# Patient Record
Sex: FEMALE | Race: BLACK OR AFRICAN AMERICAN | Employment: OTHER | URBAN - METROPOLITAN AREA
[De-identification: names, ages, dates, MRNs, and addresses within clinical notes are randomized per-mention and may not be internally consistent; named-entity substitution may affect disease eponyms.]

---

## 2017-04-17 RX ORDER — HYDROCHLOROTHIAZIDE 12.5 MG/1
CAPSULE ORAL
Qty: 90 CAP | Refills: 0 | Status: SHIPPED | OUTPATIENT
Start: 2017-04-17 | End: 2017-07-18 | Stop reason: SDUPTHER

## 2017-05-18 RX ORDER — POLYMYXIN B SULFATE AND TRIMETHOPRIM 1; 10000 MG/ML; [USP'U]/ML
1 SOLUTION OPHTHALMIC EVERY 4 HOURS
Qty: 1 BOTTLE | Refills: 0 | Status: SHIPPED | OUTPATIENT
Start: 2017-05-18 | End: 2017-05-28

## 2017-05-23 ENCOUNTER — OFFICE VISIT (OUTPATIENT)
Dept: INTERNAL MEDICINE CLINIC | Age: 57
End: 2017-05-23

## 2017-05-23 VITALS
HEIGHT: 70 IN | RESPIRATION RATE: 18 BRPM | HEART RATE: 104 BPM | WEIGHT: 205.4 LBS | TEMPERATURE: 98.5 F | DIASTOLIC BLOOD PRESSURE: 88 MMHG | SYSTOLIC BLOOD PRESSURE: 130 MMHG | BODY MASS INDEX: 29.41 KG/M2 | OXYGEN SATURATION: 95 %

## 2017-05-23 DIAGNOSIS — E78.5 DYSLIPIDEMIA: Primary | ICD-10-CM

## 2017-05-23 DIAGNOSIS — R73.03 PREDIABETES: ICD-10-CM

## 2017-05-23 DIAGNOSIS — I10 ESSENTIAL HYPERTENSION: ICD-10-CM

## 2017-05-23 RX ORDER — METFORMIN HYDROCHLORIDE 500 MG/1
500 TABLET, EXTENDED RELEASE ORAL
Qty: 30 TAB | Refills: 11 | Status: SHIPPED | OUTPATIENT
Start: 2017-05-23 | End: 2018-11-07

## 2017-05-23 RX ORDER — OMEPRAZOLE 20 MG/1
CAPSULE, DELAYED RELEASE ORAL
COMMUNITY
Start: 2017-05-11 | End: 2017-07-11 | Stop reason: ALTCHOICE

## 2017-05-23 NOTE — MR AVS SNAPSHOT
Visit Information Date & Time Provider Department Dept. Phone Encounter #  
 5/23/2017 12:00 PM Eli Guerra MD [facility] Sports Medicine and [address] [id number] Follow-up Instructions Return in about 6 months (around 11/23/2017). Follow-up and Disposition History Upcoming Health Maintenance Date Due  
 BREAST CANCER SCRN MAMMOGRAM 10/22/2016 FOBT Q 1 YEAR AGE 50-75 4/4/2017 Pneumococcal 19-64 Highest Risk (2 of 3 - PCV13) 5/18/2017 INFLUENZA AGE 9 TO ADULT 8/1/2017 PAP AKA CERVICAL CYTOLOGY 10/23/2017 DTaP/Tdap/Td series (2 - Td) 5/18/2026 Allergies as of 5/23/2017  Review Complete On: 5/23/2017 By: Eli Guerra MD  
 No Known Allergies Current Immunizations  Never Reviewed No immunizations on file. Not reviewed this visit You Were Diagnosed With   
  
 Codes Comments Dyslipidemia    -  Primary ICD-10-CM: E78.5 ICD-9-CM: 272.4 Essential hypertension     ICD-10-CM: I10 
ICD-9-CM: 401.9 Prediabetes     ICD-10-CM: R73.03 
ICD-9-CM: 790.29 Vitals BP Pulse Temp Resp Height(growth percentile) Weight(growth percentile) 130/88 (BP 1 Location: Left arm, BP Patient Position: Sitting) (!) 104 98.5 °F (36.9 °C) (Oral) 18 5' 10\" (1.778 m) 205 lb 6.4 oz (93.2 kg) SpO2 BMI OB Status Smoking Status 95% 29.47 kg/m2 Postmenopausal Never Smoker BMI and BSA Data Body Mass Index Body Surface Area  
 29.47 kg/m 2 2.15 m 2 Preferred Pharmacy Pharmacy Name Phone [facility], [facility] [address] [phone] Your Updated Medication List  
  
   
This list is accurate as of: 5/23/17  1:50 PM.  Always use your most recent med list.  
  
  
  
  
 albuterol 90 mcg/actuation inhaler Commonly known as:  VENTOLIN HFA Take 2 Puffs by inhalation every four (4) hours as needed for Wheezing. desonide 0.05 % topical lotion Commonly known as:  Milady Pair Apply  to affected area two (2) times a day. hydroCHLOROthiazide 12.5 mg capsule Commonly known as:  Nowata Lukes TAKE ONE CAPSULE BY MOUTH ONCE DAILY  
  
 metFORMIN  mg tablet Commonly known as:  GLUCOPHAGE XR Take 1 Tab by mouth daily (with dinner). mometasone 50 mcg/actuation nasal spray Commonly known as:  NASONEX  
2 Sprays by Both Nostrils route daily. NexIUM 40 mg capsule Generic drug:  esomeprazole  
  
 omeprazole 20 mg capsule Commonly known as:  PRILOSEC  
  
 pantoprazole 40 mg tablet Commonly known as:  PROTONIX Take 1 Tab by mouth daily. sucralfate 1 gram tablet Commonly known as:  CARAFATE  
  
 trimethoprim-polymyxin b ophthalmic solution Commonly known as:  POLYTRIM Administer 1 Drop to both eyes every four (4) hours for 10 days. Prescriptions Sent to Pharmacy Refills  
 metFORMIN ER (GLUCOPHAGE XR) 500 mg tablet 11 Sig: Take 1 Tab by mouth daily (with dinner). Class: Normal  
 Pharmacy: [address] [place] [address] Ph #: [phone] Route: Oral  
  
We Performed the Following CBC WITH AUTOMATED DIFF [06085 CPT(R)] HEMOGLOBIN A1C WITH EAG [99094 CPT(R)] LIPID PANEL [24586 CPT(R)] METABOLIC PANEL, COMPREHENSIVE [92978 CPT(R)] OCCULT BLOOD, IMMUNOASSAY (FIT) [id number] CPT(R)] KS COLLECTION VENOUS BLOOD,VENIPUNCTURE [id number] CPT(R)] TSH 3RD GENERATION [47986 CPT(R)] URINALYSIS W/ RFLX MICROSCOPIC [22844 CPT(R)] Follow-up Instructions Return in about 6 months (around 11/23/2017). Introducing [facility] & HEALTH SERVICES! [facility] introduces [employer] patient portal. Now you can access parts of your medical record, email your doctor's office, and request medication refills online. 1. In your internet browser, go to [email or web address] 2. Click on the First Time User? Click Here link in the Sign In box. You will see the New Member Sign Up page. 3. Enter your [employer] Access Code exactly as it appears below. You will not need to use this code after youve completed the sign-up process. If you do not sign up before the expiration date, you must request a new code. · [employer] Access Code: [email or web address] Expires: 8/21/2017  1:21 PM 
 
4. Enter the last four digits of your Social Security Number (xxxx) and Date of Birth (mm/dd/yyyy) as indicated and click Submit. You will be taken to the next sign-up page. 5. Create a [employer] ID. This will be your [employer] login ID and cannot be changed, so think of one that is secure and easy to remember. 6. Create a [employer] password. You can change your password at any time. 7. Enter your Password Reset Question and Answer. This can be used at a later time if you forget your password. 8. Enter your e-mail address. You will receive e-mail notification when new information is available in [address] 9. Click Sign Up. You can now view and download portions of your medical record. 10. Click the Download Summary menu link to download a portable copy of your medical information. If you have questions, please visit the Frequently Asked Questions section of the [employer] website. Remember, [employer] is NOT to be used for urgent needs. For medical emergencies, dial 911. Now available from your iPhone and Android! Please provide this summary of care documentation to your next provider. Your primary care clinician is listed as Theo Mcneil. If you have any questions after today's visit, please call [phone].

## 2017-05-23 NOTE — PROGRESS NOTES
SPORTS MEDICINE AND PRIMARY CARE  Job Natarajan MD, [address] [address]  Phone:  [phone]  Fax: [phone]      Chief Complaint   Patient presents with    Follow-up     Elevated blood sugar level          SUBECTIVE:    Annalise Healy is a 61 y.o. female Patient returns today ambulatory, alert and appropriate and has the capacity to give an accurate history. She has a known history of primary hypertension, left breast cancer, dyslipidemia, steatosis of the liver, and is seen for evaluation. Since we last saw her she called us about redness to her left eye. We gave her Polytrim. This is the fifth day and she does not see the redness anymore. Patient states that she notes that her glucose is always up and wants to get something to suppress her appetite and to lower her glucose. We recall that a year ago she had a hemoglobin A1C which was 6.4 which placed her in the category of prediabetes. She tells us her blood sugars are greater than 200 consistently that would place her into the category of diabetes which we will confirm with a hemoglobin A1C today. Patient reminds me that she has 9 siblings and all of them except one have diabetes. She wants something done. Patient is seen for evaluation. Current Outpatient Prescriptions   Medication Sig Dispense Refill    omeprazole (PRILOSEC) 20 mg capsule       metFORMIN ER (GLUCOPHAGE XR) 500 mg tablet Take 1 Tab by mouth daily (with dinner). 30 Tab 11    trimethoprim-polymyxin b (POLYTRIM) ophthalmic solution Administer 1 Drop to both eyes every four (4) hours for 10 days. 1 Bottle 0    hydroCHLOROthiazide (MICROZIDE) 12.5 mg capsule TAKE ONE CAPSULE BY MOUTH ONCE DAILY 90 Cap 0    albuterol (VENTOLIN HFA) 90 mcg/actuation inhaler Take 2 Puffs by inhalation every four (4) hours as needed for Wheezing. 1 Inhaler 11    mometasone (NASONEX) 50 mcg/actuation nasal spray 2 Sprays by Both Nostrils route daily.  1 Container 11    sucralfate (CARAFATE) 1 gram tablet       desonide (TRIDESILON) 0.05 % topical lotion Apply  to affected area two (2) times a day. 59 mL 0    pantoprazole (PROTONIX) 40 mg tablet Take 1 Tab by mouth daily. 30 Tab 11    NEXIUM 40 mg capsule        Past Medical History:   Diagnosis Date    Breast cancer, left ([address]     followed by gyn md cassius    Dyslipidemia     Hypertension     Prediabetes 2016    S/P colonoscopy 09    md mina hemorrhoids    S/P MUKNUD-BSO (total abdominal hysterectomy and bilateral salpingo-oophorectomy)     Sinus congestion     Spider bite     Steatosis of liver 16     Past Surgical History:   Procedure Laterality Date    HX GYN       No Known Allergies    REVIEW OF SYSTEMS:   Urgency with small amounts. Social History     Social History    Marital status:      Spouse name: N/A    Number of children: N/A    Years of education: N/A     Social History Main Topics    Smoking status: Never Smoker    Smokeless tobacco: None    Alcohol use 0.5 oz/week     1 Glasses of wine per week    Drug use: None    Sexual activity: Not Asked     Other Topics Concern    None     Social History Narrative    Family History: Mother: , Jozef Dense: , Bee stingSister(s):   yrs, Breast Cancer at 42Brother(s):    , Heart Problems1 brother(s) , 7 sister(s) - healthy. 1 son(s) , 1 daughter(s) - healthy. Social History: Alcohol Use Patient does not use alcohol. Smoking Status Patient is a never smoker. Caffeine: coffee, tea. Marital Status:    . Lives w ith: spouse Savi Feldman.  Occupation/W ork: office w ork.   r  Family History   Problem Relation Age of Onset    Diabetes Mother        OBJECTIVE:  Visit Vitals    /88 (BP 1 Location: Left arm, BP Patient Position: Sitting)    Pulse (!) 104    Temp 98.5 °F (36.9 °C) (Oral)    Resp 18    Ht 5' 10\" (1.778 m)    Wt 205 lb 6.4 oz (93.2 kg)    SpO2 95%    BMI 29.47 kg/m2     ENT: perrla,  eom intact  NECK: supple. Thyroid normal  CHEST: clear to ascultation and percussion   HEART: regular rate and rhythm  ABD: soft, bowel sounds active  EXTREMITIES: no edema, pulse 1+     No visits with results within 3 Month(s) from this visit. Latest known visit with results is:    Abstract on 07/27/2016   Component Date Value Ref Range Status    LDL-C, External 05/23/2016 178   Final    Creatinine, External 05/23/2016 1.10   Final          ASSESSMENT:  1. Dyslipidemia    2. Essential hypertension    3. Prediabetes      We do not disagree with the patient. We note that her BMI is now 20.47 which reflects a seven pound weight gain since she was last seen. I think the Metformin will help in decreasing her appetite and also stabilize her blood sugar. Hemoglobin A1C will be checked today for blood sugar control. We will also check her metabolic status. Blood pressure control is at goal.      We suggest she return to see us eleuterio bout six months, sooner if she has any problems. We will also give her a glucose machine today. PLAN:  .  Orders Placed This Encounter    OCCULT BLOOD, IMMUNOASSAY (FIT)    URINALYSIS W/ RFLX MICROSCOPIC    CBC WITH AUTOMATED DIFF    METABOLIC PANEL, COMPREHENSIVE    LIPID PANEL    TSH 3RD GENERATION    HEMOGLOBIN A1C WITH EAG    omeprazole (PRILOSEC) 20 mg capsule    metFORMIN ER (GLUCOPHAGE XR) 500 mg tablet       Follow-up Disposition:  Return in about 6 months (around 11/23/2017). ATTENTION:   This medical record was transcribed using an electronic medical records system. Although proofread, it may and can contain electronic and spelling errors. Other human spelling and other errors may be present. Corrections may be executed at a later time. Please feel free to contact us for any clarifications as needed.

## 2017-05-23 NOTE — PROGRESS NOTES
1. Have you been to the ER, urgent care clinic since your last visit? Hospitalized since your last visit? Yes [facility]    2. Have you seen or consulted any other health care providers outside of the [address] [place] [address] since your last visit? Include any pap smears or colon screening.  Yes Reason for visit: Abdominal issues

## 2017-05-24 LAB
ALBUMIN SERPL-MCNC: 4.4 G/DL (ref 3.5–5.5)
ALBUMIN/GLOB SERPL: 1.5 {RATIO} (ref 1.2–2.2)
ALP SERPL-CCNC: 115 IU/L (ref 39–117)
ALT SERPL-CCNC: 26 IU/L (ref 0–32)
APPEARANCE UR: CLEAR
AST SERPL-CCNC: 25 IU/L (ref 0–40)
BASOPHILS # BLD AUTO: 0 X10E3/UL (ref 0–0.2)
BASOPHILS NFR BLD AUTO: 0 %
BILIRUB SERPL-MCNC: 0.4 MG/DL (ref 0–1.2)
BILIRUB UR QL STRIP: NEGATIVE
BUN SERPL-MCNC: 15 MG/DL (ref 6–24)
BUN/CREAT SERPL: 17 (ref 9–23)
CALCIUM SERPL-MCNC: 9.5 MG/DL (ref 8.7–10.2)
CHLORIDE SERPL-SCNC: 97 MMOL/L (ref 96–106)
CHOLEST SERPL-MCNC: 262 MG/DL (ref 100–199)
CO2 SERPL-SCNC: 22 MMOL/L (ref 18–29)
COLOR UR: YELLOW
CREAT SERPL-MCNC: 0.88 MG/DL (ref 0.57–1)
EOSINOPHIL # BLD AUTO: 0.1 X10E3/UL (ref 0–0.4)
EOSINOPHIL NFR BLD AUTO: 2 %
ERYTHROCYTE [DISTWIDTH] IN BLOOD BY AUTOMATED COUNT: 17.2 % (ref 12.3–15.4)
EST. AVERAGE GLUCOSE BLD GHB EST-MCNC: 126 MG/DL
GLOBULIN SER CALC-MCNC: 2.9 G/DL (ref 1.5–4.5)
GLUCOSE SERPL-MCNC: 88 MG/DL (ref 65–99)
GLUCOSE UR QL: NEGATIVE
HBA1C MFR BLD: 6 % (ref 4.8–5.6)
HCT VFR BLD AUTO: 41.3 % (ref 34–46.6)
HDLC SERPL-MCNC: 55 MG/DL
HGB BLD-MCNC: 14.4 G/DL (ref 11.1–15.9)
HGB UR QL STRIP: NEGATIVE
IMM GRANULOCYTES # BLD: 0 X10E3/UL (ref 0–0.1)
IMM GRANULOCYTES NFR BLD: 0 %
KETONES UR QL STRIP: NEGATIVE
LDLC SERPL CALC-MCNC: 186 MG/DL (ref 0–99)
LEUKOCYTE ESTERASE UR QL STRIP: NEGATIVE
LYMPHOCYTES # BLD AUTO: 3.2 X10E3/UL (ref 0.7–3.1)
LYMPHOCYTES NFR BLD AUTO: 40 %
MCH RBC QN AUTO: 26.3 PG (ref 26.6–33)
MCHC RBC AUTO-ENTMCNC: 34.9 G/DL (ref 31.5–35.7)
MCV RBC AUTO: 75 FL (ref 79–97)
MICRO URNS: NORMAL
MONOCYTES # BLD AUTO: 0.5 X10E3/UL (ref 0.1–0.9)
MONOCYTES NFR BLD AUTO: 6 %
NEUTROPHILS # BLD AUTO: 4.3 X10E3/UL (ref 1.4–7)
NEUTROPHILS NFR BLD AUTO: 52 %
NITRITE UR QL STRIP: NEGATIVE
PH UR STRIP: 6.5 [PH] (ref 5–7.5)
PLATELET # BLD AUTO: 281 X10E3/UL (ref 150–379)
POTASSIUM SERPL-SCNC: 4 MMOL/L (ref 3.5–5.2)
PROT SERPL-MCNC: 7.3 G/DL (ref 6–8.5)
PROT UR QL STRIP: NEGATIVE
RBC # BLD AUTO: 5.48 X10E6/UL (ref 3.77–5.28)
SODIUM SERPL-SCNC: 139 MMOL/L (ref 134–144)
SP GR UR: 1.02 (ref 1–1.03)
TRIGL SERPL-MCNC: 107 MG/DL (ref 0–149)
TSH SERPL DL<=0.005 MIU/L-ACNC: 3.45 UIU/ML (ref 0.45–4.5)
UROBILINOGEN UR STRIP-MCNC: 0.2 MG/DL (ref 0.2–1)
VLDLC SERPL CALC-MCNC: 21 MG/DL (ref 5–40)
WBC # BLD AUTO: 8.2 X10E3/UL (ref 3.4–10.8)

## 2017-05-24 RX ORDER — ATORVASTATIN CALCIUM 40 MG/1
40 TABLET, FILM COATED ORAL DAILY
Qty: 30 TAB | Refills: 11 | Status: SHIPPED | OUTPATIENT
Start: 2017-05-24 | End: 2018-05-11 | Stop reason: SDUPTHER

## 2017-06-02 PROBLEM — Z12.11 ENCOUNTER FOR HEMOCCULT SCREENING: Status: ACTIVE | Noted: 2017-06-02

## 2017-06-02 LAB — HEMOCCULT STL QL IA: NEGATIVE

## 2017-07-11 ENCOUNTER — OFFICE VISIT (OUTPATIENT)
Dept: INTERNAL MEDICINE CLINIC | Age: 57
End: 2017-07-11

## 2017-07-11 VITALS
HEIGHT: 70 IN | TEMPERATURE: 97.9 F | BODY MASS INDEX: 29.35 KG/M2 | DIASTOLIC BLOOD PRESSURE: 89 MMHG | HEART RATE: 58 BPM | SYSTOLIC BLOOD PRESSURE: 126 MMHG | RESPIRATION RATE: 18 BRPM | WEIGHT: 205 LBS | OXYGEN SATURATION: 96 %

## 2017-07-11 DIAGNOSIS — K76.0 STEATOSIS OF LIVER: ICD-10-CM

## 2017-07-11 DIAGNOSIS — I10 ESSENTIAL HYPERTENSION: ICD-10-CM

## 2017-07-11 DIAGNOSIS — J20.9 ACUTE BRONCHITIS, UNSPECIFIED ORGANISM: ICD-10-CM

## 2017-07-11 DIAGNOSIS — R07.9 CHEST PAIN, UNSPECIFIED TYPE: Primary | ICD-10-CM

## 2017-07-11 DIAGNOSIS — R05.9 COUGH: ICD-10-CM

## 2017-07-11 RX ORDER — ALBUTEROL SULFATE 90 UG/1
2 AEROSOL, METERED RESPIRATORY (INHALATION)
Qty: 1 INHALER | Refills: 11 | Status: SHIPPED | OUTPATIENT
Start: 2017-07-11 | End: 2018-10-02 | Stop reason: SDUPTHER

## 2017-07-11 RX ORDER — AZITHROMYCIN 250 MG/1
250 TABLET, FILM COATED ORAL SEE ADMIN INSTRUCTIONS
Qty: 6 TAB | Refills: 1 | Status: SHIPPED | OUTPATIENT
Start: 2017-07-11 | End: 2017-12-04 | Stop reason: SDUPTHER

## 2017-07-11 RX ORDER — CODEINE PHOSPHATE AND GUAIFENESIN 10; 100 MG/5ML; MG/5ML
5 SOLUTION ORAL
Qty: 180 ML | Refills: 1 | Status: SHIPPED | OUTPATIENT
Start: 2017-07-11 | End: 2017-09-19 | Stop reason: ALTCHOICE

## 2017-07-11 NOTE — MR AVS SNAPSHOT
Visit Information Date & Time Provider Department Dept. Phone Encounter #  
 7/11/2017  4:00 PM Moi Goss MD SPORTS MED AND PRIMARY CARE - [facility] [phone] [id number] Follow-up Instructions Return in about 10 months (around 5/11/2018). Follow-up and Disposition History Your Appointments 11/14/2017  9:15 AM  
Any with Moi Goss MD  
[address] [place] [address] and Primary Care [address] [place] [address]) Appt Note: 6 month f/u htn  
 [address]  
  
   
 [address] [id number] Upcoming Health Maintenance Date Due  
 BREAST CANCER SCRN MAMMOGRAM 10/22/2016 PAP AKA CERVICAL CYTOLOGY 10/23/2017 INFLUENZA AGE 9 TO ADULT 8/1/2017 FOBT Q 1 YEAR AGE 50-75 5/27/2018 Pneumococcal 19-64 Highest Risk (3 of 3 - PCV13) 7/11/2018 DTaP/Tdap/Td series (2 - Td) 5/18/2026 Allergies as of 7/11/2017  Review Complete On: 7/11/2017 By: Moi Goss MD  
 No Known Allergies Current Immunizations  Never Reviewed No immunizations on file. Not reviewed this visit You Were Diagnosed With   
  
 Codes Comments Chest pain, unspecified type    -  Primary ICD-10-CM: R07.9 ICD-9-CM: 786.50 Essential hypertension     ICD-10-CM: I10 
ICD-9-CM: 401.9 Steatosis of liver     ICD-10-CM: K76.0 ICD-9-CM: 571.8 Acute bronchitis, unspecified organism     ICD-10-CM: J20.9 ICD-9-CM: 466.0 Cough     ICD-10-CM: R05 ICD-9-CM: 337. 2 Vitals BP Pulse Temp Resp Height(growth percentile) Weight(growth percentile) 126/89 (BP 1 Location: Right arm, BP Patient Position: Sitting) (!) 58 97.9 °F (36.6 °C) (Oral) 18 5' 10\" (1.778 m) 205 lb (93 kg) SpO2 BMI OB Status Smoking Status 96% 29.41 kg/m2 Postmenopausal Never Smoker Vitals History BMI and BSA Data Body Mass Index Body Surface Area  
 29.41 kg/m 2 2.14 m 2 Preferred Pharmacy Pharmacy Name Phone [facility], [address] Gissel Bae [phone] Your Updated Medication List  
  
   
This list is accurate as of: 7/11/17  5:04 PM.  Always use your most recent med list.  
  
  
  
  
 albuterol 90 mcg/actuation inhaler Commonly known as:  VENTOLIN HFA Take 2 Puffs by inhalation every four (4) hours as needed for Wheezing. atorvastatin 40 mg tablet Commonly known as:  LIPITOR Take 1 Tab by mouth daily. azithromycin 250 mg tablet Commonly known as:  Galdino Gist Take 1 Tab by mouth See Admin Instructions for 5 days. desonide 0.05 % topical lotion Commonly known as:  Dorthula Newcomalhaji Apply  to affected area two (2) times a day. glucose blood VI test strips strip Commonly known as:  CONTOUR NEXT STRIPS  
qd  
  
 guaiFENesin-codeine 100-10 mg/5 mL solution Commonly known as:  ROBITUSSIN AC Take 5 mL by mouth four (4) times daily as needed for Cough. Max Daily Amount: 20 mL.  
  
 hydroCHLOROthiazide 12.5 mg capsule Commonly known as:  Jerilyn Stone TAKE ONE CAPSULE BY MOUTH ONCE DAILY  
  
 metFORMIN  mg tablet Commonly known as:  GLUCOPHAGE XR Take 1 Tab by mouth daily (with dinner). mometasone 50 mcg/actuation nasal spray Commonly known as:  NASONEX  
2 Sprays by Both Nostrils route daily. Prescriptions Printed Refills  
 guaiFENesin-codeine (ROBITUSSIN AC) 100-10 mg/5 mL solution 1 Sig: Take 5 mL by mouth four (4) times daily as needed for Cough. Max Daily Amount: 20 mL. Class: Print Route: Oral  
  
Prescriptions Sent to Pharmacy Refills  
 albuterol (VENTOLIN HFA) 90 mcg/actuation inhaler 11 Sig: Take 2 Puffs by inhalation every four (4) hours as needed for Wheezing. Class: Normal  
 Pharmacy: [address], [address] [place] [address] Ph #: [phone]  Route: Inhalation  
 azithromycin (ZITHROMAX) 250 mg tablet 1  
 Sig: Take 1 Tab by mouth See Admin Instructions for 5 days. Class: Normal  
 Pharmacy: [address] [facility], [address] [place] [address] Ph #: [phone] Route: Oral  
  
We Performed the Following AMB POC EKG ROUTINE W/ 12 LEADS, INTER & REP [23322 CPT(R)] PAIN MGMT PANEL W/REFL, UR [[id number] Custom] Follow-up Instructions Return in about 10 months (around 5/11/2018). To-Do List   
 07/11/2017 ECHO:  ECHO EXERCISE STRESS Introducing [facility] & HEALTH SERVICES! Dear Saleem Medina: Thank you for requesting a [employer] account. Our records indicate that you already have an active [employer] account. You can access your account anytime at [email or web address] Did you know that you can access your hospital and ER discharge instructions at any time in [employer]? You can also review all of your test results from your hospital stay or ER visit. Additional Information If you have questions, please visit the Frequently Asked Questions section of the [employer] website at [email or web address] Remember, [employer] is NOT to be used for urgent needs. For medical emergencies, dial 911. Now available from your iPhone and Android! Please provide this summary of care documentation to your next provider. Your primary care clinician is listed as Yogesh Juarez. If you have any questions after today's visit, please call [phone].

## 2017-07-11 NOTE — PROGRESS NOTES
1. Have you been to the ER, urgent care clinic since your last visit? Hospitalized since your last visit? No    2. Have you seen or consulted any other health care providers outside of the [address] [place] [address] since your last visit? Include any pap smears or colon screening.  No

## 2017-07-11 NOTE — PROGRESS NOTES
SPORTS MEDICINE AND PRIMARY CARE  Jesse Harkins MD, [address] [address]  Phone:  [phone]  Fax: [phone]       Chief Complaint   Patient presents with    Cold    Cough    Chills    Fever    Shoulder Pain   . SUBJECTIVE:    Hussein Michelle is a 61 y.o. female The patient returns today, alert, appropriate, and ambulatory, and has  e capacity to give an accurate history. She has a known history of primary hypertension and prediabetes, and steatosis of the liver. Over the past week or two she has had a cough productive of mucoid purulent sputum. The cough has been persistent and just not feeling well. She also complains of some chest discomfort. She describes it as a pressure type sensation without association of shortness of breath or diaphoresis, not necessarily activity related. That has also been going on for a couple of weeks. Other new complaints denied. Patient seen for evaluation. Current Outpatient Prescriptions   Medication Sig Dispense Refill    albuterol (VENTOLIN HFA) 90 mcg/actuation inhaler Take 2 Puffs by inhalation every four (4) hours as needed for Wheezing. 1 Inhaler 11    azithromycin (ZITHROMAX) 250 mg tablet Take 1 Tab by mouth See Admin Instructions for 5 days. 6 Tab 1    guaiFENesin-codeine (ROBITUSSIN AC) 100-10 mg/5 mL solution Take 5 mL by mouth four (4) times daily as needed for Cough. Max Daily Amount: 20 mL. 180 mL 1    metFORMIN ER (GLUCOPHAGE XR) 500 mg tablet Take 1 Tab by mouth daily (with dinner). 30 Tab 11    glucose blood VI test strips (CONTOUR NEXT STRIPS) strip qd 100 Strip 11    hydroCHLOROthiazide (MICROZIDE) 12.5 mg capsule TAKE ONE CAPSULE BY MOUTH ONCE DAILY 90 Cap 0    mometasone (NASONEX) 50 mcg/actuation nasal spray 2 Sprays by Both Nostrils route daily. 1 Container 11    desonide (TRIDESILON) 0.05 % topical lotion Apply  to affected area two (2) times a day.  59 mL 0    atorvastatin (LIPITOR) 40 mg tablet Take 1 Tab by mouth daily. 27 Tab 11     Past Medical History:   Diagnosis Date    Acute bronchitis     Breast cancer, left ([address]     followed by gyn md cassius    Chest pain 2017    Dyslipidemia     Encounter for Hemoccult screening 2017    neg    Hypertension     Prediabetes 2016    S/P colonoscopy 09    md mina hemorrhoids    S/P MUKUND-BSO (total abdominal hysterectomy and bilateral salpingo-oophorectomy)     Sinus congestion     Spider bite     Steatosis of liver 16     Past Surgical History:   Procedure Laterality Date    HX GYN       No Known Allergies      REVIEW OF SYSTEMS:  General: negative for - chills or fever  ENT: negative for - headaches, nasal congestion or tinnitus  Respiratory: negative for - cough, hemoptysis, shortness of breath or wheezing  Cardiovascular : negative for - chest pain, edema, palpitations or shortness of breath  Gastrointestinal: negative for - abdominal pain, blood in stools, heartburn or nausea/vomiting  Genito-Urinary: no dysuria, trouble voiding, or hematuria  Musculoskeletal: negative for - gait disturbance, joint pain, joint stiffness or joint swelling  Neurological: no TIA or stroke symptoms  Hematologic: no bruises, no bleeding, no swollen glands  Integument: no lumps, mole changes, nail changes or rash  Endocrine: no malaise/lethargy or unexpected weight changes      Social History     Social History    Marital status:      Spouse name: N/A    Number of children: N/A    Years of education: N/A     Social History Main Topics    Smoking status: Never Smoker    Smokeless tobacco: Never Used    Alcohol use 0.5 oz/week     1 Glasses of wine per week    Drug use: None    Sexual activity: Not Asked     Other Topics Concern    None     Social History Narrative    Family History:  Mother: , Claudine Mart: , Bee stingSister(s):   yrs, Breast Cancer at 42Brother(s): , Heart Problems1 brother(s) , 7 sister(s) - healthy. 1 son(s) , 1 daughter(s) - healthy. Social History: Alcohol Use Patient does not use alcohol. Smoking Status Patient is a never smoker. Caffeine: coffee, tea. Marital Status:    . Lives w ith: spouse Kristal rUiarte. Occupation/W ork: office w ork. Medical History: Hypertension, Left Breast papilloma. Gyn History: Last mammogram date 10/28/2014. Last pap date 10/22/2012. Hysterectomy Date . OB History: Total pregnancies 2. Full term delivery (>37 w eeks) 2. Live births 2. C section(s) 2. Pregnancy # 1: , Girl, 11. Pregnancy # 2: Repeat C/S, boy, . Surgical History: hysterectomy, total abdominal w ith BSO , Left w rist surgery , colonoscopy [place]  and , left lumpectomy . Family History   Problem Relation Age of Onset    Diabetes Mother        OBJECTIVE:    Visit Vitals    /89 (BP 1 Location: Right arm, BP Patient Position: Sitting)    Pulse (!) 58    Temp 97.9 °F (36.6 °C) (Oral)    Resp 18    Ht 5' 10\" (1.778 m)    Wt 205 lb (93 kg)    SpO2 96%    BMI 29.41 kg/m2     CONSTITUTIONAL: well , well nourished, appears age appropriate  EYES: perrla, eom intact  ENMT:moist mucous membranes, pharynx clear  NECK: supple. Thyroid normal  RESPIRATORY: Chest: clear bilaterally   CARDIOVASCULAR: Heart: regular rate and rhythm  GASTROINTESTINAL: Abdomen: soft, bowel sounds active  HEMATOLOGIC: no pathological lymph nodes palpated  MUSCULOSKELETAL: Extremities: no edema, pulse 1+   INTEGUMENT: No unusual rashes or suspicious skin lesions noted. Nails appear normal.  NEUROLOGIC: non-focal exam   MENTAL STATUS: alert and oriented, appropriate affect           ASSESSMENT:  1. Chest pain, unspecified type    2. Essential hypertension    3. Steatosis of liver    4. Acute bronchitis, unspecified organism    5. Cough      The patient has acute bronchitis for which she has no bronchospastic component. Will use Robitussin AC for the cough, and give her Z-Niall with a refill to help resolve the infectious component of bronchitis. I am concerned about the chest discomfort. Will ask for an EKG today to be sure there is no change from the previous EKG, and request a stress echocardiogram.      Her BMI remains elevated, but fortunately it has not changed significantly from the last visit. She will return to the office when scheduled. PLAN:  .  Orders Placed This Encounter    PAIN MGMT PANEL W/REFL, UR    AMB POC EKG ROUTINE W/ 12 LEADS, INTER & REP    ECHO EXERCISE STRESS    albuterol (VENTOLIN HFA) 90 mcg/actuation inhaler    azithromycin (ZITHROMAX) 250 mg tablet    guaiFENesin-codeine (ROBITUSSIN AC) 100-10 mg/5 mL solution       Follow-up Disposition:  Return in about 10 months (around 5/11/2018). ATTENTION:   This medical record was transcribed using an electronic medical records system. Although proofread, it may and can contain electronic and spelling errors. Other human spelling and other errors may be present. Corrections may be executed at a later time. Please feel free to contact us for any clarifications as needed.

## 2017-07-13 LAB
AMPHETAMINES UR QL SCN: NEGATIVE NG/ML
BARBITURATES UR QL SCN: NEGATIVE NG/ML
BENZODIAZ UR QL SCN: NEGATIVE NG/ML
BZE UR QL SCN: NEGATIVE NG/ML
CANNABINOIDS UR QL SCN: NEGATIVE NG/ML
CREAT UR-MCNC: 129.8 MG/DL (ref 20–300)
FENTANYL+NORFENTANYL UR QL SCN: NEGATIVE PG/ML
MEPERIDINE UR QL: NEGATIVE NG/ML
METHADONE UR QL SCN: NEGATIVE NG/ML
OPIATES UR QL SCN: NEGATIVE NG/ML
OXYCODONE+OXYMORPHONE UR QL SCN: NEGATIVE NG/ML
PCP UR QL: NEGATIVE NG/ML
PH UR: 5.9 [PH] (ref 4.5–8.9)
PLEASE NOTE:, 733157: NORMAL
PROPOXYPH UR QL SCN: NEGATIVE NG/ML
SP GR UR: 1.01
TRAMADOL UR QL SCN: NEGATIVE NG/ML

## 2017-07-18 RX ORDER — HYDROCHLOROTHIAZIDE 12.5 MG/1
CAPSULE ORAL
Qty: 90 CAP | Refills: 3 | Status: SHIPPED | OUTPATIENT
Start: 2017-07-18 | End: 2018-07-16 | Stop reason: SDUPTHER

## 2017-07-19 ENCOUNTER — HOSPITAL ENCOUNTER (OUTPATIENT)
Dept: NON INVASIVE DIAGNOSTICS | Age: 57
Discharge: HOME | End: 2017-07-19
Attending: INTERNAL MEDICINE
Payer: COMMERCIAL

## 2017-07-19 DIAGNOSIS — R07.9 CHEST PAIN, UNSPECIFIED TYPE: ICD-10-CM

## 2017-07-19 LAB
ATTENDING PHYSICIAN, CST07: NORMAL
DIAGNOSIS, 93000: NORMAL
DUKE TM SCORE RESULT, CST14: NORMAL
DUKE TREADMILL SCORE, CST13: NORMAL
ECG INTERP BEFORE EX, CST11: NORMAL
ECG INTERP DURING EX, CST12: NORMAL
FUNCTIONAL CAPACITY, CST17: NORMAL
KNOWN CARDIAC CONDITION, CST08: NORMAL
MAX. DIASTOLIC BP, CST04: 80 MMHG
MAX. HEART RATE, CST05: 155 BPM
MAX. SYSTOLIC BP, CST03: 170 MMHG
OVERALL BP RESPONSE TO EXERCISE, CST16: NORMAL
OVERALL HR RESPONSE TO EXERCISE, CST15: NORMAL
PEAK EX METS, CST10: 6.3 METS
PROTOCOL NAME, CST01: NORMAL
TEST INDICATION, CST09: NORMAL

## 2017-07-19 PROCEDURE — 93351 STRESS TTE COMPLETE: CPT

## 2017-09-15 RX ORDER — MOMETASONE FUROATE 50 MCG
AEROSOL, SPRAY WITH PUMP (GRAM) NASAL
Qty: 17 CONTAINER | Refills: 11 | Status: SHIPPED | OUTPATIENT
Start: 2017-09-15 | End: 2019-03-07 | Stop reason: SDUPTHER

## 2017-09-19 ENCOUNTER — OFFICE VISIT (OUTPATIENT)
Dept: INTERNAL MEDICINE CLINIC | Age: 57
End: 2017-09-19

## 2017-09-19 VITALS
HEIGHT: 70 IN | SYSTOLIC BLOOD PRESSURE: 135 MMHG | HEART RATE: 76 BPM | RESPIRATION RATE: 18 BRPM | WEIGHT: 209.4 LBS | DIASTOLIC BLOOD PRESSURE: 93 MMHG | TEMPERATURE: 97.7 F | OXYGEN SATURATION: 96 % | BODY MASS INDEX: 29.98 KG/M2

## 2017-09-19 DIAGNOSIS — R10.9 ABDOMINAL WALL PAIN: ICD-10-CM

## 2017-09-19 DIAGNOSIS — K76.0 STEATOSIS OF LIVER: ICD-10-CM

## 2017-09-19 DIAGNOSIS — R73.03 PREDIABETES: ICD-10-CM

## 2017-09-19 DIAGNOSIS — I10 ESSENTIAL HYPERTENSION: Primary | ICD-10-CM

## 2017-09-19 NOTE — MR AVS SNAPSHOT
Visit Information Date & Time Provider Department Dept. Phone Encounter #  
 9/19/2017 10:45 AM Rosa Hwang MD [facility] and [place] [id number] Follow-up Instructions Return in about 3 months (around 12/19/2017). Follow-up and Disposition History Your Appointments 11/14/2017  9:15 AM  
Any with Rosa Hwang MD  
[address] [facility] [address]) Appt Note: 6 month f/u htn  
 [address] [facility]  
  
   
 [id number] 5/1/2018  9:00 AM  
Any with Rosa Hwang MD  
[facility] [address]) Appt Note: 10 month f/u  
 [address]  
[phone] Upcoming Health Maintenance Date Due  
 BREAST CANCER SCRN MAMMOGRAM 10/22/2016 PAP AKA CERVICAL CYTOLOGY 10/23/2017 FOBT Q 1 YEAR AGE 50-75 5/27/2018 Pneumococcal 19-64 Highest Risk (3 of 3 - PCV13) 7/11/2018 DTaP/Tdap/Td series (2 - Td) 5/18/2026 Allergies as of 9/19/2017  Review Complete On: 9/19/2017 By: Rosa Hwang MD  
 No Known Allergies Current Immunizations  Never Reviewed No immunizations on file. Not reviewed this visit You Were Diagnosed With   
  
 Codes Comments Essential hypertension    -  Primary ICD-10-CM: I10 
ICD-9-CM: 401.9 Abdominal wall pain     ICD-10-CM: R10.9 ICD-9-CM: 789.00 Prediabetes     ICD-10-CM: R73.03 
ICD-9-CM: 790.29 Steatosis of liver     ICD-10-CM: K76.0 ICD-9-CM: 571.8 Vitals BP Pulse Temp Resp Height(growth percentile) Weight(growth percentile) (!) 135/93 (BP 1 Location: Right arm, BP Patient Position: Sitting) 76 97.7 °F (36.5 °C) (Oral) 18 5' 10\" (1.778 m) 209 lb 6.4 oz (95 kg) SpO2 BMI OB Status Smoking Status 96% 30.05 kg/m2 Postmenopausal Never Smoker BMI and BSA Data Body Mass Index Body Surface Area 30.05 kg/m 2 2.17 m 2 Preferred Pharmacy Pharmacy Name Phone [facility] [address] [place], [place] - [address] [place] [address] Lisa Chavis [phone] Your Updated Medication List  
  
   
This list is accurate as of: 9/19/17  1:05 PM.  Always use your most recent med list.  
  
  
  
  
 albuterol 90 mcg/actuation inhaler Commonly known as:  VENTOLIN HFA Take 2 Puffs by inhalation every four (4) hours as needed for Wheezing. atorvastatin 40 mg tablet Commonly known as:  LIPITOR Take 1 Tab by mouth daily. desonide 0.05 % topical lotion Commonly known as:  Gerianne Jeff Davis Apply  to affected area two (2) times a day. glucose blood VI test strips strip Commonly known as:  CONTOUR NEXT STRIPS  
qd  
  
 hydroCHLOROthiazide 12.5 mg capsule Commonly known as:  [place] Sinks TAKE ONE CAPSULE BY MOUTH ONCE DAILY  
  
 metFORMIN  mg tablet Commonly known as:  GLUCOPHAGE XR Take 1 Tab by mouth daily (with dinner). NASONEX 50 mcg/actuation nasal spray Generic drug:  mometasone USE TWO SPRAY(S) IN EACH NOSTRIL DAILY Follow-up Instructions Return in about 3 months (around 12/19/2017). Introducing [facility]! Dear Ketty: Thank you for requesting a [employer] account. Our records indicate that you already have an active [employer] account. You can access your account anytime at [email or web address] Did you know that you can access your hospital and ER discharge instructions at any time in [employer]? You can also review all of your test results from your hospital stay or ER visit. Additional Information If you have questions, please visit the Frequently Asked Questions section of the [employer] website at [email or web address] Remember, [employer] is NOT to be used for urgent needs. For medical emergencies, dial 911. Now available from your iPhone and Android! Please provide this summary of care documentation to your next provider. Your primary care clinician is listed as Alexsandra Daley. If you have any questions after today's visit, please call [phone].

## 2017-09-19 NOTE — PROGRESS NOTES
[facility]  Todd Clark MD, [address] [place] [address] [address]  Phone:  [phone]  Fax: [phone]       Chief Complaint   Patient presents with   [facility] Follow Up   . SUBJECTIVE:    Guanaco Polk is a 61 y.o. female Patient returns today alert, appropriate, ambulatory and has the capacity to give an accurate history. Since we last saw her her stress echocardiogram was performed and was negative. She has a known history of hepatic steatosis, primary hypertension, dyslipidemia and left breast cancer, followed by Dr. Ar Gallego. Since we last saw her she had an episode a day after eating some [place] food, loss of balance and staggering gait when she got up twice to urinate. The following morning she had soft stools about three times. She didn't feel good and subsequently went to [facility] and was seen on 09/11/17, where chest xray was negative, CT scan revealed no evidence of an acute intracranial event, a CBC was unremarkable, a UA was unremarkable, and a BMP was unremarkable with a BUN of 12 and creatinine of . She was given a liter of fluid and comes in today feeling better. Patient complains of pain in the right lateral side of her umbilicus for the past 2-3 weeks. She has been doing some gardening work and wonders if that was a contributing factor. We recall that on 06/19/15 she had a CT of her abdomen and pelvis, which revealed the fatty infiltration of the liver and otherwise an unremarkable study. Patient is seen for evaluation. Current Outpatient Prescriptions   Medication Sig Dispense Refill    NASONEX 50 mcg/actuation nasal spray USE TWO SPRAY(S) IN EACH NOSTRIL DAILY 17 Container 11    hydroCHLOROthiazide (MICROZIDE) 12.5 mg capsule TAKE ONE CAPSULE BY MOUTH ONCE DAILY 90 Cap 3    albuterol (VENTOLIN HFA) 90 mcg/actuation inhaler Take 2 Puffs by inhalation every four (4) hours as needed for Wheezing.  1 Inhaler 11    atorvastatin (LIPITOR) 40 mg tablet Take 1 Tab by mouth daily. 30 Tab 11    metFORMIN ER (GLUCOPHAGE XR) 500 mg tablet Take 1 Tab by mouth daily (with dinner). 30 Tab 11    glucose blood VI test strips (CONTOUR NEXT STRIPS) strip qd 100 Strip 11    desonide (TRIDESILON) 0.05 % topical lotion Apply  to affected area two (2) times a day.  59 mL 0     Past Medical History:   Diagnosis Date    Acute bronchitis     Breast cancer, left ([address] 2005    followed by gyn md cassius    Chest pain 07/11/2017    Dyslipidemia     Encounter for Hemoccult screening 06/02/2017    neg    Hypertension     Normal cardiac stress test 07/19/2017    nrmal stres echo    Prediabetes 04/16/2016    S/P colonoscopy 8-11-09    md mina hemorrhoids    S/P MUKUND-BSO (total abdominal hysterectomy and bilateral salpingo-oophorectomy)     Sinus congestion     Spider bite     Steatosis of liver 6/19/16     Past Surgical History:   Procedure Laterality Date    HX GYN       No Known Allergies      REVIEW OF SYSTEMS:  General: negative for - chills or fever  ENT: negative for - headaches, nasal congestion or tinnitus  Respiratory: negative for - cough, hemoptysis, shortness of breath or wheezing  Cardiovascular : negative for - chest pain, edema, palpitations or shortness of breath  Gastrointestinal: negative for - abdominal pain, blood in stools, heartburn or nausea/vomiting  Genito-Urinary: no dysuria, trouble voiding, or hematuria  Musculoskeletal: negative for - gait disturbance, joint pain, joint stiffness or joint swelling  Neurological: no TIA or stroke symptoms  Hematologic: no bruises, no bleeding, no swollen glands  Integument: no lumps, mole changes, nail changes or rash  Endocrine: no malaise/lethargy or unexpected weight changes      Social History     Social History    Marital status:      Spouse name: N/A    Number of children: N/A    Years of education: N/A     Social History Main Topics    Smoking status: Never Smoker    Smokeless tobacco: Never Used    Alcohol use 0.5 oz/week     1 Glasses of wine per week    Drug use: None    Sexual activity: Not Asked     Other Topics Concern    None     Social History Narrative    Family History: Mother: , Rayna Cuenca: , Bee stingSister(s):  2010 yrs, Breast Cancer at 42Brother(s):    , Heart Problems1 brother(s) , 7 sister(s) - healthy. 1 son(s) , 1 daughter(s) - healthy. Social History: Alcohol Use Patient does not use alcohol. Smoking Status Patient is a never smoker. Caffeine: coffee, tea. Marital Status:    . Lives w ith: spouse Yousuf Quinonez. Occupation/W ork: office w ork. Medical History: Hypertension, Left Breast papilloma. Gyn History: Last mammogram date 10/28/2014. Last pap date 10/22/2012. Hysterectomy Date . OB History: Total pregnancies 2. Full term delivery (>37 w eeks) 2. Live births 2. C section(s) 2. Pregnancy # 1: , Girl, 11. Pregnancy # 2: Repeat C/S, boy, . Surgical History: hysterectomy, total abdominal w ith BSO , Left w rist surgery , colonoscopy Estephania  and , left lumpectomy . Family History   Problem Relation Age of Onset    Diabetes Mother        OBJECTIVE:    Visit Vitals    BP (!) 135/93 (BP 1 Location: Right arm, BP Patient Position: Sitting)    Pulse 76    Temp 97.7 °F (36.5 °C) (Oral)    Resp 18    Ht 5' 10\" (1.778 m)    Wt 209 lb 6.4 oz (95 kg)    SpO2 96%    BMI 30.05 kg/m2     CONSTITUTIONAL: well , well nourished, appears age appropriate  EYES: perrla, eom intact  ENMT:moist mucous membranes, pharynx clear  NECK: supple.  Thyroid normal  RESPIRATORY: Chest: clear bilaterally   CARDIOVASCULAR: Heart: regular rate and rhythm  GASTROINTESTINAL: Abdomen: soft, bowel sounds active  HEMATOLOGIC: no pathological lymph nodes palpated  MUSCULOSKELETAL: Extremities: no edema, pulse 1+   INTEGUMENT: No unusual rashes or suspicious skin lesions noted. Nails appear normal.  NEUROLOGIC: non-focal exam   MENTAL STATUS: alert and oriented, appropriate affect           ASSESSMENT:  No diagnosis found. Abdominal pain I think is abdominal wall pain related to the 100 situps that she does a day. Unfortunately we asked her to stop the situps until the pain subsides. The episode of dizziness and the symptoms associated with that I think were related to , which was completed with the IV fluids in the ER, and she is feeling okay now, so I am not going to investigate that any further. Her blood pressure control is adequate. Her BMI, however, reflects a 4 pound weight gain since we last saw her, in spite of her illness. We encouraged her to be physically active, except for situps, as well as a heart healthy, weight reducing diet. She'll return to the office in 3-4 months. If the abdominal pain continues she will let us know. She will come back sooner and we'll have to do a CT scan, which I don't think will be , but I do suspect it is rectus muscle pain. We reminded her that she can walk in at any time without without any appointment. PLAN:  . Follow-up Disposition:  Return in about 3 months (around 12/19/2017). ATTENTION:   This medical record was transcribed using an electronic medical records system. Although proofread, it may and can contain electronic and spelling errors. Other human spelling and other errors may be present. Corrections may be executed at a later time. Please feel free to contact us for any clarifications as needed.

## 2017-09-19 NOTE — PROGRESS NOTES
1. Have you been to the ER, urgent care clinic since your last visit? Hospitalized since your last visit? Yes Reason for visit: dizziness    2. Have you seen or consulted any other health care providers outside of the [address] [place] [address] since your last visit? Include any pap smears or colon screening.  Yes Where: [facility]

## 2017-12-04 RX ORDER — AZITHROMYCIN 250 MG/1
TABLET, FILM COATED ORAL
Qty: 6 TAB | Refills: 1 | Status: SHIPPED | OUTPATIENT
Start: 2017-12-04 | End: 2018-08-15 | Stop reason: ALTCHOICE

## 2018-02-14 ENCOUNTER — APPOINTMENT (OUTPATIENT)
Dept: GENERAL RADIOLOGY | Age: 58
End: 2018-02-14
Attending: EMERGENCY MEDICINE
Payer: COMMERCIAL

## 2018-02-14 ENCOUNTER — APPOINTMENT (OUTPATIENT)
Dept: GENERAL RADIOLOGY | Age: 58
End: 2018-02-14
Attending: STUDENT IN AN ORGANIZED HEALTH CARE EDUCATION/TRAINING PROGRAM
Payer: COMMERCIAL

## 2018-02-14 ENCOUNTER — HOSPITAL ENCOUNTER (EMERGENCY)
Age: 58
Discharge: HOME | End: 2018-02-14
Attending: STUDENT IN AN ORGANIZED HEALTH CARE EDUCATION/TRAINING PROGRAM | Admitting: STUDENT IN AN ORGANIZED HEALTH CARE EDUCATION/TRAINING PROGRAM
Payer: COMMERCIAL

## 2018-02-14 VITALS
SYSTOLIC BLOOD PRESSURE: 127 MMHG | HEART RATE: 91 BPM | RESPIRATION RATE: 16 BRPM | TEMPERATURE: 97.7 F | HEIGHT: 70 IN | BODY MASS INDEX: 30.75 KG/M2 | WEIGHT: 214.8 LBS | DIASTOLIC BLOOD PRESSURE: 89 MMHG | OXYGEN SATURATION: 96 %

## 2018-02-14 DIAGNOSIS — M79.622 LEFT UPPER ARM PAIN: Primary | ICD-10-CM

## 2018-02-14 LAB
ATRIAL RATE: 92 BPM
CALCULATED P AXIS, ECG09: 40 DEGREES
CALCULATED R AXIS, ECG10: 40 DEGREES
DIAGNOSIS, 93000: NORMAL
P-R INTERVAL, ECG05: 146 MS
Q-T INTERVAL, ECG07: 364 MS
QRS DURATION, ECG06: 74 MS
QTC CALCULATION (BEZET), ECG08: 450 MS
TROPONIN I SERPL-MCNC: <0.04 NG/ML
VENTRICULAR RATE, ECG03: 92 BPM

## 2018-02-14 PROCEDURE — 36415 COLL VENOUS BLD VENIPUNCTURE: CPT | Performed by: EMERGENCY MEDICINE

## 2018-02-14 PROCEDURE — 73030 X-RAY EXAM OF SHOULDER: CPT

## 2018-02-14 PROCEDURE — 71045 X-RAY EXAM CHEST 1 VIEW: CPT

## 2018-02-14 PROCEDURE — 84484 ASSAY OF TROPONIN QUANT: CPT | Performed by: EMERGENCY MEDICINE

## 2018-02-14 PROCEDURE — 99283 EMERGENCY DEPT VISIT LOW MDM: CPT

## 2018-02-14 PROCEDURE — 93005 ELECTROCARDIOGRAM TRACING: CPT

## 2018-02-14 NOTE — ED NOTES
Discharge instructions reviewed with and given to pt by ER RN. No obvious distress noted at time of discharge, ambulatory on own accord with spouse present.

## 2018-02-14 NOTE — ED TRIAGE NOTES
Triage note: Pt complains of left arm pain since 0630 that is relieved with rest.  Pt complains of shortness of breath. Pt complains of nausea since last night. Denies CP.

## 2018-02-14 NOTE — ED NOTES
2:01 PM  I have evaluated the patient as the Provider in Triage. I have reviewed Her vital signs and the triage nurse assessment. I have talked with the patient and any available family and advised that I am the provider in triage and have ordered the appropriate study to initiate their work up based on the clinical presentation during my assessment. I have advised that the patient will be accommodated in the Main ED as soon as possible. I have also requested to contact the triage nurse or myself immediately if the patient experiences any changes in their condition during this brief waiting period. Pt presenting with left shoulder pain. Pain radiates down the left arm. No numbness/tingling, no loss of sensation, weakness. No associated chest pain, shortness of breath, difficulty breathing. Pt with nausea last night. No nausea today. No neck pain or back pain. No injury or trauma known to arm.   No meds prior to arrival    Loki Vargas

## 2018-02-14 NOTE — ED PROVIDER NOTES
HPI Comments: 61 y.o. right-handed female with past medical history significant for hypertension, high cholesterol, pre-diabetes, and breast cancer who presents with chief complaint of left arm pain. Patient complains of nausea last night. This morning she woke up with sharp left arm pain. Patient's pain is not worse with ROM and she denies known exacerbating/relieving factors. Patient also reports mild shortness of breath with walking today. Patient states she had a URI a couple weeks ago and has been taking cold medicine. She checked her blood pressure earlier and it was 180's/90's. Patient took her HCTZ today and is not on any medication for high cholesterol. Patient has no cardiac history and has never seen a cardiologist.  Patient denies vomiting, chest pain, neck pain, fall/trauma, heavy lifting, abdominal pain, rash. There are no other acute medical concerns at this time. Social hx: Never smoker  PCP: Claude Yi MD    Note written by Edenilson Francois. Lora Pulliam, as dictated by Ciera Jauregui MD 2:21 PM      The history is provided by the patient.         Past Medical History:   Diagnosis Date    Abdominal wall pain     Acute bronchitis     Breast cancer, left ([address] 2005    followed by gyn md cassius    Chest pain 07/11/2017    Dyslipidemia     Encounter for Hemoccult screening 06/02/2017    neg    Hypertension     Normal cardiac stress test 07/19/2017    nrmal stres echo    Prediabetes 04/16/2016    S/P colonoscopy 8-11-09    md mina hemorrhoids    S/P MUKUND-BSO (total abdominal hysterectomy and bilateral salpingo-oophorectomy)     Sinus congestion     Spider bite     Steatosis of liver 6/19/16       Past Surgical History:   Procedure Laterality Date    HX GYN           Family History:   Problem Relation Age of Onset    Diabetes Mother        Social History     Social History    Marital status:      Spouse name: N/A    Number of children: N/A    Years of education: N/A     Occupational History    Not on file. Social History Main Topics    Smoking status: Never Smoker    Smokeless tobacco: Never Used    Alcohol use 0.5 oz/week     1 Glasses of wine per week    Drug use: Not on file    Sexual activity: Not on file     Other Topics Concern    Not on file     Social History Narrative    Family History: Mother: Abdelrahman Roles: , Bee stingSister(s):  2010 yrs, Breast Cancer at 42Brother(s):    , Heart Problems1 brother(s) , 7 sister(s) - healthy. 1 son(s) , 1 daughter(s) - healthy. Social History: Alcohol Use Patient does not use alcohol. Smoking Status Patient is a never smoker. Caffeine: coffee, tea. Marital Status:    . Lives w ith: spouse Ervin Pratt. Occupation/W ork: office w ork. Medical History: Hypertension, Left Breast papilloma. Gyn History: Last mammogram date 10/28/2014. Last pap date 10/22/2012. Hysterectomy Date . OB History: Total pregnancies 2. Full term delivery (>37 w eeks) 2. Live births 2. C section(s) 2. Pregnancy # 1: , Girl, 11. Pregnancy # 2: Repeat C/S, boy, . Surgical History: hysterectomy, total abdominal w ith BSO , Left w rist surgery , colonoscopy Yuko Martínez and 2009, left lumpectomy . ALLERGIES: Review of patient's allergies indicates no known allergies. Review of Systems   Constitutional: Negative for chills and fever. HENT: Negative for sore throat. Respiratory: Positive for shortness of breath. Negative for cough. Cardiovascular: Negative for chest pain. Gastrointestinal: Negative for abdominal pain and vomiting. Genitourinary: Negative for dysuria. Musculoskeletal: Negative for back pain.        + left arm pain   Skin: Negative for rash. Neurological: Negative for syncope and headaches. Psychiatric/Behavioral: Negative for confusion. All other systems reviewed and are negative.       Vitals:    18 1358 BP: (!) 166/95   Pulse: 99   Resp: 20   Temp: 97.6 °F (36.4 °C)   SpO2: 95%   Weight: 97.4 kg (214 lb 12.8 oz)   Height: 5' 10\" (1.778 m)            Physical Exam   Constitutional: She is oriented to person, place, and time. She appears well-developed. No distress. HENT:   Head: Normocephalic and atraumatic. Eyes: Conjunctivae and EOM are normal. Pupils are equal, round, and reactive to light. Neck: Normal range of motion. Neck supple. Cardiovascular: Normal rate, regular rhythm and normal heart sounds. No murmur heard. Pulmonary/Chest: Effort normal and breath sounds normal. No respiratory distress. Abdominal: Soft. Bowel sounds are normal. She exhibits no distension. There is no tenderness. There is no rebound. Musculoskeletal: Normal range of motion. She exhibits no edema. Neurological: She is alert and oriented to person, place, and time. No cranial nerve deficit. She exhibits normal muscle tone. Coordination normal.   Skin: Skin is warm and dry. No rash noted. Psychiatric: She has a normal mood and affect. Her behavior is normal.   Nursing note and vitals reviewed. Note written by Norbert Monsalve. Shayna Phan, as dictated by Adam Schaffer MD 2:18 PM       [facility]      ED Course       Procedures          ED EKG interpretation 1402:  Rhythm: normal sinus rhythm; and regular . Rate (approx.): 92; Axis: normal; Intervals: normal; No STEMI; Nonspecific T wave abnormality in inferior leads. Note written by Norbert Monsalve. Shayna Phan, as dictated by Adam Schaffer MD 2:23 PM    Low HEART score. AMI ruled out with neg trop at >8 hours of constant symptoms. No abnormalities of X-ray imaging. [address] stabilized today. Outpatient follow up recommended.

## 2018-05-10 ENCOUNTER — OFFICE VISIT (OUTPATIENT)
Dept: INTERNAL MEDICINE CLINIC | Age: 58
End: 2018-05-10

## 2018-05-10 VITALS
RESPIRATION RATE: 20 BRPM | TEMPERATURE: 97.9 F | BODY MASS INDEX: 29.42 KG/M2 | OXYGEN SATURATION: 94 % | HEIGHT: 70 IN | SYSTOLIC BLOOD PRESSURE: 133 MMHG | DIASTOLIC BLOOD PRESSURE: 8 MMHG | HEART RATE: 85 BPM | WEIGHT: 205.5 LBS

## 2018-05-10 DIAGNOSIS — I10 ESSENTIAL HYPERTENSION: Primary | ICD-10-CM

## 2018-05-10 DIAGNOSIS — R73.03 PREDIABETES: ICD-10-CM

## 2018-05-10 DIAGNOSIS — E78.5 DYSLIPIDEMIA: ICD-10-CM

## 2018-05-10 DIAGNOSIS — K76.0 STEATOSIS OF LIVER: ICD-10-CM

## 2018-05-10 DIAGNOSIS — R22.1 NECK NODULE: ICD-10-CM

## 2018-05-10 DIAGNOSIS — Z98.890 S/P COLONOSCOPIC POLYPECTOMY: ICD-10-CM

## 2018-05-10 NOTE — PROGRESS NOTES
SPORTS MEDICINE AND PRIMARY CARE  Syeda Do MD, Pb [address] [address]  Phone:  [phone]  Fax: [phone]       Chief Complaint   Patient presents with    Hypertension     f/u   . SUBJECTIVE:    Maria Dolores Villegas is a 61 y.o. female Patient returns today with known history of dyslipidemia, asthma, left breast cancer, primary hypertension, steatosis of the liver, and is seen for evaluation. Since we last saw her she has developed a lump in her left neck. She had a relax in her scalp that was burning and wonders if there is a relationship. Other new complaints are denied. Patient is using Contrave in an effort to lose weight. She's having a hard joe staying away from sweets and starches, however. She's lost a couple pounds. Current Outpatient Prescriptions   Medication Sig Dispense Refill    albuterol (VENTOLIN HFA) 90 mcg/actuation inhaler Take 2 Puffs by inhalation every four (4) hours as needed for Wheezing. 1 Inhaler 11    atorvastatin (LIPITOR) 40 mg tablet Take 1 Tab by mouth daily. 30 Tab 11    metFORMIN ER (GLUCOPHAGE XR) 500 mg tablet Take 1 Tab by mouth daily (with dinner). 30 Tab 11    glucose blood VI test strips (CONTOUR NEXT STRIPS) strip qd 100 Strip 11    azithromycin (ZITHROMAX) 250 mg tablet USE AS DIRECTED FOR 5 DAYS 6 Tab 1    NASONEX 50 mcg/actuation nasal spray USE TWO SPRAY(S) IN EACH NOSTRIL DAILY 17 Container 11    hydroCHLOROthiazide (MICROZIDE) 12.5 mg capsule TAKE ONE CAPSULE BY MOUTH ONCE DAILY 90 Cap 3    desonide (TRIDESILON) 0.05 % topical lotion Apply  to affected area two (2) times a day.  59 mL 0     Past Medical History:   Diagnosis Date    Abdominal wall pain     Acute bronchitis     Breast cancer, left ([address] 2005    followed by gyn md cassius    Chest pain 07/11/2017    Dyslipidemia     Encounter for Hemoccult screening 06/02/2017    neg    Hypertension     Neck nodule 05/10/2018    Normal cardiac stress test 2017    mansoor piper echo    Prediabetes 2016    S/P colonoscopic polypectomy 2017    tubular adenoma    S/P colonoscopy 09    md mina hemorrhoids    S/P MUKUND-BSO (total abdominal hysterectomy and bilateral salpingo-oophorectomy)     Sinus congestion     Spider bite     Steatosis of liver 16     Past Surgical History:   Procedure Laterality Date    HX GYN       No Known Allergies      REVIEW OF SYSTEMS:  General: negative for - chills or fever  ENT: negative for - headaches, nasal congestion or tinnitus  Respiratory: negative for - cough, hemoptysis, shortness of breath or wheezing  Cardiovascular : negative for - chest pain, edema, palpitations or shortness of breath  Gastrointestinal: negative for - abdominal pain, blood in stools, heartburn or nausea/vomiting  Genito-Urinary: no dysuria, trouble voiding, or hematuria  Musculoskeletal: negative for - gait disturbance, joint pain, joint stiffness or joint swelling  Neurological: no TIA or stroke symptoms  Hematologic: no bruises, no bleeding, no swollen glands  Integument: no lumps, mole changes, nail changes or rash  Endocrine: no malaise/lethargy or unexpected weight changes      Social History     Social History    Marital status:      Spouse name: N/A    Number of children: N/A    Years of education: N/A     Social History Main Topics    Smoking status: Never Smoker    Smokeless tobacco: Never Used    Alcohol use 0.5 oz/week     1 Glasses of wine per week      Comment: ocassional    Drug use: No    Sexual activity: Yes     Partners: Male     Other Topics Concern    None     Social History Narrative    Family History: Mother: , Frosty Minus: , Bee stingSister(s):  2010 yrs, Breast Cancer at 42Brother(s):    , Heart Problems1 brother(s) , 7 sister(s) - healthy. 1 son(s) , 1 daughter(s) - healthy. Social History: Alcohol Use Patient does not use alcohol.  Smoking Status Patient is a never smoker. Caffeine: coffee, tea. Marital Status:    . Lives w ith: spouse Rich Crowder. Occupation/W ork: office w ork. Medical History: Hypertension, Left Breast papilloma. Gyn History: Last mammogram date 10/28/2014. Last pap date 10/22/2012. Hysterectomy Date . OB History: Total pregnancies 2. Full term delivery (>37 w eeks) 2. Live births 2. C section(s) 2. Pregnancy # 1: , Girl, 11. Pregnancy # 2: Repeat C/S, boy, . Surgical History: hysterectomy, total abdominal w ith BSO , Left w rist surgery , colonoscopy Derinda Sean and , left lumpectomy . Family History   Problem Relation Age of Onset    Diabetes Mother        OBJECTIVE:    Visit Vitals    BP (!) 133/8    Pulse 85    Temp 97.9 °F (36.6 °C) (Oral)    Resp 20    Ht 5' 10\" (1.778 m)    Wt 205 lb 8 oz (93.2 kg)    SpO2 94%    BMI 29.49 kg/m2     CONSTITUTIONAL: well , well nourished, appears age appropriate  EYES: perrla, eom intact  ENMT:moist mucous membranes, pharynx clear  NECK: supple. Thyroid normal  RESPIRATORY: Chest: clear bilaterally   CARDIOVASCULAR: Heart: regular rate and rhythm  GASTROINTESTINAL: Abdomen: soft, bowel sounds active  HEMATOLOGIC: no pathological lymph nodes palpated  MUSCULOSKELETAL: Extremities: no edema, pulse 1+   INTEGUMENT: No unusual rashes or suspicious skin lesions noted. Nails appear normal.  NEUROLOGIC: non-focal exam   MENTAL STATUS: alert and oriented, appropriate affect           ASSESSMENT:  1. Essential hypertension    2. S/P colonoscopic polypectomy    3. Steatosis of liver    4. Prediabetes    5. Dyslipidemia    6. Neck nodule      Although the relationship between the irritation in her scalp and nodule is great, because of the firmness of the nodule will ask for a CT of the soft tissues of the neck to rule out other pathology. If it's questionable or suspicious will ask for surgical excision of the nodule.   She has no other lymph nodes on palpation in the accessible areas. BP control is at goal.    We continue to encourage physical activity 30 minutes five days a week and a heart healthy, weight reducing diet. Appropriate lab studies have been requested. She'll return to the office in six weeks, primarily so we can see the progress of the nodule. Discussed the patient's BMI with her. The BMI follow up plan is as follows:     dietary management education, guidance, and counseling  encourage exercise  monitor weight  prescribed dietary intake    An After Visit Summary was printed and given to the patient  PLAN:  .  Orders Placed This Encounter    BRIDGET MAMMO BI SCREENING INCL CAD    CT NECK SOFT TISSUE WO CONT    URINALYSIS W/ RFLX MICROSCOPIC    CBC WITH AUTOMATED DIFF    METABOLIC PANEL, COMPREHENSIVE    LIPID PANEL    TSH 3RD GENERATION    HEMOGLOBIN A1C WITH EAG       Follow-up Disposition:  Return in about 4 weeks (around 6/7/2018). ATTENTION:   This medical record was transcribed using an electronic medical records system. Although proofread, it may and can contain electronic and spelling errors. Other human spelling and other errors may be present. Corrections may be executed at a later time. Please feel free to contact us for any clarifications as needed. Adama Andrade

## 2018-05-10 NOTE — PROGRESS NOTES
1. Have you been to the ER, urgent care clinic since your last visit? Hospitalized since your last visit? No    2. Have you seen or consulted any other health care providers outside of the [address] [place] [address] since your last visit? Include any pap smears or colon screening.  No      Patient states that she is taking contrave for weight loss

## 2018-05-11 DIAGNOSIS — E78.5 DYSLIPIDEMIA: Primary | ICD-10-CM

## 2018-05-11 LAB
ALBUMIN SERPL-MCNC: 4.6 G/DL (ref 3.5–5.5)
ALBUMIN/GLOB SERPL: 1.6 {RATIO} (ref 1.2–2.2)
ALP SERPL-CCNC: 108 IU/L (ref 39–117)
ALT SERPL-CCNC: 32 IU/L (ref 0–32)
APPEARANCE UR: CLEAR
AST SERPL-CCNC: 31 IU/L (ref 0–40)
BACTERIA #/AREA URNS HPF: ABNORMAL /[HPF]
BASOPHILS # BLD AUTO: 0 X10E3/UL (ref 0–0.2)
BASOPHILS NFR BLD AUTO: 1 %
BILIRUB SERPL-MCNC: 0.3 MG/DL (ref 0–1.2)
BILIRUB UR QL STRIP: NEGATIVE
BUN SERPL-MCNC: 13 MG/DL (ref 6–24)
BUN/CREAT SERPL: 13 (ref 9–23)
CALCIUM SERPL-MCNC: 9.6 MG/DL (ref 8.7–10.2)
CASTS URNS QL MICRO: ABNORMAL /LPF
CHLORIDE SERPL-SCNC: 100 MMOL/L (ref 96–106)
CHOLEST SERPL-MCNC: 259 MG/DL (ref 100–199)
CO2 SERPL-SCNC: 22 MMOL/L (ref 18–29)
COLOR UR: YELLOW
CREAT SERPL-MCNC: 0.98 MG/DL (ref 0.57–1)
EOSINOPHIL # BLD AUTO: 0.1 X10E3/UL (ref 0–0.4)
EOSINOPHIL NFR BLD AUTO: 1 %
EPI CELLS #/AREA URNS HPF: ABNORMAL /HPF
ERYTHROCYTE [DISTWIDTH] IN BLOOD BY AUTOMATED COUNT: 16 % (ref 12.3–15.4)
EST. AVERAGE GLUCOSE BLD GHB EST-MCNC: 123 MG/DL
GFR SERPLBLD CREATININE-BSD FMLA CKD-EPI: 64 ML/MIN/1.73
GFR SERPLBLD CREATININE-BSD FMLA CKD-EPI: 74 ML/MIN/1.73
GLOBULIN SER CALC-MCNC: 2.9 G/DL (ref 1.5–4.5)
GLUCOSE SERPL-MCNC: 90 MG/DL (ref 65–99)
GLUCOSE UR QL: NEGATIVE
HBA1C MFR BLD: 5.9 % (ref 4.8–5.6)
HCT VFR BLD AUTO: 40.4 % (ref 34–46.6)
HDLC SERPL-MCNC: 54 MG/DL
HGB BLD-MCNC: 13.7 G/DL (ref 11.1–15.9)
HGB UR QL STRIP: NEGATIVE
IMM GRANULOCYTES # BLD: 0 X10E3/UL (ref 0–0.1)
IMM GRANULOCYTES NFR BLD: 0 %
KETONES UR QL STRIP: NEGATIVE
LDLC SERPL CALC-MCNC: 182 MG/DL (ref 0–99)
LEUKOCYTE ESTERASE UR QL STRIP: ABNORMAL
LYMPHOCYTES # BLD AUTO: 2.4 X10E3/UL (ref 0.7–3.1)
LYMPHOCYTES NFR BLD AUTO: 38 %
MCH RBC QN AUTO: 25.6 PG (ref 26.6–33)
MCHC RBC AUTO-ENTMCNC: 33.9 G/DL (ref 31.5–35.7)
MCV RBC AUTO: 75 FL (ref 79–97)
MICRO URNS: ABNORMAL
MONOCYTES # BLD AUTO: 0.4 X10E3/UL (ref 0.1–0.9)
MONOCYTES NFR BLD AUTO: 7 %
MUCOUS THREADS URNS QL MICRO: PRESENT
NEUTROPHILS # BLD AUTO: 3.4 X10E3/UL (ref 1.4–7)
NEUTROPHILS NFR BLD AUTO: 53 %
NITRITE UR QL STRIP: NEGATIVE
PH UR STRIP: 6.5 [PH] (ref 5–7.5)
PLATELET # BLD AUTO: 353 X10E3/UL (ref 150–379)
POTASSIUM SERPL-SCNC: 4.5 MMOL/L (ref 3.5–5.2)
PROT SERPL-MCNC: 7.5 G/DL (ref 6–8.5)
PROT UR QL STRIP: NEGATIVE
RBC # BLD AUTO: 5.36 X10E6/UL (ref 3.77–5.28)
RBC #/AREA URNS HPF: ABNORMAL /HPF
SODIUM SERPL-SCNC: 140 MMOL/L (ref 134–144)
SP GR UR: 1.02 (ref 1–1.03)
TRIGL SERPL-MCNC: 116 MG/DL (ref 0–149)
TSH SERPL DL<=0.005 MIU/L-ACNC: 4.44 UIU/ML (ref 0.45–4.5)
UROBILINOGEN UR STRIP-MCNC: 0.2 MG/DL (ref 0.2–1)
VLDLC SERPL CALC-MCNC: 23 MG/DL (ref 5–40)
WBC # BLD AUTO: 6.3 X10E3/UL (ref 3.4–10.8)
WBC #/AREA URNS HPF: >30 /HPF

## 2018-05-11 RX ORDER — ATORVASTATIN CALCIUM 80 MG/1
80 TABLET, FILM COATED ORAL DAILY
Qty: 30 TAB | Refills: 11 | Status: SHIPPED | OUTPATIENT
Start: 2018-05-11 | End: 2018-11-07

## 2018-05-19 ENCOUNTER — HOSPITAL ENCOUNTER (OUTPATIENT)
Dept: CT IMAGING | Age: 58
Discharge: HOME | End: 2018-05-19
Attending: INTERNAL MEDICINE
Payer: COMMERCIAL

## 2018-05-19 DIAGNOSIS — R22.1 NECK NODULE: ICD-10-CM

## 2018-05-19 PROCEDURE — 70490 CT SOFT TISSUE NECK W/O DYE: CPT

## 2018-07-16 RX ORDER — HYDROCHLOROTHIAZIDE 12.5 MG/1
CAPSULE ORAL
Qty: 90 CAP | Refills: 3 | Status: SHIPPED | OUTPATIENT
Start: 2018-07-16 | End: 2019-03-07 | Stop reason: ALTCHOICE

## 2018-07-24 ENCOUNTER — OFFICE VISIT (OUTPATIENT)
Dept: BARIATRICS/WEIGHT MGMT | Age: 58
End: 2018-07-24

## 2018-07-24 DIAGNOSIS — E66.3 OVERWEIGHT (BMI 25.0-29.9): Primary | ICD-10-CM

## 2018-07-26 LAB
% ALBUMIN, 58A: 63 % (ref 54–71)
ABSOLUTE IMMATURE GRANULOCYTES, AIG: 0 X10^3/UL (ref 0–0.1)
ADIPONECTIN-DIAZYME: 5 UG/ML
ALB/GLOBRATIO, 58C: 1.73 (ref 1.15–2.5)
ALBUMIN SERPL-MCNC: 4.5 G/DL (ref 3.7–5.1)
ALP SERPL-CCNC: 94 U/L (ref 35–125)
ALT SERPL-CCNC: 25 U/L
ANION GAP SERPL CALC-SCNC: 14 MMOL/L (ref 6–18)
APOLIPOPROTEIN B , 48: 131 MG/DL
AST SERPL W P-5'-P-CCNC: 26 U/L (ref 5–32)
BASOPHILS # BLD: 1 % (ref 0–3)
BASOPHILS NFR BLD: 0.1 X10^3/UL (ref 0–0.2)
BILIRUB SERPL-MCNC: 0.5 MG/DL
BUN SERPL-MCNC: 16 MG/DL (ref 6–20)
BUN/CREATININE RATIO,BUCR: 15 (ref 10–27)
CALCIUM SERPL-MCNC: 9.4 MG/DL (ref 8.8–10.5)
CHLORIDE SERPL-SCNC: 104 MMOL/L (ref 98–110)
CHOLEST SERPL-MCNC: 238 MG/DL
CO2 SERPL-SCNC: 25 MMOL/L (ref 19–31)
CREAT SERPL-MCNC: 1 MG/DL (ref 0.5–0.9)
CRP SERPL HS-MCNC: 1.8 MG/L
EGFRAACREAT: 70 ML/MIN/1.73M^2
EGFRNACREAT: 60 ML/MIN/1.73M^2
EOSINOPHIL # BLD: 2 % (ref 0–7)
EOSINOPHIL NFR BLD: 0.1 X10^3/UL (ref 0–0.4)
ERYTHROCYTE [DISTWIDTH] IN BLOOD BY AUTOMATED COUNT: 18.3 % (ref 11.7–15)
ESTIMATED AVERAGE GLUCOSE, EAG: 134.1 MG/DL
FIBRINOGEN ANTIGEN, 117051: 558 MG/DL (ref 126–437)
GLOBCALC, 58B: 2.6 G/DL (ref 1.9–3.5)
GLUCOSE SERPL-MCNC: 85 MG/DL (ref 70–99)
GRANULOCYTES,GRANS: 50 % (ref 40–74)
HCT VFR BLD AUTO: 43 % (ref 34–44)
HDLC SERPL-MCNC: 50 MG/DL
HGB BLD-MCNC: 14.6 G/DL (ref 11.5–15)
HGBA1C-T, HDL6300: 6.3 %
INSULIN,INS: 22 UU/ML (ref 3–9)
LDLC SERPL CALC-MCNC: 171 MG/DL
LEPTIN, SERUM, 146711: 63 NG/ML
LP(A)-P, HDL4000: 159 NMOL/L
LP-PLA2 ACTIVITY: 86 (NMOL/MIN/ML) (ref 196–225)
LYMPHOCYTES # BLD: 3 X10^3/UL (ref 0.7–4.5)
LYMPHOCYTES NFR BLD: 44 % (ref 14–46)
MAGNESIUM SERPL-MCNC: 2.2 MG/DL (ref 1.6–2.4)
MCH RBC QN AUTO: 27 PG (ref 27–34)
MCHC RBC AUTO-ENTMCNC: 34 G/DL (ref 32–36)
MCV RBC AUTO: 79 FL (ref 80–98)
MONOCYTES # BLD: 0.3 X10^3/UL (ref 0.1–1)
MONOCYTES NFR BLD: 4 % (ref 4–13)
MPOAU: 370 PMOL/L
NEUTROPHILS # BLD AUTO: 3.5 X10^3/UL (ref 1.8–7.8)
NON-HDL CHOLESTEROL, 011976: 188 MG/DL
PLATELET # BLD AUTO: 307 X10^3/UL (ref 140–415)
POTASSIUM SERPL-SCNC: 4.2 MMOL/L (ref 3.5–5.3)
PROT SERPL-MCNC: 7.2 G/DL (ref 6.1–8)
RBC # BLD AUTO: 5.5 X10^6/UL (ref 3.8–5.1)
SMALL DENSE LDL, 47: 47 MG/DL
SODIUM SERPL-SCNC: 142 MMOL/L (ref 133–145)
TRIGL SERPL-MCNC: 157 MG/DL (ref ?–150)
TSH SERPL-ACNC: 3.88 UIU/ML (ref 0.27–4.2)
URATE SERPL-MCNC: 7 MG/DL (ref 2–6.9)
VIT D 25-HYDROXY, VDLT: 51 NG/ML (ref 30–100)
WBC # BLD AUTO: 6.9 X10^3/UL (ref 4–10.5)

## 2018-07-27 LAB
AA2: 14.68 % (ref 10.5–23.3)
ALPHA LINOLEIC ACID N3, HDL1202: 0.14 % (ref 0.1–0.4)
CAMPESTEROL RATIO, HDL1303: 214 (ref 115–240)
CAMPESTEROL, HDL1302: 5.27 UG/ML (ref 2.11–4.43)
CHOLESTANOL RATIO, HDL1305: 156 (ref 117–194)
CHOLESTANOL, HDL1304: 3.73 UG/ML (ref 2.02–3.47)
CIS-MONO-UNSATURATED FATTY ACID TOTAL, HDL1205: 15 (ref 11.5–20.5)
DESMOSTEROL RATIO, HDL1307: 48 (ref 31–64)
DESMOSTEROL, HDL1306: 1.14 UG/ML
DOCOSAHEXAENOIC ACID N3, HDL1208: 4.24 % (ref 0.1–8.4)
DOCOSAPENTAENOIC ACID N3, HDL1206: 2.41 % (ref 0.6–4.1)
DOCOSAPENTAENOIC ACID N6, HDL1207: 0.65 % (ref 0.1–1.3)
EPA2: 0.56 % (ref 0.1–2.5)
HDL HDL-P, HDL5001: 38.9 UMOL/L
HDL LDL-P, HDL5000: 2618 NMOL/L
HDL SLDL-P, HDL5002: 1582 NMOL/L
LINOLEIC ACID C6, HDL1216: 14.42 % (ref 4.6–21.3)
OMEGA-3 FATTY ACID TOTAL, HDL1220: 7.3 (ref 0.1–14.1)
OMEGA-3 INDEX, HDL1219: 4.8 (ref 0.1–10.4)
OMEGA-6 FATTY ACID TOTAL, HDL1222: 34.8 (ref 28.6–44.5)
SATURATED FATTY ACID TOTAL, HDL1226: 42.2 (ref 36.6–42)
SITOSTEROL RATIO, HDL1301: 154 (ref 76–168)
SITOSTEROL, HDL1300: 3.93 UG/ML (ref 1.43–3.17)
TRANS FATTY ACID TOTAL, HDL1232: 0.6 (ref 0.1–1.8)
TRANSLINOLEIC ACID, HDL1229: <0.1 %
TRANSOLEIC ACID, HDL1230: 0.44 % (ref 0.1–1.3)

## 2018-08-01 NOTE — PROGRESS NOTES
Patient attended a Medically Supervised Weight Loss New Patient Orientation today where we discussed:  - New Direction Very Low Calorie Diet details  - Medical Supervision  - Nutrition education  - Cost of Meal Replacements  - Policies and compliance required for program enrollment.      Patients initial consultation with physician is tentatively scheduled for:  Future Appointments  Date Time Provider Kevin Casas   8/15/2018 10:30 AM Pricilla Chester DO [address] [facility]

## 2018-08-15 ENCOUNTER — OFFICE VISIT (OUTPATIENT)
Dept: FAMILY MEDICINE CLINIC | Age: 58
End: 2018-08-15

## 2018-08-15 VITALS
BODY MASS INDEX: 34.82 KG/M2 | WEIGHT: 209 LBS | SYSTOLIC BLOOD PRESSURE: 126 MMHG | OXYGEN SATURATION: 96 % | HEART RATE: 83 BPM | HEIGHT: 65 IN | RESPIRATION RATE: 15 BRPM | TEMPERATURE: 97.3 F | DIASTOLIC BLOOD PRESSURE: 80 MMHG

## 2018-08-15 DIAGNOSIS — Z90.79 S/P TAH-BSO (TOTAL ABDOMINAL HYSTERECTOMY AND BILATERAL SALPINGO-OOPHORECTOMY): ICD-10-CM

## 2018-08-15 DIAGNOSIS — R73.9 HYPERGLYCEMIA: ICD-10-CM

## 2018-08-15 DIAGNOSIS — Z13.31 DEPRESSION SCREENING: ICD-10-CM

## 2018-08-15 DIAGNOSIS — Z78.9 STATIN INTOLERANCE: ICD-10-CM

## 2018-08-15 DIAGNOSIS — E78.5 DYSLIPIDEMIA: Primary | ICD-10-CM

## 2018-08-15 DIAGNOSIS — Z90.710 S/P TAH-BSO (TOTAL ABDOMINAL HYSTERECTOMY AND BILATERAL SALPINGO-OOPHORECTOMY): ICD-10-CM

## 2018-08-15 DIAGNOSIS — E79.0 HYPERURICEMIA W/O SIGNS OF INFLAM ARTHRIT AND TOPHACEOUS DIS: ICD-10-CM

## 2018-08-15 DIAGNOSIS — E16.1 HYPERINSULINEMIA: ICD-10-CM

## 2018-08-15 DIAGNOSIS — J45.20 MILD INTERMITTENT ASTHMA WITHOUT COMPLICATION: ICD-10-CM

## 2018-08-15 DIAGNOSIS — I10 ESSENTIAL HYPERTENSION: ICD-10-CM

## 2018-08-15 DIAGNOSIS — R79.89 HIGH THYROID STIMULATING HORMONE (TSH) LEVEL: ICD-10-CM

## 2018-08-15 DIAGNOSIS — R06.89 GASPING FOR BREATH: ICD-10-CM

## 2018-08-15 DIAGNOSIS — K76.0 STEATOSIS OF LIVER: ICD-10-CM

## 2018-08-15 DIAGNOSIS — R12 HEARTBURN: ICD-10-CM

## 2018-08-15 DIAGNOSIS — E66.9 OBESITY, CLASS I, BMI 30-34.9: ICD-10-CM

## 2018-08-15 DIAGNOSIS — Z98.890 S/P COLONOSCOPIC POLYPECTOMY: ICD-10-CM

## 2018-08-15 DIAGNOSIS — Z90.722 S/P TAH-BSO (TOTAL ABDOMINAL HYSTERECTOMY AND BILATERAL SALPINGO-OOPHORECTOMY): ICD-10-CM

## 2018-08-15 DIAGNOSIS — R41.3 MILD MEMORY DISTURBANCE: ICD-10-CM

## 2018-08-15 DIAGNOSIS — R79.82 ELEVATED C-REACTIVE PROTEIN (CRP): ICD-10-CM

## 2018-08-15 DIAGNOSIS — E63.0 ESSENTIAL FATTY ACID (EFA) DEFICIENCY: ICD-10-CM

## 2018-08-15 DIAGNOSIS — E89.40 POSTSURGICAL MENOPAUSE: ICD-10-CM

## 2018-08-15 DIAGNOSIS — R79.89 ELEVATED SERUM CREATININE: ICD-10-CM

## 2018-08-15 PROBLEM — R07.9 CHEST PAIN: Status: RESOLVED | Noted: 2017-07-11 | Resolved: 2018-08-15

## 2018-08-15 NOTE — MR AVS SNAPSHOT
[address] [place] [address] 
[address] [place] [id number] 
[phone] Patient: Zahira Timmons MRN: [id number] [facility]:[birth year] Visit Information Date & Time Provider Department Dept. Phone Encounter #  
 8/15/2018 10:30 AM DO Harriett ReesevictorianoAlexis [phone] Follow-up Instructions Return in about 1 month (around 9/15/2018) for mswl/vlcd. Upcoming Health Maintenance Date Due  
 BREAST CANCER SCRN MAMMOGRAM 8/31/2018* PAP AKA CERVICAL CYTOLOGY 8/31/2018* Influenza Age 5 to Adult 9/28/2018* Pneumococcal 19-64 Highest Risk (3 of 3 - PCV13) 12/31/2018* COLONOSCOPY 2/16/2022 DTaP/Tdap/Td series (2 - Td) 5/18/2026 *Topic was postponed. The date shown is not the original due date. Allergies as of 8/15/2018  Review Complete On: 8/15/2018 By: Juliane Rahman DO Severity Noted Reaction Type Reactions Contrave [Naltrexone-bupropion]  08/15/2018   Intolerance Other (comments) 1 PILL MORNING, 2 PILLS EVENING 
FELT ZOMBIED, \"OUT OF MY HEAD\" Crestor [Rosuvastatin]  08/15/2018    Other (comments) Felt like it worsened her memory Current Immunizations  Reviewed on 8/15/2018 Name Date Influenza Vaccine 10/1/2017 Reviewed by Juliane Rahman DO on 8/15/2018 at 12:04 PM  
You Were Diagnosed With   
  
 Codes Comments Dyslipidemia    -  Primary ICD-10-CM: E78.5 ICD-9-CM: 272.4 with elevated LDLP and sdLDL Essential hypertension     ICD-10-CM: I10 
ICD-9-CM: 401.9 Hyperglycemia     ICD-10-CM: R73.9 ICD-9-CM: 790.29 worse due to increased sweets Gasping for breath     ICD-10-CM: R06.89 
ICD-9-CM: 786.09 at night during sleep High thyroid stimulating hormone (TSH) level     ICD-10-CM: R94.6 ICD-9-CM: 794.5 Obesity, Class I, BMI 30-34.9     ICD-10-CM: E66.9 ICD-9-CM: 278.00   
 Mild intermittent asthma without complication     JXI-73-XF: J45.20 ICD-9-CM: 493.90 stable Elevated C-reactive protein (CRP)     ICD-10-CM: F32.82 ICD-9-CM: 790.95 Essential fatty acid (EFA) deficiency     ICD-10-CM: E63.0 ICD-9-CM: 269.8 Elevated serum creatinine     ICD-10-CM: R79.89 ICD-9-CM: 790.99 Hyperinsulinemia     ICD-10-CM: E16.1 ICD-9-CM: 251.1 Hyperuricemia w/o signs of inflam arthrit and tophaceous dis     ICD-10-CM: E79.0 ICD-9-CM: 790.6 Steatosis of liver     ICD-10-CM: K76.0 ICD-9-CM: 571.8 stable LFTs Postsurgical menopause     ICD-10-CM: E89.40 ICD-9-CM: 627.4 Depression screening     ICD-10-CM: Z13.89 ICD-9-CM: V79.0 S/P MUKUND-BSO (total abdominal hysterectomy and bilateral salpingo-oophorectomy)     ICD-10-CM: Z90.710, Z90.722, Z90.79 ICD-9-CM: V88.01, V45.77 Statin intolerance     ICD-10-CM: Z78.9 ICD-9-CM: 995.27 S/P colonoscopic polypectomy     ICD-10-CM: R49.280 ICD-9-CM: V45.89 Heartburn     ICD-10-CM: R12 
ICD-9-CM: 787.1 Vitals BP Pulse Temp Resp Height(growth percentile) 126/80 (BP 1 Location: Left arm, BP Patient Position: Sitting) 83 97.3 °F (36.3 °C) (Temporal) 15 5' 4.5\" (1.638 m) Weight(growth percentile) SpO2 BMI OB Status Smoking Status 209 lb (94.8 kg) 96% 35.32 kg/m2 Postmenopausal Never Smoker Vitals History BMI and BSA Data Body Mass Index Body Surface Area  
 35.32 kg/m 2 2.08 m 2 Preferred Pharmacy Pharmacy Name Phone [address] [place] [address] [phone] Your Updated Medication List  
  
   
This list is accurate as of 8/15/18 12:38 PM.  Always use your most recent med list.  
  
  
  
  
 albuterol 90 mcg/actuation inhaler Commonly known as:  VENTOLIN HFA Take 2 Puffs by inhalation every four (4) hours as needed for Wheezing. atorvastatin 80 mg tablet Commonly known as:  LIPITOR Take 1 Tab by mouth daily. desonide 0.05 % topical lotion Commonly known as:  Pia Ott Apply  to affected area two (2) times a day. glucose blood VI test strips strip Commonly known as:  CONTOUR NEXT TEST STRIPS  
qd  
  
 hydroCHLOROthiazide 12.5 mg capsule Commonly known as:  [place] Dense TAKE ONE CAPSULE BY MOUTH ONCE DAILY  
  
 metFORMIN  mg tablet Commonly known as:  GLUCOPHAGE XR Take 1 Tab by mouth daily (with dinner). NASONEX 50 mcg/actuation nasal spray Generic drug:  mometasone USE TWO SPRAY(S) IN EACH NOSTRIL DAILY Follow-up Instructions Return in about 1 month (around 9/15/2018) for mswl/vlcd. Patient Instructions 1. Please refer to [facility] interest folder for a list of all potential side effects of the VLCD/LCD and what to do. For constipation, do not allow more than 3 days to pass you without having a bowel movement. As mentioned at your provider monthly visit, you can try OTC Magnesium 400 mg daily or Milk of Magnesia or Miralax or Smooth Move Tea for constipation. Please make sure you are consuming 2 liter (67 oz of water minimally). 2. Try to brew your own green tea to replace coffee. 3. Use Fish Oil 1000 mg EPA+DHA every day. Introducing [facility]! Dear Elsi Salinas: Thank you for requesting a [employer] account. Our records indicate that you already have an active [employer] account. You can access your account anytime at [email or web address] Did you know that you can access your hospital and ER discharge instructions at any time in [employer]? You can also review all of your test results from your hospital stay or ER visit. Additional Information If you have questions, please visit the Frequently Asked Questions section of the [employer] website at [email or web address] Remember, [employer] is NOT to be used for urgent needs. For medical emergencies, dial 911. Now available from your iPhone and Android! Please provide this summary of care documentation to your next provider. Your primary care clinician is listed as Jose Oreilly. If you have any questions after today's visit, please call [phone].

## 2018-08-15 NOTE — PROGRESS NOTES
Catrachito Marshall  Identified pt with two pt identifiers(name and ). Chief Complaint   Patient presents with    Weight Management    New Patient       1. Have you been to the ER, urgent care clinic since your last visit? Hospitalized since your last visit? No    2. Have you seen or consulted any other health care providers outside of the [address] [place] [address] since your last visit? Include any pap smears or colon screening. Dr. Dheeraj Wong for podiatry    In the event something were to happen to you and you were unable to speak on your behalf, do you have an Advance Directive/ Living Will in place stating your wishes? NO    If yes, do we have a copy on file NO    If no, would you like information:          Body Weight: 209lb  Body Fat: 40.8%  Muscle Mass: 31.1%  Body H2O: 44%  BMR: 1515  BMI: 34.9%    Weight Metrics 8/15/2018 8/15/2018 5/10/2018 2018 2017 2017 2017   Weight - 209 lb 205 lb 8 oz 214 lb 12.8 oz 209 lb 6.4 oz 205 lb 205 lb 6.4 oz   Neck Circ (inches) 14 - - - - - -   Waist Measure Inches 40.75 - - - - - -   Body Fat % 40.8 - - - - - -   BMI - 35.32 kg/m2 29.49 kg/m2 30.82 kg/m2 30.05 kg/m2 29.41 kg/m2 29.47 kg/m2       Medication reconciliation up to date and corrected with patient at this time. Today's provider has been notified of reason for visit, vitals and flowsheets obtained on patients. Reviewed record in preparation for visit, huddled with provider and have obtained necessary documentation.       Health Maintenance Due   Topic    BREAST CANCER SCRN MAMMOGRAM     PAP AKA CERVICAL CYTOLOGY     Pneumococcal 19-64 Highest Risk (3 of 3 - PCV13)    Influenza Age 5 to Adult        Wt Readings from Last 3 Encounters:   08/15/18 209 lb (94.8 kg)   05/10/18 205 lb 8 oz (93.2 kg)   18 214 lb 12.8 oz (97.4 kg)     Temp Readings from Last 3 Encounters:   05/10/18 97.9 °F (36.6 °C) (Oral)   02/14/18 97.7 °F (36.5 °C)   17 97.7 °F (36.5 °C) (Oral)     BP Readings from Last 3 Encounters:   05/10/18 (!) 133/8   02/14/18 127/89   09/19/17 (!) 135/93     Pulse Readings from Last 3 Encounters:   05/10/18 85   02/14/18 91   09/19/17 76     Vitals:    08/15/18 1051   BP: 126/80   Pulse: 83   Resp: 15   Temp: 97.3 °F (36.3 °C)   TempSrc: Temporal   SpO2: 96%   Weight: 209 lb (94.8 kg)   Height: 5' 4.5\" (1.638 m)         Learning Assessment:  :     Learning Assessment 7/11/2017   PRIMARY LEARNER Patient   HIGHEST LEVEL OF EDUCATION - PRIMARY LEARNER  4 YEARS OF COLLEGE   PRIMARY LANGUAGE ENGLISH   LEARNER PREFERENCE PRIMARY LISTENING   ANSWERED BY patient   RELATIONSHIP SELF       Depression Screening:  :     PHQ over the last two weeks 8/15/2018   Little interest or pleasure in doing things Not at all   Feeling down, depressed, irritable, or hopeless Not at all   Total Score PHQ 2 0       Fall Risk Assessment:  :     Fall Risk Assessment, last 12 mths 8/15/2018   Able to walk? Yes   Fall in past 12 months?  No         ADL Screening:  :     ADL Assessment 8/15/2018   Feeding yourself No Help Needed   Getting from bed to chair No Help Needed   Getting dressed No Help Needed   Bathing or showering No Help Needed   Walk across the room (includes cane/walker) No Help Needed   Using the telphone No Help Needed   Taking your medications No Help Needed   Preparing meals No Help Needed   Managing money (expenses/bills) No Help Needed   Moderately strenuous housework (laundry) No Help Needed   Shopping for personal items (toiletries/medicines) No Help Needed   Shopping for groceries No Help Needed   Driving No Help Needed   Climbing a flight of stairs No Help Needed   Getting to places beyond walking distances No Help Needed

## 2018-08-15 NOTE — PROGRESS NOTES
Richie Page Medically Supervised   [facility]   [facility]    INITIAL PHYSICIAN VISIT    HISTORY OF PRESENT ILLNESS  Agree with nurse and registered dietician notes. Rita Antoine is a 61 y.o. female with Obesity Class I, Body mass index is 35.32 kg/(m^2). and associated health concerns presents for evaluation and treatment of weight management. How did you hear about the MSWL program? She heard about it last year when she was on vacation in [place]. This year, a friend from [place] was losing weight and asked her how. She gave her the information for the program in [place]. Two of her sisters are participating in the program with her. Have you ever participated in any other weight loss programs, self directed or commercial? If so, maximum weight loss? Several attempts. Her and her  participated in a ketogenic diet for 3 weeks and she lost weight. She also used HcG shots and drops and lost weight. She did Nutrisystem but felt it was a lot of pasta. She tried weight watchers on her own but did not stick with it. Do you have any current major lifestyle changes? No.    She has ketogenic creamer with butter, coconut oil and salt. Weight History    Current weight 209 lbs. Lowest weight 198 lbs in 12/2016. Maximum weight 214 in 2/2018. Goal weight 170 lbs, and she was last at the goal weight when she was 26 y.o. She started gaining weight in her 45s. Have you ever taken weight loss medications or herbal remedies? She used Contrave BID but it made her feel like \"zombie\". She didn't feel like it was helping with her appetite. Have you or anyone in your family ever had weight loss surgery? Yes, her sister. Eating Habits  How many times a week do you eat fast food or at restaurants? 2 times weekly. Are you skipping meals and if so, why? No.   Breakfast: omelette with peppers, onions, and linder.  She did have a slice of pound cake for breakfast this morning. Lunch: Salad, BLT, chips  Snacks: popcorn with heavy butter and salt. Do you have upcoming travel in the next 6 weeks? Yes       Drinking Habits  How much caffeine do you drink daily? 10 oz coffee. How much alcohol do you drink daily and weekly? Occasional glass of wine. Do you consume any sugar-sweetened beverages (sodas, teas, juices, etc.)? None. How much water do you consume daily? 40 oz. Sleep Habits  Do you sleep between 7-9 hours a night? Yes. Do you snore? Not much, but she wakes up gasping for air. Have you been diagnosed with Sleep Apnea in the past? No, but she has never been tested and her sister has it. Do you regularly use a CPAP machine? No.       Exercise  How many days a week do you currently exercise? 3-4. She tries to do 30 minutes each day. Have you ever been told by a physician not to exercise? No.  Do you know of any reason you shouldn't exercise? No.  Do you own a pedometer or fitness tracker? Yes. Do you have a gym membership? Yes. What's your least favorite physical activity? situps       Other History  Any history of drug abuse or dependence? No.   Are you pregnant or planning on becoming pregnant within 6 months? No.   Any potential unsupportive people in your life? No.   Are you ready to lose weight and become healthier? Yes, she is tired of being overweight and without energy. She also wants to look different. Other Medical Care    Pt with hypertension, dyslipidemia, hyperinsulinemia, high TSH, hyperuricemia, postsurgical menopause, elevated serum cr, statin intolerance, s/p colonoscopic polypectomy, heartburn, elevated CRP, efa deficiency, statin intolerance, hyperglycemia, steatosis of liver, mild intermittent asthma presents to the office with a BP of 126/80. For BP, pt uses Microzide 12.5 mg daily, tolerating well. She saw endocrinologist, Dr. Martha Ramirez in 4/2018 for prediabetes.  She rx'd Metformin  mg TID, which pt used for awhile, but she stopped because she didn't feel it was necessary. Depression Screening    PHQ over the last two weeks 8/15/2018   Little interest or pleasure in doing things Not at all   Feeling down, depressed, irritable, or hopeless Not at all   Total Score PHQ 2 0        Pt denies any contraindications to VLCD including: history of MI in the last 3 months, Type 1 DM, Type 2 DM, Liver or Kidney disease requiring protein restriction, Recent treatment for Cancer, History of Uric-acid Kidney Stone, Gout, Gall stones, Recent onset of Inflammatory Bowel Disease, or severe Food Allergies. Written by karina Chaney, as dictated by Dr. Bassam Monsivais DO.      ROS:    Review of Systems negative except as noted above in HPI. ALLERGIES:    Allergies   Allergen Reactions    Contrave [Naltrexone-Bupropion] Other (comments)     1 PILL MORNING, 2 PILLS EVENING  FELT ZOMBIED, \"OUT OF MY HEAD\"    Crestor [Rosuvastatin] Other (comments)     Felt like it worsened her memory    Lipitor [Atorvastatin] Other (comments)     Felt like it worsened memory. CURRENT MEDICATIONS:    Outpatient Prescriptions Marked as Taking for the 8/15/18 encounter (Office Visit) with Mary Poon DO   Medication Sig Dispense Refill    hydroCHLOROthiazide (MICROZIDE) 12.5 mg capsule TAKE ONE CAPSULE BY MOUTH ONCE DAILY 90 Cap 3    NASONEX 50 mcg/actuation nasal spray USE TWO SPRAY(S) IN EACH NOSTRIL DAILY 17 Container 11    albuterol (VENTOLIN HFA) 90 mcg/actuation inhaler Take 2 Puffs by inhalation every four (4) hours as needed for Wheezing. 1 Inhaler 11    glucose blood VI test strips (CONTOUR NEXT STRIPS) strip qd 100 Strip 11    desonide (TRIDESILON) 0.05 % topical lotion Apply  to affected area two (2) times a day.  59 mL 0       PAST MEDICAL HISTORY:    Past Medical History:   Diagnosis Date    Asthma CHILDHOOD    Breast cancer, left ([address] 2005    followed by gyn md cassius    Chest pain 2017    normal Stress Echo 17.  Dyslipidemia     Heartburn 2017    Hyperglycemia 2016    Dr. Malvin Bailey    Hypertension     Neck nodule 05/10/2018    Obesity (BMI 30-39.9) 2017    elham Henderson.  Postsurgical menopause     Dr. Lizy Mathews Spider bite     ? Leg, recurrent.  Steatosis of liver 2016    Tubular adenoma of colon 2017    Dr. Dillon Torres    Uterine fibroid     Dr. Jose Cool    Vitamin D deficiency 2018    Dr. Telma Moser:    Past Surgical History:   Procedure Laterality Date    HX BREAST LUMPECTOMY Left     Atypical Cells. Dr. Ksenia Traore.  HX COLONOSCOPY  2009, 16    with polypectomy. Dr. Dillon Torres.  HX REFRACTIVE SURGERY Bilateral     HX MUKUND AND BSO      due to uterine fibroids.   Dr. Rogers Escalona HISTORY:    Family History   Problem Relation Age of Onset    Diabetes Mother     Obesity Mother     Other Father 54      FROM BEE ALLERGY    Obesity Sister      X7    Diabetes Brother     No Known Problems Maternal Grandmother     No Known Problems Maternal Grandfather     No Known Problems Paternal Grandmother     No Known Problems Paternal Grandfather     Sleep Apnea Sister     Diabetes Sister     Hypertension Sister     Thyroid Disease Sister      X 2    Diabetes Sister      X3 MORE SISTERS TAKING INSULIN    Diabetes Brother     Breast Cancer Sister 43    Schizophrenia Sister        SOCIAL HISTORY:    Social History     Social History    Marital status:      Spouse name: N/A    Number of children: N/A    Years of education: N/A     Social History Main Topics    Smoking status: Never Smoker    Smokeless tobacco: Never Used    Alcohol use 0.5 oz/week     1 Glasses of wine per week      Comment: ocassional    Drug use: No    Sexual activity: Yes Partners: Male     Birth control/ protection: Surgical      Comment: TL     Other Topics Concern    None     Social History Narrative    Family History: Mother: , Perico Geronimo: , Bee stingSister(s):  2010 yrs, Breast Cancer at 42Brother(s):    , Heart Problems1 brother(s) , 7 sister(s) - healthy. 1 son(s) , 1 daughter(s) - healthy. Social History: Alcohol Use Patient does not use alcohol. Smoking Status Patient is a never smoker. Caffeine: coffee, tea. Marital Status:    . Lives w ith: spouse Christina Dempsey. Occupation/W ork: office w ork. Medical History: Hypertension, Left Breast papilloma. Gyn History: Last mammogram date 10/28/2014. Last pap date 10/22/2012. Hysterectomy Date . OB History: Total pregnancies 2. Full term delivery (>37 w eeks) 2. Live births 2. C section(s) 2. Pregnancy # 1: , Girl, 11. Pregnancy # 2: Repeat C/S, boy, . Surgical History: hysterectomy, total abdominal w ith BSO , Left w rist surgery , colonoscopy Camila Collin and , left lumpectomy . IMMUNIZATIONS:    Immunization History   Administered Date(s) Administered    Influenza Vaccine 10/01/2017         PHYSICAL EXAMINATION    Vital Signs    Visit Vitals    /80 (BP 1 Location: Left arm, BP Patient Position: Sitting)    Pulse 83    Temp 97.3 °F (36.3 °C) (Temporal)    Resp 15    Ht 5' 4.5\" (1.638 m)    Wt 209 lb (94.8 kg)    SpO2 96%    BMI 35.32 kg/m2       Weight Metrics 8/15/2018 8/15/2018 5/10/2018 2018 2017 2017 2017   Weight - 209 lb 205 lb 8 oz 214 lb 12.8 oz 209 lb 6.4 oz 205 lb 205 lb 6.4 oz   Neck Circ (inches) 14 - - - - - -   Waist Measure Inches 40.75 - - - - - -   Body Fat % 40.8 - - - - - -   BMI - 35.32 kg/m2 29.49 kg/m2 30.82 kg/m2 30.05 kg/m2 29.41 kg/m2 29.47 kg/m2         General appearance - Well nourished. Well appearing. Well developed. No acute distress. Obese. Head - Normocephalic. Atraumatic. Eyes - pupils equal and reactive. Extraocular eye movements intact. Sclera anicteric. Mildly injected sclera. Ears - Hearing is grossly normal bilaterally. Nose - normal and patent. No polyps noted. No erythema. No discharge. Mouth - mucous membranes with adequate moisture. Posterior pharynx normal with cobblestone appearance. No erythema, white exudate or obstruction. Neck - supple. Midline trachea. No carotid bruits noted bilaterally. No thyromegaly noted. Chest - clear to auscultation bilaterally anteriorly and posteriorly. No wheezes. No rales or rhonchi. Breath sounds are symmetrical bilaterally. Unlabored respirations. Heart - normal rate. Regular rhythm. Normal S1, S2. No murmur noted. No rubs, clicks or gallops noted. Abdomen - soft and distended. No masses or organomegaly. No rebound, rigidity or guarding. Bowel sounds normal x 4 quadrants. No tenderness noted. Neurological - awake, alert and oriented to person, place, and time and event. Cranial nerves II through XII intact. Clear speech. Muscle strength is +5/5 x 4 extremities. Sensation is intact to light touch bilaterally. Steady gait. Heme/Lymph - peripheral pulses normal x 4 extremities. No peripheral edema is noted. Musculoskeletal - Intact x 4 extremities. Full ROM x 4 extremities. No pain with movement. Back exam - normal range of motion. No pain on palpation of the spinous processes in the cervical, thoracic, lumbar, sacral regions. No CVA tenderness. No buffalo hump noted. Skin - no rashes, erythema, ecchymosis, lacerations, abrasions, suspicious moles noted. No skin tags or moles. No acanthosis nigricans noted in the axilla or neck. Psychological -   normal behavior, dress and thought processes. Good insight. Good eye contact. Normal affect. Appropriate mood. Normal speech. DATA REVIEWED    Labs dated 7/24/2018 from [facility] reviewed. LDL was 171. HDL was 50. Triglycerides were 157. LDL-P was 2618. Small LDL-P was 1582. Hs-CRP was 1.8. Vit D was 51. Uric Acid was 7.0. Hgb A1C was 6.3. Leptin was 63. Adiponectin was 5. Insulin was 22. Creatinine was 1.0. ALT was 25. AST was 26. ALP was 94. TSH was 3.88. Omega 3 was 4.8. SEE SCANNED DOCUMENT FOR COMPLETE PANEL RESULTS. EKG from 2/14/2018: normal EKG, normal sinus rhythm, unchanged from previous tracings. No prolonged QTc noted. Upper limit is 440 for males & 460 for females. ASSESSMENT and PLAN    ICD-10-CM ICD-9-CM    1. Dyslipidemia E78.5 272.4     with elevated LDLP and sdLDL   2. Essential hypertension I10 401.9    3. Hyperglycemia R73.9 790.29     worse due to increased sweets   4. Gasping for breath R06.89 786.09     at night during sleep   5. High thyroid stimulating hormone (TSH) level R94.6 794.5    6. Obesity, Class I, BMI 30-34.9 E66.9 278.00    7. Mild intermittent asthma without complication C85.20 044.81     stable   8. Elevated C-reactive protein (CRP) R79.82 790.95    9. Essential fatty acid (EFA) deficiency E63.0 269.8    10. Elevated serum creatinine R79.89 790.99    11. Hyperinsulinemia E16.1 251.1    12. Hyperuricemia w/o signs of inflam arthrit and tophaceous dis E79.0 790.6    13. Steatosis of liver K76.0 571.8     stable LFTs   14. Postsurgical menopause E89.40 627.4    15. Depression screening Z13.89 V79.0    16. S/P MUKUND-BSO (total abdominal hysterectomy and bilateral salpingo-oophorectomy) Z90.710 V88.01     Z90.722 V45.77     Z90.79     17. Statin intolerance Z78.9 995.27    18. S/P colonoscopic polypectomy Z98.890 V45.89    19. Heartburn R12 787.1    20. Mild memory disturbance R41.3 780.93     due to statin intolerance vs other            Chart reviewed and updated.       Based on pt history, lab results, EKG and exam performed today in our office, Olga Lornez is a good candidate for the [facility] Supervised Weight Loss Program using the [employer] Software food products for an 800 calorie/day VLCD approach. Recommend pt refer to VLCD manual if experiencing any side effects once program is initiated or call our office. Referred patient to Edy Ceballos RD [facility] to receive [employer] Software food products and weekly intervention. Diet prescription: VLCD (very low calorie diet)/800 calories daily using 3-4 meal replacements daily with [employer] food/beverage products recommended. Consume a minimum of 2 L (67 oz) of water daily while utilizing VLCD. Avoid sugar-sweetened beverages. Exercise prescription: Minimal and as tolerated while using VLCD. Sleep prescription: A goal of 7-9 hours of uninterrupted sleep is recommended to turn off the Grehlin hormone to be released from the stomach and triggers appetite while promoting weight gain. Proper rest turns on Leptin hormone to be released from white adipose tissue and promotes weight loss. Reviewed MSWLP Commitment Form, Attendance Policy, and MSWLP Agreement and Consent previously discussed with Edy Ceballos RD and signed by pt. SEE SCANNED DOCUMENTS. Reviewed Coping, Eating, and Exercise Lifestyle Patterns Mini-Quizzes. Discussed appropriate strategies for positive responses. SEE SCANNED DOCUMENTS. Continue current medication and care. Advised pt to start OTC Fish Oil 1000 mg EPA+DHA. Stop HCTZ while participating in VLCD due to diuretic effect of VLCD and risk of electrolyte abnormality with diuretic use. Prescriptions written and sent to pharmacy; medication side effects discussed. Reviewed and discussed [facility] lab results. Copy of [facility] labs given to pt to share with PCP and specialists. Recheck pertinent labs monthly with [facility] lab. Recent office visit notes from Dr. Alice Weaver reviewed. Referrals given; patient urged to keep appointments with specialists.   Counseled patient on health concerns:  VLCD, weight loss goals, sleep hygiene, cholesterol, hyperglycemia, hyperins. Weight loss goal of 5-10% in 6-12 months has shown significant improvement in obesity and its health consequences. Immunizations noted. Offered empathy, support, legitimation, prayers, partnership to patient. Praised patient for progress. Follow-up Disposition:  Return in about 1 month (around 9/15/2018) for mswl/vlcd. Patient was offered a choice/choices in the treatment plan today. Patient expresses understanding of the plan and agrees with recommendations. More than 70 mins spent face to face with patient and more than 50% of this time spent in counseling and coordinating care and/or discussing treatment plans in reference to The primary encounter diagnosis was Dyslipidemia. Diagnoses of Essential hypertension, Hyperglycemia, Gasping for breath, High thyroid stimulating hormone (TSH) level, Obesity, Class I, BMI 30-34.9, Mild intermittent asthma without complication, Elevated C-reactive protein (CRP), Essential fatty acid (EFA) deficiency, Elevated serum creatinine, Hyperinsulinemia, Hyperuricemia w/o signs of inflam arthrit and tophaceous dis, Steatosis of liver, Postsurgical menopause, Depression screening, S/P MUKUND-BSO (total abdominal hysterectomy and bilateral salpingo-oophorectomy), Statin intolerance, S/P colonoscopic polypectomy, Heartburn, and Mild memory disturbance were also pertinent to this visit. Patient declines any additional handouts. Patient is satisfied with previous handouts received from our office    Lindsey Webb has a reminder for a \"due or due soon\" health maintenance. I have asked pt to contact their primary care provider for follow-up on this health maintenance. 238 Jose Raul Mcneil MD FOR ALLOWING ME THE PRIVILEGE TO PARTICIPATE IN THE CARE OF OUR MUTUAL PATIENT, Lindsey Webb WITH RESPECT TO WEIGHT MANAGEMENT. Written by karina Bourne, as dictated by Dr. Ced Salgado DO.     Documentation True and Accepted by Renate Denise. Lien Check. Patient Instructions   1. Please refer to MSWL interest folder for a list of all potential side effects of the VLCD/LCD and what to do. For constipation, do not allow more than 3 days to pass you without having a bowel movement. As mentioned at your provider monthly visit, you can try OTC Magnesium 400 mg daily or Milk of Magnesia or Miralax or Smooth Move Tea for constipation. Please make sure you are consuming 2 liter (67 oz of water minimally). 2. Try to brew your own green tea to replace coffee. 3. Use Fish Oil 1000 mg EPA+DHA every day.

## 2018-08-15 NOTE — PATIENT INSTRUCTIONS
1. Please refer to MSWL interest folder for a list of all potential side effects of the VLCD/LCD and what to do. For constipation, do not allow more than 3 days to pass you without having a bowel movement. As mentioned at your provider monthly visit, you can try OTC Magnesium 400 mg daily or Milk of Magnesia or Miralax or Smooth Move Tea for constipation. Please make sure you are consuming 2 liter (67 oz of water minimally). 2. Try to brew your own green tea to replace coffee. 3. Use Fish Oil 1000 mg EPA+DHA every day.

## 2018-08-23 ENCOUNTER — CLINICAL SUPPORT (OUTPATIENT)
Dept: BARIATRICS/WEIGHT MGMT | Age: 58
End: 2018-08-23

## 2018-08-23 VITALS
SYSTOLIC BLOOD PRESSURE: 144 MMHG | DIASTOLIC BLOOD PRESSURE: 88 MMHG | BODY MASS INDEX: 34.07 KG/M2 | WEIGHT: 204.5 LBS | HEIGHT: 65 IN

## 2018-08-23 DIAGNOSIS — E66.9 OBESITY, CLASS I, BMI 30-34.9: Primary | ICD-10-CM

## 2018-08-23 NOTE — PROGRESS NOTES
Progress Note: Weekly Education Class in the [facility] Weight Loss Program   Is there anything that you or the patient needs to let Dr Etienne Cook know about? no  Over the past week, have you experienced any side-effects? Yes, lightheadedness, headache-they went away when I was out of bed for awhile     Zarateens Petit is a 61 y.o. female who is enrolled in [facility] Loss [facility]    Visit Vitals    /88 (BP 1 Location: Right arm, BP Patient Position: Sitting)    Ht 5' 4.5\" (1.638 m)    Wt 204 lb 8 oz (92.8 kg)    BMI 34.56 kg/m2     Weight Metrics 8/23/2018 8/15/2018 8/15/2018 5/10/2018 2/14/2018 9/19/2017 7/11/2017   Weight 204 lb 8 oz - 209 lb 205 lb 8 oz 214 lb 12.8 oz 209 lb 6.4 oz 205 lb   Neck Circ (inches) - 14 - - - - -   Waist Measure Inches 40 40.75 - - - - -   Body Fat % - 40.8 - - - - -   BMI 34.56 kg/m2 - 35.32 kg/m2 29.49 kg/m2 30.82 kg/m2 30.05 kg/m2 29.41 kg/m2         Have you received any other medical care this week? no  If yes, where and for what? Have you had any change in your medications since your last visit? no  If yes what? Did you have any problems adhering to the program last week? yes  If yes, please explain: hungry, greedy, bored      Eating Habits Over Last Week:  Did you take in 64 oz of non-caloric fluids? yes     Did you consume your 4 meal replacements each day?  yes       Physical Activity Over the Past Week:    Aerobic exercise: 180 min  Resistance exercise: 3 workouts / week

## 2018-08-29 ENCOUNTER — CLINICAL SUPPORT (OUTPATIENT)
Dept: BARIATRICS/WEIGHT MGMT | Age: 58
End: 2018-08-29

## 2018-08-29 VITALS
DIASTOLIC BLOOD PRESSURE: 75 MMHG | HEART RATE: 85 BPM | HEIGHT: 65 IN | BODY MASS INDEX: 33.66 KG/M2 | WEIGHT: 202 LBS | SYSTOLIC BLOOD PRESSURE: 119 MMHG

## 2018-08-29 DIAGNOSIS — E66.9 OBESITY (BMI 30.0-34.9): Primary | ICD-10-CM

## 2018-08-30 LAB
% ALBUMIN, 58A: 61 % (ref 54–71)
ADIPONECTIN-DIAZYME: 6 UG/ML
ALB/GLOBRATIO, 58C: 1.57 (ref 1.15–2.5)
ALBUMIN SERPL-MCNC: 4.4 G/DL (ref 3.7–5.1)
ALP SERPL-CCNC: 95 U/L (ref 35–125)
ALT SERPL-CCNC: 22 U/L
ANION GAP SERPL CALC-SCNC: 13 MMOL/L (ref 6–18)
AST SERPL W P-5'-P-CCNC: 22 U/L (ref 5–32)
BILIRUB SERPL-MCNC: 0.5 MG/DL
BUN SERPL-MCNC: 21 MG/DL (ref 6–20)
BUN/CREATININE RATIO,BUCR: 19 (ref 10–27)
CALCIUM SERPL-MCNC: 9.9 MG/DL (ref 8.8–10.5)
CHLORIDE SERPL-SCNC: 103 MMOL/L (ref 98–110)
CHOLEST SERPL-MCNC: 261 MG/DL
CO2 SERPL-SCNC: 24 MMOL/L (ref 19–31)
CREAT SERPL-MCNC: 1.1 MG/DL (ref 0.5–0.9)
CRP SERPL HS-MCNC: 2 MG/L
EGFRAACREAT: 66 ML/MIN/1.73M^2
EGFRNACREAT: 57 ML/MIN/1.73M^2
ESTIMATED AVERAGE GLUCOSE, EAG: 125.5 MG/DL
GLOBCALC, 58B: 2.8 G/DL (ref 1.9–3.5)
GLUCOSE SERPL-MCNC: 106 MG/DL (ref 70–99)
HDLC SERPL-MCNC: 51 MG/DL
HGBA1C-T, HDL6300: 6 %
INSULIN,INS: 27 UU/ML (ref 3–9)
IRON,IRN: 150 UG/DL (ref 37–145)
LDLC SERPL CALC-MCNC: 196 MG/DL
LEPTIN, SERUM, 146711: 54 NG/ML
MAGNESIUM SERPL-MCNC: 2.3 MG/DL (ref 1.6–2.4)
NON-HDL CHOLESTEROL, 011976: 210 MG/DL
POTASSIUM SERPL-SCNC: 4.2 MMOL/L (ref 3.5–5.3)
PROT SERPL-MCNC: 7.3 G/DL (ref 6.1–8)
SODIUM SERPL-SCNC: 140 MMOL/L (ref 133–145)
T4 FREE SERPL-MCNC: 1.12 NG/DL (ref 0.93–1.7)
TG AB, HDL17001: 10 IU/ML
TRIGL SERPL-MCNC: 117 MG/DL (ref ?–150)
TSH SERPL-ACNC: 4.83 UIU/ML (ref 0.27–4.2)
URATE SERPL-MCNC: 8.3 MG/DL (ref 2–6.9)
VIT B12 II: 680 PG/ML

## 2018-08-31 LAB
AA2: 14.97 % (ref 10.5–23.3)
ALPHA LINOLEIC ACID N3, HDL1202: 0.13 % (ref 0.1–0.4)
CIS-MONO-UNSATURATED FATTY ACID TOTAL, HDL1205: 14.6 (ref 11.5–20.5)
COQ10: 1.59 UG/ML
DOCOSAHEXAENOIC ACID N3, HDL1208: 4.33 % (ref 0.1–8.4)
DOCOSAPENTAENOIC ACID N3, HDL1206: 2.58 % (ref 0.6–4.1)
DOCOSAPENTAENOIC ACID N6, HDL1207: 0.59 % (ref 0.1–1.3)
EPA2: 0.67 % (ref 0.1–2.5)
HDL HDL-P, HDL5001: 34.9 UMOL/L
HDL LDL-P, HDL5000: 2584 NMOL/L
HDL SLDL-P, HDL5002: 1272 NMOL/L
LINOLEIC ACID C6, HDL1216: 12.59 % (ref 4.6–21.3)
OMEGA-3 FATTY ACID TOTAL, HDL1220: 7.7 (ref 0.1–14.1)
OMEGA-3 INDEX, HDL1219: 5 (ref 0.1–10.4)
OMEGA-6 FATTY ACID TOTAL, HDL1222: 33.6 (ref 28.6–44.5)
SATURATED FATTY ACID TOTAL, HDL1226: 43.6 (ref 36.6–42)
TRANS FATTY ACID TOTAL, HDL1232: 0.6 (ref 0.1–1.8)
TRANSLINOLEIC ACID, HDL1229: 0.11 % (ref 0.1–0.5)
TRANSOLEIC ACID, HDL1230: 0.44 % (ref 0.1–1.3)

## 2018-09-01 NOTE — PROGRESS NOTES
Reviewed and agree with Weekly Education Class nurse progress note for [employer] Medically Supervised Weight Loss Program (MSWLP) using [employer] food products. Continue current diet, care and follow up as planned and outlined in BS MSWLP manual.      Documentation true and accepted by Ernestina Yates.  Mauro Chang.

## 2018-09-06 ENCOUNTER — CLINICAL SUPPORT (OUTPATIENT)
Dept: BARIATRICS/WEIGHT MGMT | Age: 58
End: 2018-09-06

## 2018-09-06 VITALS
SYSTOLIC BLOOD PRESSURE: 129 MMHG | HEIGHT: 65 IN | BODY MASS INDEX: 34.24 KG/M2 | WEIGHT: 205.5 LBS | DIASTOLIC BLOOD PRESSURE: 85 MMHG | HEART RATE: 80 BPM

## 2018-09-06 DIAGNOSIS — E66.9 OBESITY, CLASS I, BMI 30-34.9: Primary | ICD-10-CM

## 2018-09-06 NOTE — PROGRESS NOTES
Progress Note: Weekly Education Class in the [facility] Weight Loss Program   Is there anything that you or the patient needs to let Dr Zohra Pantoja know about? no  Over the past week, have you experienced any side-effects? no    Angelica Montalvo is a 61 y.o. female who is enrolled in [facility]    Visit Vitals    /85 (BP 1 Location: Right arm, BP Patient Position: Sitting)    Pulse 80    Ht 5' 4.5\" (1.638 m)    Wt 205 lb 8 oz (93.2 kg)    BMI 34.73 kg/m2     Weight Metrics 9/6/2018 8/31/2018 8/29/2018 8/23/2018 8/15/2018 8/15/2018 5/10/2018   Weight 205 lb 8 oz - 202 lb 204 lb 8 oz - 209 lb 205 lb 8 oz   Neck Circ (inches) - - - - 14 - -   Waist Measure Inches 40 40 - 40 40.75 - -   Body Fat % - 40 - - 40.8 - -   BMI 34.73 kg/m2 - 34.14 kg/m2 34.56 kg/m2 - 35.32 kg/m2 29.49 kg/m2         Have you received any other medical care this week? no  If yes, where and for what? Have you had any change in your medications since your last visit? no  If yes what? Did you have any problems adhering to the program last week? yes  If yes, please explain: weekend celebrations really took a toll on me this past week      Eating Habits Over Last Week:  Did you take in 64 oz of non-caloric fluids?  no     Did you consume your 4 meal replacements each day? no       Physical Activity Over the Past Week:    Aerobic exercise: 193 min  Resistance exercise: 1 workouts / week

## 2018-09-12 ENCOUNTER — OFFICE VISIT (OUTPATIENT)
Dept: FAMILY MEDICINE CLINIC | Age: 58
End: 2018-09-12

## 2018-09-12 VITALS
RESPIRATION RATE: 14 BRPM | SYSTOLIC BLOOD PRESSURE: 117 MMHG | BODY MASS INDEX: 33.24 KG/M2 | HEIGHT: 65 IN | DIASTOLIC BLOOD PRESSURE: 81 MMHG | TEMPERATURE: 97.8 F | OXYGEN SATURATION: 95 % | HEART RATE: 80 BPM | WEIGHT: 199.5 LBS

## 2018-09-12 DIAGNOSIS — K76.0 STEATOSIS OF LIVER: ICD-10-CM

## 2018-09-12 DIAGNOSIS — E16.1 HYPERINSULINEMIA: ICD-10-CM

## 2018-09-12 DIAGNOSIS — R12 HEARTBURN: ICD-10-CM

## 2018-09-12 DIAGNOSIS — Z90.710 S/P TAH-BSO (TOTAL ABDOMINAL HYSTERECTOMY AND BILATERAL SALPINGO-OOPHORECTOMY): ICD-10-CM

## 2018-09-12 DIAGNOSIS — E78.5 DYSLIPIDEMIA: ICD-10-CM

## 2018-09-12 DIAGNOSIS — R79.89 ELEVATED SERUM CREATININE: ICD-10-CM

## 2018-09-12 DIAGNOSIS — R63.4 WEIGHT LOSS: ICD-10-CM

## 2018-09-12 DIAGNOSIS — Z90.722 S/P TAH-BSO (TOTAL ABDOMINAL HYSTERECTOMY AND BILATERAL SALPINGO-OOPHORECTOMY): ICD-10-CM

## 2018-09-12 DIAGNOSIS — I10 ESSENTIAL HYPERTENSION: Primary | ICD-10-CM

## 2018-09-12 DIAGNOSIS — R73.9 HYPERGLYCEMIA: ICD-10-CM

## 2018-09-12 DIAGNOSIS — Z90.79 S/P TAH-BSO (TOTAL ABDOMINAL HYSTERECTOMY AND BILATERAL SALPINGO-OOPHORECTOMY): ICD-10-CM

## 2018-09-12 DIAGNOSIS — E79.0 HYPERURICEMIA W/O SIGNS OF INFLAM ARTHRIT AND TOPHACEOUS DIS: ICD-10-CM

## 2018-09-12 DIAGNOSIS — E63.0 ESSENTIAL FATTY ACID (EFA) DEFICIENCY: ICD-10-CM

## 2018-09-12 DIAGNOSIS — R79.89 ELEVATED TSH: ICD-10-CM

## 2018-09-12 DIAGNOSIS — C50.912 MALIGNANT NEOPLASM OF LEFT FEMALE BREAST, UNSPECIFIED ESTROGEN RECEPTOR STATUS, UNSPECIFIED SITE OF BREAST (HCC): ICD-10-CM

## 2018-09-12 NOTE — MR AVS SNAPSHOT
[address] [facility] 
 
 
 [address] [facility] 
[address] [place] [address] 
[phone] Patient: Yessenia Parikh MRN: [id number] Visit Information Date & Time Provider Department Dept. Phone Encounter #  
 2018 11:30 AM DO Nba Heard [phone] [id number] Your Appointments 2018  3:10 PM  
Nurse Visit with [facility] [employer] [employer] [facility] ([address] [place] [address] Road) Appt Note: 1 week [facility] [employer] ([address] [place] [address]) [phone]  
  
    
 2018  3:10 PM  
Nurse Visit with [facility] [employer] [facility] [employer] ([address] [place] [address] Road) Appt Note: 1 week [facility] ([address] [place] [address]) [phone]  
  
    
 10/4/2018  3:10 PM  
Nurse Visit with [facility] [employer] ([address] [place] [address] Road) Appt Note: 1 week [facility] ([address] [place] [address] Road) [phone]  
  
    
 10/11/2018  3:10 PM  
Nurse Visit with [facility] ([address] [place] [address] Road) Appt Note: 1 week [facility] ([address] [place] [address] Road) [phone] Upcoming Health Maintenance Date Due  
 BREAST CANCER SCRN MAMMOGRAM 10/22/2016 PAP AKA CERVICAL CYTOLOGY 10/23/2017 Influenza Age 5 to Adult 2018* Pneumococcal 19-64 Highest Risk (3 of 3 - PCV13) 2018* COLONOSCOPY 2022 DTaP/Tdap/Td series (2 - Td) 2026 *Topic was postponed. The date shown is not the original due date. Allergies as of 2018  Review Complete On: 2018 By: Peggy English Severity Noted Reaction Type Reactions Contrave [Naltrexone-bupropion]  08/15/2018   Intolerance Other (comments) 1 PILL MORNING, 2 PILLS EVENING 
FELT ZOMBIED, \"OUT OF MY HEAD\" Crestor [Rosuvastatin]  08/15/2018    Other (comments) Felt like it worsened her memory Lipitor [Atorvastatin]  08/15/2018    Other (comments) Felt like it worsened memory. Current Immunizations  Reviewed on 8/15/2018 Name Date Influenza Vaccine 10/1/2017 Not reviewed this visit You Were Diagnosed With   
  
 Codes Comments Essential hypertension    -  Primary ICD-10-CM: I10 
ICD-9-CM: 401.9 stable Dyslipidemia     ICD-10-CM: E78.5 ICD-9-CM: 272.4 with improving LDLP and sd LDL Hyperuricemia w/o signs of inflam arthrit and tophaceous dis     ICD-10-CM: E79.0 ICD-9-CM: 790.6 Essential fatty acid (EFA) deficiency     ICD-10-CM: E63.0 ICD-9-CM: 269.8 Hyperinsulinemia     ICD-10-CM: E16.1 ICD-9-CM: 251.1 Elevated serum creatinine     ICD-10-CM: R79.89 ICD-9-CM: 790.99 Malignant neoplasm of left female breast, unspecified estrogen receptor status, unspecified site of breast [facility]     ICD-10-CM: D77.654 ICD-9-CM: 174.9 Heartburn     ICD-10-CM: R12 
ICD-9-CM: 787.1 stable on VLCD Hyperglycemia     ICD-10-CM: R73.9 ICD-9-CM: 790.29 Steatosis of liver     ICD-10-CM: K76.0 ICD-9-CM: 571.8 Weight loss     ICD-10-CM: R63.4 ICD-9-CM: 783.21 10# since 08/2018 due to VLCD S/P [id number] (total abdominal hysterectomy and bilateral salpingo-oophorectomy)     ICD-10-CM: Z90.710, Z90.722, Z90.79 ICD-9-CM: V88.01, V45.77 Elevated TSH     ICD-10-CM: R94.6 ICD-9-CM: 794.5 Vitals BP Pulse Temp Resp Height(growth percentile) 117/81 (BP 1 Location: Right arm, BP Patient Position: Sitting) 80 97.8 °F (36.6 °C) (Temporal) 14 5' 4.5\" (1.638 m) Weight(growth percentile) SpO2 BMI OB Status Smoking Status 199 lb 8 oz (90.5 kg) 95% 33.72 kg/m2 Postmenopausal Never Smoker Vitals History BMI and BSA Data Body Mass Index Body Surface Area 33.72 kg/m 2 2.03 m 2 Preferred Pharmacy Pharmacy Name Phone [address] [place] [address] [phone] Your Updated Medication List  
  
   
This list is accurate as of 9/12/18  1:17 PM.  Always use your most recent med list.  
  
  
  
  
 albuterol 90 mcg/actuation inhaler Commonly known as:  VENTOLIN HFA Take 2 Puffs by inhalation every four (4) hours as needed for Wheezing. atorvastatin 80 mg tablet Commonly known as:  LIPITOR Take 1 Tab by mouth daily. desonide 0.05 % topical lotion Commonly known as:  Rolly Moan Apply  to affected area two (2) times a day. glucose blood VI test strips strip Commonly known as:  CONTOUR NEXT TEST STRIPS  
qd  
  
 hydroCHLOROthiazide 12.5 mg capsule Commonly known as:  Zen Gregory TAKE ONE CAPSULE BY MOUTH ONCE DAILY  
  
 metFORMIN  mg tablet Commonly known as:  GLUCOPHAGE XR Take 1 Tab by mouth daily (with dinner). NASONEX 50 mcg/actuation nasal spray Generic drug:  mometasone USE TWO SPRAY(S) IN EACH NOSTRIL DAILY To-Do List   
 10/17/2018 11:30 AM  
  Appointment with Layne Beavers DO at [place] [address] ([phone]) [facility]! Dear Ples Hidden: Thank you for requesting a [employer] account. Our records indicate that you already have an active [employer] account. You can access your account anytime at [email or web address] Did you know that you can access your hospital and ER discharge instructions at any time in [employer]? You can also review all of your test results from your hospital stay or ER visit. Additional Information If you have questions, please visit the Frequently Asked Questions section of the [employer] website at [email or web address] Remember, [employer] is NOT to be used for urgent needs. For medical emergencies, dial 911. Now available from your iPhone and Android! Please provide this summary of care documentation to your next provider. Your primary care clinician is listed as Dipak Suarez. If you have any questions after today's visit, please call [phone].

## 2018-09-12 NOTE — PROGRESS NOTES
Progress Note: Weekly Education Class in the [facility]   Is there anything that you or the patient needs to let Dr Deandre Moreno know about? no  Over the past week, have you experienced any side-effects? no    Carola Tellez is a 61 y.o. female who is enrolled in [facility]    Visit Vitals    /81 (BP 1 Location: Right arm, BP Patient Position: Sitting)    Pulse 80    Temp 97.8 °F (36.6 °C) (Temporal)    Resp 14    Ht 5' 4.5\" (1.638 m)    Wt 199 lb 8 oz (90.5 kg)    SpO2 95%    BMI 33.72 kg/m2     Weight Metrics 9/12/2018 9/12/2018 9/6/2018 8/31/2018 8/29/2018 8/23/2018 8/15/2018   Weight - 199 lb 8 oz 205 lb 8 oz - 202 lb 204 lb 8 oz -   Neck Circ (inches) 14 - - - - - 14   Waist Measure Inches 40 - 40 40 - 40 40.75   Body Fat % 39.6 - - 40 - - 40.8   BMI - 33.72 kg/m2 34.73 kg/m2 - 34.14 kg/m2 34.56 kg/m2 -         Have you received any other medical care this week? no  If yes, where and for what? Have you had any change in your medications since your last visit? no  If yes what? Did you have any problems adhering to the program last week? no  If yes, please explain:       Eating Habits Over Last Week:  Did you take in 64 oz of non-caloric fluids? Not available. Did you consume your 4 meal replacements each day?  yes       Physical Activity Over the Past Week:    Aerobic exercise: 104 min  Resistance exercise: 1 workouts / week

## 2018-09-12 NOTE — PROGRESS NOTES
David Gibbs  Identified pt with two pt identifiers(name and ). Chief Complaint   Patient presents with    Weight Management    Results       1. Have you been to the ER, urgent care clinic since your last visit? Hospitalized since your last visit? No    2. Have you seen or consulted any other health care providers outside of the [address] [place] [address] since your last visit? Include any pap smears or colon screening. No    In the event something were to happen to you and you were unable to speak on your behalf, do you have an Advance Directive/ Living Will in place stating your wishes? NO    If yes, do we have a copy on file NO    If no, would you like information:          Would you like to sign up for MyChart today, if you have not already done so? No, patient currently has mychart  If not, would you like information on MyChart, and how to sign up at a later time? No    Body Weight: 199.5lb  Body Fat: 39.6%  Muscle Mass: 30.9%  Body H2O: 44.6%  BMR: 1466  BMI: 33.3%    Weight Metrics 2018 2018 2018 2018 2018 2018 8/15/2018   Weight - 199 lb 8 oz 205 lb 8 oz - 202 lb 204 lb 8 oz -   Neck Circ (inches) 14 - - - - - 14   Waist Measure Inches 40 - 40 40 - 40 40.75   Body Fat % 39.6 - - 40 - - 40.8   BMI - 33.72 kg/m2 34.73 kg/m2 - 34.14 kg/m2 34.56 kg/m2 -       Medication reconciliation up to date and corrected with patient at this time. Today's provider has been notified of reason for visit, vitals and flowsheets obtained on patients. Reviewed record in preparation for visit, huddled with provider and have obtained necessary documentation.       Health Maintenance Due   Topic    BREAST CANCER SCRN MAMMOGRAM     PAP AKA CERVICAL CYTOLOGY        Wt Readings from Last 3 Encounters:   18 199 lb 8 oz (90.5 kg)   18 205 lb 8 oz (93.2 kg)   18 202 lb (91.6 kg)     Temp Readings from Last 3 Encounters:   08/15/18 97.3 °F (36.3 °C) (Temporal)   05/10/18 97.9 °F (36.6 °C) (Oral)   02/14/18 97.7 °F (36.5 °C)     BP Readings from Last 3 Encounters:   09/06/18 129/85   08/29/18 119/75   08/23/18 144/88     Pulse Readings from Last 3 Encounters:   09/06/18 80   08/29/18 85   08/15/18 83     Vitals:    09/12/18 1149   BP: 117/81   Pulse: 80   Resp: 14   Temp: 97.8 °F (36.6 °C)   TempSrc: Temporal   SpO2: 95%   Weight: 199 lb 8 oz (90.5 kg)   Height: 5' 4.5\" (1.638 m)   PainSc:   0 - No pain         Learning Assessment:  :     Learning Assessment 7/11/2017   PRIMARY LEARNER Patient   HIGHEST LEVEL OF EDUCATION - PRIMARY LEARNER  4 YEARS OF COLLEGE   PRIMARY LANGUAGE ENGLISH   LEARNER PREFERENCE PRIMARY LISTENING   ANSWERED BY patient   RELATIONSHIP SELF       Depression Screening:  :     PHQ over the last two weeks 8/15/2018   Little interest or pleasure in doing things Not at all   Feeling down, depressed, irritable, or hopeless Not at all   Total Score PHQ 2 0       Fall Risk Assessment:  :     Fall Risk Assessment, last 12 mths 8/15/2018   Able to walk? Yes   Fall in past 12 months?  No       ADL Screening:  :     ADL Assessment 8/15/2018   Feeding yourself No Help Needed   Getting from bed to chair No Help Needed   Getting dressed No Help Needed   Bathing or showering No Help Needed   Walk across the room (includes cane/walker) No Help Needed   Using the telphone No Help Needed   Taking your medications No Help Needed   Preparing meals No Help Needed   Managing money (expenses/bills) No Help Needed   Moderately strenuous housework (laundry) No Help Needed   Shopping for personal items (toiletries/medicines) No Help Needed   Shopping for groceries No Help Needed   Driving No Help Needed   Climbing a flight of stairs No Help Needed   Getting to places beyond walking distances No Help Needed

## 2018-09-12 NOTE — PROGRESS NOTES
[employer] [facility]   [facility] [facility]   [facility]    FOLLOW UP PHYSICIAN VISIT    HISTORY OF PRESENT ILLNESS  Agree with nurse and registered dietician notes. Lindsey Webb is a 61 y.o. female with Obesity Class I, Body mass index is 33.72 kg/(m^2). and associated health concerns presents for evaluation and treatment of weight management. Pt has been participating in the [employer] [facility] [place] [facility] using the [facility] Very Low Calorie Diet (VLCD, 800 kcal/day) since 8/15/18. Weight History  Start weight 209 on 8/29/18. Current weight 199, She has lost 6 pounds since 9/6/18 Total pounds lost since starting: 10 due to efforts with VLCD. Waist measurement is 40\", unchanged from 9/6/18\". Acceptable Range: M <40\" & F <35\"     Neck circumference is 14\"; unchanged\". Acceptable Range: M <17\" & F <16\"     Body fat percent is 39.6%,  Acceptable Range: M 18-24% & F 25-31%    BMR is 1466, decreased from 1466. Are you satisfied with your progress since the last ov? Yes    Barriers to adherence to this plan of care? No    Eating Habits  3 meals daily. Any skipped meals? No.   Breakfast: She drinks coffee and eats a bar at around 7:00am  Lunch:drinks shake at about 1:00pm  Dinner: She as soup or pudding at 5pm, and eats again before sleeping   Snack: Sometimes eats popcorn, chips, or fruit. She notes this past week she has not had snacks   Appetite well controlled? Yes. Drinking Habits  How much water do you consume daily? She drinks 8-10 glasses of water per day which is 16-80 oz depending on where she is (goal of 2 L or 67 oz daily, minimally)  How much caffeine do you drink daily? Drinks diet pepsi and Stevia drink sometimes  Do you consume any sugar-sweetened beverages (sodas, teas, juices, etc.)? She drinks Pepsi and Stevia drinks sometimes    Sleep Habits  She repots she sleeps 8 hours a week.      Exercise  She notes she exercises 4 times a week at noon. She states she uses the elliptical for 20-35 minutes, and she does situps and pushups. Medication(s):  Are you taking any medications to control the appetite? Side effects noted: Other Medical Care    Pt with hypertension, dyslipidemia, hyperinsulinemia, high TSH, hyperuricemia, postsurgical menopause, elevated serum cr, statin intolerance, s/p colonoscopic polypectomy, heartburn, elevated CRP, efa deficiency, statin intolerance, hyperglycemia, steatosis of liver, mild intermittent asthma presents to the office with a BP of 117/81. Pt reports she has intermittent knee pain. She denies pain when using elliptical.     ROS    Pt denies hunger, cravings, lack of focus, fatigue, feeling weak, headaches, dizziness, light headedness, nausea, vomiting, diarrhea, constipation, indigestion, rapid heart rate, SOB, low blood sugar, feeling cold, hair loss, rash, fluid retention, leg aches, difficulty sleeping, irritability, mood swings, or other associated sxs. Review of Systems negative except as noted above in HPI. ALLERGIES:    Allergies   Allergen Reactions    Contrave [Naltrexone-Bupropion] Other (comments)     1 PILL MORNING, 2 PILLS EVENING  FELT ZOMBIED, \"OUT OF MY HEAD\"    Crestor [Rosuvastatin] Other (comments)     Felt like it worsened her memory    Lipitor [Atorvastatin] Other (comments)     Felt like it worsened memory. CURRENT MEDICATIONS:    Outpatient Prescriptions Marked as Taking for the 9/12/18 encounter (Office Visit) with Wiliam Lamb, DO   Medication Sig Dispense Refill    hydroCHLOROthiazide (MICROZIDE) 12.5 mg capsule TAKE ONE CAPSULE BY MOUTH ONCE DAILY 90 Cap 3    NASONEX 50 mcg/actuation nasal spray USE TWO SPRAY(S) IN EACH NOSTRIL DAILY 17 Container 11    albuterol (VENTOLIN HFA) 90 mcg/actuation inhaler Take 2 Puffs by inhalation every four (4) hours as needed for Wheezing.  1 Inhaler 11    glucose blood VI test strips (CONTOUR NEXT STRIPS) strip qd 100 Strip 11    desonide (TRIDESILON) 0.05 % topical lotion Apply  to affected area two (2) times a day. 59 mL 0       PAST MEDICAL HISTORY:    Past Medical History:   Diagnosis Date    Asthma CHILDHOOD    Breast cancer, left ([address]     followed by gyn md cassius    Chest pain 2017    normal Stress Echo 17.  Dyslipidemia     Heartburn     Hyperglycemia 2016    Dr. Girma Mann    Hypertension     Neck nodule 05/10/2018    Obesity (BMI 30-39.9)     Dr. Girma Mann, endo.  Postsurgical menopause     Dr. Reta Talbert Spider bite     ? Leg, recurrent.  Steatosis of liver 2016    Tubular adenoma of colon 2017    Dr. Lora Courtney    Uterine fibroid     Dr. Ann-Marie Cook    Vitamin D deficiency 2018    Dr. Chantal Kong:    Past Surgical History:   Procedure Laterality Date    HX BREAST LUMPECTOMY Left     Atypical Cells. Dr. Gurjit Osuna.  HX COLONOSCOPY  2009, 17    with polypectomy. Dr. Lora Courtney.  HX REFRACTIVE SURGERY Bilateral     HX MUKUND AND BSO      due to uterine fibroids.   Dr. Rachel Villasenor HISTORY:    Family History   Problem Relation Age of Onset    Diabetes Mother     Obesity Mother     Other Father 54      FROM BEE ALLERGY    Obesity Sister      X7    Diabetes Brother     No Known Problems Maternal Grandmother     No Known Problems Maternal Grandfather     No Known Problems Paternal Grandmother     No Known Problems Paternal Grandfather     Sleep Apnea Sister     Diabetes Sister     Hypertension Sister     Thyroid Disease Sister      X 2    Diabetes Sister      X3 MORE SISTERS TAKING INSULIN    Diabetes Brother     Breast Cancer Sister 43    Schizophrenia Sister        SOCIAL HISTORY:    Social History     Social History    Marital status:      Spouse name: N/A    Number of children: N/A    Years of education: N/A     Social History Main Topics    Smoking status: Never Smoker    Smokeless tobacco: Never Used    Alcohol use 0.5 oz/week     1 Glasses of wine per week      Comment: ocassional    Drug use: No    Sexual activity: Yes     Partners: Male     Birth control/ protection: Surgical      Comment: TL     Other Topics Concern    None     Social History Narrative    Family History: Mother: , Juli Lieberman: , Bee stingSister(s):  2010 yrs, Breast Cancer at 42Brother(s):    , Heart Problems1 brother(s) , 7 sister(s) - healthy. 1 son(s) , 1 daughter(s) - healthy. Social History: Alcohol Use Patient does not use alcohol. Smoking Status Patient is a never smoker. Caffeine: coffee, tea. Marital Status:    . Lives w ith: spouse Eren Sheridan. Occupation/W ork: office w ork. Medical History: Hypertension, Left Breast papilloma. Gyn History: Last mammogram date 10/28/2014. Last pap date 10/22/2012. Hysterectomy Date . OB History: Total pregnancies 2. Full term delivery (>37 w eeks) 2. Live births 2. C section(s) 2. Pregnancy # 1: , Girl, 11. Pregnancy # 2: Repeat C/S, boy, . Surgical History: hysterectomy, total abdominal w ith BSO , Left w rist surgery , colonoscopy Darra Adelia and , left lumpectomy .            IMMUNIZATIONS:    Immunization History   Administered Date(s) Administered    Influenza Vaccine 10/01/2017         PHYSICAL EXAMINATION    Vital Signs    Visit Vitals    /81 (BP 1 Location: Right arm, BP Patient Position: Sitting)    Pulse 80    Temp 97.8 °F (36.6 °C) (Temporal)    Resp 14    Ht 5' 4.5\" (1.638 m)    Wt 199 lb 8 oz (90.5 kg)    SpO2 95%    BMI 33.72 kg/m2       Weight Metrics 2018 2018 2018 2018 2018 2018 8/15/2018   Weight - 199 lb 8 oz 205 lb 8 oz - 202 lb 204 lb 8 oz -   Neck Circ (inches) 14 - - - - - 14   Waist Measure Inches 40 - 40 40 - 40 40.75   Body Fat % 39.6 - - 40 - - 40.8   BMI - 33.72 kg/m2 34.73 kg/m2 - 34.14 kg/m2 34.56 kg/m2 -         General appearance - Well nourished. Well appearing. Well developed. No acute distress. Obese. Head - Normocephalic. Atraumatic. Eyes - pupils equal and reactive. Extraocular eye movements intact. Sclera anicteric. Mildly injected sclera. Ears - Hearing is grossly normal bilaterally. Nose - normal and patent. No polyps noted. No erythema. No discharge. Mouth - mucous membranes with adequate moisture. Posterior pharynx normal with cobblestone appearance. No erythema, white exudate or obstruction. Neck - supple. Midline trachea. No carotid bruits noted bilaterally. No thyromegaly noted. Chest - clear to auscultation bilaterally anteriorly and posteriorly. No wheezes. No rales or rhonchi. Breath sounds are symmetrical bilaterally. Unlabored respirations. Heart - normal rate. Regular rhythm. Normal S1, S2. No murmur noted. No rubs, clicks or gallops noted. Abdomen - soft and distended. No masses or organomegaly. No rebound, rigidity or guarding. Bowel sounds normal x 4 quadrants. No tenderness noted. Neurological - awake, alert and oriented to person, place, and time and event. Cranial nerves II through XII intact. Clear speech. Muscle strength is +5/5 x 4 extremities. Sensation is intact to light touch bilaterally. Steady gait. Heme/Lymph - peripheral pulses normal x 4 extremities. No peripheral edema is noted. Musculoskeletal - Intact x 4 extremities. Full ROM x 4 extremities. No pain with movement. Back exam - normal range of motion. No pain on palpation of the spinous processes in the cervical, thoracic, lumbar, sacral regions. No CVA tenderness. No buffalo hump noted. Skin - no rashes, erythema, ecchymosis, lacerations, abrasions, suspicious moles noted. No skin tags or moles.   No acanthosis nigricans noted in the axilla or neck. Psychological -   normal behavior, dress and thought processes. Good insight. Good eye contact. Normal affect. Appropriate mood. Normal speech. DATA REVIEWED      Lab Results   Component Value Date/Time    WBC 6.9 07/24/2018 11:30 AM    HGB 14.6 07/24/2018 11:30 AM    HCT 43 07/24/2018 11:30 AM    PLATELET 938 82/24/8102 11:30 AM    MCV 79 (L) 07/24/2018 11:30 AM     Lab Results   Component Value Date/Time    Sodium 140 08/29/2018 08:30 AM    Potassium 4.2 08/29/2018 08:30 AM    Chloride 103 08/29/2018 08:30 AM    CO2 24 08/29/2018 08:30 AM    Anion gap 13 08/29/2018 08:30 AM    Glucose 106 (H) 08/29/2018 08:30 AM    BUN 21 (H) 08/29/2018 08:30 AM    Creatinine 1.1 (H) 08/29/2018 08:30 AM    BUN/Creatinine ratio 19 08/29/2018 08:30 AM    GFR est AA 74 05/10/2018 11:09 AM    GFR est non-AA 64 05/10/2018 11:09 AM    Calcium 9.9 08/29/2018 08:30 AM    Bilirubin, total 0.5 08/29/2018 08:30 AM    AST (SGOT) 22 08/29/2018 08:30 AM    Alk. phosphatase 95 08/29/2018 08:30 AM    Protein, total 7.3 08/29/2018 08:30 AM    Albumin 4.4 08/29/2018 08:30 AM    A-G Ratio 1.6 05/10/2018 11:09 AM    ALT (SGPT) 22 08/29/2018 08:30 AM     Lab Results   Component Value Date/Time    Cholesterol, total 261 (H) 08/29/2018 08:30 AM    HDL Cholesterol 51 08/29/2018 08:30 AM    LDL, calculated 196 (H) 08/29/2018 08:30 AM    VLDL, calculated 23 05/10/2018 11:09 AM    Triglyceride 117 08/29/2018 08:30 AM     No results found for: CURRY Quach    Lab Results   Component Value Date/Time    Hemoglobin A1c 5.9 (H) 05/10/2018 11:09 AM     Lab Results   Component Value Date/Time    TSH 4.83 (H) 08/29/2018 08:30 AM    TSH 4.440 05/10/2018 11:09 AM         SEE SCANNED DOCUMENT FOR COMPLETE PANEL RESULTS. ASSESSMENT and PLAN      ICD-10-CM ICD-9-CM    1. Essential hypertension I10 401.9     stable   2. Dyslipidemia E78.5 272.4     with improving LDLP and sd LDL   3. Hyperuricemia w/o signs of inflam arthrit and tophaceous dis E79.0 790.6    4. Essential fatty acid (EFA) deficiency E63.0 269.8    5. Hyperinsulinemia E16.1 251.1    6. Elevated serum creatinine R79.89 790.99    7. Malignant neoplasm of left female breast, unspecified estrogen receptor status, unspecified site of breast (HCC) C50.912 174.9    8. Heartburn R12 787.1     stable on VLCD   9. Hyperglycemia R73.9 790.29    10. Steatosis of liver K76.0 571.8    11. Weight loss R63.4 783.21     10# since 08/2018 due to VLCD   12. S/P MUKUND-BSO (total abdominal hysterectomy and bilateral salpingo-oophorectomy) Z90.710 V88.01     Z90.722 V45.77     Z90.79     13. Elevated TSH R94.6 794.5        Chart reviewed and updated. Recommend pt refer to VLCD manual if experiencing any side effects once program is initiated or call our office. Referred patient to Mohan Wei RD for BS MSWLP to receive [employer] VLCD food products and weekly intervention. Discussed the patient's BMI with her. .  Decrease carbohydrates (white foods, sweet foods, sweet drinks and alcohol), increase green leafy vegetables and protein (lean meats and beans) with each meal.  Avoid fried foods. Do not skip meals. Increase water intake. Avoid sugar-sweetened beverages. Get 7-8 hours uninterrupted sleep nightly. Diet prescription: VLCD (very low calorie diet)/800 calories daily using 3-4 meal replacements daily with [employer] Nicholas food/beverage products recommended. Consume a minimum of 2 L (67 oz) of water daily while utilizing VLCD. Avoid sugar-sweetened beverages. Reviewed nutrition and importance of regular protein intake and hidden carbohydrate sources.      Exercise prescription: Advance as tolerated while using VLCD. A goal of 30 minutes physical activity daily is recommended for health benefit and at least 60 minutes daily to prevent weight regain.   For weight loss, no less than 75% needs to be aerobic (i.e. Walking) and no more than 25% resistance exercising (i.e. Weight lifting). For weight maintenance phase, 50% aerobic and 50% resistance exercises. Emphasized importance of physical activity and reducing sedentary time. Sleep prescription: A goal of 7-8 hours of uninterrupted sleep is recommended to turn off the Grehlin hormone to be released from the stomach and triggers appetite while promoting weight gain. Proper rest turns on Leptin hormone to be released from white adipose tissue and promotes weight loss. Reviewed [employer] VLCD Weekly Education Class Notes. Additional relevant handouts given and discussed with patient today. Continue current medications and care. Prescriptions written and sent to pharmacy; medication side effects discussed. Prescription given to patient during office visit today. Controlled Substance Agreement reviewed and signed. Reviewed and discussed [facility] lab results. Copy of [facility] labs given to pt to share with PCP and specialists. Recheck pertinent labs monthly with [facility] lab. Recent office visit notes from August 2018 reviewed. Referrals given; patient urged to keep appointments with specialists. Counseled patient on health concerns:  VLCD, weight loss goals, sleep hygiene, strategies to overcome habits or challenges to improve or continue adherence. Reminded females of reproductive age that with weight loss, they may become more fertile and recommend the use of condoms if pregnancy is not desired. Weight loss goal of 5-10% in 6-12 months has shown significant improvement in obesity and its health consequences. Immunizations noted. Offered empathy, support, legitimation, prayers, partnership to patient. Praised patient for progress. Follow-up Disposition:  Return in about 1 month (around 10/12/2018) for mswl/vlcd. Patient was offered a choice/choices in the treatment plan today.   Patient expresses understanding of the plan and agrees with recommendations. Time in:12:50 PM   Time out:1:25 PM   More than 60 mins spent face to face with patient and more than 50% of this time spent in counseling and coordinating care and/or discussing treatment plans in reference to There were no encounter diagnoses. Judit Santos has a reminder for a \"due or due soon\" health maintenance. I have asked pt to contact their primary care provider for follow-up on this health maintenance. Written by karina Flores, as dictated by Dr. Yajaira Hussein DO. Documentation True and Accepted by Lashaun Pabon. Leslie Factor. [address] Jose Raul Mcneil MD FOR ALLOWING ME THE PRIVILEGE TO PARTICIPATE IN THE CARE OF OUR MUTUAL PATIENT, Judit Santos WITH RESPECT TO WEIGHT MANAGEMENT. There are no Patient Instructions on file for this visit.

## 2018-09-20 ENCOUNTER — CLINICAL SUPPORT (OUTPATIENT)
Dept: BARIATRICS/WEIGHT MGMT | Age: 58
End: 2018-09-20

## 2018-09-20 VITALS
BODY MASS INDEX: 33.07 KG/M2 | HEART RATE: 91 BPM | SYSTOLIC BLOOD PRESSURE: 112 MMHG | DIASTOLIC BLOOD PRESSURE: 77 MMHG | WEIGHT: 198.5 LBS | HEIGHT: 65 IN

## 2018-09-20 DIAGNOSIS — E66.9 OBESITY, CLASS I, BMI 30-34.9: Primary | ICD-10-CM

## 2018-09-20 NOTE — PROGRESS NOTES
Progress Note: Weekly Education Class in the [facility] Loss Program   Is there anything that you or the patient needs to let Dr Zohra Pantoja know about? no  Over the past week, have you experienced any side-effects? no    Angelica Montalvo is a 61 y.o. female who is enrolled in [facility]    Visit Vitals    /77 (BP 1 Location: Left arm, BP Patient Position: Sitting)    Pulse 91    Ht 5' 4.5\" (1.638 m)    Wt 198 lb 8 oz (90 kg)    BMI 33.55 kg/m2     Weight Metrics 9/20/2018 9/12/2018 9/12/2018 9/6/2018 8/31/2018 8/29/2018 8/23/2018   Weight 198 lb 8 oz - 199 lb 8 oz 205 lb 8 oz - 202 lb 204 lb 8 oz   Neck Circ (inches) - 14 - - - - -   Waist Measure Inches 39.5 40 - 40 40 - 40   Body Fat % - 39.6 - - 40 - -   BMI 33.55 kg/m2 - 33.72 kg/m2 34.73 kg/m2 - 34.14 kg/m2 34.56 kg/m2         Have you received any other medical care this week? no  If yes, where and for what? Have you had any change in your medications since your last visit? no  If yes what? Did you have any problems adhering to the program last week? yes  If yes, please explain: Bored, wanted other foods to eat with my       Eating Habits Over Last Week:  Did you take in 64 oz of non-caloric fluids?  no     Did you consume your 4 meal replacements each day? no       Physical Activity Over the Past Week:    Aerobic exercise: 226 min  Resistance exercise: 5 workouts / week

## 2018-09-27 ENCOUNTER — CLINICAL SUPPORT (OUTPATIENT)
Dept: BARIATRICS/WEIGHT MGMT | Age: 58
End: 2018-09-27

## 2018-09-27 VITALS
HEIGHT: 65 IN | SYSTOLIC BLOOD PRESSURE: 144 MMHG | DIASTOLIC BLOOD PRESSURE: 83 MMHG | WEIGHT: 203 LBS | BODY MASS INDEX: 33.82 KG/M2 | HEART RATE: 80 BPM

## 2018-09-27 DIAGNOSIS — E66.9 OBESITY, CLASS I, BMI 30-34.9: Primary | ICD-10-CM

## 2018-09-27 NOTE — PROGRESS NOTES
Progress Note: Weekly Education Class in the [facility] Weight Loss Program   Is there anything that you or the patient needs to let Dr Deandre Moreno know about? no  Over the past week, have you experienced any side-effects? no    Carola Tellez is a 61 y.o. female who is enrolled in [facility] Weight Loss Program    Visit Vitals    /83 (BP 1 Location: Right arm, BP Patient Position: Sitting)    Pulse 80    Ht 5' 4.5\" (1.638 m)    Wt 203 lb (92.1 kg)    BMI 34.31 kg/m2     Weight Metrics 9/27/2018 9/20/2018 9/12/2018 9/12/2018 9/6/2018 8/31/2018 8/29/2018   Weight 203 lb 198 lb 8 oz - 199 lb 8 oz 205 lb 8 oz - 202 lb   Neck Circ (inches) - - 14 - - - -   Waist Measure Inches 40 39.5 40 - 40 40 -   Body Fat % - - 39.6 - - 40 -   BMI 34.31 kg/m2 33.55 kg/m2 - 33.72 kg/m2 34.73 kg/m2 - 34.14 kg/m2         Have you received any other medical care this week? no  If yes, where and for what? Have you had any change in your medications since your last visit? no  If yes what? Did you have any problems adhering to the program last week? no  If yes, please explain:       Eating Habits Over Last Week:  Did you take in 64 oz of non-caloric fluids?  no     Did you consume your 4 meal replacements each day? no       Physical Activity Over the Past Week:    Aerobic exercise: 224 min  Resistance exercise: 5 workouts / week

## 2018-09-28 NOTE — PROGRESS NOTES
Reviewed and agree with Weekly Education Class nurse progress note for [employer] [facility] [facility] Weight Loss Program (MSWLP) using [employer] food products. Continue current diet, care and follow up as planned and outlined in BS MSWLP manual.      Documentation true and accepted by Anand Tomlin.  Rigo Hernandez.

## 2018-09-28 NOTE — PROGRESS NOTES
Reviewed and agree with Weekly Education Class nurse progress note for [facility] (MSWLP) using [employer] food products. Continue current diet, care and follow up as planned and outlined in BS MSWLP manual.      Documentation true and accepted by Tatiana Durant.  Ivan Apgar.

## 2018-09-28 NOTE — PROGRESS NOTES
Reviewed and agree with Weekly Education Class nurse progress note for [employer] [facility] Weight Loss Program (MSWLP) using [employer] food products. Continue current diet, care and follow up as planned and outlined in BS MSWLP manual.      Documentation true and accepted by Casandra Richards.  Weston Patel.

## 2018-09-28 NOTE — PROGRESS NOTES
Reviewed and agree with Weekly Education Class nurse progress note for [employer] [facility] Weight Loss Program (MSWLP) using [employer] food products. Continue current diet, care and follow up as planned and outlined in BS MSWLP manual.      Documentation true and accepted by Tatiana Durant.  Ivan Apgar.

## 2018-10-01 RX ORDER — ALBUTEROL SULFATE 90 UG/1
2 AEROSOL, METERED RESPIRATORY (INHALATION)
Qty: 1 INHALER | Refills: 11 | Status: CANCELLED | OUTPATIENT
Start: 2018-10-01

## 2018-10-02 RX ORDER — ALBUTEROL SULFATE 90 UG/1
2 AEROSOL, METERED RESPIRATORY (INHALATION)
Qty: 1 INHALER | Refills: 11 | Status: SHIPPED | OUTPATIENT
Start: 2018-10-02 | End: 2022-07-26

## 2018-10-02 NOTE — TELEPHONE ENCOUNTER
From: Josiane Ortega  To: Brooke Hill MD  Sent: 10/1/2018 6:42 PM EDT  Subject: Medication Renewal Request    Original authorizing provider: Brooke Hill MD    Emre Sahu would like a refill of the following medications:  albuterol (VENTOLIN HFA) 90 mcg/actuation inhaler [Tyler Casillas MD]    Preferred pharmacy: [facility] [address], [address] [place] [address]    Comment:

## 2018-10-11 ENCOUNTER — CLINICAL SUPPORT (OUTPATIENT)
Dept: BARIATRICS/WEIGHT MGMT | Age: 58
End: 2018-10-11

## 2018-10-11 VITALS
BODY MASS INDEX: 33.91 KG/M2 | WEIGHT: 203.5 LBS | DIASTOLIC BLOOD PRESSURE: 85 MMHG | SYSTOLIC BLOOD PRESSURE: 147 MMHG | HEIGHT: 65 IN | HEART RATE: 83 BPM

## 2018-10-11 DIAGNOSIS — E66.9 OBESITY, CLASS I, BMI 30-34.9: Primary | ICD-10-CM

## 2018-10-11 NOTE — PROGRESS NOTES
Progress Note: Weekly Education Class in the [facility] Weight Loss Program   Is there anything that you or the patient needs to let Dr Feng Kapadia know about? no  Over the past week, have you experienced any side-effects? no    Juli Dickson is a 60 y.o. female who is enrolled in [facility] Weight Loss Program    Visit Vitals    /85 (BP 1 Location: Left arm, BP Patient Position: Sitting)    Pulse 83    Ht 5' 4.5\" (1.638 m)    Wt 203 lb 8 oz (92.3 kg)    BMI 34.39 kg/m2     Weight Metrics 10/11/2018 9/27/2018 9/20/2018 9/12/2018 9/12/2018 9/6/2018 8/31/2018   Weight 203 lb 8 oz 203 lb 198 lb 8 oz - 199 lb 8 oz 205 lb 8 oz -   Neck Circ (inches) - - - 14 - - -   Waist Measure Inches 39.5 40 39.5 40 - 40 40   Body Fat % - - - 39.6 - - 40   BMI 34.39 kg/m2 34.31 kg/m2 33.55 kg/m2 - 33.72 kg/m2 34.73 kg/m2 -         Have you received any other medical care this week? no  If yes, where and for what? Have you had any change in your medications since your last visit? no  If yes what? Did you have any problems adhering to the program last week? no  If yes, please explain:       Eating Habits Over Last Week:  Did you take in 64 oz of non-caloric fluids? yes     Did you consume your 4 meal replacements each day?  yes       Physical Activity Over the Past Week:    Aerobic exercise: 50 min  Resistance exercise: 1 workouts / week

## 2018-10-18 ENCOUNTER — CLINICAL SUPPORT (OUTPATIENT)
Dept: BARIATRICS/WEIGHT MGMT | Age: 58
End: 2018-10-18

## 2018-10-18 VITALS
HEIGHT: 65 IN | DIASTOLIC BLOOD PRESSURE: 85 MMHG | BODY MASS INDEX: 33.49 KG/M2 | SYSTOLIC BLOOD PRESSURE: 132 MMHG | WEIGHT: 201 LBS | HEART RATE: 84 BPM

## 2018-10-18 DIAGNOSIS — E66.9 OBESITY, CLASS I, BMI 30-34.9: Primary | ICD-10-CM

## 2018-11-01 ENCOUNTER — CLINICAL SUPPORT (OUTPATIENT)
Dept: BARIATRICS/WEIGHT MGMT | Age: 58
End: 2018-11-01

## 2018-11-01 VITALS
SYSTOLIC BLOOD PRESSURE: 115 MMHG | DIASTOLIC BLOOD PRESSURE: 77 MMHG | WEIGHT: 197.5 LBS | HEIGHT: 65 IN | HEART RATE: 88 BPM | BODY MASS INDEX: 32.9 KG/M2

## 2018-11-01 DIAGNOSIS — E66.9 OBESITY, CLASS I, BMI 30-34.9: Primary | ICD-10-CM

## 2018-11-02 LAB
% ALBUMIN, 58A: 61 % (ref 54–71)
ABSOLUTE IMMATURE GRANULOCYTES, AIG: 0 X10^3/UL (ref 0–0.1)
ADIPONECTIN-DIAZYME: 6 UG/ML
ALB/GLOBRATIO, 58C: 1.55 (ref 1.15–2.5)
ALBUMIN SERPL-MCNC: 4.6 G/DL (ref 3.7–5.1)
ALP SERPL-CCNC: 101 U/L (ref 35–125)
ALT SERPL-CCNC: 22 U/L
ANION GAP SERPL CALC-SCNC: 14 MMOL/L (ref 6–18)
AST SERPL W P-5'-P-CCNC: 26 U/L (ref 5–32)
BASOPHILS # BLD: 1 % (ref 0–3)
BASOPHILS NFR BLD: 0.1 X10^3/UL (ref 0–0.2)
BILIRUB SERPL-MCNC: 0.7 MG/DL
BUN SERPL-MCNC: 20 MG/DL (ref 6–20)
BUN/CREATININE RATIO,BUCR: 20 (ref 10–27)
CALCIUM SERPL-MCNC: 9.8 MG/DL (ref 8.8–10.5)
CHLORIDE SERPL-SCNC: 101 MMOL/L (ref 98–110)
CHOLEST SERPL-MCNC: 237 MG/DL
CO2 SERPL-SCNC: 26 MMOL/L (ref 19–31)
CREAT SERPL-MCNC: 1 MG/DL (ref 0.5–0.9)
CRP SERPL HS-MCNC: 1.5 MG/L
EGFRAACREAT: 68 ML/MIN/1.73M^2
EGFRNACREAT: 59 ML/MIN/1.73M^2
EOSINOPHIL # BLD: 2 % (ref 0–7)
EOSINOPHIL NFR BLD: 0.1 X10^3/UL (ref 0–0.4)
ERYTHROCYTE [DISTWIDTH] IN BLOOD BY AUTOMATED COUNT: 14.4 % (ref 11.7–15)
ESTIMATED AVERAGE GLUCOSE, EAG: 125.5 MG/DL
GLOBCALC, 58B: 3 G/DL (ref 1.9–3.5)
GLUCOSE SERPL-MCNC: 84 MG/DL (ref 70–99)
GRANULOCYTES,GRANS: 43 % (ref 40–74)
HCT VFR BLD AUTO: 46 % (ref 34–44)
HDL HDL-P, HDL5001: 39.2 UMOL/L
HDL LDL-P, HDL5000: 2811 NMOL/L
HDL SLDL-P, HDL5002: 1636 NMOL/L
HDLC SERPL-MCNC: 58 MG/DL
HGB BLD-MCNC: 16.5 G/DL (ref 11.5–15)
HGBA1C-T, HDL6300: 6 %
INSULIN,INS: 13 UU/ML (ref 3–9)
IRON,IRN: 186 UG/DL (ref 37–145)
LDLC SERPL CALC-MCNC: 179 MG/DL
LYMPHOCYTES # BLD: 3.1 X10^3/UL (ref 0.7–4.5)
LYMPHOCYTES NFR BLD: 50 % (ref 14–46)
MAGNESIUM SERPL-MCNC: 2.4 MG/DL (ref 1.6–2.4)
MCH RBC QN AUTO: 28 PG (ref 27–34)
MCHC RBC AUTO-ENTMCNC: 36 G/DL (ref 32–36)
MCV RBC AUTO: 78 FL (ref 80–98)
MONOCYTES # BLD: 0.3 X10^3/UL (ref 0.1–1)
MONOCYTES NFR BLD: 5 % (ref 4–13)
NEUTROPHILS # BLD AUTO: 2.6 X10^3/UL (ref 1.8–7.8)
NON-HDL CHOLESTEROL, 011976: 178 MG/DL
NRBC: 0.7 /100 WBC
PLATELET # BLD AUTO: 313 X10^3/UL (ref 140–415)
POTASSIUM SERPL-SCNC: 4.3 MMOL/L (ref 3.5–5.3)
PROT SERPL-MCNC: 7.6 G/DL (ref 6.1–8)
RBC # BLD AUTO: 5.9 X10^6/UL (ref 3.8–5.1)
SODIUM SERPL-SCNC: 141 MMOL/L (ref 133–145)
T4 FREE SERPL-MCNC: 1.04 NG/DL (ref 0.93–1.7)
TRIGL SERPL-MCNC: 115 MG/DL (ref ?–150)
TSH SERPL-ACNC: 6.25 UIU/ML (ref 0.27–4.2)
URATE SERPL-MCNC: 7.1 MG/DL (ref 2–6.9)
VIT B12 II: 722 PG/ML
VIT D 25-HYDROXY, VDLT: 56 NG/ML (ref 30–100)
WBC # BLD AUTO: 6.2 X10^3/UL (ref 4–10.5)

## 2018-11-03 LAB
COQ10: 1.52 UG/ML
LEPTIN, SERUM, 146711: 53 NG/ML

## 2018-11-05 LAB
AA2: 15.01 % (ref 10.5–23.3)
ALPHA LINOLEIC ACID N3, HDL1202: 0.12 % (ref 0.1–0.4)
CIS-MONO-UNSATURATED FATTY ACID TOTAL, HDL1205: 15.1 (ref 11.5–20.5)
DOCOSAHEXAENOIC ACID N3, HDL1208: 4.64 % (ref 0.1–8.4)
DOCOSAPENTAENOIC ACID N3, HDL1206: 2.63 % (ref 0.6–4.1)
DOCOSAPENTAENOIC ACID N6, HDL1207: 0.61 % (ref 0.1–1.3)
EPA2: 0.72 % (ref 0.1–2.5)
LINOLEIC ACID C6, HDL1216: 13.94 % (ref 4.6–21.3)
OMEGA-3 FATTY ACID TOTAL, HDL1220: 8.1 (ref 0.1–14.1)
OMEGA-3 INDEX, HDL1219: 5.4 (ref 0.1–10.4)
OMEGA-6 FATTY ACID TOTAL, HDL1222: 34.6 (ref 28.6–44.5)
SATURATED FATTY ACID TOTAL, HDL1226: 41.6 (ref 36.6–42)
TRANS FATTY ACID TOTAL, HDL1232: 0.6 (ref 0.1–1.8)
TRANSLINOLEIC ACID, HDL1229: 0.1 % (ref 0.1–0.5)
TRANSOLEIC ACID, HDL1230: 0.39 % (ref 0.1–1.3)

## 2018-11-07 ENCOUNTER — OFFICE VISIT (OUTPATIENT)
Dept: FAMILY MEDICINE CLINIC | Age: 58
End: 2018-11-07

## 2018-11-07 VITALS — WEIGHT: 196.5 LBS | BODY MASS INDEX: 32.74 KG/M2 | HEIGHT: 65 IN | RESPIRATION RATE: 18 BRPM

## 2018-11-07 DIAGNOSIS — E16.1 HYPERINSULINEMIA: ICD-10-CM

## 2018-11-07 DIAGNOSIS — R79.82 ELEVATED C-REACTIVE PROTEIN (CRP): ICD-10-CM

## 2018-11-07 DIAGNOSIS — E66.9 OBESITY, CLASS I, BMI 30-34.9: ICD-10-CM

## 2018-11-07 DIAGNOSIS — R63.4 WEIGHT LOSS: ICD-10-CM

## 2018-11-07 DIAGNOSIS — E79.0 HYPERURICEMIA W/O SIGNS OF INFLAM ARTHRIT AND TOPHACEOUS DIS: ICD-10-CM

## 2018-11-07 DIAGNOSIS — E78.5 DYSLIPIDEMIA: ICD-10-CM

## 2018-11-07 DIAGNOSIS — E03.9 HYPOTHYROIDISM, UNSPECIFIED TYPE: Primary | ICD-10-CM

## 2018-11-07 DIAGNOSIS — R73.9 HYPERGLYCEMIA: ICD-10-CM

## 2018-11-07 DIAGNOSIS — D58.2 ELEVATED HEMOGLOBIN (HCC): ICD-10-CM

## 2018-11-07 DIAGNOSIS — E63.0 ESSENTIAL FATTY ACID (EFA) DEFICIENCY: ICD-10-CM

## 2018-11-07 DIAGNOSIS — R79.89 ELEVATED SERUM CREATININE: ICD-10-CM

## 2018-11-07 DIAGNOSIS — I10 ESSENTIAL HYPERTENSION: ICD-10-CM

## 2018-11-07 RX ORDER — LEVOTHYROXINE SODIUM 50 UG/1
50 TABLET ORAL
Qty: 30 TAB | Refills: 5 | Status: SHIPPED | OUTPATIENT
Start: 2018-11-07 | End: 2019-06-06 | Stop reason: DRUGHIGH

## 2018-11-07 NOTE — PROGRESS NOTES
[facility] Medically Supervised Weight Loss Program  
[employer] FOLLOW UP PHYSICIAN VISIT HISTORY OF PRESENT ILLNESS Agree with nurse and registered dietician notes. Naveen Fields is a 60 y.o. female with Obesity Class I, Body mass index is 33.21 kg/m². and associated health concerns presents for evaluation and treatment of weight management. Pt has been participating in the [facility] Medically Supervised [facility] Loss Program using the Ruth New Direction Very Low Calorie Diet (VLCD, 800 kcal/day) since 8/15/18. She will transition to LCD today. Weight History Start weight 209 on 18. Current weight 196 lbs, 3 lbs since 2018. Percent weight loss 6.22% and total pounds lost since startin lbs. Goal weight 170 lbs. Waist measurement is 39\", decreased from 40\". Acceptable Range: M <40\" & F <35\" Neck circumference is 14.75\"; unchanged. Acceptable Range: M <17\" & F <16\" Body fat percent is 39.2%, decreased from 39.6%. Acceptable Range: M 18-24% & F 25-31% BMR is 1454, decreased from 1466. Are you satisfied with your progress since the last ov? She would like to do better. Barriers to adherence to this plan of care? \"I haven't been steady\", eating pecans while watching football Eating Habits 3 meals daily. Any skipped meals? No. 
Breakfast: hot chocolate or bar 
Lunch: shake Dinner: Muffin and cookies from the recipe book, pudding, or soup. She had pizza once. Snacks: 4-5 cups of popcorn a day, pecans Appetite well controlled? Drinking Habits How much water do you consume daily? Average of 16 oz, but as little of 8 oz and up to 80 oz (goal of 2 L or 67 oz daily, minimally) How much caffeine do you drink daily? 9 oz of coffee with stevia How much alcohol do you drink daily and weekly? 1 beer weekly. Do you consume any sugar-sweetened beverages (sodas, teas, juices, etc.)? Diet Jewel Rising Sleep Habits She averages 7-8 hours nightly. Exercise She averages 30-60 minutes of walking or elliptical daily. Medication(s): 
Are you taking any medications to control the appetite? No. 
 
Other Medical Care Pt with dyslipidemia, hypertension, elevated cr, hyperuricemia, efa deficiency, hyperinsulinemia, elevated crp, hypothyroidism, and hyperglycemia presents to the office with a BP of 115/77. For BP, she uses HCTZ 12.5 mg daily, tolerating well. She stopped the medication when she started the diet and her BP was very high. Denies fatigue, skin dryness, or constipation. For hyperglycemia, she previously used Metformin 500 mg but stopped because she didn't think she needed it any longer. Written by karina Talamantes, as dictated by Dr. Blessing Muhammad DO. 
ROS Pt denies hunger, cravings, lack of focus, fatigue, feeling weak, headaches, dizziness, light headedness, nausea, vomiting, diarrhea, constipation, indigestion, rapid heart rate, SOB, low blood sugar, feeling cold, hair loss, rash, fluid retention, leg aches, difficulty sleeping, irritability, mood swings, or other associated sxs. Review of Systems negative except as noted above in HPI. ALLERGIES:   
Allergies Allergen Reactions  Contrave [Naltrexone-Bupropion] Other (comments) 1 PILL MORNING, 2 PILLS EVENING 
FELT ZOMBIED, \"OUT OF MY HEAD\"  Crestor [Rosuvastatin] Other (comments) Felt like it worsened her memory  Lipitor [Atorvastatin] Other (comments) Felt like it worsened memory. CURRENT MEDICATIONS:   
Outpatient Medications Marked as Taking for the 11/7/18 encounter (Office Visit) with Mejia Sterling DO Medication Sig Dispense Refill  albuterol (VENTOLIN HFA) 90 mcg/actuation inhaler Take 2 Puffs by inhalation every four (4) hours as needed for Wheezing.  1 Inhaler 11  
 hydroCHLOROthiazide (MICROZIDE) 12.5 mg capsule TAKE ONE CAPSULE BY MOUTH ONCE DAILY 90 Cap 3  
  NASONEX 50 mcg/actuation nasal spray USE TWO SPRAY(S) IN EACH NOSTRIL DAILY 17 Container 11  
 glucose blood VI test strips (CONTOUR NEXT STRIPS) strip qd 100 Strip 11  
 desonide (TRIDESILON) 0.05 % topical lotion Apply  to affected area two (2) times a day. 59 mL 0  
 
 
PAST MEDICAL HISTORY:   
Past Medical History:  
Diagnosis Date  Asthma CHILDHOOD  Breast cancer, left ([address]   
 followed by gyn md cassius  
 Chest pain 2017  
 normal Stress Echo 17.  Dyslipidemia  Heartburn 2017  Hyperglycemia 2016 Dr. Erwin Jaramillo  Hypertension  Neck nodule 05/10/2018  Obesity (BMI 30-39.9)  Dr. Erwin Jaramillo, endo.  Postsurgical menopause  Dr. Bill Lieberman  Spider bite  ? Leg, recurrent.  Steatosis of liver 2016  Tubular adenoma of colon 2017 Dr. Nyasia [facility]  Uterine fibroid  Dr. Marquis Olea  Vitamin D deficiency 2018 Dr. Erwin Jaramillo PAST SURGICAL HISTORY:   
Past Surgical History:  
Procedure Laterality Date  HX BREAST LUMPECTOMY Left  Atypical Cells. Dr. Aldridge Rule.  HX COLONOSCOPY  2009, 17  
 with polypectomy. Dr. Nyasia [facility].  HX REFRACTIVE SURGERY Bilateral   HX MUKUND AND BSO    
 due to uterine fibroids. Dr. Marquis Olea [facility]. Dr. Marquis Olea FAMILY HISTORY:   
Family History Problem Relation Age of Onset  Diabetes Mother  Obesity Mother  Other Father 54  FROM BEE ALLERGY  Obesity Sister X7  
 Diabetes Brother  No Known Problems Maternal Grandmother  No Known Problems Maternal Grandfather  No Known Problems Paternal Grandmother  No Known Problems Paternal Grandfather  Sleep Apnea Sister  Diabetes Sister  Hypertension Sister  Thyroid Disease Sister X 2  
 Diabetes Sister      X3 MORE SISTERS TAKING INSULIN  
  Diabetes Brother  Breast Cancer Sister 43  Schizophrenia Sister SOCIAL HISTORY:   
Social History Socioeconomic History  Marital status:  Spouse name: Not on file  Number of children: Not on file  Years of education: Not on file  Highest education level: Not on file Social Needs  Financial resource strain: Not on file  Food insecurity - worry: Not on file  Food insecurity - inability: Not on file  Transportation needs - medical: Not on file  Transportation needs - non-medical: Not on file Occupational History  Not on file Tobacco Use  Smoking status: Never Smoker  Smokeless tobacco: Never Used Substance and Sexual Activity  Alcohol use: Yes Alcohol/week: 0.5 oz Types: 1 Glasses of wine per week Comment: ocassional  
 Drug use: No  
 Sexual activity: Yes  
  Partners: Male Birth control/protection: Surgical  
  Comment: TL Other Topics Concern  Not on file Social History Narrative Family History: Mother: , Pauly Record: , Bee stingSister(s):   yrs, Breast Cancer at 42Brother(s):  
 , Heart Problems1 brother(s) , 7 sister(s) - healthy. 1 son(s) , 1 daughter(s) - healthy. Social History: Alcohol Use Patient does not use alcohol. Smoking Status Patient is a never smoker. Caffeine: coffee, tea. Marital Status:  
 . Lives w ith: spouse Jocelin Thakkar. Occupation/W ork: office w ork. Medical History: Hypertension, Left Breast papilloma. Gyn History: Last mammogram date 10/28/2014. Last pap date 10/22/2012. Hysterectomy Date . OB History: Total pregnancies 2. Full term delivery (>37 w eeks) 2. Live births 2. C section(s) 2. Pregnancy # 1: , Girl, 11. Pregnancy # 2: Repeat C/S, boy, . Surgical History: hysterectomy, total abdominal w ith BSO , Left w rist surgery , colonoscopy Dwain Harada and , left lumpectomy . IMMUNIZATIONS:   
Immunization History Administered Date(s) Administered  Influenza Vaccine 10/01/2017 PHYSICAL EXAMINATION Vital Signs Visit Vitals Resp 18 Ht 5' 4.5\" (1.638 m) Wt 196 lb 8 oz (89.1 kg) BMI 33.21 kg/m² Weight Metrics 11/7/2018 11/7/2018 11/1/2018 10/18/2018 10/11/2018 9/27/2018 9/20/2018 Weight - 196 lb 8 oz 197 lb 8 oz 201 lb 203 lb 8 oz 203 lb 198 lb 8 oz Neck Circ (inches) 14.75 - - - - - - Waist Measure Inches 39 - 39.5 39 39.5 40 39.5 Body Fat % 39.2 - 39.4 39.9 - - -  
BMI - 33.21 kg/m2 33.38 kg/m2 33.97 kg/m2 34.39 kg/m2 34.31 kg/m2 33.55 kg/m2 General appearance - Well nourished. Well appearing. Well developed. No acute distress. Obese. Head - Normocephalic. Atraumatic. Eyes - pupils equal and reactive. Extraocular eye movements intact. Sclera anicteric. Mildly injected sclera. Ears - Hearing is grossly normal bilaterally. Nose - normal and patent. No polyps noted. No erythema. No discharge. Mouth - mucous membranes with decreased oral moisture. Posterior pharynx normal with cobblestone appearance. No erythema, white exudate or obstruction. Neck - supple. Midline trachea. No carotid bruits noted bilaterally. No thyromegaly noted. Chest - clear to auscultation bilaterally anteriorly and posteriorly. No wheezes. No rales or rhonchi. Breath sounds are symmetrical bilaterally. Unlabored respirations. Heart - normal rate. Regular rhythm. Normal S1, S2. No murmur noted. No rubs, clicks or gallops noted. Abdomen - soft and distended. No masses or organomegaly. No rebound, rigidity or guarding. Bowel sounds normal x 4 quadrants. No tenderness noted. Neurological - awake, alert and oriented to person, place, and time and event. Cranial nerves II through XII intact. Clear speech. Muscle strength is +5/5 x 4 extremities. Sensation is intact to light touch bilaterally. Steady gait. Heme/Lymph - peripheral pulses normal x 4 extremities. No peripheral edema is noted. Musculoskeletal - Intact x 4 extremities. Full ROM x 4 extremities. No pain with movement. Back exam - normal range of motion. No pain on palpation of the spinous processes in the cervical, thoracic, lumbar, sacral regions. No CVA tenderness. No buffalo hump noted. Skin - no rashes, erythema, ecchymosis, lacerations, abrasions, suspicious moles noted. No skin tags or moles. No acanthosis nigricans noted in the axilla or neck. Psychological -   normal behavior, dress and thought processes. Good insight. Good eye contact. Normal affect. Appropriate mood. Normal speech. DATA REVIEWED Labs dated 11/1/2018 from [facility] compared with labs from 8/29/2018. LDL was 179, down from 196. HDL was 58, up from 51. Triglycerides were 115, down from 117. LDL-P was 2811, up from 2584. Small LDL-P was 1636, up from 1272. Hs-CRP was 1.5, down from 2.0. Vit D was 56, up from 46. Uric Acid was 7.1, down from 8.3. CoQ10 was 1.52. Vit B12 was 722, up from 680. Hgb A1C was 6.0, unchanged. Leptin was 53. Adiponectin was 6. Insulin was 13. Creatinine was 1.0, down from 1.1. ALT was 22. AST was 26. ALP was 101. Iron 186. TSH was 6.25, up from 4.83. FT4 was 1.04. Omega 3 was 5.4, up from 5.0. SEE SCANNED DOCUMENT FOR COMPLETE PANEL RESULTS. ASSESSMENT and PLAN 
 
  ICD-10-CM ICD-9-CM 1. Hypothyroidism, unspecified type E03.9 244.9 levothyroxine (SYNTHROID) 50 mcg tablet  
 newly dx'd 2. Dyslipidemia E78.5 272.4   
 with elevated LDL-P and sLDL-P, worse with modified VLCD and untreated hypothyroid 3. Hyperglycemia R73.9 790.29   
 unchanged 4. Hyperuricemia w/o signs of inflam arthrit and tophaceous dis E79.0 790.6   
 improving 5. Elevated hemoglobin (HCC) D58.2 282.7 6. Weight loss R63.4 783.21   
 3# since 9/2018 and 13# since 8/2018 7. Essential hypertension I10 401.9   
 stable on HCTZ 12.5 mg daily 8. Elevated serum creatinine R79.89 790.99   
 improving 9. Essential fatty acid (EFA) deficiency E63.0 269.8   
 improving with Fish Oil 10. Hyperinsulinemia E16.1 251.1   
 improving with modified VLCD 11. Elevated C-reactive protein (CRP) R79.82 790.95   
 improving 12. Obesity, Class I, BMI 30-34.9 E66.9 278.00 Chart reviewed and updated. Recommend pt refer to LCD manual if experiencing any side effects once program is initiated or call our office. Referred patient to Venkata Reece RD for BS MSWLP to receive Ruth Nicholas LCD food products and weekly intervention. Discussed the patient's BMI with her. The BMI follow up plan is as follows: I have counseled this patient on diet and exercise regimens. Decrease carbohydrates (white foods, sweet foods, sweet drinks and alcohol), increase green leafy vegetables and protein (lean meats and beans) with each meal.  Avoid fried foods. Do not skip meals. Increase water intake. Avoid sugar-sweetened beverages. Get 7-8 hours uninterrupted sleep nightly. Diet prescription: LCD (low calorie diet)/6905-7603 calories daily using 2-3 meal replacements daily with Ruth Nicholas food/beverage products recommended. Consume a minimum of 2 L (67 oz) of water daily while utilizing LCD. Avoid sugar-sweetened beverages. Reviewed nutrition and importance of regular protein intake and hidden carbohydrate sources. Encouraged reduction of salt, sugar, and caffeine in diet. Avoid snacking on pecans. 
  
Exercise prescription: Advance as tolerated while using LCD. A goal of 30 minutes physical activity daily is recommended for health benefit and at least 60 minutes daily to prevent weight regain. For weight loss, no less than 75% needs to be aerobic (i.e. Walking) and no more than 25% resistance exercising (i.e. Weight lifting).   For weight maintenance phase, 50% aerobic and 50% resistance exercises. Emphasized importance of physical activity and reducing sedentary time. Sleep prescription: A goal of 7-8 hours of uninterrupted sleep is recommended to turn off the Grehlin hormone to be released from the stomach and triggers appetite while promoting weight gain. Proper rest turns on Leptin hormone to be released from white adipose tissue and promotes weight loss Continue current medications and care. Start Synthroid 50 mcg qam before breakfast. Do not take within 4 hours of any vitamins. Stop HCTZ while participating in VLCD due to diuretic effect of VLCD and risk of electrolyte abnormality with diuretic use. Okay to stay off Metformin for now while participating in diet. Prescriptions written and sent to pharmacy; medication side effects discussed. Synthroid 50 mcg. Reviewed and discussed [facility]. Copy of [facility] labs given to pt to share with PCP and specialists. Recheck pertinent labs monthly with [facility] lab. Counseled patient on health concerns:  LCD, weight loss goals, sleep hygiene, strategies to overcome habits or challenges to improve or continue adherence, vit d deficiency, elevated hgb, hypothyroidism, cholesterol, elevated cr, efa deficiency, hyperuricemia, and hyperglycemia. Hypothyroid. Reminded females of reproductive age that with weight loss, they may become more fertile and recommend the use of condoms if pregnancy is not desired. Weight loss goal of 5-10% in 6-12 months has shown significant improvement in obesity and its health consequences. Immunizations noted. Offered empathy, support, legitimation, prayers, partnership to patient. Praised patient for progress. Follow-up Disposition: 
Return in about 1 month (around 12/7/2018) for weight, results, mswlp/lcd, thyroid. Patient was offered a choice/choices in the treatment plan today. Patient expresses understanding of the plan and agrees with recommendations. More than 30 mins spent face to face with patient and more than 50% of this time spent in counseling and coordinating care and/or discussing treatment plans in reference to The primary encounter diagnosis was Hypothyroidism, unspecified type. Diagnoses of Dyslipidemia, Hyperglycemia, Hyperuricemia w/o signs of inflam arthrit and tophaceous dis, Elevated hemoglobin (HCC), Weight loss, Essential hypertension, Elevated serum creatinine, Essential fatty acid (EFA) deficiency, Hyperinsulinemia, Elevated C-reactive protein (CRP), and Obesity, Class I, BMI 30-34.9 were also pertinent to this visit. Pili Stark has a reminder for a \"due or due soon\" health maintenance. I have asked pt to contact their primary care provider for follow-up on this health maintenance. Written by karina Talamantes, as dictated by Dr. Blessing Muhammad DO. Documentation True and Accepted by Bassam Torres. Laury Gandara. [id number] [facility] Mcneil MD FOR ALLOWING ME THE PRIVILEGE TO PARTICIPATE IN THE CARE OF OUR MUTUAL PATIENT, Pili Stark WITH RESPECT TO WEIGHT MANAGEMENT. Patient Instructions Start Synthroid 50 mcg every morning before breakfast. Do not take any vitamins within four hours of the medication. Hypothyroidism: Care Instructions Your Care Instructions You have hypothyroidism, which means that your body is not making enough thyroid hormone. This hormone helps your body use energy. If your thyroid level is low, you may feel tired, be constipated, have an increase in your blood pressure, or have dry skin or memory problems. You may also get cold easily, even when it is warm. Women with low thyroid levels may have heavy menstrual periods. A blood test to find your thyroid-stimulating hormone (TSH) level is used to check for hypothyroidism. A high TSH level may mean that you have low thyroid.  When your body is not making enough thyroid hormone, TSH levels rise in an effort to make the body produce more. The treatment for hypothyroidism is to take thyroid hormone pills. You should start to feel better in 1 to 2 weeks. But it can take several months to see changes in the TSH level. You will need regular visits with your doctor to make sure you have the right dose of medicine. Most people need treatment for the rest of their lives. You will need to see your doctor regularly to have blood tests and to make sure you are doing well. Follow-up care is a key part of your treatment and safety. Be sure to make and go to all appointments, and call your doctor if you are having problems. It's also a good idea to know your test results and keep a list of the medicines you take. How can you care for yourself at home? · Take your thyroid hormone medicine exactly as prescribed. Call your doctor if you think you are having a problem with your medicine. Most people do not have side effects if they take the right amount of medicine regularly. ? Take the medicine 30 minutes before breakfast, and do not take it with calcium, vitamins, or iron. ? Do not take extra doses of your thyroid medicine. It will not help you get better any faster, and it may cause side effects. ? If you forget to take a dose, do NOT take a double dose of medicine. Take your usual dose the next day. · Tell your doctor about all prescription, herbal, or over-the-counter products you take. · Take care of yourself. Eat a healthy diet, get enough sleep, and get regular exercise. When should you call for help? Call 911 anytime you think you may need emergency care. For example, call if: 
  · You passed out (lost consciousness).  
  · You have severe trouble breathing.  
  · You have a very slow heartbeat (less than 60 beats a minute).  
  · You have a low body temperature (95°F or below).  
 Call your doctor now or seek immediate medical care if: 
  · You feel tired, sluggish, or weak.   · You have trouble remembering things or concentrating.  
  · You do not begin to feel better 2 weeks after starting your medicine.  
 Watch closely for changes in your health, and be sure to contact your doctor if you have any problems. Where can you learn more? Go to http://corona-andreea.info/. Enter G202 in the search box to learn more about \"Hypothyroidism: Care Instructions. \" Current as of: March 15, 2018 Content Version: 11.8 © 9120-6837 Healthwise, [employer]. Care instructions adapted under license by [employer] (which disclaims liability or warranty for this information). If you have questions about a medical condition or this instruction, always ask your healthcare professional. [address] any warranty or liability for your use of this information.

## 2018-11-07 NOTE — PATIENT INSTRUCTIONS
Start Synthroid 50 mcg every morning before breakfast. Do not take any vitamins within four hours of the medication. Hypothyroidism: Care Instructions Your Care Instructions You have hypothyroidism, which means that your body is not making enough thyroid hormone. This hormone helps your body use energy. If your thyroid level is low, you may feel tired, be constipated, have an increase in your blood pressure, or have dry skin or memory problems. You may also get cold easily, even when it is warm. Women with low thyroid levels may have heavy menstrual periods. A blood test to find your thyroid-stimulating hormone (TSH) level is used to check for hypothyroidism. A high TSH level may mean that you have low thyroid. When your body is not making enough thyroid hormone, TSH levels rise in an effort to make the body produce more. The treatment for hypothyroidism is to take thyroid hormone pills. You should start to feel better in 1 to 2 weeks. But it can take several months to see changes in the TSH level. You will need regular visits with your doctor to make sure you have the right dose of medicine. Most people need treatment for the rest of their lives. You will need to see your doctor regularly to have blood tests and to make sure you are doing well. Follow-up care is a key part of your treatment and safety. Be sure to make and go to all appointments, and call your doctor if you are having problems. It's also a good idea to know your test results and keep a list of the medicines you take. How can you care for yourself at home? · Take your thyroid hormone medicine exactly as prescribed. Call your doctor if you think you are having a problem with your medicine. Most people do not have side effects if they take the right amount of medicine regularly. ? Take the medicine 30 minutes before breakfast, and do not take it with calcium, vitamins, or iron. ? Do not take extra doses of your thyroid medicine. It will not help you get better any faster, and it may cause side effects. ? If you forget to take a dose, do NOT take a double dose of medicine. Take your usual dose the next day. · Tell your doctor about all prescription, herbal, or over-the-counter products you take. · Take care of yourself. Eat a healthy diet, get enough sleep, and get regular exercise. When should you call for help? Call 911 anytime you think you may need emergency care. For example, call if: 
  · You passed out (lost consciousness).  
  · You have severe trouble breathing.  
  · You have a very slow heartbeat (less than 60 beats a minute).  
  · You have a low body temperature (95°F or below).  
 Call your doctor now or seek immediate medical care if: 
  · You feel tired, sluggish, or weak.  
  · You have trouble remembering things or concentrating.  
  · You do not begin to feel better 2 weeks after starting your medicine.  
 Watch closely for changes in your health, and be sure to contact your doctor if you have any problems. Where can you learn more? Go to http://corona-andreea.info/. Enter N644 in the search box to learn more about \"Hypothyroidism: Care Instructions. \" Current as of: March 15, 2018 Content Version: 11.8 © 6637-7692 Healthwise[employer]. Care instructions adapted under license by [employer] (which disclaims liability or warranty for this information). If you have questions about a medical condition or this instruction, always ask your healthcare professional. [address] any warranty or liability for your use of this information.

## 2018-11-07 NOTE — PROGRESS NOTES
Rita Mcelroy  Identified pt with two pt identifiers(name and ). Chief Complaint Patient presents with  Weight Management 1. Have you been to the ER, urgent care clinic since your last visit? Hospitalized since your last visit? NO 
 
2. Have you seen or consulted any other health care providers outside of the [address] [place] [address] since your last visit? Include any pap smears or colon screening. NO Advance Care Planning In the event something were to happen to you and you were unable to speak on your behalf, do you have an Advance Directive/ Living Will in place stating your wishes? NO If yes, do we have a copy on file NO If no, would you like information NO 
 
My Chart My chart gives you direct online access to portions of the electronic medical record (EMR) where your doctor stores your health information (ie, lab results, appointment information, medications, immunizations, and more. It is free. Would you like to set up your my chart? YES 
 
[unfilled] Weight Metrics 2018 2018 2018 10/18/2018 10/11/2018 2018 2018 Weight - 196 lb 8 oz 197 lb 8 oz 201 lb 203 lb 8 oz 203 lb 198 lb 8 oz Neck Circ (inches) 14.75 - - - - - - Waist Measure Inches 39 - 39.5 39 39.5 40 39.5 Body Fat % 39.2 - 39.4 39.9 - - -  
BMI - 33.21 kg/m2 33.38 kg/m2 33.97 kg/m2 34.39 kg/m2 34.31 kg/m2 33.55 kg/m2 MMW: 30.8 
BWW: 44.8 BMR: 4915 Medication reconciliation up to date and corrected with patient at this time. Today's provider has been notified of reason for visit, vitals and flowsheets obtained on patients. Reviewed record in preparation for visit, huddled with provider and have obtained necessary documentation. Health Maintenance Due Topic  Shingrix Vaccine Age 50> (1 of 2)  BREAST CANCER SCRN MAMMOGRAM   
 PAP AKA CERVICAL CYTOLOGY  Influenza Age 5 to Adult Wt Readings from Last 3 Encounters: 11/07/18 196 lb 8 oz (89.1 kg) 11/01/18 197 lb 8 oz (89.6 kg) 10/18/18 201 lb (91.2 kg) Temp Readings from Last 3 Encounters:  
09/12/18 97.8 °F (36.6 °C) (Temporal) 08/15/18 97.3 °F (36.3 °C) (Temporal) 05/10/18 97.9 °F (36.6 °C) (Oral) BP Readings from Last 3 Encounters:  
11/01/18 115/77  
10/18/18 132/85  
10/11/18 147/85 Pulse Readings from Last 3 Encounters:  
11/01/18 88  
10/18/18 84  
10/11/18 83 Vitals:  
 11/07/18 1619 Resp: 18 Weight: 196 lb 8 oz (89.1 kg) Height: 5' 4.5\" (1.638 m) PainSc:   0 - No pain Learning Assessment: 
:  
 
Learning Assessment 7/11/2017 PRIMARY LEARNER Patient HIGHEST LEVEL OF EDUCATION - PRIMARY LEARNER  [address] [place] [address] PRIMARY LANGUAGE ENGLISH  
LEARNER PREFERENCE PRIMARY LISTENING  
ANSWERED BY patient RELATIONSHIP SELF Depression Screening: 
:  
 
PHQ over the last two weeks 8/15/2018 Little interest or pleasure in doing things Not at all Feeling down, depressed, irritable, or hopeless Not at all Total Score PHQ 2 0 Fall Risk Assessment: 
:  
 
Fall Risk Assessment, last 12 mths 8/15/2018 Able to walk? Yes Fall in past 12 months? No  
 
 
Abuse Screening: 
:  
 
Abuse Screening Questionnaire 11/7/2018 Do you ever feel afraid of your partner? Danielle Mandril Are you in a relationship with someone who physically or mentally threatens you? Danielle Mandril Is it safe for you to go home? Y  
 
 
ADL Screening: 
:  
 

## 2018-11-12 ENCOUNTER — OFFICE VISIT (OUTPATIENT)
Dept: INTERNAL MEDICINE CLINIC | Age: 58
End: 2018-11-12

## 2018-11-12 VITALS
TEMPERATURE: 98 F | HEIGHT: 64 IN | DIASTOLIC BLOOD PRESSURE: 82 MMHG | HEART RATE: 79 BPM | WEIGHT: 198.7 LBS | BODY MASS INDEX: 33.92 KG/M2 | SYSTOLIC BLOOD PRESSURE: 116 MMHG | OXYGEN SATURATION: 97 % | RESPIRATION RATE: 18 BRPM

## 2018-11-12 DIAGNOSIS — R10.12 LEFT UPPER QUADRANT PAIN: ICD-10-CM

## 2018-11-12 DIAGNOSIS — I10 ESSENTIAL HYPERTENSION: Primary | ICD-10-CM

## 2018-11-12 DIAGNOSIS — K76.0 STEATOSIS OF LIVER: ICD-10-CM

## 2018-11-12 DIAGNOSIS — E79.0 HYPERURICEMIA W/O SIGNS OF INFLAM ARTHRIT AND TOPHACEOUS DIS: ICD-10-CM

## 2018-11-12 NOTE — PROGRESS NOTES
1. Have you been to the ER, urgent care clinic since your last visit? Hospitalized since your last visit? No 
 
2. Have you seen or consulted any other health care providers outside of the [address] [place] [address] since your last visit? Include any pap smears or colon screening. No  
 
Wants to discuss thyroid

## 2018-11-12 NOTE — PROGRESS NOTES
[facility] Florencia Plasencia MD, [address] [address] Phone:  [phone]  Fax: [phone] Chief Complaint Patient presents with  Thyroid Problem  
  wants to discuss Manuel Diggs SUBJECTIVE: 
  Donna Terrell is a 60 y.o. female Patient returns today with her  with a known history of left breast cancer, dyslipidemia, impaired glucose tolerance, primary hypertension, obesity, and is followed by Dr. Syed Doherty, and is seen for evaluation. She was found to have a slightly elevated TSH at 6.25 and was placed on 50 mcg of thyroid. She is also taking medication that she is hoping to keep her from losing her memory. Patient complains of left lower back discomfort and is seen for evaluation. Current Outpatient Medications Medication Sig Dispense Refill  levothyroxine (SYNTHROID) 50 mcg tablet Take 1 Tab by mouth Daily (before breakfast). 30 Tab 5  MAGNESIUM PO Take 2,000 mg by mouth three (3) times daily. MAGNESIUM L THREONINE For memory  albuterol (VENTOLIN HFA) 90 mcg/actuation inhaler Take 2 Puffs by inhalation every four (4) hours as needed for Wheezing. 1 Inhaler 11  
 hydroCHLOROthiazide (MICROZIDE) 12.5 mg capsule TAKE ONE CAPSULE BY MOUTH ONCE DAILY 90 Cap 3  
 NASONEX 50 mcg/actuation nasal spray USE TWO SPRAY(S) IN EACH NOSTRIL DAILY 17 Container 11  
 glucose blood VI test strips (CONTOUR NEXT STRIPS) strip qd 100 Strip 11  
 desonide (TRIDESILON) 0.05 % topical lotion Apply  to affected area two (2) times a day. 59 mL 0 Past Medical History:  
Diagnosis Date  Asthma CHILDHOOD  Breast cancer, left ([address] 2005  
 followed by gyn md cassius  
 Chest pain 07/11/2017  
 normal Stress Echo 07/19/17.  Dyslipidemia  Heartburn 2017  Hyperglycemia 04/16/2016 Dr. Soco Hitchcock  Hypertension  Neck nodule 05/10/2018  Obesity (BMI 30-39.9) 2017 Dr. Soco Hitchcock, endo.  Postsurgical menopause 2002 Dr. Liza Molina  Spider bite 2016 ? Leg, recurrent.  Steatosis of liver 06/19/2016  Tubular adenoma of colon 02/2017 Dr. Wendy Melvin  Uterine fibroid 2002 Dr. Kathryn Callaway  Vitamin D deficiency 04/18/2018 Dr. Leslee Sutherland Past Surgical History:  
Procedure Laterality Date  HX BREAST LUMPECTOMY Left 2005 Atypical Cells. Dr. Jennifer Veliz.  HX COLONOSCOPY  08/11/2009, 02/07/17  
 with polypectomy. Dr. Wendy Melvin.  HX REFRACTIVE SURGERY Bilateral 2000s  HX MUKUND AND BSO  2002  
 due to uterine fibroids. Dr. Kathryn Callaway [address] Kathryn Callaway Allergies Allergen Reactions  Contrave [Naltrexone-Bupropion] Other (comments) 1 PILL MORNING, 2 PILLS EVENING 
FELT ZOMBIED, \"OUT OF MY HEAD\"  Crestor [Rosuvastatin] Other (comments) Felt like it worsened her memory  Lipitor [Atorvastatin] Other (comments) Felt like it worsened memory. REVIEW OF SYSTEMS: 
General: negative for - chills or fever ENT: negative for - headaches, nasal congestion or tinnitus Respiratory: negative for - cough, hemoptysis, shortness of breath or wheezing Cardiovascular : negative for - chest pain, edema, palpitations or shortness of breath Gastrointestinal: negative for - abdominal pain, blood in stools, heartburn or nausea/vomiting Genito-Urinary: no dysuria, trouble voiding, or hematuria Musculoskeletal: negative for - gait disturbance, joint pain, joint stiffness or joint swelling Neurological: no TIA or stroke symptoms Hematologic: no bruises, no bleeding, no swollen glands Integument: no lumps, mole changes, nail changes or rash Endocrine: no malaise/lethargy or unexpected weight changes Social History Socioeconomic History  Marital status:  Spouse name: Not on file  Number of children: Not on file  Years of education: Not on file  Highest education level: Not on file Social Needs  Financial resource strain: Not on file  Food insecurity - worry: Not on file  Food insecurity - inability: Not on file  Transportation needs - medical: Not on file  Transportation needs - non-medical: Not on file Occupational History  Not on file Tobacco Use  Smoking status: Never Smoker  Smokeless tobacco: Never Used Substance and Sexual Activity  Alcohol use: Yes Alcohol/week: 0.5 oz Types: 1 Glasses of wine per week Comment: ocassional  
 Drug use: No  
 Sexual activity: Yes  
  Partners: Male Birth control/protection: Surgical  
  Comment: TL Other Topics Concern  Not on file Social History Narrative Family History: Mother: , [place]: , Bee stingSister(s):  2010 yrs, Breast Cancer at 42Brother(s):  
 , Heart Problems1 brother(s) , 7 sister(s) - healthy. 1 son(s) , 1 daughter(s) - healthy. Social History: Alcohol Use Patient does not use alcohol. Smoking Status Patient is a never smoker. Caffeine: coffee, tea. Marital Status:  
 . Lives w ith: spouse Melania Ulloa. Occupation/W ork: office w ork. Medical History: Hypertension, Left Breast papilloma. Gyn History: Last mammogram date 10/28/2014. Last pap date 10/22/2012. Hysterectomy Date . OB History: Total pregnancies 2. Full term delivery (>37 w eeks) 2. Live births 2. C section(s) 2. Pregnancy # 1: , Girl, 11. Pregnancy # 2: Repeat C/S, boy, . Surgical History: hysterectomy, total abdominal w ith BSO , Left w rist surgery , colonoscopy Erin Espinal and 2009, left lumpectomy . Family History Problem Relation Age of Onset  Diabetes Mother  Obesity Mother  Other Father 54  FROM BEE ALLERGY  Obesity Sister X7  
 Diabetes Brother  No Known Problems Maternal Grandmother  No Known Problems Maternal Grandfather  No Known Problems Paternal Grandmother  No Known Problems Paternal Grandfather  Sleep Apnea Sister  Diabetes Sister  Hypertension Sister  Thyroid Disease Sister X 2  
 Diabetes Sister X3 MORE SISTERS TAKING INSULIN  
 Diabetes Brother  Breast Cancer Sister 43  Schizophrenia Sister OBJECTIVE: 
 
Visit Vitals /82 Pulse 79 Temp 98 °F (36.7 °C) (Oral) Resp 18 Ht 5' 4\" (1.626 m) Wt 198 lb 11.2 oz (90.1 kg) SpO2 97% BMI 34.11 kg/m² CONSTITUTIONAL: well , well nourished, appears age appropriate EYES: perrla, eom intact ENMT:moist mucous membranes, pharynx clear NECK: supple. Thyroid normal 
RESPIRATORY: Chest: clear bilaterally CARDIOVASCULAR: Heart: regular rate and rhythm GASTROINTESTINAL: Abdomen: soft, bowel sounds active HEMATOLOGIC: no pathological lymph nodes palpated MUSCULOSKELETAL: Extremities: no edema, pulse 1+ INTEGUMENT: No unusual rashes or suspicious skin lesions noted. Nails appear normal. 
NEUROLOGIC: non-focal exam  
MENTAL STATUS: alert and oriented, appropriate affect ASSESSMENT: 
1. Essential hypertension 2. Steatosis of liver 3. Hyperuricemia w/o signs of inflam arthrit and tophaceous dis 4. Left upper quadrant pain We are concerned about the flank discomfort. It is actually at the CVA angle, but no tenderness on percussion. We will ask for a UA as well as CT of the abdomen. If the pain completely subsides she will cancel the CT scan. If the urine shows an abnormality we will cancel the CT scan. She is agreeable with the plan. Her blood pressure control is at goal.  Her weight loss physician suggested she stop her blood pressure pill. Her blood pressure promptly went up greater than 140/80. We therefore suggest that she continue the medication. We encourage physical activity for 30 minutes five days a week.   She will be back to see us in about six months, sooner if she has any problems. She is seeing Dr. Benito Benjamin on a regular basis for her dietary maneuvers. I have discussed the diagnosis with the patient and the intended plan as seen in the 
orders above. The patient understands and agees with the plan. The patient has  
received an after visit summary and questions were answered concerning 
future plans Patient labs and/or xrays were reviewed Past records were reviewed. PLAN: 
. Orders Placed This Encounter  CT ABD PELV W CONT  
 URINALYSIS W/ RFLX MICROSCOPIC Follow-up Disposition: 
Return in about 6 months (around 5/12/2019). ATTENTION:  
This medical record was transcribed using an electronic medical records system. Although proofread, it may and can contain electronic and spelling errors. Other human spelling and other errors may be present. Corrections may be executed at a later time. Please feel free to contact us for any clarifications as needed.

## 2018-11-13 LAB
APPEARANCE UR: CLEAR
BILIRUB UR QL STRIP: NEGATIVE
COLOR UR: YELLOW
GLUCOSE UR QL: NEGATIVE
HGB UR QL STRIP: NEGATIVE
KETONES UR QL STRIP: NEGATIVE
LEUKOCYTE ESTERASE UR QL STRIP: NEGATIVE
MICRO URNS: NORMAL
NITRITE UR QL STRIP: NEGATIVE
PH UR STRIP: 6.5 [PH] (ref 5–7.5)
PROT UR QL STRIP: NEGATIVE
SP GR UR: 1.01 (ref 1–1.03)
UROBILINOGEN UR STRIP-MCNC: 0.2 MG/DL (ref 0.2–1)

## 2018-11-14 ENCOUNTER — CLINICAL SUPPORT (OUTPATIENT)
Dept: BARIATRICS/WEIGHT MGMT | Age: 58
End: 2018-11-14

## 2018-11-14 VITALS
HEIGHT: 64 IN | SYSTOLIC BLOOD PRESSURE: 122 MMHG | HEART RATE: 80 BPM | WEIGHT: 197.5 LBS | BODY MASS INDEX: 33.72 KG/M2 | DIASTOLIC BLOOD PRESSURE: 80 MMHG

## 2018-11-14 DIAGNOSIS — E66.9 CLASS 1 OBESITY WITH BODY MASS INDEX (BMI) OF 33.0 TO 33.9 IN ADULT, UNSPECIFIED OBESITY TYPE, UNSPECIFIED WHETHER SERIOUS COMORBIDITY PRESENT: Primary | ICD-10-CM

## 2018-11-14 NOTE — PROGRESS NOTES
Progress Note: Weekly Education Class in the [facility] Weight Loss Program   Is there anything that you or the patient needs to let Dr Yaneth Gale know about? no  Over the past week, have you experienced any side-effects? no    Abundio Dumont is a 60 y.o. female who is enrolled in [facility]    Visit Vitals  /80 (BP 1 Location: Left arm, BP Patient Position: Sitting)   Pulse 80   Ht 5' 4\" (1.626 m)   Wt 197 lb 8 oz (89.6 kg)   BMI 33.90 kg/m²     Weight Metrics 11/14/2018 11/12/2018 11/7/2018 11/7/2018 11/1/2018 10/18/2018 10/11/2018   Weight 197 lb 8 oz 198 lb 11.2 oz - 196 lb 8 oz 197 lb 8 oz 201 lb 203 lb 8 oz   Neck Circ (inches) - - 14.75 - - - -   Waist Measure Inches 38.5 - 39 - 39.5 39 39.5   Body Fat % 39.8 - 39.2 - 39.4 39.9 -   BMI 33.9 kg/m2 34.11 kg/m2 - 33.21 kg/m2 33.38 kg/m2 33.97 kg/m2 34.39 kg/m2         Have you received any other medical care this week? no  If yes, where and for what? Pt had back pain and abdomen pain and constipation    Have you had any change in your medications since your last visit? no  If yes what? Did you have any problems adhering to the program last week? no  If yes, please explain:       Eating Habits Over Last Week:  Did you take in 64 oz of non-caloric fluids? yes     Did you consume your 4 meal replacements each day?  yes       Physical Activity Over the Past Week:    Aerobic exercise: 32 min  Resistance exercise: 10 workouts / week

## 2018-11-29 ENCOUNTER — CLINICAL SUPPORT (OUTPATIENT)
Dept: BARIATRICS/WEIGHT MGMT | Age: 58
End: 2018-11-29

## 2018-11-29 VITALS
HEIGHT: 64 IN | SYSTOLIC BLOOD PRESSURE: 136 MMHG | WEIGHT: 196.5 LBS | DIASTOLIC BLOOD PRESSURE: 78 MMHG | BODY MASS INDEX: 33.55 KG/M2

## 2018-11-29 DIAGNOSIS — E66.9 CLASS 1 OBESITY WITH BODY MASS INDEX (BMI) OF 33.0 TO 33.9 IN ADULT, UNSPECIFIED OBESITY TYPE, UNSPECIFIED WHETHER SERIOUS COMORBIDITY PRESENT: Primary | ICD-10-CM

## 2018-11-29 NOTE — PROGRESS NOTES
Progress Note: Weekly Education Class in the [facility] Weight Loss Program   Is there anything that you or the patient needs to let Dr Mikael Vargas know about? no  Over the past week, have you experienced any side-effects? no    Hannah Vázquez is a 60 y.o. female who is enrolled in [facility] Weight Loss Program    Visit Vitals  /78 (BP 1 Location: Left arm, BP Patient Position: Sitting)   Ht 5' 4\" (1.626 m)   Wt 196 lb 8 oz (89.1 kg)   BMI 33.73 kg/m²     Weight Metrics 11/29/2018 11/14/2018 11/12/2018 11/7/2018 11/7/2018 11/1/2018 10/18/2018   Weight 196 lb 8 oz 197 lb 8 oz 198 lb 11.2 oz - 196 lb 8 oz 197 lb 8 oz 201 lb   Neck Circ (inches) - - - 14.75 - - -   Waist Measure Inches 38 38.5 - 39 - 39.5 39   Body Fat % 39.6 39.8 - 39.2 - 39.4 39.9   BMI 33.73 kg/m2 33.9 kg/m2 34.11 kg/m2 - 33.21 kg/m2 33.38 kg/m2 33.97 kg/m2         Have you received any other medical care this week? no  If yes, where and for what? Have you had any change in your medications since your last visit? no  If yes what? Did you have any problems adhering to the program last week? no  If yes, please explain:       Eating Habits Over Last Week:  Did you take in 64 oz of non-caloric fluids? yes     Did you consume your 4 meal replacements each day?  yes       Physical Activity Over the Past Week:    Aerobic exercise: 0 min  Resistance exercise: 0 workouts / week

## 2018-12-01 NOTE — PROGRESS NOTES
Reviewed and agree with Weekly Education Class nurse progress note for [facility] Medically Supervised Weight Loss Program (MSWLP) using [employer] food products. Continue current diet, care and follow up as planned and outlined in BS MSWLP manual.      Documentation true and accepted by Tatyana Zambrano.  Jean Leon.

## 2018-12-01 NOTE — PROGRESS NOTES
Reviewed and agree with Weekly Education Class nurse progress note for [facility] Medically Supervised Weight Loss Program (MSWLP) using [employer] food products. Continue current diet, care and follow up as planned and outlined in BS MSWLP manual.      Documentation true and accepted by Shree Butler.  Davis Andrade.

## 2018-12-06 ENCOUNTER — TELEPHONE (OUTPATIENT)
Dept: FAMILY MEDICINE CLINIC | Age: 58
End: 2018-12-06

## 2018-12-06 ENCOUNTER — CLINICAL SUPPORT (OUTPATIENT)
Dept: BARIATRICS/WEIGHT MGMT | Age: 58
End: 2018-12-06

## 2018-12-06 VITALS
HEIGHT: 64 IN | BODY MASS INDEX: 33.12 KG/M2 | DIASTOLIC BLOOD PRESSURE: 77 MMHG | SYSTOLIC BLOOD PRESSURE: 117 MMHG | WEIGHT: 194 LBS

## 2018-12-06 DIAGNOSIS — E66.9 OBESITY (BMI 30.0-34.9): Primary | ICD-10-CM

## 2018-12-06 NOTE — TELEPHONE ENCOUNTER
Patient came into the office for her triage appointment for the MSWL program. Patient stated that she needed a new lab sheet. Patient was provided with a new [facility] Diagnostic lab form to take to complete her labs before her upcoming 1 month appointment with Dr. Robert Claire.

## 2018-12-06 NOTE — PROGRESS NOTES
Progress Note: Weekly Education Class in the [facility] Weight Loss Program   Is there anything that you or the patient needs to let Dr Olivia Nuno know about? no  Over the past week, have you experienced any side-effects? no    Charley Stevens is a 60 y.o. female who is enrolled in [facility] Weight Loss Program    Visit Vitals  /77 (BP 1 Location: Left arm, BP Patient Position: Sitting)   Ht 5' 4\" (1.626 m)   Wt 194 lb (88 kg)   BMI 33.30 kg/m²     Weight Metrics 12/6/2018 12/6/2018 11/29/2018 11/14/2018 11/12/2018 11/7/2018 11/7/2018   Weight - 194 lb 196 lb 8 oz 197 lb 8 oz 198 lb 11.2 oz - 196 lb 8 oz   Neck Circ (inches) - - - - - 14.75 -   Waist Measure Inches 38 - 38 38.5 - 39 -   Body Fat % 39.2 - 39.6 39.8 - 39.2 -   BMI - 33.3 kg/m2 33.73 kg/m2 33.9 kg/m2 34.11 kg/m2 - 33.21 kg/m2         Have you received any other medical care this week? no  If yes, where and for what? Have you had any change in your medications since your last visit? no  If yes what? Did you have any problems adhering to the program last week? no  If yes, please explain:       Eating Habits Over Last Week:  Did you take in 64 oz of non-caloric fluids? yes     Did you consume your 4 meal replacements each day?  yes       Physical Activity Over the Past Week:    Aerobic exercise: 720 min  Resistance exercise: 360 workouts / week

## 2018-12-12 RX ORDER — SCOLOPAMINE TRANSDERMAL SYSTEM 1 MG/1
1 PATCH, EXTENDED RELEASE TRANSDERMAL
Qty: 4 PATCH | Refills: 0 | Status: SHIPPED | OUTPATIENT
Start: 2018-12-12 | End: 2019-05-13 | Stop reason: ALTCHOICE

## 2018-12-13 ENCOUNTER — CLINICAL SUPPORT (OUTPATIENT)
Dept: BARIATRICS/WEIGHT MGMT | Age: 58
End: 2018-12-13

## 2018-12-13 VITALS
HEIGHT: 64 IN | WEIGHT: 190.5 LBS | DIASTOLIC BLOOD PRESSURE: 83 MMHG | SYSTOLIC BLOOD PRESSURE: 144 MMHG | HEART RATE: 82 BPM | BODY MASS INDEX: 32.52 KG/M2

## 2018-12-13 DIAGNOSIS — E66.9 OBESITY (BMI 30.0-34.9): Primary | ICD-10-CM

## 2018-12-13 LAB
% ALBUMIN, 58A: 61 % (ref 54–71)
ADIPONECTIN-DIAZYME: 7 UG/ML
ALB/GLOBRATIO, 58C: 1.6 (ref 1.15–2.5)
ALBUMIN SERPL-MCNC: 4.4 G/DL (ref 3.7–5.1)
ALP SERPL-CCNC: 101 U/L (ref 35–125)
ALT SERPL-CCNC: 23 U/L
ANION GAP SERPL CALC-SCNC: 8 MMOL/L (ref 6–18)
AST SERPL W P-5'-P-CCNC: 26 U/L (ref 5–32)
BILIRUB SERPL-MCNC: 0.4 MG/DL
BUN SERPL-MCNC: 12 MG/DL (ref 6–20)
BUN/CREATININE RATIO,BUCR: 13 (ref 10–27)
CALCIUM SERPL-MCNC: 9.4 MG/DL (ref 8.8–10.5)
CHLORIDE SERPL-SCNC: 108 MMOL/L (ref 98–110)
CHOLEST SERPL-MCNC: 209 MG/DL
CO2 SERPL-SCNC: 28 MMOL/L (ref 19–31)
CREAT SERPL-MCNC: 0.9 MG/DL (ref 0.5–0.9)
CRP SERPL HS-MCNC: 0.6 MG/L
EGFRAACREAT: 80 ML/MIN/1.73M^2
EGFRNACREAT: 69 ML/MIN/1.73M^2
ESTIMATED AVERAGE GLUCOSE, EAG: 116.9 MG/DL
GLOBCALC, 58B: 2.7 G/DL (ref 1.9–3.5)
GLUCOSE SERPL-MCNC: 88 MG/DL (ref 70–99)
HBA1C MFR BLD HPLC: 5.7 %
HDLC SERPL-MCNC: 62 MG/DL
HOMA-IR, HDL2100: 3.4
INSULIN,INS: 16 UU/ML (ref 3–9)
IRON,IRN: 72 UG/DL (ref 37–145)
LDLC SERPL CALC-MCNC: 146 MG/DL
MAGNESIUM SERPL-MCNC: 2.3 MG/DL (ref 1.6–2.4)
NON-HDL CHOLESTEROL, 011976: 147 MG/DL
POTASSIUM SERPL-SCNC: 4.9 MMOL/L (ref 3.5–5.3)
PROT SERPL-MCNC: 7.1 G/DL (ref 6.1–8)
SODIUM SERPL-SCNC: 144 MMOL/L (ref 133–145)
TRIGL SERPL-MCNC: 58 MG/DL (ref ?–150)
URATE SERPL-MCNC: 5.5 MG/DL (ref 2–6.9)
VIT B12 II: 698 PG/ML
VIT D 25-HYDROXY, VDLT: 52 NG/ML (ref 30–100)

## 2018-12-13 NOTE — PROGRESS NOTES
Progress Note: Weekly Education Class in the [facility] Weight Loss Program   Is there anything that you or the patient needs to let Dr Tona Perales know about? no  Over the past week, have you experienced any side-effects? no    Charlcie Ganser is a 60 y.o. female who is enrolled in [facility] Weight Loss Program    Visit Vitals  /83 (BP 1 Location: Left arm, BP Patient Position: Sitting)   Pulse 82   Ht 5' 4\" (1.626 m)   Wt 190 lb 8 oz (86.4 kg)   BMI 32.70 kg/m²     Weight Metrics 12/13/2018 12/6/2018 12/6/2018 11/29/2018 11/14/2018 11/12/2018 11/7/2018   Weight 190 lb 8 oz - 194 lb 196 lb 8 oz 197 lb 8 oz 198 lb 11.2 oz -   Neck Circ (inches) - - - - - - 14.75   Waist Measure Inches 37.5 38 - 38 38.5 - 39   Body Fat % 38.7 39.2 - 39.6 39.8 - 39.2   BMI 32.7 kg/m2 - 33.3 kg/m2 33.73 kg/m2 33.9 kg/m2 34.11 kg/m2 -         Have you received any other medical care this week? no  If yes, where and for what? Have you had any change in your medications since your last visit? no  If yes what? Did you have any problems adhering to the program last week? no  If yes, please explain:       Eating Habits Over Last Week:  Did you take in 64 oz of non-caloric fluids? yes     Did you consume your 4 meal replacements each day?  yes       Physical Activity Over the Past Week:    Aerobic exercise: 720 min  Resistance exercise: 720 workouts / week

## 2018-12-14 LAB
COQ10: 1.5 UG/ML
HDL HDL-P, HDL5001: 42.3 UMOL/L
HDL LDL-P, HDL5000: 1899 NMOL/L
HDL SLDL-P, HDL5002: 701 NMOL/L
LEPTIN, SERUM, 146711: 35 NG/ML

## 2018-12-15 LAB
AA2: 14.84 % (ref 10.5–23.3)
ALPHA LINOLEIC ACID N3, HDL1202: 0.14 % (ref 0.1–0.4)
CIS-MONO-UNSATURATED FATTY ACID TOTAL, HDL1205: 14.7 (ref 11.5–20.5)
DOCOSAHEXAENOIC ACID N3, HDL1208: 5.08 % (ref 0.1–8.4)
DOCOSAPENTAENOIC ACID N3, HDL1206: 2.78 % (ref 0.6–4.1)
DOCOSAPENTAENOIC ACID N6, HDL1207: 0.56 % (ref 0.1–1.3)
EPA2: 0.66 % (ref 0.1–2.5)
LINOLEIC ACID C6, HDL1216: 14.33 % (ref 4.6–21.3)
OMEGA-3 FATTY ACID TOTAL, HDL1220: 8.7 (ref 0.1–14.1)
OMEGA-3 INDEX, HDL1219: 5.7 (ref 0.1–10.4)
OMEGA-6 FATTY ACID TOTAL, HDL1222: 34.6 (ref 28.6–44.5)
SATURATED FATTY ACID TOTAL, HDL1226: 41.4 (ref 36.6–42)
TRANS FATTY ACID TOTAL, HDL1232: 0.6 (ref 0.1–1.8)
TRANSLINOLEIC ACID, HDL1229: 0.19 % (ref 0.1–0.5)
TRANSOLEIC ACID, HDL1230: 0.36 % (ref 0.1–1.3)

## 2018-12-19 ENCOUNTER — OFFICE VISIT (OUTPATIENT)
Dept: FAMILY MEDICINE CLINIC | Age: 58
End: 2018-12-19

## 2018-12-19 VITALS
SYSTOLIC BLOOD PRESSURE: 142 MMHG | OXYGEN SATURATION: 98 % | WEIGHT: 192 LBS | DIASTOLIC BLOOD PRESSURE: 89 MMHG | HEART RATE: 81 BPM | HEIGHT: 64 IN | RESPIRATION RATE: 18 BRPM | TEMPERATURE: 97.9 F | BODY MASS INDEX: 32.78 KG/M2

## 2018-12-19 DIAGNOSIS — E66.9 OBESITY, CLASS I, BMI 30-34.9: ICD-10-CM

## 2018-12-19 DIAGNOSIS — E16.1 HYPERINSULINEMIA: ICD-10-CM

## 2018-12-19 DIAGNOSIS — E63.0 ESSENTIAL FATTY ACID (EFA) DEFICIENCY: ICD-10-CM

## 2018-12-19 DIAGNOSIS — R73.9 HYPERGLYCEMIA: Primary | ICD-10-CM

## 2018-12-19 DIAGNOSIS — E79.0 HYPERURICEMIA: ICD-10-CM

## 2018-12-19 DIAGNOSIS — R63.4 WEIGHT LOSS: ICD-10-CM

## 2018-12-19 DIAGNOSIS — D58.2 ELEVATED HEMOGLOBIN (HCC): ICD-10-CM

## 2018-12-19 DIAGNOSIS — R79.82 ELEVATED C-REACTIVE PROTEIN (CRP): ICD-10-CM

## 2018-12-19 DIAGNOSIS — E78.5 DYSLIPIDEMIA: ICD-10-CM

## 2018-12-19 DIAGNOSIS — R79.89 ELEVATED SERUM CREATININE: ICD-10-CM

## 2018-12-19 DIAGNOSIS — I10 ESSENTIAL HYPERTENSION: ICD-10-CM

## 2018-12-19 NOTE — PROGRESS NOTES
[facility]  Identified pt with two pt identifiers(name and ). Chief Complaint   Patient presents with    Weight Management         1. Have you been to the ER, urgent care clinic since your last visit? Hospitalized since your last visit? NO    2. Have you seen or consulted any other health care providers outside of the [address] [place] [address] since your last visit? Include any pap smears or colon screening. NO      Advance Care Planning    In the event something were to happen to you and you were unable to speak on your behalf, do you have an Advance Directive/ Living Will in place stating your wishes? NO    If yes, do we have a copy on file NO    If no, would you like information NO    My Chart     My chart gives you direct online access to portions of the electronic medical record (EMR) where your doctor stores your health information (ie, lab results, appointment information, medications, immunizations, and more. It is free. Would you like to set up your my chart? YES    [unfilled]    Weight Metrics 2018   Weight 192 lb 190 lb 8 oz - 194 lb 196 lb 8 oz 197 lb 8 oz 198 lb 11.2 oz   Neck Circ (inches) 13.5 - - - - - -   Waist Measure Inches 39 37.5 38 - 38 38.5 -   Body Fat % 39 38.7 39.2 - 39.6 39.8 -   BMI 32.96 kg/m2 32.7 kg/m2 - 33.3 kg/m2 33.73 kg/m2 33.9 kg/m2 34.11 kg/m2       MMW: 30.4  BWW: 44.9  BMR: 1426    Medication reconciliation up to date and corrected with patient at this time. Today's provider has been notified of reason for visit, vitals and flowsheets obtained on patients. Reviewed record in preparation for visit, huddled with provider and have obtained necessary documentation.       Health Maintenance Due   Topic    BREAST CANCER SCRN MAMMOGRAM     PAP AKA CERVICAL CYTOLOGY        Wt Readings from Last 3 Encounters:   18 192 lb (87.1 kg)   18 190 lb 8 oz (86.4 kg)   18 194 lb (88 kg) Temp Readings from Last 3 Encounters:   12/19/18 97.9 °F (36.6 °C) (Temporal)   11/12/18 98 °F (36.7 °C) (Oral)   11/07/18 (P) 97.2 °F (36.2 °C) ((P) Temporal)     BP Readings from Last 3 Encounters:   12/19/18 142/89   12/13/18 144/83   12/06/18 117/77     Pulse Readings from Last 3 Encounters:   12/19/18 81   12/13/18 82   11/14/18 80     Vitals:    12/19/18 1513   BP: 142/89   Pulse: 81   Resp: 18   Temp: 97.9 °F (36.6 °C)   TempSrc: Temporal   SpO2: 98%   Weight: 192 lb (87.1 kg)   Height: 5' 4\" (1.626 m)   PainSc:   0 - No pain         Learning Assessment:  :     Learning Assessment 11/12/2018 7/11/2017   PRIMARY LEARNER Patient Patient   HIGHEST LEVEL OF EDUCATION - PRIMARY LEARNER  4 YEARS OF COLLEGE 4 YEARS OF COLLEGE   PRIMARY LANGUAGE ENGLISH ENGLISH   LEARNER PREFERENCE PRIMARY LISTENING LISTENING   ANSWERED BY patient patient   RELATIONSHIP SELF SELF       Depression Screening:  :     PHQ over the last two weeks 11/12/2018   Little interest or pleasure in doing things Not at all   Feeling down, depressed, irritable, or hopeless Not at all   Total Score PHQ 2 0       Fall Risk Assessment:  :     Fall Risk Assessment, last 12 mths 8/15/2018   Able to walk? Yes   Fall in past 12 months? No       Abuse Screening:  :     Abuse Screening Questionnaire 11/7/2018   Do you ever feel afraid of your partner? N   Are you in a relationship with someone who physically or mentally threatens you? N   Is it safe for you to go home?  Y       ADL Screening:  :     ADL Assessment 8/15/2018   Feeding yourself No Help Needed   Getting from bed to chair No Help Needed   Getting dressed No Help Needed   Bathing or showering No Help Needed   Walk across the room (includes cane/walker) No Help Needed   Using the telphone No Help Needed   Taking your medications No Help Needed   Preparing meals No Help Needed   Managing money (expenses/bills) No Help Needed   Moderately strenuous housework (laundry) No Help Needed   Shopping for personal items (toiletries/medicines) No Help Needed   Shopping for groceries No Help Needed   Driving No Help Needed   Climbing a flight of stairs No Help Needed   Getting to places beyond walking distances No Help Needed

## 2018-12-19 NOTE — PROGRESS NOTES
[facility] Medically Supervised   [facility] Loss Program   [facility]    FOLLOW UP PHYSICIAN VISIT    HISTORY OF PRESENT ILLNESS  Agree with nurse and registered dietician notes. Yocasta Stephenson is a 60 y.o. female with Obesity Class I, Body mass index is 32.96 kg/m². and associated health concerns presents for evaluation and treatment of weight management. Pt has been participating in the [facility] Medically Supervised [facility] Loss Program using the Ruth New Direction Very Low Calorie Diet (VLCD, 800 kcal/day) since 8/15/18.  She transitioned to LCD on 2018. Weight History  Start weight 209 on 2018. Current weight 192 lbs, down 4 lbs since 2018. Percent weight loss 8.13 and total pounds lost since startin lbs. Goal weight 170 lbs. Waist measurement is 39\", unchanged. Acceptable Range: M <40\" & F <35\"     Neck circumference is 13.5\"; unchanged. Acceptable Range: M <17\" & F <16\"     Body fat percent is 39%, decreased from 39.2%. Acceptable Range: M 18-24% & F 25-31%    BMR is 1426, decreased from 1454. Are you satisfied with your progress since the last ov? Yes, she is now wearing a size 14 pants decreased from size 18. Barriers to adherence to this plan of care? None. Eating Habits  3 meals daily. Any skipped meals? No.   Breakfast: hot cocoa mixed with coffee at 6:30 am. She previously used 3 packs of sugar. Lunch: smoothie  Dinner: soup   Snacks: bar at 10 am, jello  Appetite well controlled? Yes. Drinking Habits  How much water do you consume daily? She averages  (goal of 2 L or 67 oz daily, minimally)  How much caffeine do you drink daily? 16 oz of green tea with 1 packet of stevia, decreased from 3 packets. How much alcohol do you drink daily and weekly? None. Do you consume any sugar-sweetened beverages (sodas, teas, juices, etc.)? No. She has not had a soda since her last visit.      Sleep Habits  n/a    Exercise  She works at [employer] during the holidays and she does a lot of walking and lifting. Medication(s):  Are you taking any medications to control the appetite? No.     Other Medical Care    Pt with a BP of 142/89. She no longer uses HCTZ 12.5 mg for BP. She tried to take Synthroid 50 mcg for her thyroid but she started having L flank pain so she stopped taking it. Written by karina Lemon, as dictated by Dr. Elvin García DO. ICD-10-CM ICD-9-CM    1. Weight loss R63.4 783.21     4# since 11/2018 and 17# since 8/2018       ROS    Pt denies hunger, cravings, lack of focus, fatigue, feeling weak, headaches, dizziness, light headedness, nausea, vomiting, diarrhea, constipation, indigestion, rapid heart rate, SOB, low blood sugar, feeling cold, hair loss, rash, fluid retention, leg aches, difficulty sleeping, irritability, mood swings, or other associated sxs. Review of Systems negative except as noted above in HPI. ALLERGIES:    Allergies   Allergen Reactions    Contrave [Naltrexone-Bupropion] Other (comments)     1 PILL MORNING, 2 PILLS EVENING  FELT ZOMBIED, \"OUT OF MY HEAD\"    Crestor [Rosuvastatin] Other (comments)     Felt like it worsened her memory    Lipitor [Atorvastatin] Other (comments)     Felt like it worsened memory. CURRENT MEDICATIONS:        PAST MEDICAL HISTORY:    Past Medical History:   Diagnosis Date    Asthma CHILDHOOD    Breast cancer, left ([facility] [address] 2005    followed by gyn md cassius    Chest pain 07/11/2017    normal Stress Echo 07/19/17.  Dyslipidemia     Heartburn 2017    Hyperglycemia 04/16/2016    Dr. Maryruth Osgood    Hypertension     Neck nodule 05/10/2018    Obesity (BMI 30-39.9) 2017    Dr. Maryruth Osgood, endo.  Postsurgical menopause 2002    Dr. Lester Ascencio Spider bite 2016    ? Leg, recurrent.     Steatosis of liver 06/19/2016    Tubular adenoma of colon 02/2017    Dr. Nick Noland    Uterine fibroid 2002    Dr. Kierra Emmanuel deficiency 2018    Dr. Yuliana Harp HISTORY:    Past Surgical History:   Procedure Laterality Date    HX BREAST LUMPECTOMY Left     Atypical Cells. Dr. Nathan Pina.  HX COLONOSCOPY  2009, 17    with polypectomy. Dr. Ashutosh Dozier.  HX REFRACTIVE SURGERY Bilateral s    HX MUKUND AND BSO      due to uterine fibroids. Dr. Griffin Gómez HISTORY:    Family History   Problem Relation Age of Onset    Diabetes Mother     Obesity Mother     Other Father 54         FROM BEE ALLERGY    Obesity Sister         X7    Diabetes Brother     No Known Problems Maternal Grandmother     No Known Problems Maternal Grandfather     No Known Problems Paternal Grandmother     No Known Problems Paternal Grandfather     Sleep Apnea Sister     Diabetes Sister     Hypertension Sister     Thyroid Disease Sister         X 2    Diabetes Sister         X3 MORE SISTERS TAKING INSULIN    Diabetes Brother     Breast Cancer Sister 43    Schizophrenia Sister        SOCIAL HISTORY:    Social History     Socioeconomic History    Marital status:      Spouse name: Not on file    Number of children: Not on file    Years of education: Not on file    Highest education level: Not on file   Tobacco Use    Smoking status: Never Smoker    Smokeless tobacco: Never Used   Substance and Sexual Activity    Alcohol use: Yes     Alcohol/week: 0.5 oz     Types: 1 Glasses of wine per week     Comment: ocassional    Drug use: No    Sexual activity: Yes     Partners: Male     Birth control/protection: Surgical     Comment: TL   Social History Narrative    Family History: Mother: , Butch Mix: , Bee stingSister(s):  2010 yrs, Breast Cancer at 42Brother(s):    , Heart Problems1 brother(s) , 7 sister(s) - healthy. 1 son(s) , 1 daughter(s) - healthy.     Social History: Alcohol Use Patient does not use alcohol. Smoking Status Patient is a never smoker. Caffeine: coffee, tea. Marital Status:    . Lives w ith: spouse Delores Barros. Occupation/W ork: office w ork. Medical History: Hypertension, Left Breast papilloma. Gyn History: Last mammogram date 10/28/2014. Last pap date 10/22/2012. Hysterectomy Date . OB History: Total pregnancies 2. Full term delivery (>37 w eeks) 2. Live births 2. C section(s) 2. Pregnancy # 1: , Girl, . Pregnancy # 2: Repeat C/S, boy, . Surgical History: hysterectomy, total abdominal w ith BSO , Left w rist surgery , colonoscopy Hessie  and , left lumpectomy . IMMUNIZATIONS:    Immunization History   Administered Date(s) Administered    Influenza Vaccine 10/01/2017, 2018         PHYSICAL EXAMINATION    Vital Signs    Visit Vitals  /89 (BP 1 Location: Left arm, BP Patient Position: Sitting)   Pulse 81   Temp 97.9 °F (36.6 °C) (Temporal)   Resp 18   Ht 5' 4\" (1.626 m)   Wt 192 lb (87.1 kg)   SpO2 98%   BMI 32.96 kg/m²       Weight Metrics 2018   Weight 192 lb 190 lb 8 oz - 194 lb 196 lb 8 oz 197 lb 8 oz 198 lb 11.2 oz   Neck Circ (inches) 13.5 - - - - - -   Waist Measure Inches 39 37.5 38 - 38 38.5 -   Body Fat % 39 38.7 39.2 - 39.6 39.8 -   BMI 32.96 kg/m2 32.7 kg/m2 - 33.3 kg/m2 33.73 kg/m2 33.9 kg/m2 34.11 kg/m2         General appearance - Well nourished. Well appearing. Well developed. No acute distress. Obese. Head - Normocephalic. Atraumatic. Eyes - pupils equal and reactive. Extraocular eye movements intact. Sclera anicteric. Mildly injected sclera. Ears - Hearing is grossly normal bilaterally. Nose - normal and patent. No polyps noted. No erythema. No discharge. Mouth - mucous membranes with adequate moisture. Posterior pharynx normal with cobblestone appearance.   No erythema, white exudate or obstruction. Neck - supple. Midline trachea. No carotid bruits noted bilaterally. No thyromegaly noted. Chest - clear to auscultation bilaterally anteriorly and posteriorly. No wheezes. No rales or rhonchi. Breath sounds are symmetrical bilaterally. Unlabored respirations. Heart - normal rate. Regular rhythm. Normal S1, S2. No murmur noted. No rubs, clicks or gallops noted. Abdomen - soft and distended. No masses or organomegaly. No rebound, rigidity or guarding. Bowel sounds normal x 4 quadrants. No tenderness noted. Neurological - awake, alert and oriented to person, place, and time and event. Cranial nerves II through XII intact. Clear speech. Muscle strength is +5/5 x 4 extremities. Sensation is intact to light touch bilaterally. Steady gait. Heme/Lymph - peripheral pulses normal x 4 extremities. No peripheral edema is noted. Musculoskeletal - Intact x 4 extremities. Full ROM x 4 extremities. No pain with movement. Back exam - normal range of motion. No pain on palpation of the spinous processes in the cervical, thoracic, lumbar, sacral regions. No CVA tenderness. No buffalo hump noted. Skin - no rashes, erythema, ecchymosis, lacerations, abrasions, suspicious moles noted. No skin tags or moles. No acanthosis nigricans noted in the axilla or neck. Psychological -   normal behavior, dress and thought processes. Good insight. Good eye contact. Normal affect. Appropriate mood. Normal speech. DATA REVIEWED    Labs dated 12/12/2018 from [facility] compared with labs from 11/1/2018. LDL was 146. HDL was 62. Triglycerides were 58. LDL-P was 1899, down from 2811. Small LDL-P was 701. Hs-CRP was 0.6. Vit D was 52. Uric Acid was 5.5. CoQ10 was 1.5. Vit B12 was 698. Hgb A1C was 5.7. Leptin was 35. Adiponectin was 7. Insulin was 16. Creatinine was 0.9. ALT was 23. AST was 26. ALP was 101. Iron 72. Hgb 16.5, up from 14.5.     SEE SCANNED DOCUMENT FOR COMPLETE PANEL RESULTS. ASSESSMENT and PLAN      ICD-10-CM ICD-9-CM    1. Hyperglycemia R73.9 790.29     uncontrolled   2. Dyslipidemia E78.5 272.4     with elevated LDLP and sdLDL, improving   3. Essential hypertension I10 401.9     slightly worse off HCTZ 12.5 mg   4. Elevated serum creatinine R79.89 790.99     stable   5. Essential fatty acid (EFA) deficiency E63.0 269.8    6. Hyperinsulinemia E16.1 251.1     slightly worse   7. Elevated C-reactive protein (CRP) R79.82 790.95    8. Weight loss R63.4 783.21     4# since 11/2018 and 17# since 8/2018   9. Obesity, Class I, BMI 30-34.9 E66.9 278.00    10. Hyperuricemia E79.0 790.6     resolved    11. Elevated hemoglobin (HCC) D58.2 282.7        Chart reviewed and updated. Recommend pt refer to LCD manual if experiencing any side effects once program is initiated or call our office. Referred patient to Hussein Rojo RD for BS MSWLP to receive Ruth New Direction LCD food products and weekly intervention. Diet prescription: LCD (low calorie diet)/5914-5463 calories daily using 3-4 meal replacements daily with Ruth New Yu food/beverage products recommended. Consume a minimum of 2 L (67 oz) of water daily while utilizing VLCD. Avoid sugar-sweetened beverages. Reviewed nutrition and importance of regular protein intake and hidden carbohydrate sources.      Exercise prescription: Advance as tolerated while using VLCD. A goal of 30 minutes physical activity daily is recommended for health benefit and at least 60 minutes daily to prevent weight regain. For weight loss, no less than 75% needs to be aerobic (i.e. Walking) and no more than 25% resistance exercising (i.e. Weight lifting). For weight maintenance phase, 50% aerobic and 50% resistance exercises. Emphasized importance of physical activity and reducing sedentary time.      Sleep prescription: A goal of 7-8 hours of uninterrupted sleep is recommended to turn off the [employer] hormone to be released from the stomach and triggers appetite while promoting weight gain. Proper rest turns on Leptin hormone to be released from white adipose tissue and promotes weight loss. Continue current medications and care. Encouraged pt to restart Synthroid 50 mcg 1/2 tab as tolerated. Once tolerated, increase to 1 tab daily. Reviewed and discussed [place] lab results. Copy of [place] labs given to pt to share with PCP and specialists. Recheck pertinent labs monthly with [place] lab. Counseled patient on health concerns:  VLCD, weight loss goals, sleep hygiene, strategies to overcome habits or challenges to improve or continue adherence, hyperglycemia, hyperinsulinemia, hypothyroidism, cholesterol, elevated cr, hyperuricemia, elevated crp, hypertension. Reminded females of reproductive age that with weight loss, they may become more fertile and recommend the use of condoms if pregnancy is not desired. Weight loss goal of 5-10% in 6-12 months has shown significant improvement in obesity and its health consequences. Immunizations noted. Offered empathy, support, legitimation, prayers, partnership to patient. Praised patient for progress. Follow-up Disposition:  Return in about 1 month (around 1/19/2019) for mswlp/lcd. Patient was offered a choice/choices in the treatment plan today. Patient expresses understanding of the plan and agrees with recommendations. More than 30 mins spent face to face with patient and more than 50% of this time spent in counseling and coordinating care and/or discussing treatment plans in reference to There were no encounter diagnoses. Bharat Butler has a reminder for a \"due or due soon\" health maintenance. I have asked pt to contact their primary care provider for follow-up on this health maintenance. Written by karina Garcia, as dictated by Dr. Pat Lucas DO. Documentation True and Accepted by [facility].     Chloeo [address] Rivas MD FOR ALLOWING ME THE PRIVILEGE TO PARTICIPATE IN THE CARE OF OUR MUTUAL PATIENT, Charlcie Ganser WITH RESPECT TO WEIGHT MANAGEMENT. There are no Patient Instructions on file for this visit.

## 2018-12-19 NOTE — PROGRESS NOTES
Progress Note: Weekly Education Class in the [facility] Weight Loss Program   Is there anything that you or the patient needs to let Dr Disha Nettles know about? no  Over the past week, have you experienced any side-effects? no    Krysta Alexander is a 60 y.o. female who is enrolled in [facility] Weight Loss Program    Visit Vitals  /89 (BP 1 Location: Left arm, BP Patient Position: Sitting)   Pulse 81   Temp 97.9 °F (36.6 °C) (Temporal)   Resp 18   Ht 5' 4\" (1.626 m)   Wt 192 lb (87.1 kg)   SpO2 98%   BMI 32.96 kg/m²     Weight Metrics 12/19/2018 12/13/2018 12/6/2018 12/6/2018 11/29/2018 11/14/2018 11/12/2018   Weight 192 lb 190 lb 8 oz - 194 lb 196 lb 8 oz 197 lb 8 oz 198 lb 11.2 oz   Neck Circ (inches) 13.5 - - - - - -   Waist Measure Inches 39 37.5 38 - 38 38.5 -   Body Fat % 39 38.7 39.2 - 39.6 39.8 -   BMI 32.96 kg/m2 32.7 kg/m2 - 33.3 kg/m2 33.73 kg/m2 33.9 kg/m2 34.11 kg/m2         Have you received any other medical care this week? no  If yes, where and for what? Have you had any change in your medications since your last visit? no  If yes what? Did you have any problems adhering to the program last week? no  If yes, please explain:       Eating Habits Over Last Week:  Did you take in 64 oz of non-caloric fluids? yes    Did you consume your 4 meal replacements each day?  yes       Physical Activity Over the Past Week:    Aerobic exercise: 600min  Resistance exercise:140 workouts / week

## 2018-12-21 NOTE — PROGRESS NOTES
Reviewed and agree with Weekly Education Class nurse progress note for [facility] Medically Supervised Weight Loss Program (MSWLP) using New Direction food products. Continue current diet, care and follow up as planned and outlined in BS MSWLP manual.      Documentation true and accepted by Elizabeth Hartley.  Vanessa Valencia.

## 2018-12-21 NOTE — PROGRESS NOTES
Reviewed and agree with Weekly Education Class nurse progress note for [facility] Loss Program (MSWLP) using New Direction food products. Continue current diet, care and follow up as planned and outlined in BS MSWLP manual.      Documentation true and accepted by Bahman Alcantara.  Norman Franklin.

## 2019-01-03 ENCOUNTER — CLINICAL SUPPORT (OUTPATIENT)
Dept: BARIATRICS/WEIGHT MGMT | Age: 59
End: 2019-01-03

## 2019-01-03 VITALS
HEIGHT: 64 IN | DIASTOLIC BLOOD PRESSURE: 83 MMHG | BODY MASS INDEX: 34.49 KG/M2 | SYSTOLIC BLOOD PRESSURE: 144 MMHG | WEIGHT: 202 LBS | HEART RATE: 93 BPM

## 2019-01-03 DIAGNOSIS — E66.9 OBESITY (BMI 30.0-34.9): Primary | ICD-10-CM

## 2019-01-03 NOTE — PROGRESS NOTES
Progress Note: Weekly Education Class in the [facility] Weight Loss Program   Is there anything that you or the patient needs to let Dr Santa Camarena know about? no  Over the past week, have you experienced any side-effects? no    Chikis Gilbert is a 60 y.o. female who is enrolled in [facility] Weight Loss Program    Visit Vitals  /83 (BP 1 Location: Left arm, BP Patient Position: Sitting)   Pulse 93   Ht 5' 4\" (1.626 m)   Wt 202 lb (91.6 kg)   BMI 34.67 kg/m²     Weight Metrics 1/3/2019 1/3/2019 12/19/2018 12/19/2018 12/13/2018 12/6/2018 12/6/2018   Weight - 202 lb - 192 lb 190 lb 8 oz - 194 lb   Neck Circ (inches) - - 13.5 - - - -   Waist Measure Inches 39 - 39 - 37.5 38 -   Body Fat % 40 - 39 - 38.7 39.2 -   BMI - 34.67 kg/m2 - 32.96 kg/m2 32.7 kg/m2 - 33.3 kg/m2         Have you received any other medical care this week? no  If yes, where and for what? Have you had any change in your medications since your last visit? no  If yes what? Did you have any problems adhering to the program last week? yes  If yes, please explain: Holiday       Eating Habits Over Last Week:  Did you take in 64 oz of non-caloric fluids? yes     Did you consume your 4 meal replacements each day?  yes       Physical Activity Over the Past Week:    Aerobic exercise: 0 min  Resistance exercise: 0 workouts / week

## 2019-01-10 ENCOUNTER — CLINICAL SUPPORT (OUTPATIENT)
Dept: BARIATRICS/WEIGHT MGMT | Age: 59
End: 2019-01-10

## 2019-01-10 DIAGNOSIS — E66.9 OBESITY (BMI 30.0-34.9): Primary | ICD-10-CM

## 2019-01-15 VITALS
BODY MASS INDEX: 33.29 KG/M2 | HEART RATE: 90 BPM | DIASTOLIC BLOOD PRESSURE: 78 MMHG | SYSTOLIC BLOOD PRESSURE: 124 MMHG | WEIGHT: 195 LBS | HEIGHT: 64 IN

## 2019-01-15 NOTE — PROGRESS NOTES
Progress Note: Weekly Education Class in the [facility] Weight Loss Program   Is there anything that you or the patient needs to let Dr Vic Yusuf know about? no  Over the past week, have you experienced any side-effects? no    Ac John is a 60 y.o. female who is enrolled in [facility] Weight Loss Program    Visit Vitals  /78 (BP 1 Location: Left arm, BP Patient Position: Sitting)   Pulse 90   Ht 5' 4\" (1.626 m)   Wt 195 lb (88.5 kg)   BMI 33.47 kg/m²     Weight Metrics 1/15/2019 1/10/2019 1/3/2019 1/3/2019 12/19/2018 12/19/2018 12/13/2018   Weight - 195 lb - 202 lb - 192 lb 190 lb 8 oz   Neck Circ (inches) - - - - 13.5 - -   Waist Measure Inches 38 - 39 - 39 - 37.5   Body Fat % - - 40 - 39 - 38.7   BMI - 33.47 kg/m2 - 34.67 kg/m2 - 32.96 kg/m2 32.7 kg/m2         Have you received any other medical care this week? no  If yes, where and for what? Have you had any change in your medications since your last visit? no  If yes what? Did you have any problems adhering to the program last week? no  If yes, please explain:       Eating Habits Over Last Week:  Did you take in 64 oz of non-caloric fluids? yes     Did you consume your 4 meal replacements each day?  yes       Physical Activity Over the Past Week:    Aerobic exercise: 250 min  Resistance exercise: 0 workouts / week

## 2019-01-17 ENCOUNTER — CLINICAL SUPPORT (OUTPATIENT)
Dept: BARIATRICS/WEIGHT MGMT | Age: 59
End: 2019-01-17

## 2019-01-17 VITALS
HEART RATE: 83 BPM | WEIGHT: 195.5 LBS | DIASTOLIC BLOOD PRESSURE: 86 MMHG | BODY MASS INDEX: 33.38 KG/M2 | SYSTOLIC BLOOD PRESSURE: 131 MMHG | HEIGHT: 64 IN

## 2019-01-17 DIAGNOSIS — E66.9 OBESITY, CLASS I, BMI 30-34.9: Primary | ICD-10-CM

## 2019-01-17 NOTE — PROGRESS NOTES
Progress Note: Weekly Education Class in the [facility] Weight Loss Program   Is there anything that you or the patient needs to let Dr Edward Parker know about? no  Over the past week, have you experienced any side-effects? no    Naeem Martinez is a 60 y.o. female who is enrolled in [facility] Weight Loss Program    Visit Vitals  /86 (BP 1 Location: Right arm, BP Patient Position: Sitting)   Pulse 83   Ht 5' 4\" (1.626 m)   Wt 195 lb 8 oz (88.7 kg)   LMP  (Exact Date)   BMI 33.56 kg/m²     Weight Metrics 1/17/2019 1/15/2019 1/10/2019 1/3/2019 1/3/2019 12/19/2018 12/19/2018   Weight 195 lb 8 oz - 195 lb - 202 lb - 192 lb   Neck Circ (inches) - - - - - 13.5 -   Waist Measure Inches 39 38 - 39 - 39 -   Body Fat % 39.5 - - 40 - 39 -   BMI 33.56 kg/m2 - 33.47 kg/m2 - 34.67 kg/m2 - 32.96 kg/m2         Have you received any other medical care this week? no  If yes, where and for what? Have you had any change in your medications since your last visit? no  If yes what? Did you have any problems adhering to the program last week? no  If yes, please explain:       Eating Habits Over Last Week:  Did you take in 64 oz of non-caloric fluids? yes     Did you consume your 4 meal replacements each day?  yes       Physical Activity Over the Past Week:    Aerobic exercise: 200 min  Resistance exercise: 0 workouts / week

## 2019-01-19 LAB
% ALBUMIN, 58A: 61 % (ref 54–71)
ABSOLUTE IMMATURE GRANULOCYTES, AIG: 0 X10^3/UL (ref 0–0.1)
ADIPONECTIN-DIAZYME: 6 UG/ML
ALB/GLOBRATIO, 58C: 1.57 (ref 1.15–2.5)
ALBUMIN SERPL-MCNC: 4.6 G/DL (ref 3.7–5.1)
ALP SERPL-CCNC: 95 U/L (ref 35–125)
ALT SERPL-CCNC: 14 U/L
ANION GAP SERPL CALC-SCNC: 10 MMOL/L (ref 6–18)
AST SERPL W P-5'-P-CCNC: 17 U/L (ref 5–32)
BASOPHILS # BLD: 1 % (ref 0–3)
BASOPHILS NFR BLD: 0 X10^3/UL (ref 0–0.2)
BILIRUB SERPL-MCNC: 0.4 MG/DL
BUN SERPL-MCNC: 19 MG/DL (ref 6–20)
BUN/CREATININE RATIO,BUCR: 21 (ref 10–27)
CALCIUM SERPL-MCNC: 9.5 MG/DL (ref 8.8–10.5)
CHLORIDE SERPL-SCNC: 105 MMOL/L (ref 98–110)
CHOLEST SERPL-MCNC: 236 MG/DL
CO2 SERPL-SCNC: 27 MMOL/L (ref 19–31)
CREAT SERPL-MCNC: 0.9 MG/DL (ref 0.5–0.9)
EGFRAACREAT: 80 ML/MIN/1.73M^2
EGFRNACREAT: 69 ML/MIN/1.73M^2
EOSINOPHIL # BLD: 2 % (ref 0–7)
EOSINOPHIL NFR BLD: 0.1 X10^3/UL (ref 0–0.4)
ERYTHROCYTE [DISTWIDTH] IN BLOOD BY AUTOMATED COUNT: 14.1 % (ref 11.7–15)
ESTIMATED AVERAGE GLUCOSE, EAG: 116.9 MG/DL
GLOBCALC, 58B: 2.9 G/DL (ref 1.9–3.5)
GLUCOSE SERPL-MCNC: 101 MG/DL (ref 70–99)
GRANULOCYTES,GRANS: 50 % (ref 40–74)
HBA1C MFR BLD HPLC: 5.7 %
HCT VFR BLD AUTO: 44 % (ref 34–44)
HDLC SERPL-MCNC: 57 MG/DL
HGB BLD-MCNC: 15.2 G/DL (ref 11.5–15)
HOMA-IR, HDL2100: 4.7
INSULIN,INS: 19 UU/ML (ref 3–9)
IRON,IRN: 105 UG/DL (ref 37–145)
LDLC SERPL CALC-MCNC: 175 MG/DL
LYMPHOCYTES # BLD: 2.6 X10^3/UL (ref 0.7–4.5)
LYMPHOCYTES NFR BLD: 42 % (ref 14–46)
MAGNESIUM SERPL-MCNC: 2.3 MG/DL (ref 1.6–2.4)
MCH RBC QN AUTO: 28 PG (ref 27–34)
MCHC RBC AUTO-ENTMCNC: 35 G/DL (ref 32–36)
MCV RBC AUTO: 80 FL (ref 80–98)
MONOCYTES # BLD: 0.4 X10^3/UL (ref 0.1–1)
MONOCYTES NFR BLD: 6 % (ref 4–13)
NEUTROPHILS # BLD AUTO: 3.1 X10^3/UL (ref 1.8–7.8)
NON-HDL CHOLESTEROL, 011976: 179 MG/DL
NRBC: 2.8 /100 WBC
PLATELET # BLD AUTO: 304 X10^3/UL (ref 140–415)
POTASSIUM SERPL-SCNC: 4.4 MMOL/L (ref 3.5–5.3)
PROT SERPL-MCNC: 7.4 G/DL (ref 6.1–8)
RBC # BLD AUTO: 5.4 X10^6/UL (ref 3.8–5.1)
SODIUM SERPL-SCNC: 142 MMOL/L (ref 133–145)
T4 FREE SERPL-MCNC: 1.01 NG/DL (ref 0.93–1.7)
TRIGL SERPL-MCNC: 74 MG/DL (ref ?–150)
TSH SERPL-ACNC: 4.66 UIU/ML (ref 0.27–4.2)
URATE SERPL-MCNC: 5.5 MG/DL (ref 2–6.9)
WBC # BLD AUTO: 6.2 X10^3/UL (ref 4–10.5)

## 2019-01-20 LAB
HDL HDL-P, HDL5001: 41.9 UMOL/L
HDL LDL-P, HDL5000: 2348 NMOL/L
HDL SLDL-P, HDL5002: 1266 NMOL/L

## 2019-01-21 LAB
AA2: 15.09 % (ref 10.5–23.3)
ALPHA LINOLEIC ACID N3, HDL1202: 0.13 % (ref 0.1–0.4)
CIS-MONO-UNSATURATED FATTY ACID TOTAL, HDL1205: 15.2 (ref 11.5–20.5)
COQ10: 1.53 UG/ML
DOCOSAHEXAENOIC ACID N3, HDL1208: 4.31 % (ref 0.1–8.4)
DOCOSAPENTAENOIC ACID N3, HDL1206: 2.76 % (ref 0.6–4.1)
DOCOSAPENTAENOIC ACID N6, HDL1207: 0.56 % (ref 0.1–1.3)
EPA2: 0.69 % (ref 0.1–2.5)
LINOLEIC ACID C6, HDL1216: 13.53 % (ref 4.6–21.3)
OMEGA-3 FATTY ACID TOTAL, HDL1220: 7.9 (ref 0.1–14.1)
OMEGA-3 INDEX, HDL1219: 5 (ref 0.1–10.4)
OMEGA-6 FATTY ACID TOTAL, HDL1222: 34.5 (ref 28.6–44.5)
SATURATED FATTY ACID TOTAL, HDL1226: 41.9 (ref 36.6–42)
TRANS FATTY ACID TOTAL, HDL1232: 0.5 (ref 0.1–1.8)
TRANSLINOLEIC ACID, HDL1229: <0.1 %
TRANSOLEIC ACID, HDL1230: 0.35 % (ref 0.1–1.3)

## 2019-01-22 LAB — LEPTIN, SERUM, 146711: 44 NG/ML

## 2019-01-24 ENCOUNTER — OFFICE VISIT (OUTPATIENT)
Dept: FAMILY MEDICINE CLINIC | Age: 59
End: 2019-01-24

## 2019-01-24 VITALS
WEIGHT: 195.8 LBS | DIASTOLIC BLOOD PRESSURE: 73 MMHG | OXYGEN SATURATION: 98 % | RESPIRATION RATE: 18 BRPM | TEMPERATURE: 97.7 F | SYSTOLIC BLOOD PRESSURE: 110 MMHG | BODY MASS INDEX: 33.43 KG/M2 | HEIGHT: 64 IN | HEART RATE: 76 BPM

## 2019-01-24 DIAGNOSIS — R63.5 WEIGHT GAIN: ICD-10-CM

## 2019-01-24 DIAGNOSIS — I10 ESSENTIAL HYPERTENSION: ICD-10-CM

## 2019-01-24 DIAGNOSIS — E79.0 HYPERURICEMIA: ICD-10-CM

## 2019-01-24 DIAGNOSIS — R79.89 ELEVATED SERUM CREATININE: ICD-10-CM

## 2019-01-24 DIAGNOSIS — E78.5 DYSLIPIDEMIA: ICD-10-CM

## 2019-01-24 DIAGNOSIS — E63.0 ESSENTIAL FATTY ACID (EFA) DEFICIENCY: ICD-10-CM

## 2019-01-24 DIAGNOSIS — R79.82 ELEVATED C-REACTIVE PROTEIN (CRP): ICD-10-CM

## 2019-01-24 DIAGNOSIS — E03.9 HYPOTHYROIDISM, UNSPECIFIED TYPE: ICD-10-CM

## 2019-01-24 DIAGNOSIS — E16.1 HYPERINSULINEMIA: ICD-10-CM

## 2019-01-24 DIAGNOSIS — E66.9 OBESITY, CLASS I, BMI 30-34.9: ICD-10-CM

## 2019-01-24 DIAGNOSIS — R73.9 HYPERGLYCEMIA: Primary | ICD-10-CM

## 2019-01-24 NOTE — PROGRESS NOTES
Juan C Fields  Identified pt with two pt identifiers(name and ). Chief Complaint   Patient presents with    Weight Management     Rm 13         1. Have you been to the ER, urgent care clinic since your last visit? Hospitalized since your last visit? NO    2. Have you seen or consulted any other health care providers outside of the [address] [place] [address] since your last visit? Include any pap smears or colon screening. NO      Advance Care Planning    In the event something were to happen to you and you were unable to speak on your behalf, do you have an Advance Directive/ Living Will in place stating your wishes? NO    If yes, do we have a copy on file NO    If no, would you like information NO    My Chart     My chart gives you direct online access to portions of the electronic medical record (EMR) where your doctor stores your health information (ie, lab results, appointment information, medications, immunizations, and more. It is free. Would you like to set up your my chart? yes    [unfilled]    Weight Metrics 2019 2019 2019 1/15/2019 1/10/2019 1/3/2019 1/3/2019   Weight - 195 lb 12.8 oz 195 lb 8 oz - 195 lb - 202 lb   Neck Circ (inches) 15 - - - - - -   Waist Measure Inches 39.25 - 39 38 - 39 -   Body Fat % 39.5 - 39.5 - - 40 -   BMI - 33.61 kg/m2 33.56 kg/m2 - 33.47 kg/m2 - 34.67 kg/m2       MMW: 30.4  BWW: 44.7  BMR: 1442    Medication reconciliation up to date and corrected with patient at this time. Today's provider has been notified of reason for visit, vitals and flowsheets obtained on patients. Reviewed record in preparation for visit, huddled with provider and have obtained necessary documentation.       Health Maintenance Due   Topic    Pneumococcal 19-64 Medium Risk (1 of 1 - PPSV23)    BREAST CANCER SCRN MAMMOGRAM     PAP AKA CERVICAL CYTOLOGY        Wt Readings from Last 3 Encounters:   19 195 lb 12.8 oz (88.8 kg)   19 195 lb 8 oz (88.7 kg)   01/15/19 195 lb (88.5 kg)     Temp Readings from Last 3 Encounters:   01/24/19 97.7 °F (36.5 °C) (Oral)   12/19/18 97.9 °F (36.6 °C) (Temporal)   11/12/18 98 °F (36.7 °C) (Oral)     BP Readings from Last 3 Encounters:   01/24/19 110/73   01/17/19 131/86   01/15/19 124/78     Pulse Readings from Last 3 Encounters:   01/24/19 76   01/17/19 83   01/15/19 90     Vitals:    01/24/19 1147   BP: 110/73   Pulse: 76   Resp: 18   Temp: 97.7 °F (36.5 °C)   TempSrc: Oral   SpO2: 98%   Weight: 195 lb 12.8 oz (88.8 kg)   Height: 5' 4\" (1.626 m)   PainSc:   0 - No pain         Learning Assessment:  :     Learning Assessment 11/12/2018 7/11/2017   PRIMARY LEARNER Patient Patient   HIGHEST LEVEL OF EDUCATION - PRIMARY LEARNER  4 YEARS OF COLLEGE 4 YEARS OF COLLEGE   PRIMARY LANGUAGE ENGLISH ENGLISH   LEARNER PREFERENCE PRIMARY LISTENING LISTENING   ANSWERED BY patient patient   RELATIONSHIP SELF SELF       Depression Screening:  :     PHQ over the last two weeks 11/12/2018   Little interest or pleasure in doing things Not at all   Feeling down, depressed, irritable, or hopeless Not at all   Total Score PHQ 2 0       Fall Risk Assessment:  :     Fall Risk Assessment, last 12 mths 8/15/2018   Able to walk? Yes   Fall in past 12 months? No       Abuse Screening:  :     Abuse Screening Questionnaire 11/7/2018   Do you ever feel afraid of your partner? N   Are you in a relationship with someone who physically or mentally threatens you? N   Is it safe for you to go home?  Y       ADL Screening:  :     ADL Assessment 8/15/2018   Feeding yourself No Help Needed   Getting from bed to chair No Help Needed   Getting dressed No Help Needed   Bathing or showering No Help Needed   Walk across the room (includes cane/walker) No Help Needed   Using the telphone No Help Needed   Taking your medications No Help Needed   Preparing meals No Help Needed   Managing money (expenses/bills) No Help Needed   Moderately strenuous housework (laundry) No Help Needed Shopping for personal items (toiletries/medicines) No Help Needed   Shopping for groceries No Help Needed   Driving No Help Needed   Climbing a flight of stairs No Help Needed   Getting to places beyond walking distances No Help Needed

## 2019-01-24 NOTE — PROGRESS NOTES
[employer] Medically Supervised   [facility] Loss Program   [facility]    FOLLOW UP PHYSICIAN VISIT    HISTORY OF PRESENT ILLNESS  Agree with nurse and registered dietician notes. Juan C Fields is a 60 y.o. female with Obesity Class I, Body mass index is 33.61 kg/m². and associated health concerns presents for evaluation and treatment of weight management. Pt has been participating in the [employer] Medically Supervised Weight Loss Program using the [facility] Very Low Calorie Diet (VLCD, 800 kcal/day) since 8/15/18.  She transitioned to LCD on 2018.     Weight History  Start weight 209 lbs on 2018. Current weight 195 lbs, up 3 lbs since 2018. Percent weight loss 6.70% and total pounds lost since startin lbs. Goal weight 170 lbs. Waist measurement is 39.25\", increased from 39\". (Acceptable Range: M <40\" & F <35\")     Neck circumference is 15\"; increased from 14.75\". (Acceptable Range: M <17\" & F <16\")     Body fat percent is 39.5%, increased from 39.2%. (Acceptable Range: M 18-24% & F 25-31%)    BMR is 1442, increased from 1426. Are you satisfied with your progress since the last ov? She is happy she didn't gain more. Barriers to adherence to this plan of care? Eating/drinking while watching football parties, holiday parties, traveling to [place] for 3 days and then a 1 week cruise. Eating Habits  3 meals daily. Any skipped meals? No.   Breakfast: hot cocoa   Lunch: shake  Dinner: meat with vegetable or salad  Snacks: bar    Drinking Habits  How much water do you consume daily? 40 oz (goal of 2 L or 67 oz daily, minimally)  How much caffeine do you drink daily? 10 oz of green tea with 1 packet of stevia  How much alcohol do you drink daily and weekly? She had 2 12 oz bottles of Coronas on two occassions while the [employer] were playing. She had 2 mixed drinks at [employer] parties.   Do you consume any sugar-sweetened beverages (sodas, teas, juices, etc.)? Stevia in green tea. Sleep Habits  She averages 5-6.5 hours of sleep, on occasion getting less. Exercise  She works out everyday at 10 am for about 20-30 minutes. Medication(s):  Are you taking any medications to control the appetite? No.     Other Medical Care    Pt with hyperglycemia, dyslipidemia, elevated cr, efa deficiency, hyperinsulinemia, and elevated crp presents to the office with a BP of 110/73. Pt with hypothyroidism, dx'd in 11/2018. She was rx'd Synthroid 50 mcg on 11/7/2018 but she has not started. She does not want to start another medication. She wonders if it is the New Direction products that caused her TSH to rise. She feels well. Written by karina Hogan, as dictated by Dr. Trish Kirkland DO.    ROS    Pt denies hunger, cravings, lack of focus, fatigue, feeling weak, headaches, dizziness, light headedness, nausea, vomiting, diarrhea, constipation, indigestion, rapid heart rate, SOB, low blood sugar, feeling cold, hair loss, rash, fluid retention, leg aches, difficulty sleeping, irritability, mood swings, or other associated sxs. Review of Systems negative except as noted above in HPI. ALLERGIES:    Allergies   Allergen Reactions    Contrave [Naltrexone-Bupropion] Other (comments)     1 PILL MORNING, 2 PILLS EVENING  FELT ZOMBIED, \"OUT OF MY HEAD\"    Crestor [Rosuvastatin] Other (comments)     Felt like it worsened her memory    Lipitor [Atorvastatin] Other (comments)     Felt like it worsened memory. CURRENT MEDICATIONS:    Outpatient Medications Marked as Taking for the 1/24/19 encounter (Office Visit) with Sabine De Los Santos DO   Medication Sig Dispense Refill    scopolamine (TRANSDERM-SCOP) 1 mg over 3 days pt3d 1 Patch by TransDERmal route every seventy-two (72) hours. 4 Patch 0    levothyroxine (SYNTHROID) 50 mcg tablet Take 1 Tab by mouth Daily (before breakfast).  30 Tab 5    albuterol (VENTOLIN HFA) 90 mcg/actuation inhaler Take 2 Puffs by inhalation every four (4) hours as needed for Wheezing. 1 Inhaler 11    hydroCHLOROthiazide (MICROZIDE) 12.5 mg capsule TAKE ONE CAPSULE BY MOUTH ONCE DAILY 90 Cap 3    NASONEX 50 mcg/actuation nasal spray USE TWO SPRAY(S) IN EACH NOSTRIL DAILY 17 Container 11    glucose blood VI test strips (CONTOUR NEXT STRIPS) strip qd 100 Strip 11       PAST MEDICAL HISTORY:    Past Medical History:   Diagnosis Date    Asthma CHILDHOOD    Breast cancer, left ([address]     followed by gyn md cassius    Chest pain 2017    normal Stress Echo 17.  Dyslipidemia     Heartburn 2017    Hyperglycemia 2016    Dr. Job Tinoco    Hypertension     Neck nodule 05/10/2018    Obesity (BMI 30-39.9)     elham Mcfarland.  Postsurgical menopause     Dr. Sangeetha Aguilera Spider bite     ? Leg, recurrent.  Steatosis of liver 2016    Tubular adenoma of colon 2017    Dr. John Estrada    Uterine fibroid     Dr. Carlos Velazquez    Vitamin D deficiency 2018    Dr. Allen Mcmanus:    Past Surgical History:   Procedure Laterality Date    HX BREAST LUMPECTOMY Left     Atypical Cells. Dr. Tyler Bo.  HX COLONOSCOPY  2009, 17    with polypectomy. Dr. John Estrada.  HX REFRACTIVE SURGERY Bilateral     HX MUKUND AND BSO      due to uterine fibroids.   Dr. Judd Velazquez       FAMILY HISTORY:    Family History   Problem Relation Age of Onset    Diabetes Mother     Obesity Mother     Other Father 54         FROM BEE ALLERGY    Obesity Sister         X7    Diabetes Brother     No Known Problems Maternal Grandmother     No Known Problems Maternal Grandfather     No Known Problems Paternal Grandmother     No Known Problems Paternal Grandfather     Sleep Apnea Sister     Diabetes Sister     Hypertension Sister     Thyroid Disease Sister         X 2    Diabetes Sister         X3 MORE SISTERS TAKING INSULIN    Diabetes Brother     Breast Cancer Sister 43    Schizophrenia Sister        SOCIAL HISTORY:    Social History     Socioeconomic History    Marital status:      Spouse name: Not on file    Number of children: Not on file    Years of education: Not on file    Highest education level: Not on file   Tobacco Use    Smoking status: Never Smoker    Smokeless tobacco: Never Used   Substance and Sexual Activity    Alcohol use: Yes     Alcohol/week: 0.5 oz     Types: 1 Glasses of wine per week     Comment: ocassional    Drug use: No    Sexual activity: Yes     Partners: Male     Birth control/protection: Surgical     Comment: TL   Social History Narrative    Family History: Mother: , Alena Savers: , Bee stingSister(s):  2010 yrs, Breast Cancer at 42Brother(s):    , Heart Problems1 brother(s) , 7 sister(s) - healthy. 1 son(s) , 1 daughter(s) - healthy. Social History: Alcohol Use Patient does not use alcohol. Smoking Status Patient is a never smoker. Caffeine: coffee, tea. Marital Status:    . Lives w ith: spouse Darrell [place]. Occupation/W ork: office w ork. Medical History: Hypertension, Left Breast papilloma. Gyn History: Last mammogram date 10/28/2014. Last pap date 10/22/2012. Hysterectomy Date . OB History: Total pregnancies 2. Full term delivery (>37 w eeks) 2. Live births 2. C section(s) 2. Pregnancy # 1: , Girl, 11. Pregnancy # 2: Repeat C/S, boy, . Surgical History: hysterectomy, total abdominal w ith BSO , Left w rist surgery , colonoscopy Sameul Combe and , left lumpectomy .        IMMUNIZATIONS:    Immunization History   Administered Date(s) Administered    Influenza Vaccine 10/01/2017, 2018         PHYSICAL EXAMINATION    Vital Signs    Visit Vitals  /73 (BP 1 Location: Right arm, BP Patient Position: Sitting) Pulse 76   Temp 97.7 °F (36.5 °C) (Oral)   Resp 18   Ht 5' 4\" (1.626 m)   Wt 195 lb 12.8 oz (88.8 kg)   SpO2 98%   BMI 33.61 kg/m²       Weight Metrics 1/24/2019 1/24/2019 1/17/2019 1/15/2019 1/10/2019 1/3/2019 1/3/2019   Weight - 195 lb 12.8 oz 195 lb 8 oz - 195 lb - 202 lb   Neck Circ (inches) 15 - - - - - -   Waist Measure Inches 39.25 - 39 38 - 39 -   Body Fat % 39.5 - 39.5 - - 40 -   BMI - 33.61 kg/m2 33.56 kg/m2 - 33.47 kg/m2 - 34.67 kg/m2         General appearance - Well nourished. Well appearing. Well developed. No acute distress. Obese. Head - Normocephalic. Atraumatic. Eyes - pupils equal and reactive. Extraocular eye movements intact. Sclera anicteric. Mildly injected sclera. Ears - Hearing is grossly normal bilaterally. Nose - normal and patent. No polyps noted. No erythema. No discharge. Mouth - mucous membranes with adequate moisture. Posterior pharynx normal with cobblestone appearance. No erythema, white exudate or obstruction. Neck - supple. Midline trachea. No carotid bruits noted bilaterally. No thyromegaly noted. Chest - clear to auscultation bilaterally anteriorly and posteriorly. No wheezes. No rales or rhonchi. Breath sounds are symmetrical bilaterally. Unlabored respirations. Heart - normal rate. Regular rhythm. Normal S1, S2. No murmur noted. No rubs, clicks or gallops noted. Abdomen - soft and distended. No masses or organomegaly. No rebound, rigidity or guarding. Bowel sounds normal x 4 quadrants. No tenderness noted. Neurological - awake, alert and oriented to person, place, and time and event. Cranial nerves II through XII intact. Clear speech. Muscle strength is +5/5 x 4 extremities. Sensation is intact to light touch bilaterally. Steady gait. Heme/Lymph - peripheral pulses normal x 4 extremities. No peripheral edema is noted. Musculoskeletal - Intact x 4 extremities. Full ROM x 4 extremities. No pain with movement.     Back exam - normal range of motion. No pain on palpation of the spinous processes in the cervical, thoracic, lumbar, sacral regions. No CVA tenderness. No buffalo hump noted. Skin - no rashes, erythema, ecchymosis, lacerations, abrasions, suspicious moles noted. No skin tags or moles. No acanthosis nigricans noted in the axilla or neck. Psychological -   normal behavior, dress and thought processes. Good insight. Good eye contact. Normal affect. Appropriate mood. Normal speech. DATA REVIEWED    Labs dated 12/12/2018 from [facility] compared with labs from 11/1/2018. LDL was 146, down from 179. HDL was 62, up from 58. Triglycerides were 58, down from 115. LDL-P was 1899, down from 2811. Small LDL-P was 701, down from 1636. Hs-CRP was 0.6, down from 1.5. Vit D was 52, down from 56. Uric Acid was 5.5, down from 7.1. CoQ10 was 1.5. Hgb A1C was 5.7. Leptin was 35, down from 53. Adiponectin was 7, up from 6. Insulin was 16, up from 13. Creatinine was 0.9, down from 1.0. ALT was 23. AST was 26. ALP was 101. Iron 72. TSH was 4.66 on 1/18/2019 down from 6.25. Omega 3 was 5.7, up from 5.4. SEE SCANNED DOCUMENT FOR COMPLETE PANEL RESULTS. ASSESSMENT and PLAN      ICD-10-CM ICD-9-CM    1. Hyperglycemia R73.9 790.29     improving with LCD    2. Dyslipidemia E78.5 272.4     with elevated LDL-P and sLDL-P, improving with LCD    3. Hyperinsulinemia E16.1 251.1     slightly worse    4. Hypothyroidism, unspecified type E03.9 244.9     pt not taking Synthroid 50 mcg    5. Elevated serum creatinine R79.89 790.99     resolved    6. Essential fatty acid (EFA) deficiency E63.0 269.8     improving    7. Elevated C-reactive protein (CRP) R79.82 790.95     resolved    8. Obesity, Class I, BMI 30-34.9 E66.9 278.00    9. Essential hypertension I10 401.9     stable off HCTZ   10. Hyperuricemia E79.0 790.6     resolved    11.  Weight gain R63.5 783.1     3# since 12/2018 due to holiday eating vs modified LCD        Chart reviewed and updated. Recommend pt refer to LCD manual if experiencing any side effects once program is initiated or call our office. Referred patient to Darren Bellamy RD for BS MSWLP to receive Ruth [employer] LCD food products and weekly intervention. Discussed the patient's BMI with her. The BMI follow up plan is as follows: I have counseled this patient on diet and exercise regimens. Decrease carbohydrates (white foods, sweet foods, sweet drinks and alcohol), increase green leafy vegetables and protein (lean meats and beans) with each meal.  Avoid fried foods. Do not skip meals. Increase water intake. Avoid sugar-sweetened beverages. Get 7-8 hours uninterrupted sleep nightly. Diet prescription: LCD (low calorie diet)/7580-3590 calories daily using 2-3 meal replacements daily with Ruth [employer] food/beverage products recommended. Consume a minimum of 2 L (67 oz) of water daily while utilizing LCD. Avoid sugar-sweetened beverages. Reviewed nutrition and importance of regular protein intake and hidden carbohydrate sources.      Exercise prescription: Advance as tolerated while using LCD. A goal of 30 minutes physical activity daily is recommended for health benefit and at least 60 minutes daily to prevent weight regain. For weight loss, no less than 75% needs to be aerobic (i.e. Walking) and no more than 25% resistance exercising (i.e. Weight lifting). For weight maintenance phase, 50% aerobic and 50% resistance exercises. Emphasized importance of physical activity and reducing sedentary time. Sleep prescription: A goal of 7-8 hours of uninterrupted sleep is recommended to turn off the Grehlin hormone to be released from the stomach and triggers appetite while promoting weight gain. Proper rest turns on Leptin hormone to be released from white adipose tissue and promotes weight loss. Continue current medications and care.    Encouraged pt to start Synthroid 50 mcg daily to tx hypothyroidism. Discussed that the ND products would not cause thyroid disease. Since pt is very hesitant, I am agreeable with waiting 1 more month to recheck TSH before starting the medication. Reviewed and discussed [facility] lab results. Copy of [facility] labs given to pt to share with PCP and specialists. Recheck pertinent labs monthly with [facility] lab. Counseled patient on health concerns:  LCD, weight loss goals, sleep hygiene, strategies to overcome habits or challenges to improve or continue adherence, hyperglycemia, hyperuricemia, efa deficiency, elevated cr, elevated crp, hyperinsulinemia, hypothyroidism. Reminded females of reproductive age that with weight loss, they may become more fertile and recommend the use of condoms if pregnancy is not desired. Weight loss goal of 5-10% in 6-12 months has shown significant improvement in obesity and its health consequences. Immunizations noted. Offered empathy, support, legitimation, prayers, partnership to patient. Praised patient for progress. Follow-up Disposition:  Return in about 1 month (around 2/24/2019) for MSWL, LCD. Patient was offered a choice/choices in the treatment plan today. Patient expresses understanding of the plan and agrees with recommendations. Patient declines any additional handouts. Patient is satisfied with previous handouts received from our office    Zain Green has a reminder for a \"due or due soon\" health maintenance. I have asked pt to contact their primary care provider for follow-up on this health maintenance. Written by karina Nagy, as dictated by Dr. Amy Cheema DO. Documentation True and Accepted by Rod Herrera. Semaj FergusonWayne. [id number] Shanell Mcneil MD FOR ALLOWING ME THE PRIVILEGE TO PARTICIPATE IN THE CARE OF OUR MUTUAL PATIENT, Zain Green WITH RESPECT TO WEIGHT MANAGEMENT.

## 2019-01-31 RX ORDER — MOMETASONE FUROATE 50 UG/1
SPRAY, METERED NASAL
Qty: 1 CONTAINER | Refills: 11 | Status: SHIPPED | OUTPATIENT
Start: 2019-01-31

## 2019-02-01 NOTE — PROGRESS NOTES
Reviewed and agree with Weekly Education Class nurse progress note for [facility] Medically Supervised Weight Loss Program (MSWLP) using [employer]irection food products. Continue current diet, care and follow up as planned and outlined in BS MSWLP manual.      Documentation true and accepted by Andrew Nina.  Ryan Mcgill.

## 2019-02-01 NOTE — PROGRESS NOTES
Reviewed and agree with Weekly Education Class nurse progress note for [facility] Medically Supervised Weight Loss Program (MSWLP) using [employer]irection food products. Continue current diet, care and follow up as planned and outlined in BS MSWLP manual.      Documentation true and accepted by Zuhair Solano.  Leonarda Guerra.

## 2019-02-01 NOTE — PROGRESS NOTES
Reviewed and agree with Weekly Education Class nurse progress note for [facility] Medically Supervised Weight Loss Program (MSWLP) using [employer] food products. Continue current diet, care and follow up as planned and outlined in BS MSWLP manual.      Documentation true and accepted by Jeremie Matta.  Alcides Negron.

## 2019-02-13 ENCOUNTER — CLINICAL SUPPORT (OUTPATIENT)
Dept: BARIATRICS/WEIGHT MGMT | Age: 59
End: 2019-02-13

## 2019-02-13 VITALS
SYSTOLIC BLOOD PRESSURE: 143 MMHG | HEIGHT: 64 IN | WEIGHT: 203 LBS | DIASTOLIC BLOOD PRESSURE: 84 MMHG | BODY MASS INDEX: 34.66 KG/M2 | HEART RATE: 84 BPM

## 2019-02-13 DIAGNOSIS — E66.9 OBESITY (BMI 30.0-34.9): Primary | ICD-10-CM

## 2019-02-13 NOTE — PROGRESS NOTES
Progress Note: Weekly Education Class in the [facility] Weight Loss Program   Is there anything that you or the patient needs to let Dr Santa Camarena know about? no  Over the past week, have you experienced any side-effects? no    Chikis Gilbert is a 60 y.o. female who is enrolled in [facility] Weight Loss Program    Visit Vitals  /84   Pulse 84   Ht 5' 4\" (1.626 m)   Wt 203 lb (92.1 kg)   BMI 34.84 kg/m²     Weight Metrics 2/13/2019 1/24/2019 1/24/2019 1/17/2019 1/15/2019 1/10/2019 1/3/2019   Weight 203 lb - 195 lb 12.8 oz 195 lb 8 oz - 195 lb -   Neck Circ (inches) - 15 - - - - -   Waist Measure Inches - 39.25 - 39 38 - 39   Body Fat % - 39.5 - 39.5 - - 40   BMI 34.84 kg/m2 - 33.61 kg/m2 33.56 kg/m2 - 33.47 kg/m2 -         Have you received any other medical care this week? no  If yes, where and for what? Have you had any change in your medications since your last visit? no  If yes what? Did you have any problems adhering to the program last week? no  If yes, please explain:       Eating Habits Over Last Week:  Did you take in 64 oz of non-caloric fluids? yes     Did you consume your 4 meal replacements each day?  yes       Physical Activity Over the Past Week:    Aerobic exercise: 90 min  Resistance exercise: 0 workouts / week

## 2019-02-21 ENCOUNTER — CLINICAL SUPPORT (OUTPATIENT)
Dept: BARIATRICS/WEIGHT MGMT | Age: 59
End: 2019-02-21

## 2019-02-21 VITALS
HEIGHT: 64 IN | SYSTOLIC BLOOD PRESSURE: 144 MMHG | BODY MASS INDEX: 34.15 KG/M2 | DIASTOLIC BLOOD PRESSURE: 83 MMHG | HEART RATE: 84 BPM | WEIGHT: 200 LBS

## 2019-02-21 DIAGNOSIS — E66.9 OBESITY (BMI 30.0-34.9): Primary | ICD-10-CM

## 2019-02-21 NOTE — PROGRESS NOTES
Progress Note: Weekly Education Class in the [facility] Weight Loss Program   Is there anything that you or the patient needs to let Dr Aundrea Bower know about? no  Over the past week, have you experienced any side-effects? no    Nick Banerjee is a 60 y.o. female who is enrolled in [facility] Weight Loss Program    Visit Vitals  /83   Pulse 84   Ht 5' 4\" (1.626 m)   Wt 200 lb (90.7 kg)   BMI 34.33 kg/m²     Weight Metrics 2/21/2019 2/13/2019 2/13/2019 1/24/2019 1/24/2019 1/17/2019 1/15/2019   Weight 200 lb - 203 lb - 195 lb 12.8 oz 195 lb 8 oz -   Neck Circ (inches) - - - 15 - - -   Waist Measure Inches - 40 - 39.25 - 39 38   Body Fat % - 39.7 - 39.5 - 39.5 -   BMI 34.33 kg/m2 - 34.84 kg/m2 - 33.61 kg/m2 33.56 kg/m2 -         Have you received any other medical care this week? no  If yes, where and for what? Have you had any change in your medications since your last visit? no  If yes what? Did you have any problems adhering to the program last week?no If yes, please explain:       Eating Habits Over Last Week:  Did you take in 64 oz of non-caloric fluids? yes     Did you consume your 4 meal replacements each day?  yes       Physical Activity Over the Past Week:    Aerobic exercise: 90 min  Resistance exercise: 30 workouts / week

## 2019-02-26 LAB
% ALBUMIN, 58A: 60 % (ref 54–71)
ADIPONECTIN-DIAZYME: 5 UG/ML
ALB/GLOBRATIO, 58C: 1.52 (ref 1.15–2.5)
ALBUMIN SERPL-MCNC: 4.2 G/DL (ref 3.7–5.1)
ALP SERPL-CCNC: 99 U/L (ref 35–125)
ALT SERPL-CCNC: 13 U/L
ANION GAP SERPL CALC-SCNC: 11 MMOL/L (ref 6–18)
AST SERPL W P-5'-P-CCNC: 16 U/L (ref 5–32)
BILIRUB SERPL-MCNC: 0.3 MG/DL
BUN SERPL-MCNC: 12 MG/DL (ref 6–20)
BUN/CREATININE RATIO,BUCR: 13 (ref 10–27)
CALCIUM SERPL-MCNC: 9.3 MG/DL (ref 8.8–10.5)
CHLORIDE SERPL-SCNC: 107 MMOL/L (ref 98–110)
CHOLEST SERPL-MCNC: 214 MG/DL
CO2 SERPL-SCNC: 25 MMOL/L (ref 19–31)
CREAT SERPL-MCNC: 0.9 MG/DL (ref 0.5–0.9)
EGFRAACREAT: 78 ML/MIN/1.73M^2
EGFRNACREAT: 67 ML/MIN/1.73M^2
ESTIMATED AVERAGE GLUCOSE, EAG: 111.2 MG/DL
GLOBCALC, 58B: 2.8 G/DL (ref 1.9–3.5)
GLUCOSE SERPL-MCNC: 85 MG/DL (ref 70–99)
HBA1C MFR BLD HPLC: 5.5 %
HDLC SERPL-MCNC: 55 MG/DL
HOMA-IR, HDL2100: 3.2
INSULIN,INS: 15 UU/ML (ref 3–9)
LDLC SERPL CALC-MCNC: 165 MG/DL
MAGNESIUM SERPL-MCNC: 2 MG/DL (ref 1.6–2.4)
NON-HDL CHOLESTEROL, 011976: 159 MG/DL
POTASSIUM SERPL-SCNC: 4.9 MMOL/L (ref 3.5–5.3)
PROT SERPL-MCNC: 6.9 G/DL (ref 6.1–8)
SODIUM SERPL-SCNC: 143 MMOL/L (ref 133–145)
T4 FREE SERPL-MCNC: 0.9 NG/DL (ref 0.93–1.7)
TRIGL SERPL-MCNC: 84 MG/DL (ref ?–150)
TSH SERPL-ACNC: 5.9 UIU/ML (ref 0.27–4.2)
URATE SERPL-MCNC: 5.6 MG/DL (ref 2–6.9)

## 2019-02-27 ENCOUNTER — CLINICAL SUPPORT (OUTPATIENT)
Dept: BARIATRICS/WEIGHT MGMT | Age: 59
End: 2019-02-27

## 2019-02-27 DIAGNOSIS — E66.9 OBESITY (BMI 30.0-34.9): Primary | ICD-10-CM

## 2019-02-27 LAB
HDL HDL-P, HDL5001: 41.3 UMOL/L
HDL LDL-P, HDL5000: 2091 NMOL/L
HDL SLDL-P, HDL5002: 818 NMOL/L
LEPTIN, SERUM, 146711: 55 NG/ML

## 2019-02-28 VITALS
HEIGHT: 64 IN | DIASTOLIC BLOOD PRESSURE: 77 MMHG | WEIGHT: 197.5 LBS | BODY MASS INDEX: 33.72 KG/M2 | SYSTOLIC BLOOD PRESSURE: 124 MMHG | HEART RATE: 82 BPM

## 2019-02-28 LAB
AA2: 13.76 % (ref 10.5–23.3)
ALPHA LINOLEIC ACID N3, HDL1202: 0.16 % (ref 0.1–0.4)
CIS-MONO-UNSATURATED FATTY ACID TOTAL, HDL1205: 15 (ref 11.5–20.5)
DOCOSAHEXAENOIC ACID N3, HDL1208: 3.96 % (ref 0.1–8.4)
DOCOSAPENTAENOIC ACID N3, HDL1206: 2.82 % (ref 0.6–4.1)
DOCOSAPENTAENOIC ACID N6, HDL1207: 0.59 % (ref 0.1–1.3)
EPA2: 0.77 % (ref 0.1–2.5)
LINOLEIC ACID C6, HDL1216: 13.45 % (ref 4.6–21.3)
OMEGA-3 FATTY ACID TOTAL, HDL1220: 7.7 (ref 0.1–14.1)
OMEGA-3 INDEX, HDL1219: 4.7 (ref 0.1–10.4)
OMEGA-6 FATTY ACID TOTAL, HDL1222: 33.1 (ref 28.6–44.5)
SATURATED FATTY ACID TOTAL, HDL1226: 43.6 (ref 36.6–42)
TRANS FATTY ACID TOTAL, HDL1232: 0.7 (ref 0.1–1.8)
TRANSLINOLEIC ACID, HDL1229: <0.1 %
TRANSOLEIC ACID, HDL1230: 0.48 % (ref 0.1–1.3)

## 2019-02-28 NOTE — PROGRESS NOTES
Progress Note: Weekly Education Class in the [facility] Weight Loss Program   Is there anything that you or the patient needs to let Dr Shayna Gorman know about? no  Over the past week, have you experienced any side-effects? no    Lena Lane is a 60 y.o. female who is enrolled in [facility] Weight Loss Program    Visit Vitals  /77 (BP 1 Location: Right arm, BP Patient Position: Sitting)   Pulse 82   Ht 5' 4\" (1.626 m)   Wt 197 lb 8 oz (89.6 kg)   BMI 33.90 kg/m²     Weight Metrics 2/28/2019 2/27/2019 2/21/2019 2/13/2019 2/13/2019 1/24/2019 1/24/2019   Weight - 197 lb 8 oz 200 lb - 203 lb - 195 lb 12.8 oz   Neck Circ (inches) - - - - - 15 -   Waist Measure Inches 38 - 40.25 40 - 39.25 -   Body Fat % 39 - 39.4 39.7 - 39.5 -   BMI - 33.9 kg/m2 34.33 kg/m2 - 34.84 kg/m2 - 33.61 kg/m2         Have you received any other medical care this week? no  If yes, where and for what? Have you had any change in your medications since your last visit? no  If yes what? Did you have any problems adhering to the program last week? no  If yes, please explain:       Eating Habits Over Last Week:  Did you take in 64 oz of non-caloric fluids?  no     Did you consume your 4 meal replacements each day? no       Physical Activity Over the Past Week:    Aerobic exercise: 521 min  Resistance exercise: 0 workouts / week

## 2019-03-01 NOTE — PROGRESS NOTES
Reviewed and agree with Weekly Education Class nurse progress note for [facility] Medically Supervised Weight Loss Program (MSWLP) using [employer] Direction food products. Continue current diet, care and follow up as planned and outlined in BS MSWLP manual.      Documentation true and accepted by Sonia CompanyLoopvanessa.  Gasper Cochran.

## 2019-03-01 NOTE — PROGRESS NOTES
Reviewed and agree with Weekly Education Class nurse progress note for [employer] Medically Supervised Weight Loss Program (MSWLP) using [employer] Direction food products. Continue current diet, care and follow up as planned and outlined in BS MSWLP manual.      Documentation true and accepted by Chente Ge.  Anabel Pope.

## 2019-03-01 NOTE — PROGRESS NOTES
Reviewed and agree with Weekly Education Class nurse progress note for [employer] Medically Supervised Weight Loss Program (MSWLP) using [employer]irection food products. Continue current diet, care and follow up as planned and outlined in BS MSWLP manual.      Documentation true and accepted by [employer].  Gasper Cochran.

## 2019-03-07 ENCOUNTER — OFFICE VISIT (OUTPATIENT)
Dept: FAMILY MEDICINE CLINIC | Age: 59
End: 2019-03-07

## 2019-03-07 VITALS
WEIGHT: 195 LBS | TEMPERATURE: 97.9 F | HEIGHT: 64 IN | HEART RATE: 101 BPM | SYSTOLIC BLOOD PRESSURE: 125 MMHG | OXYGEN SATURATION: 99 % | RESPIRATION RATE: 18 BRPM | BODY MASS INDEX: 33.29 KG/M2 | DIASTOLIC BLOOD PRESSURE: 82 MMHG

## 2019-03-07 DIAGNOSIS — E78.5 DYSLIPIDEMIA: ICD-10-CM

## 2019-03-07 DIAGNOSIS — I10 ESSENTIAL HYPERTENSION: ICD-10-CM

## 2019-03-07 DIAGNOSIS — R63.4 WEIGHT LOSS: ICD-10-CM

## 2019-03-07 DIAGNOSIS — R68.89 FLU-LIKE SYMPTOMS: ICD-10-CM

## 2019-03-07 DIAGNOSIS — E79.0 HYPERURICEMIA W/O SIGNS OF INFLAM ARTHRIT AND TOPHACEOUS DIS: ICD-10-CM

## 2019-03-07 DIAGNOSIS — Z90.722 S/P TAH-BSO (TOTAL ABDOMINAL HYSTERECTOMY AND BILATERAL SALPINGO-OOPHORECTOMY): ICD-10-CM

## 2019-03-07 DIAGNOSIS — Z90.710 S/P TAH-BSO (TOTAL ABDOMINAL HYSTERECTOMY AND BILATERAL SALPINGO-OOPHORECTOMY): ICD-10-CM

## 2019-03-07 DIAGNOSIS — R73.9 HYPERGLYCEMIA: ICD-10-CM

## 2019-03-07 DIAGNOSIS — G44.89 OTHER HEADACHE SYNDROME: ICD-10-CM

## 2019-03-07 DIAGNOSIS — E03.4 HYPOTHYROIDISM DUE TO ACQUIRED ATROPHY OF THYROID: Primary | ICD-10-CM

## 2019-03-07 DIAGNOSIS — R68.83 CHILLS: ICD-10-CM

## 2019-03-07 DIAGNOSIS — R79.89 ELEVATED SERUM CREATININE: ICD-10-CM

## 2019-03-07 DIAGNOSIS — K76.0 STEATOSIS OF LIVER: ICD-10-CM

## 2019-03-07 DIAGNOSIS — Z90.79 S/P TAH-BSO (TOTAL ABDOMINAL HYSTERECTOMY AND BILATERAL SALPINGO-OOPHORECTOMY): ICD-10-CM

## 2019-03-07 DIAGNOSIS — Z98.890 STATUS POST SURGICAL REMOVAL OF NAIL MATRIX OF TOE: ICD-10-CM

## 2019-03-07 DIAGNOSIS — C50.912 MALIGNANT NEOPLASM OF LEFT FEMALE BREAST, UNSPECIFIED ESTROGEN RECEPTOR STATUS, UNSPECIFIED SITE OF BREAST (HCC): ICD-10-CM

## 2019-03-07 LAB
FLUAV+FLUBV AG NOSE QL IA.RAPID: NEGATIVE POS/NEG
FLUAV+FLUBV AG NOSE QL IA.RAPID: NEGATIVE POS/NEG
VALID INTERNAL CONTROL?: YES

## 2019-03-07 NOTE — PROGRESS NOTES
Progress Note: Weekly Education Class in the [facility] Weight Loss Program   Is there anything that you or the patient needs to let Dr Sid Easley know about? no  Over the past week, have you experienced any side-effects? no    Wayne Lamb is a 60 y.o. female who is enrolled in [facility] Weight Loss Program    Visit Vitals  /82   Pulse (!) 101   Temp 97.9 °F (36.6 °C) (Oral)   Resp 18   Ht 5' 4\" (1.626 m)   Wt 195 lb (88.5 kg)   SpO2 99%   BMI 33.47 kg/m²     Weight Metrics 3/7/2019 3/7/2019 2/28/2019 2/27/2019 2/21/2019 2/13/2019 2/13/2019   Weight - 195 lb - 197 lb 8 oz 200 lb - 203 lb   Neck Circ (inches) 14.5 - - - - - -   Waist Measure Inches 39.5 - 38 - 40.25 40 -   Body Fat % 39.4 - 39 - 39.4 39.7 -   BMI - 33.47 kg/m2 - 33.9 kg/m2 34.33 kg/m2 - 34.84 kg/m2         Have you received any other medical care this week? yes  If yes, where and for what? Keanu achilles foot and ankle    Have you had any change in your medications since your last visit? yes  If yes what? Tylenol 3 for pain, toenails removed    Did you have any problems adhering to the program last week? no  If yes, please explain:       Eating Habits Over Last Week:  Did you take in 64 oz of non-caloric fluids? yes     Did you consume your 4 meal replacements each day?  yes       Physical Activity Over the Past Week:    Aerobic exercise: 30 min  Resistance exercise: 0 workouts / week

## 2019-03-07 NOTE — PATIENT INSTRUCTIONS
Hypothyroidism: Care Instructions  Your Care Instructions    You have hypothyroidism, which means that your body is not making enough thyroid hormone. This hormone helps your body use energy. If your thyroid level is low, you may feel tired, be constipated, have an increase in your blood pressure, or have dry skin or memory problems. You may also get cold easily, even when it is warm. Women with low thyroid levels may have heavy menstrual periods. A blood test to find your thyroid-stimulating hormone (TSH) level is used to check for hypothyroidism. A high TSH level may mean that you have low thyroid. When your body is not making enough thyroid hormone, TSH levels rise in an effort to make the body produce more. The treatment for hypothyroidism is to take thyroid hormone pills. You should start to feel better in 1 to 2 weeks. But it can take several months to see changes in the TSH level. You will need regular visits with your doctor to make sure you have the right dose of medicine. Most people need treatment for the rest of their lives. You will need to see your doctor regularly to have blood tests and to make sure you are doing well. Follow-up care is a key part of your treatment and safety. Be sure to make and go to all appointments, and call your doctor if you are having problems. It's also a good idea to know your test results and keep a list of the medicines you take. How can you care for yourself at home? · Take your thyroid hormone medicine exactly as prescribed. Call your doctor if you think you are having a problem with your medicine. Most people do not have side effects if they take the right amount of medicine regularly. ? Take the medicine 30 minutes before breakfast, and do not take it with calcium, vitamins, or iron. ? Do not take extra doses of your thyroid medicine. It will not help you get better any faster, and it may cause side effects.   ? If you forget to take a dose, do NOT take a double dose of medicine. Take your usual dose the next day. · Tell your doctor about all prescription, herbal, or over-the-counter products you take. · Take care of yourself. Eat a healthy diet, get enough sleep, and get regular exercise. When should you call for help? Call 911 anytime you think you may need emergency care. For example, call if:    · You passed out (lost consciousness).     · You have severe trouble breathing.     · You have a very slow heartbeat (less than 60 beats a minute).     · You have a low body temperature (95°F or below).    Call your doctor now or seek immediate medical care if:    · You feel tired, sluggish, or weak.     · You have trouble remembering things or concentrating.     · You do not begin to feel better 2 weeks after starting your medicine.    Watch closely for changes in your health, and be sure to contact your doctor if you have any problems. Where can you learn more? Go to http://[facility]ree[facility].info/. Enter S464 in the search box to learn more about \"Hypothyroidism: Care Instructions. \"  Current as of: March 14, 2018  Content Version: 11.9  © 7821-9726 [employer]. Care instructions adapted under license by [employer] (which disclaims liability or warranty for this information). If you have questions about a medical condition or this instruction, always ask your healthcare professional. [address] any warranty or liability for your use of this information.

## 2019-03-07 NOTE — PROGRESS NOTES
Verbal Order Read Back Per Elizabeth Wren MD forAMB POC ARIANNA INFLUENZA A/B TEST    .  Nicole Jean verified correct name and  with PCP

## 2019-03-07 NOTE — PROGRESS NOTES
Loi Gorman  Identified pt with two pt identifiers(name and ). Chief Complaint   Patient presents with    Results     MS[facility]         1. Have you been to the ER, urgent care clinic since your last visit? Hospitalized since your last visit? NO    2. Have you seen or consulted any other health care providers outside of the [address] [place] [address] since your last visit? Include any pap smears or colon screening. NO      Advance Care Planning    In the event something were to happen to you and you were unable to speak on your behalf, do you have an Advance Directive/ Living Will in place stating your wishes? NO    If yes, do we have a copy on file NO    If no, would you like informationYES    My Chart     My chart gives you direct online access to portions of the electronic medical record (EMR) where your doctor stores your health information (ie, lab results, appointment information, medications, immunizations, and more. It is free. Would you like to set up your my chart? YES    [unfilled]    Weight Metrics 3/7/2019 3/7/2019 2019 2019 2019 2019 2019   Weight - 195 lb - 197 lb 8 oz 200 lb - 203 lb   Neck Circ (inches) 14.5 - - - - - -   Waist Measure Inches 39.5 - 38 - 40.25 40 -   Body Fat % 39.4 - 39 - 39.4 39.7 -   BMI - 33.47 kg/m2 - 33.9 kg/m2 34.33 kg/m2 - 34.84 kg/m2       MMW: 30.4  BWW: 44.7  BMR: 1438    Medication reconciliation up to date and corrected with patient at this time. Today's provider has been notified of reason for visit, vitals and flowsheets obtained on patients. Reviewed record in preparation for visit, huddled with provider and have obtained necessary documentation.       Health Maintenance Due   Topic    Pneumococcal 19-64 Medium Risk (1 of 1 - PPSV23)    BREAST CANCER SCRN MAMMOGRAM     PAP AKA CERVICAL CYTOLOGY        Wt Readings from Last 3 Encounters:   19 195 lb (88.5 kg)   19 197 lb 8 oz (89.6 kg)   19 200 lb (90.7 kg)     Temp Readings from Last 3 Encounters:   03/07/19 97.9 °F (36.6 °C) (Oral)   01/24/19 97.7 °F (36.5 °C) (Oral)   12/19/18 97.9 °F (36.6 °C) (Temporal)     BP Readings from Last 3 Encounters:   03/07/19 125/82   02/28/19 124/77   02/21/19 144/83     Pulse Readings from Last 3 Encounters:   03/07/19 (!) 101   02/28/19 82   02/21/19 84     Vitals:    03/07/19 1147   BP: 125/82   Pulse: (!) 101   Resp: 18   Temp: 97.9 °F (36.6 °C)   TempSrc: Oral   SpO2: 99%   Weight: 195 lb (88.5 kg)   Height: 5' 4\" (1.626 m)   PainSc:   0 - No pain         Learning Assessment:  :     Learning Assessment 11/12/2018 7/11/2017   PRIMARY LEARNER Patient Patient   HIGHEST LEVEL OF EDUCATION - PRIMARY LEARNER  4 YEARS OF COLLEGE 4 YEARS OF COLLEGE   PRIMARY LANGUAGE ENGLISH ENGLISH   LEARNER PREFERENCE PRIMARY LISTENING LISTENING   ANSWERED BY patient patient   RELATIONSHIP SELF SELF       Depression Screening:  :     3 most recent PHQ Screens 11/12/2018   Little interest or pleasure in doing things Not at all   Feeling down, depressed, irritable, or hopeless Not at all   Total Score PHQ 2 0       Fall Risk Assessment:  :     Fall Risk Assessment, last 12 mths 8/15/2018   Able to walk? Yes   Fall in past 12 months? No       Abuse Screening:  :     Abuse Screening Questionnaire 11/7/2018   Do you ever feel afraid of your partner? N   Are you in a relationship with someone who physically or mentally threatens you? N   Is it safe for you to go home?  Y       ADL Screening:  :     ADL Assessment 8/15/2018   Feeding yourself No Help Needed   Getting from bed to chair No Help Needed   Getting dressed No Help Needed   Bathing or showering No Help Needed   Walk across the room (includes cane/walker) No Help Needed   Using the telphone No Help Needed   Taking your medications No Help Needed   Preparing meals No Help Needed   Managing money (expenses/bills) No Help Needed   Moderately strenuous housework (laundry) No Help Needed   Shopping for personal items (toiletries/medicines) No Help Needed   Shopping for groceries No Help Needed   Driving No Help Needed   Climbing a flight of stairs No Help Needed   Getting to places beyond walking distances No Help Needed

## 2019-03-08 NOTE — PROGRESS NOTES
[facility] Medically Supervised   [facility] Loss Program   [facility]    FOLLOW UP PHYSICIAN VISIT    HISTORY OF PRESENT ILLNESS  Agree with nurse and registered dietician notes. Candido Kincaid is a 60 y.o. female with Obesity Class I, Body mass index is 33.47 kg/m². and associated health concerns presents for evaluation and treatment of weight management. Pt has been participating in the [facility] Medically Supervised Weight Loss Program using the Ruth [employer] Direction Very Low Calorie Diet (VLCD, 800 kcal/day) since 8/15/18.  She transitioned to LCD on 2018.     Weight History  Start weight 209 lbs on 2018. Current weight 195 lbs, unchanged since 2019. Percent weight loss 6.70% and total pounds lost since startin lbs. Goal weight 170 lbs. Waist measurement is 39.5\", increased from 39.25\". (Acceptable Range: M <40\" & F <35\")     Neck circumference is 14.5\"; decreased from 15\". (Acceptable Range: M <17\" & F <16\")     Body fat percent is 39.4%, decreased from 39.5%. (Acceptable Range: M 18-24% & F 25-31%)    BMR is 1438, decreased from 1442. Are you satisfied with your progress since the last ov? Yes. Barriers to adherence to this plan of care? She went on a 1 week [employer] cruise to the [place] [address] [address] [facility] at the end of 2019, toe surgery,  ordering take out (pizza, etc)    Eating Habits  3 meals daily. Any skipped meals? No.   Breakfast: hot cocoa and coffee  Lunch: pudding or shake  Dinner: soup, pizza  Appetite well controlled? Yes. Drinking Habits  How much water do you consume daily? 16 oz (goal of 2 L or 67 oz daily, minimally)  How much caffeine do you drink daily? 4-5 cups of green tea and coffee. How much alcohol do you drink daily and weekly? None. Do you consume any sugar-sweetened beverages (sodas, teas, juices, etc.)? No    Sleep Habits  She averages 6-7 hours of sleep nightly.  She has been sleeping more while recovering from her toe surgery since she is not working. Exercise  She has not been exercising due to her toe nail surgery. Medication(s):  Are you taking any medications to control the appetite? No.     Other Medical Care    Pt with hypothyroidism, hypertension, hyperglycemia, dyslipidemia, hyperuricemia, steatosis of liver, hx of breast ca, elevated cr, and s/p MUKUND-BSO presents to the office with a BP of 125/82. Her BP is stable of HCTZ 12.5 mg. She has not yet started Synthroid 50 mcg daily. Pt complains of headache, body ache, and chills. Denies blurry or double vision. She also has been sneezing. She used Mucinex today. POC Influenza neg. Pt reports she had surgical removal of BL and R second toe nails on 2/27/2019. She is using Tylenol #3 prn for pain. Written by karina Olivas, as dictated by Dr. Laura Hernandez DO.      ROS    Pt denies hunger, cravings, lack of focus, fatigue, feeling weak, dizziness, light headedness, nausea, vomiting, diarrhea, constipation, indigestion, rapid heart rate, SOB, low blood sugar, hair loss, rash, fluid retention, leg aches, difficulty sleeping, irritability, mood swings, or other associated sxs. Review of Systems negative except as noted above in HPI. ALLERGIES:    Allergies   Allergen Reactions    Contrave [Naltrexone-Bupropion] Other (comments)     1 PILL MORNING, 2 PILLS EVENING  FELT ZOMBIED, \"OUT OF MY HEAD\"    Crestor [Rosuvastatin] Other (comments)     Felt like it worsened her memory    Lipitor [Atorvastatin] Other (comments)     Felt like it worsened memory.         CURRENT MEDICATIONS:    Outpatient Medications Marked as Taking for the 3/7/19 encounter (Office Visit) with Ranjana Luong DO   Medication Sig Dispense Refill    mometasone (NASONEX) 50 mcg/actuation nasal spray USE TWO SPRAY(S) IN EACH NOSTRIL DAILY 1 Container 11    scopolamine (TRANSDERM-SCOP) 1 mg over 3 days pt3d 1 Patch by TransDERmal route every seventy-two (72) hours. 4 Patch 0    albuterol (VENTOLIN HFA) 90 mcg/actuation inhaler Take 2 Puffs by inhalation every four (4) hours as needed for Wheezing. 1 Inhaler 11    glucose blood VI test strips (CONTOUR NEXT STRIPS) strip qd 100 Strip 11       PAST MEDICAL HISTORY:    Past Medical History:   Diagnosis Date    Asthma CHILDHOOD    Breast cancer, left ([address]     followed by gyn md cassius    Chest pain 2017    normal Stress Echo 17.  Dyslipidemia     Heartburn     Hyperglycemia 2016    Dr. Leah Baker    Hypertension     Hypothyroid 2018    Neck nodule 05/10/2018    Obesity (BMI 30-39.9)     Dr. Leah Baker, endo.  Postsurgical menopause     Dr. aNte Ngo Spider bite     ? Leg, recurrent.  Steatosis of liver 2016    Tubular adenoma of colon 2017    Dr. Estephania Huang    Uterine fibroid     Dr. Vickey Dumont    Vitamin D deficiency 2018    Dr. Enma Curry:    Past Surgical History:   Procedure Laterality Date    HX BREAST LUMPECTOMY Left     Atypical Cells. Dr. Jasper Park.  HX COLONOSCOPY  2009, 16    with polypectomy. Dr. Estephania Huang.  HX REFRACTIVE SURGERY Bilateral     HX MUKUND AND BSO      due to uterine fibroids.   Dr. Luis Miguel Reis HISTORY:    Family History   Problem Relation Age of Onset    Diabetes Mother     Obesity Mother     Other Father 54         FROM BEE ALLERGY    Obesity Sister         X7    Diabetes Brother     No Known Problems Maternal Grandmother     No Known Problems Maternal Grandfather     No Known Problems Paternal Grandmother     No Known Problems Paternal Grandfather     Sleep Apnea Sister    [address] [facility] Daryl Diabetes Sister     Hypertension Sister     Thyroid Disease Sister         X 2    Diabetes Sister         X3 MORE SISTERS TAKING INSULIN    Diabetes Brother     Breast Cancer Sister 43    Schizophrenia Sister        SOCIAL HISTORY:    Social History     Socioeconomic History    Marital status:      Spouse name: Not on file    Number of children: Not on file    Years of education: Not on file    Highest education level: Not on file   Tobacco Use    Smoking status: Never Smoker    Smokeless tobacco: Never Used   Substance and Sexual Activity    Alcohol use: Yes     Alcohol/week: 0.5 oz     Types: 1 Glasses of wine per week     Comment: ocassional    Drug use: No    Sexual activity: Yes     Partners: Male     Birth control/protection: Surgical     Comment: TL   Social History Narrative    Family History: Mother: , Brie Duel: , Bee stingSister(s):   yrs, Breast Cancer at 42Brother(s):    , Heart Problems1 brother(s) , 7 sister(s) - healthy. 1 son(s) , 1 daughter(s) - healthy. Social History: Alcohol Use Patient does not use alcohol. Smoking Status Patient is a never smoker. Caffeine: coffee, tea. Marital Status:    . Lives w ith: spouse Savage Jameson. Occupation/W ork: office w ork. Medical History: Hypertension, Left Breast papilloma. Gyn History: Last mammogram date 10/28/2014. Last pap date 10/22/2012. Hysterectomy Date . OB History: Total pregnancies 2. Full term delivery (>37 w eeks) 2. Live births 2. C section(s) 2. Pregnancy # 1: , Girl, 11. Pregnancy # 2: Repeat C/S, boy, . Surgical History: hysterectomy, total abdominal w ith BSO , Left w rist surgery , colonoscopy Sarthak Bobby and 2009, left lumpectomy .        IMMUNIZATIONS:    Immunization History   Administered Date(s) Administered    Influenza Vaccine 10/01/2017, 2018         PHYSICAL EXAMINATION    Vital Signs    Visit Vitals  /82   Pulse (!) 101   Temp 97.9 °F (36.6 °C) (Oral)   Resp 18   Ht 5' 4\" (1.626 m)   Wt 195 lb (88.5 kg)   SpO2 99%   BMI 33.47 kg/m²       Weight Metrics 3/7/2019 3/7/2019 2/28/2019 2/27/2019 2/21/2019 2/13/2019 2/13/2019   Weight - 195 lb - 197 lb 8 oz 200 lb - 203 lb   Neck Circ (inches) 14.5 - - - - - -   Waist Measure Inches 39.5 - 38 - 40.25 40 -   Body Fat % 39.4 - 39 - 39.4 39.7 -   BMI - 33.47 kg/m2 - 33.9 kg/m2 34.33 kg/m2 - 34.84 kg/m2         General appearance - Well nourished. Well appearing. Well developed. No acute distress. Obese. Head - Normocephalic. Atraumatic. Eyes - pupils equal and reactive. Extraocular eye movements intact. Sclera anicteric. Mildly injected sclera. Ears - Hearing is grossly normal bilaterally. Nose - normal and patent. No polyps noted. No erythema. No discharge. Mouth - mucous membranes with adequate moisture. Posterior pharynx normal with cobblestone appearance. No erythema, white exudate or obstruction. Neck - supple. Midline trachea. No carotid bruits noted bilaterally. No thyromegaly noted. Chest - clear to auscultation bilaterally anteriorly and posteriorly. No wheezes. No rales or rhonchi. Breath sounds are symmetrical bilaterally. Unlabored respirations. Heart - normal rate. Regular rhythm. Normal S1, S2. No murmur noted. No rubs, clicks or gallops noted. Abdomen - soft and distended. No masses or organomegaly. No rebound, rigidity or guarding. Bowel sounds normal x 4 quadrants. No tenderness noted. Neurological - awake, alert and oriented to person, place, and time and event. Cranial nerves II through XII intact. Clear speech. Muscle strength is +5/5 x 4 extremities. Sensation is intact to light touch bilaterally. Steady gait. Heme/Lymph - peripheral pulses normal x 4 extremities. No peripheral edema is noted. Musculoskeletal - Intact x 4 extremities. Full ROM x 4 extremities. No pain with movement. Back exam - normal range of motion. No pain on palpation of the spinous processes in the cervical, thoracic, lumbar, sacral regions. No CVA tenderness.   No buffalo hump noted. Skin - no rashes, erythema, ecchymosis, lacerations, abrasions, suspicious moles noted. No skin tags or moles. No acanthosis nigricans noted in the axilla or neck. Psychological -   normal behavior, dress and thought processes. Good insight. Good eye contact. Normal affect. Appropriate mood. Normal speech. DATA REVIEWED    Labs dated 2/25/2019 from [facility] compared with labs from 1/18/2019. LDL was 165, up down from 175. HDL was 55, down from 57. Triglycerides were 84, up from 74. LDL-P was 2091, down from 2348. Uric Acid was 5.6, up from 5.5. Hgb A1C was 5.5, down from 5.7. Leptin was 55, up from 44. Insulin was 15, down from 19. Creatinine was 0.9.     TSH was 5.9, up from 4.66. FT4 was 0.90, down from 1.01.     SEE SCANNED DOCUMENT FOR COMPLETE PANEL RESULTS. ASSESSMENT and PLAN      ICD-10-CM ICD-9-CM    1. Hypothyroidism due to acquired atrophy of thyroid E03.4 244.8      246.8     uncontrolled without tx   2. Essential hypertension I10 401.9     stable off HCTZ 12.5 mg.    3. Hyperglycemia R73.9 790.29     resolved with LCD changes and weight loss   4. Flu-like symptoms R68.89 780.99 AMB POC ARIANNA INFLUENZA A/B TEST   5. Dyslipidemia E78.5 272.4     with elevated LDLP and sdLDLP, improving   6. Hyperuricemia w/o signs of inflam arthrit and tophaceous dis E79.0 790.6     resolved with weight loss and LCD   7. Chills R68.83 780.64 AMB POC ARIANNA INFLUENZA A/B TEST   8. Elevated serum creatinine R79.89 790.99     resolved   9. Steatosis of liver K76.0 571.8     with stable LFTs   10. S/P MUKUND-BSO (total abdominal hysterectomy and bilateral salpingo-oophorectomy) Z90.710 V88.01     Z90.722 V45.77     Z90.79     11. Malignant neoplasm of left female breast, unspecified estrogen receptor status, unspecified site of breast ([facility] [address] C50.912 174.9    12. Other headache syndrome G44.89 339.89     due to Viral vs other   13.  Status post surgical removal of nail matrix of toe Z98.890 V45.89     BL great toes, R 2nd toe   14. Weight loss R63.4 783.21     14# since 8/2018 due to LCD and efforts        Chart reviewed and updated. Recommend pt refer to LCD manual if experiencing any side effects once program is initiated or call our office.     Referred patient to David Perez RD for BS MSWLP to receive Ruth Conner [employer] LCD food products and weekly intervention.     Discussed the patient's BMI with her. The BMI follow up plan is as follows: I have counseled this patient on diet and exercise regimens. Decrease carbohydrates (white foods, sweet foods, sweet drinks and alcohol), increase green leafy vegetables and protein (lean meats and beans) with each meal.  Avoid fried foods. Do not skip meals. Increase water intake to 1 bottle with each meal. Avoid sugar-sweetened beverages. Get 7-8 hours uninterrupted sleep nightly.     Diet prescription: LCD (low calorie diet)/1347-4838 calories daily using 2-3 meal replacements daily with Ruth Conner [facility] food/beverage products recommended. Consume a minimum of 2 L (67 oz) of water daily while utilizing LCD. Avoid sugar-sweetened beverages. Reviewed nutrition and importance of regular protein intake and hidden carbohydrate sources.      Exercise prescription: Advance as tolerated while using LCD. A goal of 30 minutes physical activity daily is recommended for health benefit and at least 60 minutes daily to prevent weight regain. For weight loss, no less than 75% needs to be aerobic (i.e. Walking) and no more than 25% resistance exercising (i.e. Weight lifting). For weight maintenance phase, 50% aerobic and 50% resistance exercises. Emphasized importance of physical activity and reducing sedentary time.      Sleep prescription: A goal of 7-8 hours of uninterrupted sleep is recommended to turn off the Grehlin hormone to be released from the stomach and triggers appetite while promoting weight gain.  Proper rest turns on Leptin hormone to be released from white adipose tissue and promotes weight loss. Continue current medications and care. Start Synthroid 50 mcg daily. Reviewed and discussed [facility] lab results. Copy of [facility] labs given to pt to share with PCP and specialists. Recheck pertinent labs monthly with [facility] lab. Counseled patient on health concerns:  LCD, weight loss goals, sleep hygiene, strategies to overcome habits or challenges to improve or continue adherence, hypothyroidism, cholesterol, hyperuricemia, headaches, chills, elevated cr, BP, hyperglycemia, chills, and hx of breast ca. Reminded females of reproductive age that with weight loss, they may become more fertile and recommend the use of condoms if pregnancy is not desired. Weight loss goal of 5-10% in 6-12 months has shown significant improvement in obesity and its health consequences. Immunizations noted. Offered empathy, support, legitimation, prayers, partnership to patient. Praised patient for progress. Follow-up Disposition:  Return in about 1 month (around 4/7/2019) for mswlp LCD, results. Patient was offered a choice/choices in the treatment plan today. Patient expresses understanding of the plan and agrees with recommendations. Nick Lavonne has a reminder for a \"due or due soon\" health maintenance. I have asked pt to contact their primary care provider for follow-up on this health maintenance. Written by karina Hanna, as dictated by Dr. Shoshana White DO. Documentation True and Accepted by Barbi Early. Garret Herrera. [id number] [facility] Providencenatali Mcneil MD FOR ALLOWING ME THE PRIVILEGE TO PARTICIPATE IN THE CARE OF OUR MUTUAL PATIENT, Nick Banerjee WITH RESPECT TO WEIGHT MANAGEMENT. Patient Instructions          Hypothyroidism: Care Instructions  Your Care Instructions    You have hypothyroidism, which means that your body is not making enough thyroid hormone. This hormone helps your body use energy.  If your thyroid level is low, you may feel tired, be constipated, have an increase in your blood pressure, or have dry skin or memory problems. You may also get cold easily, even when it is warm. Women with low thyroid levels may have heavy menstrual periods. A blood test to find your thyroid-stimulating hormone (TSH) level is used to check for hypothyroidism. A high TSH level may mean that you have low thyroid. When your body is not making enough thyroid hormone, TSH levels rise in an effort to make the body produce more. The treatment for hypothyroidism is to take thyroid hormone pills. You should start to feel better in 1 to 2 weeks. But it can take several months to see changes in the TSH level. You will need regular visits with your doctor to make sure you have the right dose of medicine. Most people need treatment for the rest of their lives. You will need to see your doctor regularly to have blood tests and to make sure you are doing well. Follow-up care is a key part of your treatment and safety. Be sure to make and go to all appointments, and call your doctor if you are having problems. It's also a good idea to know your test results and keep a list of the medicines you take. How can you care for yourself at home? · Take your thyroid hormone medicine exactly as prescribed. Call your doctor if you think you are having a problem with your medicine. Most people do not have side effects if they take the right amount of medicine regularly. ? Take the medicine 30 minutes before breakfast, and do not take it with calcium, vitamins, or iron. ? Do not take extra doses of your thyroid medicine. It will not help you get better any faster, and it may cause side effects. ? If you forget to take a dose, do NOT take a double dose of medicine. Take your usual dose the next day. · Tell your doctor about all prescription, herbal, or over-the-counter products you take. · Take care of yourself.  Eat a healthy diet, get enough sleep, and get regular exercise. When should you call for help? Call 911 anytime you think you may need emergency care. For example, call if:    · You passed out (lost consciousness).     · You have severe trouble breathing.     · You have a very slow heartbeat (less than 60 beats a minute).     · You have a low body temperature (95°F or below).    Call your doctor now or seek immediate medical care if:    · You feel tired, sluggish, or weak.     · You have trouble remembering things or concentrating.     · You do not begin to feel better 2 weeks after starting your medicine.    Watch closely for changes in your health, and be sure to contact your doctor if you have any problems. Where can you learn more? Go to http://corona-andreea.info/. Enter V087 in the search box to learn more about \"Hypothyroidism: Care Instructions. \"  Current as of: March 14, 2018  Content Version: 11.9  © 3098-0780 [employer]. Care instructions adapted under license by [employer] (which disclaims liability or warranty for this information). If you have questions about a medical condition or this instruction, always ask your healthcare professional. [address] any warranty or liability for your use of this information.

## 2019-03-14 ENCOUNTER — CLINICAL SUPPORT (OUTPATIENT)
Dept: BARIATRICS/WEIGHT MGMT | Age: 59
End: 2019-03-14

## 2019-03-14 DIAGNOSIS — E66.9 OBESITY, CLASS I, BMI 30-34.9: Primary | ICD-10-CM

## 2019-03-15 VITALS
SYSTOLIC BLOOD PRESSURE: 129 MMHG | WEIGHT: 195.5 LBS | HEART RATE: 85 BPM | BODY MASS INDEX: 33.38 KG/M2 | DIASTOLIC BLOOD PRESSURE: 81 MMHG | HEIGHT: 64 IN

## 2019-03-15 NOTE — PROGRESS NOTES
Progress Note: Weekly Education Class in the [facility] Weight Loss Program   Is there anything that you or the patient needs to let Dr Hiram West know about? no  Over the past week, have you experienced any side-effects? no    Santiago Jean is a 60 y.o. female who is enrolled in [facility] Weight Loss Program    Visit Vitals  /81   Pulse 85   Ht 5' 4\" (1.626 m)   Wt 195 lb 8 oz (88.7 kg)   BMI 33.56 kg/m²     Weight Metrics 3/15/2019 3/14/2019 3/7/2019 3/7/2019 2/28/2019 2/27/2019 2/21/2019   Weight - 195 lb 8 oz - 195 lb - 197 lb 8 oz 200 lb   Neck Circ (inches) - - 14.5 - - - -   Waist Measure Inches 42 - 39.5 - 38 - 40.25   Body Fat % 36.9 - 39.4 - 39 - 39.4   BMI - 33.56 kg/m2 - 33.47 kg/m2 - 33.9 kg/m2 34.33 kg/m2         Have you received any other medical care this week? no  If yes, where and for what? Have you had any change in your medications since your last visit? no  If yes what? Did you have any problems adhering to the program last week? no  If yes, please explain:       Eating Habits Over Last Week:  Did you take in 64 oz of non-caloric fluids? yes     Did you consume your 4 meal replacements each day?  yes       Physical Activity Over the Past Week:    Aerobic exercise:210 min  Resistance exercise: 30 workouts / week

## 2019-04-12 LAB
% ALBUMIN, 58A: 62 % (ref 54–71)
ABSOLUTE IMMATURE GRANULOCYTES, AIG: 0 X10^3/UL (ref 0–0.1)
ADIPONECTIN-DIAZYME: 6 UG/ML
ALB/GLOBRATIO, 58C: 1.65 (ref 1.15–2.5)
ALBUMIN SERPL-MCNC: 4.6 G/DL (ref 3.7–5.1)
ALP SERPL-CCNC: 118 U/L (ref 35–125)
ALT SERPL-CCNC: 14 U/L
ANION GAP SERPL CALC-SCNC: 11 MMOL/L (ref 6–18)
AST SERPL W P-5'-P-CCNC: 18 U/L (ref 5–32)
BASOPHILS # BLD: 1 % (ref 0–3)
BASOPHILS NFR BLD: 0.1 X10^3/UL (ref 0–0.2)
BILIRUB SERPL-MCNC: 0.5 MG/DL
BUN SERPL-MCNC: 18 MG/DL (ref 6–20)
BUN/CREATININE RATIO,BUCR: 18 (ref 10–27)
CALCIUM SERPL-MCNC: 9.7 MG/DL (ref 8.8–10.5)
CHLORIDE SERPL-SCNC: 105 MMOL/L (ref 98–110)
CHOLEST SERPL-MCNC: 235 MG/DL
CO2 SERPL-SCNC: 26 MMOL/L (ref 19–31)
CREAT SERPL-MCNC: 1 MG/DL (ref 0.5–0.9)
CRP SERPL HS-MCNC: 1 MG/L
EGFRAACREAT: 74 ML/MIN/1.73M^2
EGFRNACREAT: 64 ML/MIN/1.73M^2
EOSINOPHIL # BLD: 2 % (ref 0–7)
EOSINOPHIL NFR BLD: 0.1 X10^3/UL (ref 0–0.4)
ERYTHROCYTE [DISTWIDTH] IN BLOOD BY AUTOMATED COUNT: 13.7 % (ref 11.7–15)
ESTIMATED AVERAGE GLUCOSE, EAG: 116.9 MG/DL
GLOBCALC, 58B: 2.8 G/DL (ref 1.9–3.5)
GLUCOSE SERPL-MCNC: 88 MG/DL (ref 70–99)
GRANULOCYTES,GRANS: 49 % (ref 40–74)
HBA1C MFR BLD HPLC: 5.7 %
HCT VFR BLD AUTO: 44 % (ref 34–44)
HDLC SERPL-MCNC: 61 MG/DL
HGB BLD-MCNC: 15.3 G/DL (ref 11.5–15)
HOMA-IR, HDL2100: 5.3
INSULIN,INS: 24 UU/ML (ref 3–9)
LDLC SERPL CALC-MCNC: 178 MG/DL
LYMPHOCYTES # BLD: 3 X10^3/UL (ref 0.7–4.5)
LYMPHOCYTES NFR BLD: 44 % (ref 14–46)
MCH RBC QN AUTO: 28 PG (ref 27–34)
MCHC RBC AUTO-ENTMCNC: 35 G/DL (ref 32–36)
MCV RBC AUTO: 80 FL (ref 80–98)
MONOCYTES # BLD: 0.3 X10^3/UL (ref 0.1–1)
MONOCYTES NFR BLD: 4 % (ref 4–13)
MP REPORT TRIGGER: 1
NEUTROPHILS # BLD AUTO: 3.4 X10^3/UL (ref 1.8–7.8)
NON-HDL CHOLESTEROL, 011976: 174 MG/DL
NRBC: 0.4 /100 WBC
PLATELET # BLD AUTO: 296 X10^3/UL (ref 140–415)
POTASSIUM SERPL-SCNC: 4.3 MMOL/L (ref 3.5–5.3)
PROT SERPL-MCNC: 7.3 G/DL (ref 6.1–8)
RBC # BLD AUTO: 5.5 X10^6/UL (ref 3.8–5.1)
SODIUM SERPL-SCNC: 142 MMOL/L (ref 133–145)
T4 FREE SERPL-MCNC: 0.94 NG/DL (ref 0.93–1.7)
TRIGL SERPL-MCNC: 123 MG/DL (ref ?–150)
TSH SERPL-ACNC: 8.26 UIU/ML (ref 0.27–4.2)
URATE SERPL-MCNC: 6.8 MG/DL (ref 2–6.9)
VIT D 25-HYDROXY, VDLT: 52 NG/ML (ref 30–100)
WBC # BLD AUTO: 6.8 X10^3/UL (ref 4–10.5)

## 2019-04-13 LAB
HDL HDL-P, HDL5001: 44.2 UMOL/L
HDL LDL-P, HDL5000: 2467 NMOL/L
HDL SLDL-P, HDL5002: 1299 NMOL/L
LEPTIN, SERUM, 146711: 55 NG/ML

## 2019-04-14 LAB
AA2: 15.92 % (ref 10.5–23.3)
ALPHA LINOLEIC ACID N3, HDL1202: 0.16 % (ref 0.1–0.4)
CIS-MONO-UNSATURATED FATTY ACID TOTAL, HDL1205: 15.3 (ref 11.5–20.5)
DOCOSAHEXAENOIC ACID N3, HDL1208: 4.81 % (ref 0.1–8.4)
DOCOSAPENTAENOIC ACID N3, HDL1206: 2.87 % (ref 0.6–4.1)
DOCOSAPENTAENOIC ACID N6, HDL1207: 0.62 % (ref 0.1–1.3)
EPA2: 0.8 % (ref 0.1–2.5)
LINOLEIC ACID C6, HDL1216: 14.28 % (ref 4.6–21.3)
OMEGA-3 FATTY ACID TOTAL, HDL1220: 8.6 (ref 0.1–14.1)
OMEGA-3 INDEX, HDL1219: 5.6 (ref 0.1–10.4)
OMEGA-6 FATTY ACID TOTAL, HDL1222: 35.9 (ref 28.6–44.5)
SATURATED FATTY ACID TOTAL, HDL1226: 39.6 (ref 36.6–42)
TRANS FATTY ACID TOTAL, HDL1232: 0.6 (ref 0.1–1.8)
TRANSLINOLEIC ACID, HDL1229: 0.11 % (ref 0.1–0.5)
TRANSOLEIC ACID, HDL1230: 0.43 % (ref 0.1–1.3)

## 2019-04-18 NOTE — PROGRESS NOTES
Reviewed and agree with Weekly Education Class nurse progress note for [address] [place] [address] Medically Supervised Weight Loss Program (MSWLP) using [employer]irection food products. Continue current diet, care and follow up as planned and outlined in BS MSWLP manual.      Documentation true and accepted by Love Upton.  Kyle Lerner.

## 2019-05-01 ENCOUNTER — OFFICE VISIT (OUTPATIENT)
Dept: FAMILY MEDICINE CLINIC | Age: 59
End: 2019-05-01

## 2019-05-01 VITALS
SYSTOLIC BLOOD PRESSURE: 115 MMHG | DIASTOLIC BLOOD PRESSURE: 81 MMHG | OXYGEN SATURATION: 98 % | BODY MASS INDEX: 34.49 KG/M2 | WEIGHT: 202 LBS | RESPIRATION RATE: 18 BRPM | HEIGHT: 64 IN | HEART RATE: 91 BPM | TEMPERATURE: 98 F

## 2019-05-01 DIAGNOSIS — R79.89 ELEVATED SERUM CREATININE: ICD-10-CM

## 2019-05-01 DIAGNOSIS — E16.1 HYPERINSULINEMIA: ICD-10-CM

## 2019-05-01 DIAGNOSIS — E79.0 HYPERURICEMIA: ICD-10-CM

## 2019-05-01 DIAGNOSIS — R63.5 WEIGHT GAIN: ICD-10-CM

## 2019-05-01 DIAGNOSIS — R79.82 ELEVATED C-REACTIVE PROTEIN (CRP): ICD-10-CM

## 2019-05-01 DIAGNOSIS — R73.9 HYPERGLYCEMIA: ICD-10-CM

## 2019-05-01 DIAGNOSIS — E66.9 OBESITY, CLASS I, BMI 30-34.9: ICD-10-CM

## 2019-05-01 DIAGNOSIS — E63.0 ESSENTIAL FATTY ACID (EFA) DEFICIENCY: ICD-10-CM

## 2019-05-01 DIAGNOSIS — I10 ESSENTIAL HYPERTENSION: ICD-10-CM

## 2019-05-01 DIAGNOSIS — D58.2 ELEVATED HEMOGLOBIN (HCC): ICD-10-CM

## 2019-05-01 DIAGNOSIS — E03.4 HYPOTHYROIDISM DUE TO ACQUIRED ATROPHY OF THYROID: Primary | ICD-10-CM

## 2019-05-01 DIAGNOSIS — E78.5 DYSLIPIDEMIA: ICD-10-CM

## 2019-05-01 RX ORDER — ACETAMINOPHEN AND CODEINE PHOSPHATE 300; 30 MG/1; MG/1
TABLET ORAL
COMMUNITY
Start: 2019-02-27 | End: 2019-06-06 | Stop reason: ALTCHOICE

## 2019-05-01 RX ORDER — HYDROCHLOROTHIAZIDE 12.5 MG/1
CAPSULE ORAL
COMMUNITY
Start: 2019-01-31 | End: 2019-06-06 | Stop reason: ALTCHOICE

## 2019-05-01 NOTE — PROGRESS NOTES
Zoey Malagon  Identified pt with two pt identifiers(name and ). Chief Complaint   Patient presents with    Results      Rm 12 MSWL         1. Have you been to the ER, urgent care clinic since your last visit? Hospitalized since your last visit? NO    2. Have you seen or consulted any other health care providers outside of the [address] [place] [address] since your last visit? Include any pap smears or colon screening. NO      Advance Care Planning    In the event something were to happen to you and you were unable to speak on your behalf, do you have an Advance Directive/ Living Will in place stating your wishes? NO    If yes, do we have a copy on file NO    If no, would you like information YES    My Chart     My chart gives you direct online access to portions of the electronic medical record (EMR) where your doctor stores your health information (ie, lab results, appointment information, medications, immunizations, and more. It is free. Would you like to set up your my chart? yes    [unfilled]    Weight Metrics 2019 2019 3/15/2019 3/14/2019 3/7/2019 3/7/2019 2019   Weight - 202 lb - 195 lb 8 oz - 195 lb -   Neck Circ (inches) 14.5 - - - 14.5 - -   Waist Measure Inches 40 - 42 - 39.5 - 38   Body Fat % - - 36.9 - 39.4 - 39   BMI - 34.67 kg/m2 - 33.56 kg/m2 - 33.47 kg/m2 -       MMW: 31.3  BWW: 44.6   BMR: 1486    Medication reconciliation up to date and corrected with patient at this time. Today's provider has been notified of reason for visit, vitals and flowsheets obtained on patients. Reviewed record in preparation for visit, huddled with provider and have obtained necessary documentation.       Health Maintenance Due   Topic    Pneumococcal 0-64 years (1 of 1 - PPSV23)    BREAST CANCER SCRN MAMMOGRAM     PAP AKA CERVICAL CYTOLOGY        Wt Readings from Last 3 Encounters:   19 202 lb (91.6 kg)   03/15/19 195 lb 8 oz (88.7 kg)   19 195 lb (88.5 kg)     Temp Readings from Last 3 Encounters:   05/01/19 98 °F (36.7 °C) (Oral)   03/07/19 97.9 °F (36.6 °C) (Oral)   01/24/19 97.7 °F (36.5 °C) (Oral)     BP Readings from Last 3 Encounters:   05/01/19 115/81   03/15/19 129/81   03/07/19 125/82     Pulse Readings from Last 3 Encounters:   05/01/19 91   03/15/19 85   03/07/19 (!) 101     Vitals:    05/01/19 1524   BP: 115/81   Pulse: 91   Resp: 18   Temp: 98 °F (36.7 °C)   TempSrc: Oral   SpO2: 98%   Weight: 202 lb (91.6 kg)   Height: 5' 4\" (1.626 m)   PainSc:   0 - No pain         Learning Assessment:  :     Learning Assessment 11/12/2018 7/11/2017   PRIMARY LEARNER Patient Patient   HIGHEST LEVEL OF EDUCATION - PRIMARY LEARNER  4 YEARS OF COLLEGE 4 YEARS OF COLLEGE   PRIMARY LANGUAGE ENGLISH ENGLISH   LEARNER PREFERENCE PRIMARY LISTENING LISTENING   ANSWERED BY patient patient   RELATIONSHIP SELF SELF       Depression Screening:  :     3 most recent PHQ Screens 11/12/2018   Little interest or pleasure in doing things Not at all   Feeling down, depressed, irritable, or hopeless Not at all   Total Score PHQ 2 0       Fall Risk Assessment:  :     Fall Risk Assessment, last 12 mths 8/15/2018   Able to walk? Yes   Fall in past 12 months? No       Abuse Screening:  :     Abuse Screening Questionnaire 11/7/2018   Do you ever feel afraid of your partner? N   Are you in a relationship with someone who physically or mentally threatens you? N   Is it safe for you to go home?  Y       ADL Screening:  :     ADL Assessment 8/15/2018   Feeding yourself No Help Needed   Getting from bed to chair No Help Needed   Getting dressed No Help Needed   Bathing or showering No Help Needed   Walk across the room (includes cane/walker) No Help Needed   Using the telphone No Help Needed   Taking your medications No Help Needed   Preparing meals No Help Needed   Managing money (expenses/bills) No Help Needed   Moderately strenuous housework (laundry) No Help Needed   Shopping for personal items (toiletries/medicines) No Help Needed   Shopping for groceries No Help Needed   Driving No Help Needed   Climbing a flight of stairs No Help Needed   Getting to places beyond walking distances No Help Needed

## 2019-05-09 ENCOUNTER — CLINICAL SUPPORT (OUTPATIENT)
Dept: BARIATRICS/WEIGHT MGMT | Age: 59
End: 2019-05-09

## 2019-05-09 DIAGNOSIS — E66.9 OBESITY (BMI 30.0-34.9): Primary | ICD-10-CM

## 2019-05-10 VITALS
BODY MASS INDEX: 33.89 KG/M2 | SYSTOLIC BLOOD PRESSURE: 125 MMHG | HEIGHT: 64 IN | DIASTOLIC BLOOD PRESSURE: 84 MMHG | WEIGHT: 198.5 LBS | HEART RATE: 89 BPM

## 2019-05-10 NOTE — PROGRESS NOTES
Progress Note: Weekly Education Class in the [facility] Weight Loss Program     I am on the  LCD     Did you have any symptoms of physical problems? no What effects     Is there anything that you or the patient needs to let Dr Osmar Romero know about? no      Zahira Timmons is a 60 y.o. female who is enrolled in [facility]    Visit Vitals  /84   Pulse 89   Ht 5' 4\" (1.626 m)   Wt 198 lb 8 oz (90 kg)   BMI 34.07 kg/m²     Weight Metrics 5/10/2019 5/9/2019 5/1/2019 5/1/2019 3/15/2019 3/14/2019 3/7/2019   Weight - 198 lb 8 oz - 202 lb - 195 lb 8 oz -   Neck Circ (inches) - - 14.5 - - - 14.5   Waist Measure Inches 39.5 - 40 - 42 - 39.5   Body Fat % 39.1 - - - 36.9 - 39.4   BMI - 34.07 kg/m2 - 34.67 kg/m2 - 33.56 kg/m2 -         Have you received any other medical care this week? no  If yes, where and for what? Have you had any change in your medications since your last visit? no  If yes what? Did you have any problems adhering to the program last week? no  If yes, please explain:       Eating Habits Over Last Week:  Did you take in 64 oz of non-caloric fluids? yes     Did you consume your 4 meal replacements each day?  yes       Physical Activity Over the Past Week:    Aerobic exercise: 300  min    Resistance exercise: 150 workouts / week  Steps weekly: 0    How much exercise do you plan to do next week? 0      I would like a follow-up call (#________________) to discuss   Transition into adapting    Question about the diet    Other

## 2019-05-13 ENCOUNTER — OFFICE VISIT (OUTPATIENT)
Dept: INTERNAL MEDICINE CLINIC | Age: 59
End: 2019-05-13

## 2019-05-13 VITALS
DIASTOLIC BLOOD PRESSURE: 82 MMHG | BODY MASS INDEX: 34.76 KG/M2 | SYSTOLIC BLOOD PRESSURE: 123 MMHG | OXYGEN SATURATION: 95 % | TEMPERATURE: 97.8 F | HEART RATE: 76 BPM | WEIGHT: 203.6 LBS | RESPIRATION RATE: 18 BRPM | HEIGHT: 64 IN

## 2019-05-13 DIAGNOSIS — E78.5 DYSLIPIDEMIA: ICD-10-CM

## 2019-05-13 DIAGNOSIS — R10.9 ABDOMINAL WALL PAIN: ICD-10-CM

## 2019-05-13 DIAGNOSIS — I10 ESSENTIAL HYPERTENSION: ICD-10-CM

## 2019-05-13 DIAGNOSIS — J45.909 UNCOMPLICATED ASTHMA, UNSPECIFIED ASTHMA SEVERITY, UNSPECIFIED WHETHER PERSISTENT: ICD-10-CM

## 2019-05-13 DIAGNOSIS — K76.0 STEATOSIS OF LIVER: Primary | ICD-10-CM

## 2019-05-13 RX ORDER — ACYCLOVIR 50 MG/G
OINTMENT TOPICAL
Qty: 30 G | Refills: 11 | Status: SHIPPED | OUTPATIENT
Start: 2019-05-13 | End: 2019-07-17 | Stop reason: ALTCHOICE

## 2019-05-13 NOTE — PROGRESS NOTES
1. Have you been to the ER, urgent care clinic since your last visit? Hospitalized since your last visit? No 
 
2. Have you seen or consulted any other health care providers outside of the [address] [place] [address] since your last visit? Include any pap smears or colon screening. No  
 
Requesting a prescription for a medication Wants to discuss some abdominal issues

## 2019-05-13 NOTE — PROGRESS NOTES
SPORTS MEDICINE AND PRIMARY CARE Ijeoma Medina MD, [facility] [address] Phone:  [phone]  Fax: [phone] Chief Complaint Patient presents with  Hypertension SUBJECTIVE: 
  Michael Galan is a 60 y.o. female *Patient returns today with known history of hepatic steatosis, primary hypertension, dyslipidemia, asthma, abdominal wall pain, and is seen for evaluation. Since we last saw her she was seen by family physician at [place] [address] for a weight loss program and was given exercise and diet recommendations. Patient is seen for evaluation. Current Outpatient Medications Medication Sig Dispense Refill  acyclovir (ZOVIRAX) 5 % ointment 6 times daily 30 g 11  
 acetaminophen-codeine (TYLENOL #3) 300-30 mg per tablet  mometasone (NASONEX) 50 mcg/actuation nasal spray USE TWO SPRAY(S) IN EACH NOSTRIL DAILY 1 Container 11  
 levothyroxine (SYNTHROID) 50 mcg tablet Take 1 Tab by mouth Daily (before breakfast). 30 Tab 5  
 albuterol (VENTOLIN HFA) 90 mcg/actuation inhaler Take 2 Puffs by inhalation every four (4) hours as needed for Wheezing. 1 Inhaler 11  
 glucose blood VI test strips (CONTOUR NEXT STRIPS) strip qd 100 Strip 11  
 hydroCHLOROthiazide (MICROZIDE) 12.5 mg capsule Past Medical History:  
Diagnosis Date  Asthma CHILDHOOD  Breast cancer, left ([address] 2005  
 followed by gyn md cassius  
 Chest pain 07/11/2017  
 normal Stress Echo 07/19/17.  Dyslipidemia  Heartburn 2017  Hyperglycemia 04/16/2016 Dr. Anabel Zabala  Hypertension  Hypothyroid 08/2018  Neck nodule 05/10/2018  Obesity (BMI 30-39.9) 2017 Dr. Anabel Zabala, endo.  Postsurgical menopause 2002 Dr. Lorrie Byrd  Spider bite 2016 ? Leg, recurrent.  Steatosis of liver 06/19/2016  Tubular adenoma of colon 02/2017 Dr. Maura Rose  Uterine fibroid 2002 Dr. Beck Malhotra  Vitamin D deficiency 04/18/2018 Dr. Radha Hu Past Surgical History:  
Procedure Laterality Date  HX BREAST LUMPECTOMY Left 2005 Atypical Cells. Dr. Flako Ace.  HX COLONOSCOPY  08/11/2009, 02/07/17  
 with polypectomy. Dr. James Silverman.  HX REFRACTIVE SURGERY Bilateral 2000s  HX MUKUND AND BSO  2002  
 due to uterine fibroids. Dr. Flores Caldwell [address] [facility] Dr. Flores Caldwell Allergies Allergen Reactions  Contrave [Naltrexone-Bupropion] Other (comments) 1 PILL MORNING, 2 PILLS EVENING 
FELT ZOMBIED, \"OUT OF MY HEAD\"  Crestor [Rosuvastatin] Other (comments) Felt like it worsened her memory  Lipitor [Atorvastatin] Other (comments) Felt like it worsened memory. REVIEW OF SYSTEMS: 
General: negative for - chills or fever ENT: negative for - headaches, nasal congestion or tinnitus Respiratory: negative for - cough, hemoptysis, shortness of breath or wheezing Cardiovascular : negative for - chest pain, edema, palpitations or shortness of breath Gastrointestinal: negative for - abdominal pain, blood in stools, heartburn or nausea/vomiting Genito-Urinary: no dysuria, trouble voiding, or hematuria Musculoskeletal: negative for - gait disturbance, joint pain, joint stiffness or joint swelling Neurological: no TIA or stroke symptoms Hematologic: no bruises, no bleeding, no swollen glands Integument: no lumps, mole changes, nail changes or rash Endocrine: no malaise/lethargy or unexpected weight changes Social History Socioeconomic History  Marital status:  Spouse name: Not on file  Number of children: Not on file  Years of education: Not on file  Highest education level: Not on file Tobacco Use  Smoking status: Never Smoker  Smokeless tobacco: Never Used Substance and Sexual Activity  Alcohol use: Yes Alcohol/week: 0.5 oz Types: 1 Glasses of wine per week   Comment: ocassional  
  Drug use: No  
 Sexual activity: Yes  
  Partners: Male Birth control/protection: Surgical  
  Comment: TL Social History Narrative Family History: Mother: , Jacquelyn Jacoby: , Bee stingSister(s):  2010 yrs, Breast Cancer at 42Brother(s):  
 , Heart Problems1 brother(s) , 7 sister(s) - healthy. 1 son(s) , 1 daughter(s) - healthy. Social History: Alcohol Use Patient does not use alcohol. Smoking Status Patient is a never smoker. Caffeine: coffee, tea. Marital Status:  
 . Lives w ith: spouse Carrillo Ballesteros. Occupation/W ork: office w ork. Medical History: Hypertension, Left Breast papilloma. Gyn History: Last mammogram date 10/28/2014. Last pap date 10/22/2012. Hysterectomy Date . OB History: Total pregnancies 2. Full term delivery (>37 w eeks) 2. Live births 2. C section(s) 2. Pregnancy # 1: , Girl, 11. Pregnancy # 2: Repeat C/S, boy, . Surgical History: hysterectomy, total abdominal w ith BSO , Left w rist surgery , colonoscopy [facility] and 2009, left lumpectomy . Family History Problem Relation Age of Onset  Diabetes Mother  Obesity Mother  Other Father 54  FROM BEE [facility]ERGY  Obesity Sister X7  
 Diabetes Brother  No Known Problems Maternal Grandmother  No Known Problems Maternal Grandfather  No Known Problems Paternal Grandmother  No Known Problems Paternal Grandfather  Sleep Apnea Sister  Diabetes Sister  Hypertension Sister  Thyroid Disease Sister X 2  
 Diabetes Sister X3 MORE SISTERS TAKING INSULIN  
 Diabetes Brother  Breast Cancer Sister 43  Schizophrenia Sister OBJECTIVE: 
 
Visit Vitals /82 Pulse 76 Temp 97.8 °F (36.6 °C) (Oral) Resp 18 Ht 5' 4\" (1.626 m) Wt 203 lb 9.6 oz (92.4 kg) SpO2 95% BMI 34.95 kg/m² CONSTITUTIONAL: well , well nourished, appears age appropriate EYES: perrla, eom intact ENMT:moist mucous membranes, pharynx clear NECK: supple. Thyroid normal 
RESPIRATORY: Chest: clear bilaterally CARDIOVASCULAR: Heart: regular rate and rhythm GASTROINTESTINAL: Abdomen: soft, bowel sounds active HEMATOLOGIC: no pathological lymph nodes palpated MUSCULOSKELETAL: Extremities: no edema, pulse 1+ INTEGUMENT: No unusual rashes or suspicious skin lesions noted. Nails appear normal. 
NEUROLOGIC: non-focal exam  
MENTAL STATUS: alert and oriented, appropriate affect ASSESSMENT: 
1. Steatosis of liver 2. Essential hypertension 3. Dyslipidemia 4. Uncomplicated asthma, unspecified asthma severity, unspecified whether persistent 5. Abdominal wall pain Been involved in a weight loss program and is trying to lose weight. She is going to try and stick with it this time. Her greatest concern is related to her cholesterol, which we review with her. We recall that with Lipitor and Crestor she was concerned about memory issues and therefore both were discontinued. Cholesterol remains quite concerning being 165 and 178 respectively over the past three months. At her request we will repeat the cholesterol today. TSH bumped and she was placed on thyroid medication, 50 mcg. We will check a TSH. BMI took a bump also. She is now over 200 again. She had gotten down less than 200. We were tickled. She is going to significantly try this time to stick with the diet. She will be back to see us in three to four months, sooner if she needs to. I have discussed the diagnosis with the patient and the intended plan as seen in the 
orders above. The patient understands and agees with the plan. The patient has  
received an after visit summary and questions were answered concerning 
future plans Patient labs and/or xrays were reviewed Past records were reviewed. PLAN: 
. Orders Placed This Encounter  TSH 3RD GENERATION  
 LIPID PANEL  
  acyclovir (ZOVIRAX) 5 % ointment Follow-up and Dispositions · Return in about 4 months (around 9/13/2019). ATTENTION:  
This medical record was transcribed using an electronic medical records system. Although proofread, it may and can contain electronic and spelling errors. Other human spelling and other errors may be present. Corrections may be executed at a later time. Please feel free to contact us for any clarifications as needed.

## 2019-05-14 LAB
APPEARANCE UR: ABNORMAL
BACTERIA #/AREA URNS HPF: ABNORMAL /[HPF]
BILIRUB UR QL STRIP: NEGATIVE
CASTS URNS QL MICRO: ABNORMAL /LPF
CHOLEST SERPL-MCNC: 199 MG/DL (ref 100–199)
COLOR UR: YELLOW
EPI CELLS #/AREA URNS HPF: ABNORMAL /HPF (ref 0–10)
GLUCOSE UR QL: NEGATIVE
HDLC SERPL-MCNC: 58 MG/DL
HGB UR QL STRIP: NEGATIVE
KETONES UR QL STRIP: NEGATIVE
LDLC SERPL CALC-MCNC: 128 MG/DL (ref 0–99)
LEUKOCYTE ESTERASE UR QL STRIP: ABNORMAL
MICRO URNS: ABNORMAL
MUCOUS THREADS URNS QL MICRO: PRESENT
NITRITE UR QL STRIP: POSITIVE
PH UR STRIP: =>9 [PH] (ref 5–7.5)
PROT UR QL STRIP: NEGATIVE
RBC #/AREA URNS HPF: ABNORMAL /HPF (ref 0–2)
SP GR UR: 1.02 (ref 1–1.03)
TRIGL SERPL-MCNC: 65 MG/DL (ref 0–149)
TSH SERPL DL<=0.005 MIU/L-ACNC: 1.86 UIU/ML (ref 0.45–4.5)
UROBILINOGEN UR STRIP-MCNC: 0.2 MG/DL (ref 0.2–1)
VLDLC SERPL CALC-MCNC: 13 MG/DL (ref 5–40)
WBC #/AREA URNS HPF: ABNORMAL /HPF (ref 0–5)

## 2019-05-14 RX ORDER — NITROFURANTOIN 25; 75 MG/1; MG/1
100 CAPSULE ORAL 2 TIMES DAILY
Qty: 10 CAP | Refills: 0 | Status: SHIPPED | OUTPATIENT
Start: 2019-05-14 | End: 2019-05-19

## 2019-05-15 RX ORDER — FAMCICLOVIR 500 MG/1
1000 TABLET ORAL 2 TIMES DAILY
Qty: 4 TAB | Refills: 11 | Status: SHIPPED | OUTPATIENT
Start: 2019-05-15 | End: 2019-05-16

## 2019-05-15 RX ORDER — FAMCICLOVIR 500 MG/1
500 TABLET ORAL 3 TIMES DAILY
Qty: 21 TAB | Refills: 6 | OUTPATIENT
Start: 2019-05-15 | End: 2019-05-22

## 2019-05-16 ENCOUNTER — TELEPHONE (OUTPATIENT)
Dept: INTERNAL MEDICINE CLINIC | Age: 59
End: 2019-05-16

## 2019-05-16 ENCOUNTER — CLINICAL SUPPORT (OUTPATIENT)
Dept: BARIATRICS/WEIGHT MGMT | Age: 59
End: 2019-05-16

## 2019-05-16 DIAGNOSIS — E66.9 OBESITY (BMI 30.0-34.9): Primary | ICD-10-CM

## 2019-05-16 PROBLEM — A60.00 HERPES SIMPLEX INFECTION OF GENITOURINARY SYSTEM: Status: ACTIVE | Noted: 2019-05-16

## 2019-05-16 NOTE — TELEPHONE ENCOUNTER
Received request from [facility] requesting PA for Zovirax 5 % ointment.  Prior authorization pending approval.

## 2019-05-20 VITALS
HEIGHT: 64 IN | BODY MASS INDEX: 34.15 KG/M2 | DIASTOLIC BLOOD PRESSURE: 81 MMHG | WEIGHT: 200 LBS | HEART RATE: 94 BPM | SYSTOLIC BLOOD PRESSURE: 122 MMHG

## 2019-05-20 NOTE — PROGRESS NOTES
Progress Note: Weekly Education Class in the [facility] Loss [facility]     I am on the  VLCD LCD     Did you have any symptoms of physical problems? no What effects     Zahira Timmons is a 60 y.o. female who is enrolled in [facility]    Visit Vitals  /81   Pulse 94   Ht 5' 4\" (1.626 m)   Wt 200 lb (90.7 kg)   BMI 34.33 kg/m²     Weight Metrics 5/20/2019 5/16/2019 5/13/2019 5/10/2019 5/9/2019 5/1/2019 5/1/2019   Weight - 200 lb 203 lb 9.6 oz - 198 lb 8 oz - 202 lb   Neck Circ (inches) - - - - - 14.5 -   Waist Measure Inches 39.75 - - 39.5 - 40 -   Body Fat % - - - 39.1 - - -   BMI - 34.33 kg/m2 34.95 kg/m2 - 34.07 kg/m2 - 34.67 kg/m2         Have you received any other medical care this week? yes  If yes, where and for what? Dr. Talib Goncalves    Have you had any change in your medications since your last visit? yes  If yes what? Nitrofurantoin Mono 100mg    Did you have any problems adhering to the program last week? no  If yes, please explain:       Eating Habits Over Last Week:  Did you take in 64 oz of non-caloric fluids? yes     Did you consume your 4 meal replacements each day?  yes       Physical Activity Over the Past Week:    Aerobic exercise: 205  min    Resistance exercise: 90 workouts / week  Steps weekly: 0    How much exercise do you plan to do next week? 0      I would like a follow-up call (#________________) to discuss   Transition into adapting    Question about the diet    Other

## 2019-05-23 ENCOUNTER — CLINICAL SUPPORT (OUTPATIENT)
Dept: BARIATRICS/WEIGHT MGMT | Age: 59
End: 2019-05-23

## 2019-05-23 DIAGNOSIS — E66.9 OBESITY (BMI 30.0-34.9): Primary | ICD-10-CM

## 2019-05-24 VITALS
SYSTOLIC BLOOD PRESSURE: 123 MMHG | HEART RATE: 85 BPM | WEIGHT: 199.5 LBS | HEIGHT: 64 IN | DIASTOLIC BLOOD PRESSURE: 76 MMHG | BODY MASS INDEX: 34.06 KG/M2

## 2019-05-24 NOTE — PROGRESS NOTES
Progress Note:   Weekly Education Class in the [facility] Weight Loss Program     I am on the  LCD     Did you have any symptoms of physical problems? no What effects **    Zoey Malagon is a 60 y.o. female who is enrolled in [facility]    Visit Vitals  /76   Pulse 85   Ht 5' 4\" (1.626 m)   Wt 199 lb 8 oz (90.5 kg)   BMI 34.24 kg/m²     Weight Metrics 5/24/2019 5/23/2019 5/20/2019 5/16/2019 5/13/2019 5/10/2019 5/9/2019   Weight - 199 lb 8 oz - 200 lb 203 lb 9.6 oz - 198 lb 8 oz   Neck Circ (inches) - - - - - - -   Waist Measure Inches 38.5 - 39.75 - - 39.5 -   Body Fat % - - - - - 39.1 -   BMI - 34.24 kg/m2 - 34.33 kg/m2 34.95 kg/m2 - 34.07 kg/m2       *    Have you received any other medical care this week? no  If yes, where and for what? 0    Have you had any change in your medications since your last visit? no  If yes what? 0    Did you have any problems adhering to the program last week? no  If yes, please explain: 0      Eating Habits Over Last Week:  Did you take in 64 oz of non-caloric fluids? yes     Did you consume your 4 meal replacements each day?  yes       Physical Activity Over the Past Week:    Aerobic exercise: 330  min    Resistance exercise: 0 workouts / week  Steps weekly: 0    How much exercise do you plan to do next week? 0      I would like a follow-up call (ph#________________) to discuss   Transition into adapting    Question about the diet    Other

## 2019-05-25 LAB
% ALBUMIN, 58A: 61 % (ref 54–71)
ADIPONECTIN-DIAZYME: 6 UG/ML
ALB/GLOBRATIO, 58C: 1.55 (ref 1.15–2.5)
ALBUMIN SERPL-MCNC: 4.3 G/DL (ref 3.7–5.1)
ALP SERPL-CCNC: 118 U/L (ref 35–125)
ALT SERPL-CCNC: 13 U/L
ANION GAP SERPL CALC-SCNC: 13 MMOL/L (ref 6–18)
AST SERPL W P-5'-P-CCNC: 19 U/L (ref 5–32)
BILIRUB SERPL-MCNC: 0.4 MG/DL
BUN SERPL-MCNC: 17 MG/DL (ref 6–20)
BUN/CREATININE RATIO,BUCR: 19 (ref 10–27)
CALCIUM SERPL-MCNC: 9.2 MG/DL (ref 8.8–10.5)
CHLORIDE SERPL-SCNC: 105 MMOL/L (ref 98–110)
CHOLEST SERPL-MCNC: 217 MG/DL
CO2 SERPL-SCNC: 26 MMOL/L (ref 19–31)
CREAT SERPL-MCNC: 0.9 MG/DL (ref 0.5–0.9)
EGFRAACREAT: 82 ML/MIN/1.73M^2
EGFRNACREAT: 71 ML/MIN/1.73M^2
ESTIMATED AVERAGE GLUCOSE, EAG: 116.9 MG/DL
GLOBCALC, 58B: 2.7 G/DL (ref 1.9–3.5)
GLUCOSE SERPL-MCNC: 68 MG/DL (ref 70–99)
HBA1C MFR BLD HPLC: 5.7 %
HDL-C, 884255: 57 MG/DL
HOMA-IR, HDL2100: 2.7
INSULIN,INS: 16 UU/ML (ref 3–9)
LDLC SERPL CALC-MCNC: 168 MG/DL
MAGNESIUM SERPL-MCNC: 2.2 MG/DL (ref 1.6–2.4)
NON-HDL CHOLESTEROL, 011976: 159 MG/DL
POTASSIUM SERPL-SCNC: 4 MMOL/L (ref 3.5–5.3)
PROT SERPL-MCNC: 7 G/DL (ref 6.1–8)
SODIUM SERPL-SCNC: 143 MMOL/L (ref 133–145)
T4 FREE SERPL-MCNC: 1.07 NG/DL (ref 0.93–1.7)
TRIGL SERPL-MCNC: 72 MG/DL (ref ?–150)
TSH SERPL-ACNC: 4.11 UIU/ML (ref 0.27–4.2)
URATE SERPL-MCNC: 5 MG/DL (ref 2–6.9)

## 2019-05-26 LAB
HDL HDL-P, HDL5001: 40 UMOL/L
HDL LDL-P, HDL5000: 2483 NMOL/L
HDL SLDL-P, HDL5002: 1385 NMOL/L

## 2019-05-28 LAB — LEPTIN, SERUM, 146711: 60 NG/ML

## 2019-05-29 LAB
AA2: 15.93 % (ref 10.5–23.3)
ALPHA LINOLEIC ACID N3, HDL1202: 0.15 % (ref 0.1–0.4)
CIS-MONO-UNSATURATED FATTY ACID TOTAL, HDL1205: 16.3 (ref 11.5–20.5)
DOCOSAHEXAENOIC ACID N3, HDL1208: 5.26 % (ref 0.1–8.4)
DOCOSAPENTAENOIC ACID N3, HDL1206: 2.83 % (ref 0.6–4.1)
DOCOSAPENTAENOIC ACID N6, HDL1207: 0.67 % (ref 0.1–1.3)
EPA2: 0.74 % (ref 0.1–2.5)
LINOLEIC ACID C6, HDL1216: 14.28 % (ref 4.6–21.3)
OMEGA-3 FATTY ACID TOTAL, HDL1220: 9 (ref 0.1–14.1)
OMEGA-3 INDEX, HDL1219: 6 (ref 0.1–10.4)
OMEGA-6 FATTY ACID TOTAL, HDL1222: 36 (ref 28.6–44.5)
SATURATED FATTY ACID TOTAL, HDL1226: 38.2 (ref 36.6–42)
TRANS FATTY ACID TOTAL, HDL1232: 0.6 (ref 0.1–1.8)
TRANSLINOLEIC ACID, HDL1229: <0.1 % (ref 0.1–0.5)
TRANSOLEIC ACID, HDL1230: 0.38 % (ref 0.1–1.3)

## 2019-06-06 ENCOUNTER — OFFICE VISIT (OUTPATIENT)
Dept: FAMILY MEDICINE CLINIC | Age: 59
End: 2019-06-06

## 2019-06-06 VITALS
DIASTOLIC BLOOD PRESSURE: 75 MMHG | WEIGHT: 198.5 LBS | RESPIRATION RATE: 18 BRPM | BODY MASS INDEX: 33.89 KG/M2 | TEMPERATURE: 98.1 F | SYSTOLIC BLOOD PRESSURE: 108 MMHG | OXYGEN SATURATION: 98 % | HEART RATE: 77 BPM | HEIGHT: 64 IN

## 2019-06-06 DIAGNOSIS — R73.9 HYPERGLYCEMIA: ICD-10-CM

## 2019-06-06 DIAGNOSIS — K76.0 STEATOSIS OF LIVER: ICD-10-CM

## 2019-06-06 DIAGNOSIS — I10 ESSENTIAL HYPERTENSION: ICD-10-CM

## 2019-06-06 DIAGNOSIS — E16.1 HYPERINSULINEMIA: ICD-10-CM

## 2019-06-06 DIAGNOSIS — E03.4 HYPOTHYROIDISM DUE TO ACQUIRED ATROPHY OF THYROID: Primary | ICD-10-CM

## 2019-06-06 DIAGNOSIS — E79.0 HYPERURICEMIA: ICD-10-CM

## 2019-06-06 DIAGNOSIS — R79.82 ELEVATED C-REACTIVE PROTEIN (CRP): ICD-10-CM

## 2019-06-06 DIAGNOSIS — Z78.9 STATIN INTOLERANCE: ICD-10-CM

## 2019-06-06 DIAGNOSIS — Z82.49 FAMILY HISTORY OF HEART DISEASE: ICD-10-CM

## 2019-06-06 DIAGNOSIS — N30.00 ACUTE CYSTITIS WITHOUT HEMATURIA: ICD-10-CM

## 2019-06-06 DIAGNOSIS — E63.0 ESSENTIAL FATTY ACID (EFA) DEFICIENCY: ICD-10-CM

## 2019-06-06 DIAGNOSIS — E78.5 DYSLIPIDEMIA: ICD-10-CM

## 2019-06-06 DIAGNOSIS — R79.89 ELEVATED SERUM CREATININE: ICD-10-CM

## 2019-06-06 DIAGNOSIS — M25.511 ACUTE PAIN OF RIGHT SHOULDER: ICD-10-CM

## 2019-06-06 RX ORDER — CICLOPIROX 80 MG/ML
SOLUTION TOPICAL
COMMUNITY
Start: 2019-05-22 | End: 2019-07-17 | Stop reason: ALTCHOICE

## 2019-06-06 RX ORDER — GLUCOSAM/CHONDRO/HERB 149/HYAL 750-100 MG
1 TABLET ORAL DAILY
COMMUNITY
End: 2020-01-31 | Stop reason: ALTCHOICE

## 2019-06-06 NOTE — PROGRESS NOTES
[employer] Medically Supervised   [facility]   [facility]    FOLLOW UP PHYSICIAN VISIT    HISTORY OF PRESENT ILLNESS  Agree with nurse and registered dietician notes. Dede Andrade is a 60 y.o. female with Obesity Class I, Body mass index is 34.07 kg/m². and associated health concerns presents for evaluation and treatment of weight management. Pt has been participating in the [employer] Medically Supervised [facility] Loss Program using the [facility] Very Low Calorie Diet (VLCD, 800 kcal/day) since 8/15/18.  She transitioned to LCD on 11/7/2018. Weight History  Start weight 209 lbs on 8/29/2018. Current weight 199 lbs, down 3 lbs since 5/1/2019. Percent weight loss 4.78% and total pounds lost since starting: 10 lbs. Goal weight 170 lbs. Waist measurement is 38.5\", decreased from 40\". (Acceptable Range: M <40\" & F <35\")     Neck circumference is 14.25\"; decreased from 14.5\". (Acceptable Range: M <17\" & F <16\")     Body fat percent is 39.1%. (Acceptable Range: M 18-24% & F 25-31%)    BMR is 1470, decreased from 1486. Are you satisfied with your progress since the last ov? Yes, but she claims it is difficult to stick to the program. She is unsure about her commitment to the program with summer holidays coming up. Barriers to adherence to this plan of care? She went on vacation in April. Eating Habits  3-4 meals daily. Any skipped meals? Dinner sometimes. Breakfast: packet  Lunch: packet when she works at home. 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 Salad and rolls from [employer]. Dinner: Sometimes skips. Snacks: Pudding or soup, popcorn, almonds. Appetite well controlled? No, she is unsure how many calories she consumes each day. Even when she goes off the program, she tries to eat healthy. She is looking into gluten free and keto recipes. Drinking Habits  How much water do you consume daily? 8 glasses. 64 oz. (goal of 2 L or 67 oz daily, minimally)  How much caffeine do you drink daily? Green tea. How much alcohol do you drink daily and weekly? None. Do you consume any sugar-sweetened beverages (sodas, teas, juices, etc.)? Stevia with green tea. [employer] [place] fiber tea. Sleep Habits  Her R shoulder pain is causing difficulty sleeping. She averages 5-6 hours of sleep nightly. Exercise  She has a fitbit. She aims for 10,000 steps. R hand dominant patient pulled a muscle in her R shoulder moving furniture and working in her garden that is preventing her from exercising. Medication(s):  Are you taking any medications to control the appetite? No.     Other Medical Care    Pt with hypothyroidism, hyperglycemia, dyslipidemia, elevated cr, hyperinsulinemia, hyperuricemia, elevated hgb, efa deficiency, elevated crp, hypertension, steatosis of the liver, and statin intolerance presents to the office with a BP of 108/75. She is currently on Synthroid 50 mcg rx'd on 11/7/2018 when TSH was 6.25. She no longer uses HCTZ for BP. She is interested in receiving a coronary calcium scan due to her high cholesterol and family hx of blocked arteries. Pt had UTI, resolved after Macrobid x 10 days from pcp. Written by karina Santo, as dictated by Dr. Omayra Lorenz DO.    ROS    Pt denies hunger, cravings, lack of focus, fatigue, feeling weak, headaches, dizziness, light headedness, nausea, vomiting, diarrhea, constipation, indigestion, rapid heart rate, SOB, low blood sugar, feeling cold, hair loss, rash, fluid retention, leg aches, difficulty sleeping, irritability, mood swings, or other associated sxs. Review of Systems negative except as noted above in HPI.     ALLERGIES:    Allergies   Allergen Reactions    Contrave [Naltrexone-Bupropion] Other (comments)     1 PILL MORNING, 2 PILLS EVENING  FELT ZOMBIED, \"OUT OF MY HEAD\"    Crestor [Rosuvastatin] Other (comments)     Felt like it worsened her memory    Lipitor [Atorvastatin] Other (comments)     Felt like it worsened memory. CURRENT MEDICATIONS:    Outpatient Medications Marked as Taking for the 6/6/19 encounter (Office Visit) with Villa Underwood, DO   Medication Sig Dispense Refill    ciclopirox (PENLAC) 8 % solution       omega 3-DHA-EPA-fish oil 1,000 mg (120 mg-180 mg) capsule Take 1 Cap by mouth daily.  mometasone (NASONEX) 50 mcg/actuation nasal spray USE TWO SPRAY(S) IN EACH NOSTRIL DAILY 1 Container 11    albuterol (VENTOLIN HFA) 90 mcg/actuation inhaler Take 2 Puffs by inhalation every four (4) hours as needed for Wheezing. 1 Inhaler 11    glucose blood VI test strips (CONTOUR NEXT STRIPS) strip qd 100 Strip 11       PAST MEDICAL HISTORY:    Past Medical History:   Diagnosis Date    Asthma CHILDHOOD    Breast cancer, left ([address] 2005    followed by gyn md cassius    Chest pain 07/11/2017    normal Stress Echo 07/19/17.  Dyslipidemia     Heartburn 2017    Hyperglycemia 04/16/2016    Dr. Jasper Daily    Hypertension     Hypothyroid 08/2018    Neck nodule 05/10/2018    Obesity (BMI 30-39.9) 2017    Dr. Jasper Daily, endo.  Postsurgical menopause 2002    Dr. Rey Gambino Spider bite 2016    ? Leg, recurrent.  Steatosis of liver 06/19/2016    Tubular adenoma of colon 02/2017    Dr. Camila Polanco    Uterine fibroid 2002    Dr. Adriel Mejia    Vitamin D deficiency 04/18/2018    Dr. Gabriela Yanes:    Past Surgical History:   Procedure Laterality Date    HX BREAST LUMPECTOMY Left 2005    Atypical Cells. Dr. Quinteros Current.  HX COLONOSCOPY  08/11/2009, 02/07/17    with polypectomy. Dr. Camila Polanco.  HX REFRACTIVE SURGERY Bilateral 2000s    HX MUKUND AND BSO  2002    due to uterine fibroids.   Dr. Laboy Can    Dr. Adriel Mejia       FAMILY HISTORY:    Family History   Problem Relation Age of Onset    Diabetes Mother     Obesity Mother  Other Father 54         FROM BEE ALLERGY    Obesity Sister         X7    Diabetes Brother     No Known Problems Maternal Grandmother     No Known Problems Maternal Grandfather     No Known Problems Paternal Grandmother     No Known Problems Paternal Grandfather     Sleep Apnea Sister     Diabetes Sister     Hypertension Sister     Thyroid Disease Sister         X 2    Diabetes Sister         X3 MORE SISTERS TAKING INSULIN    Diabetes Brother     Heart Disease Brother     Breast Cancer Sister 43    Schizophrenia Sister     Heart Disease Niece/nephew        SOCIAL HISTORY:    Social History     Socioeconomic History    Marital status:      Spouse name: Not on file    Number of children: Not on file    Years of education: Not on file    Highest education level: Not on file   Tobacco Use    Smoking status: Never Smoker    Smokeless tobacco: Never Used   Substance and Sexual Activity    Alcohol use: Yes     Alcohol/week: 0.5 oz     Types: 1 Glasses of wine per week     Comment: ocassional    Drug use: No    Sexual activity: Yes     Partners: Male     Birth control/protection: Surgical     Comment: TL   Social History Narrative    Family History: Mother: Tona Plater: , Bee stingSister(s):   yrs, Breast Cancer at 42Brother(s):    , Heart Problems1 brother(s) , 7 sister(s) - healthy. 1 son(s) , 1 daughter(s) - healthy. Social History: Alcohol Use Patient does not use alcohol. Smoking Status Patient is a never smoker. Caffeine: coffee, tea. Marital Status:    . Lives w ith: spouse Ladi Rosario. Occupation/W ork: office w ork. Medical History: Hypertension, Left Breast papilloma. Gyn History: Last mammogram date 10/28/2014. Last pap date 10/22/2012. Hysterectomy Date . OB History: Total pregnancies 2. Full term delivery (>37 w eeks) 2. Live births 2. C section(s) 2. Pregnancy # 1: , Girl, 11.     Pregnancy # 2: Repeat C/S, boy, 1990. Surgical History: hysterectomy, total abdominal w ith BSO 2002, Left w rist surgery , colonoscopy Wilhelminia Bonnet and 2009, left lumpectomy . IMMUNIZATIONS:    Immunization History   Administered Date(s) Administered    Influenza Vaccine 10/01/2017, 09/28/2018       PHYSICAL EXAMINATION    Vital Signs    Visit Vitals  /75   Pulse 77   Temp 98.1 °F (36.7 °C) (Oral)   Resp 18   Ht 5' 4\" (1.626 m)   Wt 198 lb 8 oz (90 kg)   SpO2 98%   BMI 34.07 kg/m²       Weight Metrics 6/6/2019 6/6/2019 5/24/2019 5/23/2019 5/20/2019 5/16/2019 5/13/2019   Weight - 198 lb 8 oz - 199 lb 8 oz - 200 lb 203 lb 9.6 oz   Neck Circ (inches) 14.25 - - - - - -   Waist Measure Inches 38.5 - 38.5 - 39.75 - -   Body Fat % 39.1 - - - - - -   BMI - 34.07 kg/m2 - 34.24 kg/m2 - 34.33 kg/m2 34.95 kg/m2       General appearance - Well nourished. Well appearing. Well developed. No acute distress. Obese. Head - Normocephalic. Atraumatic. Eyes - pupils equal and reactive. Extraocular eye movements intact. Sclera anicteric. Mildly injected sclera. Ears - Hearing is grossly normal bilaterally. Nose - normal and patent. No polyps noted. No erythema. No discharge. Mouth - mucous membranes with adequate moisture. Posterior pharynx normal with cobblestone appearance. No erythema, white exudate or obstruction. Neck - supple. Midline trachea. No carotid bruits noted bilaterally. No thyromegaly noted. Chest - clear to auscultation bilaterally anteriorly and posteriorly. No wheezes. No rales or rhonchi. Breath sounds are symmetrical bilaterally. Unlabored respirations. Heart - normal rate. Regular rhythm. Normal S1, S2. No murmur noted. No rubs, clicks or gallops noted. Abdomen - soft and distended. No masses or organomegaly. No rebound, rigidity or guarding. Bowel sounds normal x 4 quadrants. No tenderness noted.   Neurological - awake, alert and oriented to person, place, and time and event.  Cranial nerves II through XII intact. Clear speech. Muscle strength is +5/5 x 4 extremities. Sensation is intact to light touch bilaterally. Steady gait. Heme/Lymph - peripheral pulses normal x 4 extremities. No peripheral edema is noted. Musculoskeletal - Intact x 4 extremities. Full ROM x 4 extremities. Mild R pain with movement. Back exam - normal range of motion. No pain on palpation of the spinous processes in the cervical, thoracic, lumbar, sacral regions. No CVA tenderness. No buffalo hump noted. Skin - no rashes, erythema, ecchymosis, lacerations, abrasions, suspicious moles noted. No skin tags or moles. No acanthosis nigricans noted in the axilla or neck. Psychological -   normal behavior, dress and thought processes. Good insight. Good eye contact. Normal affect. Appropriate mood. Normal speech. DATA REVIEWED    Labs dated 5/24/2019 from [facility] compared with labs from 4/11/2019. Creatinine was 0.9. ALP was 118. ALT was 13. AST was 19. Uric Acid was 5.0. LDL was 168 down from 178. HDL was 57. Triglycerides were 72 down from 123. LDL-P was 2843 up from 2467. Small LDL-P was 1385 up from 1299. Omega 3 was 6.0. Hgb A1C was 5.7, unchanged. Leptin was 60 up from 55. Adiponectin was 6, unchanged. Insulin was 16 down from 24. TSH was 4.11 down from 8.26. FT4 was 1.07 up from 0.94. SEE SCANNED DOCUMENT FOR COMPLETE PANEL RESULTS. ASSESSMENT and PLAN      ICD-10-CM ICD-9-CM    1. Hypothyroidism due to acquired atrophy of thyroid E03.4 244.8 DISCONTINUED: levothyroxine (SYNTHROID) 75 mcg tablet     246.8     improving with Synthroid 50 mcg daily   2. Essential hypertension I10 401.9     stable   3. Dyslipidemia E78.5 272.4 CT HEART W/O CONT WITH CALCIUM    with elevated LDLP and sdLDL   4. Hyperinsulinemia E16.1 251.1     improving with increased exercise and LCD modified   5. Hyperglycemia R73.9 790.29     unchanged   6.  Elevated serum creatinine R79.89 790.99     stable   7. Hyperuricemia E79.0 790.6     resolved with LCD and weight loss   8. Essential fatty acid (EFA) deficiency E63.0 269.8     improving   9. Elevated C-reactive protein (CRP) R79.82 790.95    10. Steatosis of liver K76.0 571.8     with stable LFTs   11. Statin intolerance Z78.9 995.27    12. Family history of heart disease Z82.49 V17.49 CT HEART W/O CONT WITH CALCIUM   13. Acute pain of right shoulder M25.511 719.41     improving   14. Acute cystitis without hematuria N30.00 595.0     resolved after antibiotic       Chart reviewed and updated. Recommend pt refer to LCD manual if experiencing any side effects once program is initiated or call our office.     Referred patient to Gabbi Fish RD for BS MSWLP to receive [employer] LCD food products and weekly intervention.     Discussed the patient's BMI with her. Cady Plata BMI follow up plan is as follows: I have counseled this patient on diet and exercise regimens. Decrease carbohydrates (white foods, sweet foods, sweet drinks and alcohol), increase green leafy vegetables and protein (lean meats and beans) with each meal.  Avoid fried foods. Do not skip meals.  Increase water intake to 1 bottle with each meal. Avoid sugar-sweetened beverages.  Get 7-8 hours uninterrupted sleep nightly.     Diet prescription: LCD (low calorie diet)/3026-5451 calories daily using 2-3 meal replacements daily with [facility] food/beverage products recommended. Consume a minimum of 2 L (67 oz) of water daily while utilizing LCD. Avoid sugar-sweetened beverages. Reviewed nutrition and importance of regular protein intake and hidden carbohydrate sources.      Exercise prescription: Advance as tolerated while using LCD.  A goal of 30 minutes physical activity daily is recommended for health benefit and at least 60 minutes daily to prevent weight regain.  For weight loss, no less than 75% needs to be aerobic (i.e. Walking) and no more than 25% resistance exercising (i.e. Weight lifting).  For weight maintenance phase, 50% aerobic and 50% resistance exercises. Emphasized importance of physical activity and reducing sedentary time.      Sleep prescription: A goal of 7-8 hours of uninterrupted sleep is recommended to turn off the Grehlin hormone to be released from the stomach and triggers appetite while promoting weight gain. Proper rest turns on Leptin hormone to be released from white adipose tissue and promotes weight loss. Continue current medications and care. Increase Synthroid 50 mcg from 1 pill to 1.5 pills. Prescriptions written and sent to pharmacy; medication side effects discussed. Synthroid 75 mcg. Reviewed and discussed [place] lab results. Copy of [facility] labs given to pt to share with PCP and specialists. Recheck pertinent labs monthly with [facility] lab. Pt is unclear with who her insurer prefers and would like to continue with [place]. Ordered coronary calcium scan. Counseled patient on health concerns:  LCD, weight loss goals, sleep hygiene, strategies to overcome habits or challenges to improve or continue adherence, insulin resistance, thyroid, hyperglycemia, cholesterol   Reminded females of reproductive age that with weight loss, they may become more fertile and recommend the use of condoms if pregnancy is not desired. Weight loss goal of 5-10% in 6-12 months has shown significant improvement in obesity and its health consequences. Immunizations noted. Offered empathy, support, legitimation, prayers, partnership to patient. Praised patient for progress. Follow-up and Dispositions    · Return in about 1 month (around 7/6/2019) for [facility] LCD, results. Patient was offered a choice/choices in the treatment plan today. Patient expresses understanding of the plan and agrees with recommendations.     More than 30 mins spent face to face with patient and more than 50% of this time spent in counseling and coordinating care and/or discussing treatment plans in reference to The primary encounter diagnosis was Hypothyroidism due to acquired atrophy of thyroid. Diagnoses of Essential hypertension, Dyslipidemia, Hyperinsulinemia, Hyperglycemia, Elevated serum creatinine, Hyperuricemia, Essential fatty acid (EFA) deficiency, Elevated C-reactive protein (CRP), Steatosis of liver, Statin intolerance, Family history of heart disease, Acute pain of right shoulder, and Acute cystitis without hematuria were also pertinent to this visit. Teresa Gross has a reminder for a \"due or due soon\" health maintenance. I have asked pt to contact their primary care provider for follow-up on this health maintenance. Written by karina Aranda, as dictated by Dr. Nishi Garrett DO. Documentation True and Accepted by Kaila Perdue. Sanford Bower. [facility], MD FOR ALLOWING ME THE PRIVILEGE TO PARTICIPATE IN THE CARE OF OUR MUTUAL PATIENT, Teresa Gross WITH RESPECT TO WEIGHT MANAGEMENT. Patient Instructions          High Cholesterol: Care Instructions  Your Care Instructions    Cholesterol is a type of fat in your blood. It is needed for many body functions, such as making new cells. Cholesterol is made by your body. It also comes from food you eat. High cholesterol means that you have too much of the fat in your blood. This raises your risk of a heart attack and stroke. LDL and HDL are part of your total cholesterol. LDL is the \"bad\" cholesterol. High LDL can raise your risk for heart disease, heart attack, and stroke. HDL is the \"good\" cholesterol. It helps clear bad cholesterol from the body. High HDL is linked with a lower risk of heart disease, heart attack, and stroke. Your cholesterol levels help your doctor find out your risk for having a heart attack or stroke. You and your doctor can talk about whether you need to lower your risk and what treatment is best for you.   A heart-healthy lifestyle along with medicines can help lower your cholesterol and your risk. The way you choose to lower your risk will depend on how high your risk is for heart attack and stroke. It will also depend on how you feel about taking medicines. Follow-up care is a key part of your treatment and safety. Be sure to make and go to all appointments, and call your doctor if you are having problems. It's also a good idea to know your test results and keep a list of the medicines you take. How can you care for yourself at home? · Eat a variety of foods every day. Good choices include fruits, vegetables, whole grains (like oatmeal), dried beans and peas, nuts and seeds, soy products (like tofu), and fat-free or low-fat dairy products. · Replace butter, margarine, and hydrogenated or partially hydrogenated oils with olive and canola oils. (Canola oil margarine without trans fat is fine.)  · Replace red meat with fish, poultry, and soy protein (like tofu). · Limit processed and packaged foods like chips, crackers, and cookies. · Bake, broil, or steam foods. Don't moctezuma them. · Be physically active. Get at least 30 minutes of exercise on most days of the week. Walking is a good choice. You also may want to do other activities, such as running, swimming, cycling, or playing tennis or team sports. · Stay at a healthy weight or lose weight by making the changes in eating and physical activity listed above. Losing just a small amount of weight, even 5 to 10 pounds, can reduce your risk for having a heart attack or stroke. · Do not smoke. When should you call for help? Watch closely for changes in your health, and be sure to contact your doctor if:    · You need help making lifestyle changes.     · You have questions about your medicine. Where can you learn more? Go to http://corona-andreea.info/. Enter X688 in the search box to learn more about \"High Cholesterol: Care Instructions. \"  Current as of: July 22, 2018  Content Version: 11.9  © 3470-4590 [employer]. Care instructions adapted under license by [employer] (which disclaims liability or warranty for this information). If you have questions about a medical condition or this instruction, always ask your healthcare professional. [address] any warranty or liability for your use of this information. Recommend AHA new dietary guidelines to improve weight, cardiovascular and general health. Limit daily added sugar consumption to 6 tsp/25 grams/100 gee/daily for women and 9 tsp/37 grams/150 gee/daily for men. 1 tsp= 4 g    MSWLP Reminders  1. Recheck fasting labs 10 days prior to next monthly visit with Dr. Shankar. Make sure to bring [facility] lab order form with you to your lab appointment. 2. Attend the mandatory weekly weigh-ins at the office. Make sure homework sheets and product order forms are completed prior to arrival. If they are not completed you will not be seen and cannot  meal products. 3. Attend the weekly 30 minute nutritional meetings on Thursdays at 4:30 pm. Contact BRIGID Hou if you are not able to attend.

## 2019-06-06 NOTE — PROGRESS NOTES
Michael Galan  Identified pt with two pt identifiers(name and ). Chief Complaint   Patient presents with    Weight Management     RM 14 MSWL    Results         1. Have you been to the ER, urgent care clinic since your last visit? Hospitalized since your last visit? NO    2. Have you seen or consulted any other health care providers outside of the [address] [place] [address] since your last visit? Include any pap smears or colon screening. NO      My Chart     My chart gives you direct online access to portions of the electronic medical record (EMR) where your doctor stores your health information (ie, lab results, appointment information, medications, immunizations, and more. It is free. Would you like to set up your my chart? YES    [unfilled]    Weight Metrics 2019 2019 2019 2019 2019 2019 5/10/2019   Weight - - 199 lb 8 oz - 200 lb 203 lb 9.6 oz -   Neck Circ (inches) 14.25 - - - - - -   Waist Measure Inches 38.5 38.5 - 39.75 - - 39.5   Body Fat % 39.1 - - - - - 39.1   BMI - - 34.24 kg/m2 - 34.33 kg/m2 34.95 kg/m2 -       MMW: 31.2  BWW: 44.9  BMR: 1470    Medication reconciliation up to date and corrected with patient at this time. Advance Care Planning    In the event something were to happen to you and you were unable to speak on your behalf, do you have an Advance Directive/ Living Will in place stating your wishes? NO    If yes, do we have a copy on file NO    If no, would you like information YES      ====Elodia De La Garza Invitation====    Patient was invited to [facility] on this date and given the information folder for review. Recommended appointment with Elodia De La Garza facilitator for ACP conversation regarding advance directives. [] Yes  [x] No  Referral sent to [facility]eric team member or Coordinator for follow-up    [] Yes  [x] No  Patient scheduled an appointment.        Site of Referral:       Today's provider has been notified of reason for visit, vitals and flowsheets obtained on patients. Reviewed record in preparation for visit, huddled with provider and have obtained necessary documentation. Health Maintenance Due   Topic    BREAST CANCER SCRN MAMMOGRAM     PAP AKA CERVICAL CYTOLOGY        Wt Readings from Last 3 Encounters:   05/24/19 199 lb 8 oz (90.5 kg)   05/20/19 200 lb (90.7 kg)   05/13/19 203 lb 9.6 oz (92.4 kg)     Temp Readings from Last 3 Encounters:   05/13/19 97.8 °F (36.6 °C) (Oral)   05/01/19 98 °F (36.7 °C) (Oral)   03/07/19 97.9 °F (36.6 °C) (Oral)     BP Readings from Last 3 Encounters:   05/24/19 123/76   05/20/19 122/81   05/13/19 123/82     Pulse Readings from Last 3 Encounters:   05/24/19 85   05/20/19 94   05/13/19 76     There were no vitals filed for this visit. Learning Assessment:  :     Learning Assessment 11/12/2018 7/11/2017   PRIMARY LEARNER Patient Patient   HIGHEST LEVEL OF EDUCATION - PRIMARY LEARNER  4 YEARS OF COLLEGE 4 YEARS OF COLLEGE   PRIMARY LANGUAGE ENGLISH ENGLISH   LEARNER PREFERENCE PRIMARY LISTENING LISTENING   ANSWERED BY patient patient   RELATIONSHIP SELF SELF       Depression Screening:  :     3 most recent PHQ Screens 5/13/2019   Little interest or pleasure in doing things Not at all   Feeling down, depressed, irritable, or hopeless Not at all   Total Score PHQ 2 0       Fall Risk Assessment:  :     Fall Risk Assessment, last 12 mths 8/15/2018   Able to walk? Yes   Fall in past 12 months? No       Abuse Screening:  :     Abuse Screening Questionnaire 11/7/2018   Do you ever feel afraid of your partner? N   Are you in a relationship with someone who physically or mentally threatens you? N   Is it safe for you to go home?  Y       ADL Screening:  :     ADL Assessment 8/15/2018   Feeding yourself No Help Needed   Getting from bed to chair No Help Needed   Getting dressed No Help Needed   Bathing or showering No Help Needed   Walk across the room (includes cane/walker) No Help Needed Using the telphone No Help Needed   Taking your medications No Help Needed   Preparing meals No Help Needed   Managing money (expenses/bills) No Help Needed   Moderately strenuous housework (laundry) No Help Needed   Shopping for personal items (toiletries/medicines) No Help Needed   Shopping for groceries No Help Needed   Driving No Help Needed   Climbing a flight of stairs No Help Needed   Getting to places beyond walking distances No Help Needed

## 2019-06-06 NOTE — PATIENT INSTRUCTIONS
High Cholesterol: Care Instructions Your Care Instructions Cholesterol is a type of fat in your blood. It is needed for many body functions, such as making new cells. Cholesterol is made by your body. It also comes from food you eat. High cholesterol means that you have too much of the fat in your blood. This raises your risk of a heart attack and stroke. LDL and HDL are part of your total cholesterol. LDL is the \"bad\" cholesterol. High LDL can raise your risk for heart disease, heart attack, and stroke. HDL is the \"good\" cholesterol. It helps clear bad cholesterol from the body. High HDL is linked with a lower risk of heart disease, heart attack, and stroke. Your cholesterol levels help your doctor find out your risk for having a heart attack or stroke. You and your doctor can talk about whether you need to lower your risk and what treatment is best for you. A heart-healthy lifestyle along with medicines can help lower your cholesterol and your risk. The way you choose to lower your risk will depend on how high your risk is for heart attack and stroke. It will also depend on how you feel about taking medicines. Follow-up care is a key part of your treatment and safety. Be sure to make and go to all appointments, and call your doctor if you are having problems. It's also a good idea to know your test results and keep a list of the medicines you take. How can you care for yourself at home? · Eat a variety of foods every day. Good choices include fruits, vegetables, whole grains (like oatmeal), dried beans and peas, nuts and seeds, soy products (like tofu), and fat-free or low-fat dairy products. · Replace butter, margarine, and hydrogenated or partially hydrogenated oils with olive and canola oils. (Canola oil margarine without trans fat is fine.) · Replace red meat with fish, poultry, and soy protein (like tofu). · Limit processed and packaged foods like chips, crackers, and cookies. · Bake, broil, or steam foods. Don't moctezuma them. · Be physically active. Get at least 30 minutes of exercise on most days of the week. Walking is a good choice. You also may want to do other activities, such as running, swimming, cycling, or playing tennis or team sports. · Stay at a healthy weight or lose weight by making the changes in eating and physical activity listed above. Losing just a small amount of weight, even 5 to 10 pounds, can reduce your risk for having a heart attack or stroke. · Do not smoke. When should you call for help? Watch closely for changes in your health, and be sure to contact your doctor if: 
  · You need help making lifestyle changes.  
  · You have questions about your medicine. Where can you learn more? Go to http://coronaNetlogandreea.info/. Enter G141 in the search box to learn more about \"High Cholesterol: Care Instructions. \" Current as of: July 22, 2018 Content Version: 11.9 © 6904-3074 [employer]. Care instructions adapted under license by [employer] (which disclaims liability or warranty for this information). If you have questions about a medical condition or this instruction, always ask your healthcare professional. [place] [address] any warranty or liability for your use of this information. Recommend AHA new dietary guidelines to improve weight, cardiovascular and general health. Limit daily added sugar consumption to 6 tsp/25 grams/100 gee/daily for women and 9 tsp/37 grams/150 gee/daily for men. 1 tsp= 4 g 
 
MSWLP Reminders 1. Recheck fasting labs 10 days prior to next monthly visit with Dr. Quynh Gupta. Make sure to bring [facility] lab order form with you to your lab appointment. 2. Attend the mandatory weekly weigh-ins at the office. Make sure homework sheets and product order forms are completed prior to arrival. If they are not completed you will not be seen and cannot  meal products. 3. Attend the weekly 30 minute nutritional meetings on Thursdays at 4:30 pm. Contact BRIGID Jules if you are not able to attend.

## 2019-06-07 RX ORDER — LEVOTHYROXINE SODIUM 75 UG/1
75 TABLET ORAL
Qty: 90 TAB | Refills: 3 | Status: SHIPPED | OUTPATIENT
Start: 2019-06-07 | End: 2019-06-11 | Stop reason: SDUPTHER

## 2019-06-11 DIAGNOSIS — E03.4 HYPOTHYROIDISM DUE TO ACQUIRED ATROPHY OF THYROID: ICD-10-CM

## 2019-06-11 NOTE — TELEPHONE ENCOUNTER
Requested Prescriptions     Pending Prescriptions Disp Refills    levothyroxine (SYNTHROID) 75 mcg tablet 90 Tab 3     Sig: Take 1 Tab by mouth Daily (before breakfast). Patient called into the office stating that she did not  the medication at [facility] because it is half the price through mail order. Patient is requesting 90 days supply through [facility].

## 2019-06-13 ENCOUNTER — CLINICAL SUPPORT (OUTPATIENT)
Dept: BARIATRICS/WEIGHT MGMT | Age: 59
End: 2019-06-13

## 2019-06-13 DIAGNOSIS — E66.9 OBESITY (BMI 30.0-34.9): Primary | ICD-10-CM

## 2019-06-13 RX ORDER — LEVOTHYROXINE SODIUM 75 UG/1
75 TABLET ORAL
Qty: 90 TAB | Refills: 1 | Status: SHIPPED | OUTPATIENT
Start: 2019-06-13 | End: 2019-06-18 | Stop reason: SDUPTHER

## 2019-06-13 NOTE — PROGRESS NOTES
Reviewed and agree with Weekly Education Class nurse progress note for [facility] Medically Supervised Weight Loss Program (MSWLP) using New Direction food products. Continue current diet, care and follow up as planned and outlined in BS MSWLP manual.      Documentation true and accepted by Emily Alford.

## 2019-06-13 NOTE — PROGRESS NOTES
Reviewed and agree with Weekly Education Class nurse progress note for [facility] Medically Supervised Weight Loss Program (MSWLP) using New Direction food products. Continue current diet, care and follow up as planned and outlined in BS MSWLP manual.      Documentation true and accepted by Ernestina Yates.  Mauro Chang.

## 2019-06-13 NOTE — PROGRESS NOTES
Reviewed and agree with Weekly Education Class nurse progress note for [facility] Medically Supervised Weight Loss Program (MSWLP) using New Direction food products. Continue current diet, care and follow up as planned and outlined in BS MSWLP manual.      Documentation true and accepted by Xochitl Levine.  Tay Em.

## 2019-06-13 NOTE — TELEPHONE ENCOUNTER
PCP: Waleska Elena MD    Last appt: 6/6/2019  Future Appointments   Date Time Provider Kevin Casas   6/14/2019  8:30 AM [facility] ER [facility]   6/20/2019  2:05 PM [facility] SCHED   6/27/2019  2:05 PM [facility]MP [facility] SCHED   7/11/2019 11:30 AM Albina MCGARRY DO BRFP ROLO SCHED   11/21/2019  9:00 AM Rose Casillas MD PC MAIN ROLO SCHED       Requested Prescriptions     Pending Prescriptions Disp Refills    levothyroxine (SYNTHROID) 75 mcg tablet 90 Tab 3     Sig: Take 1 Tab by mouth Daily (before breakfast).        Prior labs and Blood pressures:  BP Readings from Last 3 Encounters:   06/06/19 108/75   05/24/19 123/76   05/20/19 122/81     Lab Results   Component Value Date/Time    Sodium 143 05/24/2019 08:10 AM    Potassium 4.0 05/24/2019 08:10 AM    Chloride 105 05/24/2019 08:10 AM    CO2 26 05/24/2019 08:10 AM    Anion gap 13 05/24/2019 08:10 AM    Glucose 68 (L) 05/24/2019 08:10 AM    BUN 17 05/24/2019 08:10 AM    Creatinine 0.9 05/24/2019 08:10 AM    BUN/Creatinine ratio 19 05/24/2019 08:10 AM    GFR est AA 74 05/10/2018 11:09 AM    GFR est non-AA 64 05/10/2018 11:09 AM    Calcium 9.2 05/24/2019 08:10 AM     Lab Results   Component Value Date/Time    Hemoglobin A1c 5.7 (H) 05/24/2019 08:10 AM     Lab Results   Component Value Date/Time    Cholesterol, total 217 (H) 05/24/2019 08:10 AM    HDL Cholesterol 58 05/13/2019 09:54 AM    LDL, calculated 168 (H) 05/24/2019 08:10 AM    VLDL, calculated 13 05/13/2019 09:54 AM    Triglyceride 72 05/24/2019 08:10 AM     No results found for: CURRY Rogers    Lab Results   Component Value Date/Time    TSH 4.11 05/24/2019 08:10 AM    TSH 1.860 05/13/2019 09:54 AM

## 2019-06-14 ENCOUNTER — HOSPITAL ENCOUNTER (OUTPATIENT)
Dept: CT IMAGING | Age: 59
Discharge: HOME | End: 2019-06-14
Attending: FAMILY MEDICINE
Payer: SELF-PAY

## 2019-06-14 DIAGNOSIS — Z82.49 FAMILY HISTORY OF HEART DISEASE: ICD-10-CM

## 2019-06-14 DIAGNOSIS — E78.5 DYSLIPIDEMIA: ICD-10-CM

## 2019-06-14 PROCEDURE — 75571 CT HRT W/O DYE W/CA TEST: CPT

## 2019-06-18 DIAGNOSIS — E03.4 HYPOTHYROIDISM DUE TO ACQUIRED ATROPHY OF THYROID: ICD-10-CM

## 2019-06-19 RX ORDER — LEVOTHYROXINE SODIUM 75 UG/1
75 TABLET ORAL
Qty: 90 TAB | Refills: 1 | Status: SHIPPED | OUTPATIENT
Start: 2019-06-19 | End: 2019-07-17

## 2019-06-20 ENCOUNTER — CLINICAL SUPPORT (OUTPATIENT)
Dept: BARIATRICS/WEIGHT MGMT | Age: 59
End: 2019-06-20

## 2019-06-20 DIAGNOSIS — E66.9 OBESITY, CLASS I, BMI 30-34.9: Primary | ICD-10-CM

## 2019-06-24 NOTE — PROGRESS NOTES
Progress Note:   Weekly Education Class in the [facility] Weight Loss Program     I am on the  LCD     Did you have any symptoms of physical problems? no What effects **    Romi Villareal is a 60 y.o. female who is enrolled in [facility] Weight Loss Program    There were no vitals taken for this visit. Weight Metrics 6/24/2019 6/6/2019 6/6/2019 5/24/2019 5/23/2019 5/20/2019 5/16/2019   Weight - - 198 lb 8 oz - 199 lb 8 oz - 200 lb   Neck Circ (inches) - 14.25 - - - - -   Waist Measure Inches 40 38.5 - 38.5 - 39.75 -   Body Fat % - 39.1 - - - - -   BMI - - 34.07 kg/m2 - 34.24 kg/m2 - 34.33 kg/m2       *    Have you received any other medical care this week? no  If yes, where and for what? 0    Have you had any change in your medications since your last visit? no  If yes what? 0    Did you have any problems adhering to the program last week? no If yes, please explain: 0      Eating Habits Over Last Week:  Did you take in 64 oz of non-caloric fluids? yes     Did you consume your 4 meal replacements each day?  yes       Physical Activity Over the Past Week:    Aerobic exercise: 210  min    Resistance exercise: 120 workouts / week  Steps weekly: 0    How much exercise do you plan to do next week? 0      I would like a follow-up call (#________________) to discuss   Transition into adapting    Question about the diet    Other

## 2019-06-26 VITALS
SYSTOLIC BLOOD PRESSURE: 144 MMHG | BODY MASS INDEX: 33.89 KG/M2 | HEIGHT: 64 IN | HEART RATE: 97 BPM | WEIGHT: 198.5 LBS | DIASTOLIC BLOOD PRESSURE: 97 MMHG

## 2019-06-26 NOTE — PROGRESS NOTES
Progress Note:   Weekly Education Class in the [facility] Weight Loss Program     I am on the LCD     Did you have any symptoms of physical problems? no What effects **    Romulo Choi is a 60 y.o. female who is enrolled in [facility] Weight Loss Program    Visit Vitals  BP (!) 144/97   Pulse 97   Ht 5' 4\" (1.626 m)   Wt 198 lb 8 oz (90 kg)   BMI 34.07 kg/m²     Weight Metrics 6/26/2019 6/24/2019 6/20/2019 6/6/2019 6/6/2019 5/24/2019 5/23/2019   Weight - - 198 lb 8 oz - 198 lb 8 oz - 199 lb 8 oz   Neck Circ (inches) - - - 14.25 - - -   Waist Measure Inches 39 40 - 38.5 - 38.5 -   Body Fat % - - - 39.1 - - -   BMI - - 34.07 kg/m2 - 34.07 kg/m2 - 34.24 kg/m2       *    Have you received any other medical care this week? yes  If yes, where and for what? [facility] Problem w/ shoulder    Have you had any change in your medications since your last visit? yes  If yes what? Methylprednisolone 4mg dose pack    Did you have any problems adhering to the program last week? no  If yes, please explain: 0      Eating Habits Over Last Week:  Did you take in 64 oz of non-caloric fluids? yes     Did you consume your 4 meal replacements each day?  yes       Physical Activity Over the Past Week:    Aerobic exercise: 200  min    Resistance exercise: 90 workouts / week  Steps weekly: 0    How much exercise do you plan to do next week? 0      I would like a follow-up call (ph#________________) to discuss   Transition into adapting    Question about the diet    Other

## 2019-07-01 NOTE — PROGRESS NOTES
Reviewed and agree with Weekly Education Class nurse progress note for [facility] Medically Supervised Weight Loss Program (MSWLP) using New Direction food products. Continue current diet, care and follow up as planned and outlined in BS MSWLP manual.      Documentation true and accepted by Aj Shi.  Alexis Odom.

## 2019-07-01 NOTE — PROGRESS NOTES
Reviewed and agree with Weekly Education Class nurse progress note for [facility] Medically Supervised Weight Loss Program (MSWLP) using New Direction food products. Continue current diet, care and follow up as planned and outlined in BS MSWLP manual.      Documentation true and accepted by Danisha Herzog.  Marlene Crawford.

## 2019-07-02 ENCOUNTER — CLINICAL SUPPORT (OUTPATIENT)
Dept: BARIATRICS/WEIGHT MGMT | Age: 59
End: 2019-07-02

## 2019-07-02 VITALS
WEIGHT: 204.5 LBS | SYSTOLIC BLOOD PRESSURE: 139 MMHG | HEIGHT: 64 IN | BODY MASS INDEX: 34.91 KG/M2 | DIASTOLIC BLOOD PRESSURE: 81 MMHG | HEART RATE: 83 BPM

## 2019-07-02 DIAGNOSIS — E66.9 OBESITY, CLASS II, BMI 35-39.9, ISOLATED: Primary | ICD-10-CM

## 2019-07-12 LAB
% ALBUMIN, 58A: 62 % (ref 54–71)
ABSOLUTE IMMATURE GRANULOCYTES, AIG: 0 X10^3/UL (ref 0–0.1)
ADIPONECTIN-DIAZYME: 7 UG/ML
ALB/GLOBRATIO, 58C: 1.6 (ref 1.15–2.5)
ALBUMIN SERPL-MCNC: 4.5 G/DL (ref 3.7–5.1)
ALP SERPL-CCNC: 102 U/L (ref 35–125)
ALT SERPL-CCNC: 18 U/L
ANION GAP SERPL CALC-SCNC: 9 MMOL/L (ref 6–18)
APOLIPOPROTEIN B , 48: 147 MG/DL
AST SERPL W P-5'-P-CCNC: 19 U/L (ref 5–32)
BASOPHILS # BLD: 1 % (ref 0–3)
BASOPHILS NFR BLD: 0.1 X10^3/UL (ref 0–0.2)
BILIRUB SERPL-MCNC: 0.6 MG/DL
BUN SERPL-MCNC: 16 MG/DL (ref 6–20)
BUN/CREATININE RATIO,BUCR: 17 (ref 10–27)
CALCIUM SERPL-MCNC: 9.7 MG/DL (ref 8.8–10.5)
CHLORIDE SERPL-SCNC: 104 MMOL/L (ref 98–110)
CHOLEST SERPL-MCNC: 277 MG/DL
CO2 SERPL-SCNC: 29 MMOL/L (ref 19–31)
CREAT SERPL-MCNC: 0.9 MG/DL (ref 0.5–0.9)
CRP SERPL HS-MCNC: 2.7 MG/L
EGFRAACREAT: 79 ML/MIN/1.73M^2
EGFRNACREAT: 68 ML/MIN/1.73M^2
EOSINOPHIL # BLD: 2 % (ref 0–7)
EOSINOPHIL NFR BLD: 0.1 X10^3/UL (ref 0–0.4)
ERYTHROCYTE [DISTWIDTH] IN BLOOD BY AUTOMATED COUNT: 14.1 % (ref 11.7–15)
ESTIMATED AVERAGE GLUCOSE, EAG: 116.9 MG/DL
GLOBCALC, 58B: 2.8 G/DL (ref 1.9–3.5)
GLUCOSE SERPL-MCNC: 93 MG/DL (ref 70–99)
GRANULOCYTES,GRANS: 48 % (ref 40–74)
HBA1C MFR BLD HPLC: 5.7 %
HCT VFR BLD AUTO: 44 % (ref 34–44)
HDL-C, 884255: 57 MG/DL
HGB BLD-MCNC: 15.3 G/DL (ref 11.5–15)
HOMA-IR, HDL2100: 5.9
INSULIN,INS: 26 UU/ML (ref 3–9)
IRON,IRN: 131 UG/DL (ref 37–145)
LDLC SERPL CALC-MCNC: 216 MG/DL
LYMPHOCYTES # BLD: 2.6 X10^3/UL (ref 0.7–4.5)
LYMPHOCYTES NFR BLD: 44 % (ref 14–46)
MAGNESIUM SERPL-MCNC: 2.4 MG/DL (ref 1.6–2.4)
MCH RBC QN AUTO: 28 PG (ref 27–34)
MCHC RBC AUTO-ENTMCNC: 35 G/DL (ref 32–36)
MCV RBC AUTO: 80 FL (ref 80–98)
MONOCYTES # BLD: 0.3 X10^3/UL (ref 0.1–1)
MONOCYTES NFR BLD: 5 % (ref 4–13)
NEUTROPHILS # BLD AUTO: 2.9 X10^3/UL (ref 1.8–7.8)
NON-HDL CHOLESTEROL, 011976: 220 MG/DL
NRBC: 0.5 /100 WBC
PLATELET # BLD AUTO: 288 X10^3/UL (ref 140–415)
POTASSIUM SERPL-SCNC: 4.2 MMOL/L (ref 3.5–5.3)
PROT SERPL-MCNC: 7.3 G/DL (ref 6.1–8)
RBC # BLD AUTO: 5.5 X10^6/UL (ref 3.8–5.1)
SODIUM SERPL-SCNC: 142 MMOL/L (ref 133–145)
T4 FREE SERPL-MCNC: 1.14 NG/DL (ref 0.93–1.7)
TPO AB, HDL17101: 17 IU/ML
TRIGL SERPL-MCNC: 113 MG/DL (ref ?–150)
TSH SERPL-ACNC: 3.27 UIU/ML (ref 0.27–4.2)
URATE SERPL-MCNC: 6.6 MG/DL (ref 2–6.9)
VIT B12 II: 708 PG/ML
WBC # BLD AUTO: 6 X10^3/UL (ref 4–10.5)

## 2019-07-13 LAB
HDL HDL-P, HDL5001: 40.7 UMOL/L
HDL SLDL-P, HDL5002: 1513 NMOL/L
Lab: 2106 NMOL/L

## 2019-07-14 LAB
AA2: 14.19 % (ref 10.5–23.3)
ALPHA LINOLEIC ACID N3, HDL1202: 0.13 % (ref 0.1–0.4)
CIS-MONO-UNSATURATED FATTY ACID TOTAL, HDL1205: 15.4 (ref 11.5–20.5)
DOCOSAHEXAENOIC ACID N3, HDL1208: 4.81 % (ref 0.1–8.4)
DOCOSAPENTAENOIC ACID N3, HDL1206: 2.71 % (ref 0.6–4.1)
DOCOSAPENTAENOIC ACID N6, HDL1207: 0.62 % (ref 0.1–1.3)
EPA2: 0.98 % (ref 0.1–2.5)
LINOLEIC ACID C6, HDL1216: 13.25 % (ref 4.6–21.3)
OMEGA-3 FATTY ACID TOTAL, HDL1220: 8.6 (ref 0.1–14.1)
OMEGA-3 INDEX, HDL1219: 5.8 (ref 0.1–10.4)
OMEGA-6 FATTY ACID TOTAL, HDL1222: 32.8 (ref 28.6–44.5)
SATURATED FATTY ACID TOTAL, HDL1226: 42.6 (ref 36.6–42)
TRANS FATTY ACID TOTAL, HDL1232: 0.6 (ref 0.1–1.8)
TRANSLINOLEIC ACID, HDL1229: <0.1 % (ref 0.1–0.5)
TRANSOLEIC ACID, HDL1230: 0.39 % (ref 0.1–1.3)

## 2019-07-15 LAB — LEPTIN, SERUM, 146711: 62 NG/ML

## 2019-07-17 ENCOUNTER — OFFICE VISIT (OUTPATIENT)
Dept: FAMILY MEDICINE CLINIC | Age: 59
End: 2019-07-17

## 2019-07-17 VITALS
HEART RATE: 80 BPM | RESPIRATION RATE: 19 BRPM | DIASTOLIC BLOOD PRESSURE: 82 MMHG | SYSTOLIC BLOOD PRESSURE: 138 MMHG | TEMPERATURE: 98.3 F | WEIGHT: 200.5 LBS | HEIGHT: 64 IN | BODY MASS INDEX: 34.23 KG/M2 | OXYGEN SATURATION: 96 %

## 2019-07-17 DIAGNOSIS — I10 ESSENTIAL HYPERTENSION: ICD-10-CM

## 2019-07-17 DIAGNOSIS — E16.1 HYPERINSULINEMIA: ICD-10-CM

## 2019-07-17 DIAGNOSIS — E03.4 HYPOTHYROIDISM DUE TO ACQUIRED ATROPHY OF THYROID: ICD-10-CM

## 2019-07-17 DIAGNOSIS — Z82.49 FAMILY HISTORY OF HEART DISEASE: ICD-10-CM

## 2019-07-17 DIAGNOSIS — D58.2 ELEVATED HEMOGLOBIN (HCC): ICD-10-CM

## 2019-07-17 DIAGNOSIS — E66.9 OBESITY, CLASS I, BMI 30-34.9: ICD-10-CM

## 2019-07-17 DIAGNOSIS — E63.0 ESSENTIAL FATTY ACID (EFA) DEFICIENCY: ICD-10-CM

## 2019-07-17 DIAGNOSIS — R79.89 ELEVATED SERUM CREATININE: ICD-10-CM

## 2019-07-17 DIAGNOSIS — Z78.9 STATIN INTOLERANCE: ICD-10-CM

## 2019-07-17 DIAGNOSIS — E03.4 HYPOTHYROIDISM DUE TO ACQUIRED ATROPHY OF THYROID: Primary | ICD-10-CM

## 2019-07-17 DIAGNOSIS — R79.89 HIGH THYROID STIMULATING HORMONE (TSH) LEVEL: ICD-10-CM

## 2019-07-17 DIAGNOSIS — R79.89 ELEVATED TSH: ICD-10-CM

## 2019-07-17 DIAGNOSIS — E78.5 DYSLIPIDEMIA: ICD-10-CM

## 2019-07-17 DIAGNOSIS — R73.9 HYPERGLYCEMIA: ICD-10-CM

## 2019-07-17 DIAGNOSIS — R63.5 WEIGHT GAIN: ICD-10-CM

## 2019-07-17 DIAGNOSIS — R79.82 ELEVATED C-REACTIVE PROTEIN (CRP): ICD-10-CM

## 2019-07-17 DIAGNOSIS — K76.0 STEATOSIS OF LIVER: ICD-10-CM

## 2019-07-17 DIAGNOSIS — Z98.890 STATUS POST SURGICAL REMOVAL OF NAIL MATRIX OF TOE: ICD-10-CM

## 2019-07-17 DIAGNOSIS — Z76.89 ENCOUNTER FOR WEIGHT MANAGEMENT: ICD-10-CM

## 2019-07-17 PROBLEM — M54.9 BACK PAIN: Status: ACTIVE | Noted: 2019-07-17

## 2019-07-17 PROBLEM — K52.9 GASTROENTERITIS: Status: ACTIVE | Noted: 2019-07-17

## 2019-07-17 RX ORDER — TERBINAFINE HYDROCHLORIDE 250 MG/1
TABLET ORAL
Refills: 2 | COMMUNITY
Start: 2019-07-12 | End: 2020-01-31 | Stop reason: ALTCHOICE

## 2019-07-17 RX ORDER — LEVOTHYROXINE SODIUM 88 UG/1
88 TABLET ORAL
Qty: 90 TAB | Refills: 1 | Status: SHIPPED | OUTPATIENT
Start: 2019-07-17 | End: 2019-11-17 | Stop reason: SDUPTHER

## 2019-07-17 RX ORDER — NALTREXONE HYDROCHLORIDE 50 MG/1
25 TABLET, FILM COATED ORAL DAILY
Qty: 45 TAB | Refills: 0 | Status: SHIPPED | OUTPATIENT
Start: 2019-07-17 | End: 2019-11-11

## 2019-07-17 NOTE — PROGRESS NOTES
Veena Zapata  Identified pt with two pt identifiers(name and ). Chief Complaint   Patient presents with    Weight Management     Rm 6 MSWL    Results       1. Have you been to the ER, urgent care clinic since your last visit? n  Hospitalized since your last visit? n     2. Have you seen or consulted any other health care providers outside of the [address] [place] [address] since your last visit? Include any pap smears or colon screening. n       Advance Care Planning    In the event something were to happen to you and you were unable to speak on your behalf, do you have an Advance Directive/ Living Will in place stating your wishes? NO    If yes, do we have a copy on file NO    If no, would you like information NO    Medication reconciliation up to date and corrected with patient at this time. Today's provider has been notified of reason for visit, vitals and flowsheets obtained on patients. Reviewed record in preparation for visit, huddled with provider and have obtained necessary documentation.       Health Maintenance Due   Topic    BREAST CANCER SCRN MAMMOGRAM     PAP AKA CERVICAL CYTOLOGY        Wt Readings from Last 3 Encounters:   19 200 lb 8 oz (90.9 kg)   19 204 lb 8 oz (92.8 kg)   19 198 lb 8 oz (90 kg)     Temp Readings from Last 3 Encounters:   19 98.3 °F (36.8 °C) (Oral)   19 98.1 °F (36.7 °C) (Oral)   19 97.8 °F (36.6 °C) (Oral)     BP Readings from Last 3 Encounters:   19 138/82   19 139/81   19 (!) 144/97     Pulse Readings from Last 3 Encounters:   19 80   19 83   19 97     Vitals:    19 0930   BP: 138/82   Pulse: 80   Resp: 19   Temp: 98.3 °F (36.8 °C)   TempSrc: Oral   SpO2: 96%   Weight: 200 lb 8 oz (90.9 kg)   Height: 5' 4\" (1.626 m)   PainSc:   0 - No pain         Learning Assessment:  :     Learning Assessment 2018   PRIMARY LEARNER Patient Patient   HIGHEST LEVEL OF EDUCATION - PRIMARY LEARNER  4 YEARS OF COLLEGE 4 YEARS OF COLLEGE   PRIMARY LANGUAGE ENGLISH ENGLISH   LEARNER PREFERENCE PRIMARY LISTENING LISTENING   ANSWERED BY patient patient   RELATIONSHIP SELF SELF       Depression Screening:  :     3 most recent PHQ Screens 7/17/2019   Little interest or pleasure in doing things Not at all   Feeling down, depressed, irritable, or hopeless Not at all   Total Score PHQ 2 0       No flowsheet data found. Fall Risk Assessment:  :     Fall Risk Assessment, last 12 mths 8/15/2018   Able to walk? Yes   Fall in past 12 months? No       Abuse Screening:  :     Abuse Screening Questionnaire 11/7/2018   Do you ever feel afraid of your partner? N   Are you in a relationship with someone who physically or mentally threatens you? N   Is it safe for you to go home? Y       ADL Screening:  :     ADL Assessment 8/15/2018   Feeding yourself No Help Needed   Getting from bed to chair No Help Needed   Getting dressed No Help Needed   Bathing or showering No Help Needed   Walk across the room (includes cane/walker) No Help Needed   Using the telphone No Help Needed   Taking your medications No Help Needed   Preparing meals No Help Needed   Managing money (expenses/bills) No Help Needed   Moderately strenuous housework (laundry) No Help Needed   Shopping for personal items (toiletries/medicines) No Help Needed   Shopping for groceries No Help Needed   Driving No Help Needed   Climbing a flight of stairs No Help Needed   Getting to places beyond walking distances No Help Needed           BMI:  Weight Metrics 7/17/2019 7/17/2019 7/2/2019 7/2/2019 6/26/2019 6/24/2019 6/20/2019   Weight - 200 lb 8 oz - 204 lb 8 oz - - 198 lb 8 oz   Neck Circ (inches) 14.75 - - - - - -   Waist Measure Inches 41 - 38.75 - 39 40 -   Body Fat % 40.2 - - - - - -   BMI - 34.42 kg/m2 - 35.1 kg/m2 - - 34.07 kg/m2     MMW 30.5  BWW 44.3  BMR 1464      Medication reconciliation up to date and corrected with patient at this time.

## 2019-07-17 NOTE — PROGRESS NOTES
[facility] Medically Supervised   [facility] Loss Program   [facility]    FOLLOW UP PHYSICIAN VISIT    HISTORY OF PRESENT ILLNESS  Agree with nurse and registered dietician notes. Romulo Choi is a 60 y.o. female with Obesity Class I, Body mass index is 34.42 kg/m². and associated health concerns presents for evaluation and treatment of weight management. Pt has been participating in the [facility] Medically Supervised [facility] Loss Program using the Ruth New Direction Very Low Calorie Diet (VLCD, 800 kcal/day) since 8/15/18.  She transitioned to LCD on 2018. Weight History  Start weight 209 lbs on 2018. Current weight 200.8 lbs, down 4 lbs since 2019. Percent weight loss 4.78% and total pounds lost since startin.2 lbs. Goal weight 170 lbs. Waist measurement is 41\", increased from 38.75\". (Acceptable Range: M <40\" & F <35\")     Neck circumference is 14.25\"; decreased from 14.5\". (Acceptable Range: M <17\" & F <16\")     Body fat percent is 39.1%. (Acceptable Range: M 18-24% & F 25-31%)    BMR is 1470, decreased from 1486. Are you satisfied with your progress since the last ov? Yes, but she claims it is difficult to stick to the program. She is unsure about her commitment to the program with summer holidays coming up. Her  makes it difficult because she is the cook in the household. Barriers to adherence to this plan of care? She went on vacation in April. She has not been exercising since Friday. She saw her podiatrist and had a toenail removed. Weight Metrics 2019   Weight - 200 lb 8 oz - 204 lb 8 oz - - 198 lb 8 oz   Neck Circ (inches) 14.75 - - - - - -   Waist Measure Inches 41 - 38.75 - 39 40 -   Body Fat % 40.2 - - - - - -   BMI - 34.42 kg/m2 - 35.1 kg/m2 - - 34.07 kg/m2     Eating Habits  3-4 meals daily.  Sometimes she is just doing one meal but then eats veggies out of her garden for another meal.  Any skipped meals? Dinner sometimes. Breakfast: packet  Lunch: packet when she works at home. 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 Salad and rolls from [employer]. She is not eating out as much as last month. Dinner: Sometimes skips. Has green tea with this. Snacks: Pudding or soup, loves popcorn (eats right before she leaves work), almonds. Appetite well controlled? Yes, she just eats because she just loves food. Even when she goes off the program, she tries to eat healthy. She is looking into gluten free and keto recipes. Searches on facebook for low carb recipes. Drinking Habits  How much water do you consume daily? 8 glasses. 64 oz. (goal of 2 L or 67 oz daily, minimally)  How much caffeine do you drink daily? Green tea. How much alcohol do you drink daily and weekly? None. Do you consume any sugar-sweetened beverages (sodas, teas, juices, etc.)? Stevia with green tea. New Direction fiber tea. Had a pepsi one time last Sunday. Sleep Habits  Her R shoulder pain is causing difficulty sleeping. She averages 5-6 hours of sleep nightly. Seeing Ortho for workup and further evaluation and treatment of her shoulder pain. Exercise  She has a fitbit. She aims for 10,000 steps. R hand dominant patient pulled a muscle in her R shoulder moving furniture and working in her garden that is preventing her from exercising. Medication(s):  Are you taking any medications to control the appetite? No.     Other Medical Care    Pt with hypothyroidism, hyperglycemia, dyslipidemia, elevated cr, hyperinsulinemia, hyperuricemia, elevated hgb, efa deficiency, elevated crp, hypertension, steatosis of the liver, and statin intolerance presents to the office with a BP of 108/75. She is currently on Synthroid 50 mcg rx'd on 11/7/2018 when TSH was 6.25. She no longer uses HCTZ for BP.     She is interested in receiving a coronary calcium scan due to her high cholesterol and family hx of blocked arteries. She had this done 6/6/2019, results were good. 7/12/2019: had toenail removed on her left big toe by podiatry. ROS    Pt denies hunger, cravings, lack of focus, fatigue, feeling weak, headaches, dizziness, light headedness, nausea, vomiting, diarrhea, constipation, indigestion, rapid heart rate, SOB, low blood sugar, feeling cold, hair loss, rash, fluid retention, leg aches, difficulty sleeping, irritability, mood swings, or other associated sxs. Review of Systems negative except as noted above in HPI. ALLERGIES:    Allergies   Allergen Reactions    Contrave [Naltrexone-Bupropion] Other (comments)     1 PILL MORNING, 2 PILLS EVENING  FELT ZOMBIED, \"OUT OF MY HEAD\"    Crestor [Rosuvastatin] Other (comments)     Felt like it worsened her memory    Gadolinium-Containing Contrast Media Nausea and Vomiting    Lipitor [Atorvastatin] Other (comments)     Felt like it worsened memory. CURRENT MEDICATIONS:    Outpatient Medications Marked as Taking for the 7/17/19 encounter (Office Visit) with Homero Vogel NP   Medication Sig Dispense Refill    levothyroxine (SYNTHROID) 88 mcg tablet Take 1 Tab by mouth Daily (before breakfast). 90 Tab 1    naltrexone (DEPADE) 50 mg tablet Take 0.5 Tabs by mouth daily. 45 Tab 0    omega 3-DHA-EPA-fish oil 1,000 mg (120 mg-180 mg) capsule Take 1 Cap by mouth daily.  mometasone (NASONEX) 50 mcg/actuation nasal spray USE TWO SPRAY(S) IN EACH NOSTRIL DAILY 1 Container 11    albuterol (VENTOLIN HFA) 90 mcg/actuation inhaler Take 2 Puffs by inhalation every four (4) hours as needed for Wheezing. 1 Inhaler 11    glucose blood VI test strips (CONTOUR NEXT STRIPS) strip qd 100 Strip 11       PAST MEDICAL HISTORY:    Past Medical History:   Diagnosis Date    Asthma CHILDHOOD    Breast cancer, left ([address] 2005    followed by gyn md cassius    Chest pain 07/11/2017    normal Stress Echo 07/19/17.     Dyslipidemia     Heartburn 2017    Hyperglycemia 2016    Dr. Andreia England    Hypertension     Hypothyroid 2018    Neck nodule 05/10/2018    Obesity (BMI 30-39.9) 2017    Dr. Andreia England, endo.  Postsurgical menopause     Dr. Walt Hirsch Spider bite     ? Leg, recurrent.  Steatosis of liver 2016    Tubular adenoma of colon 2017    Dr. Rafael Schmid    Uterine fibroid     Dr. Bernarda Chowdary    Vitamin D deficiency 2018    Dr. Severo Lek:    Past Surgical History:   Procedure Laterality Date    HX BREAST LUMPECTOMY Left     Atypical Cells. Dr. Renee Belcher.  HX COLONOSCOPY  2009, 17    with polypectomy. Dr. Rafael Schmid.  HX REFRACTIVE SURGERY Bilateral     HX MKUUND AND BSO      due to uterine fibroids.   Dr. Tao Cole HISTORY:    Family History   Problem Relation Age of Onset    Diabetes Mother     Obesity Mother     Other Father 54         FROM BEE ALLERGY    Obesity Sister         X7    Diabetes Brother     No Known Problems Maternal Grandmother     No Known Problems Maternal Grandfather     No Known Problems Paternal Grandmother     No Known Problems Paternal Grandfather     Sleep Apnea Sister     Diabetes Sister     Hypertension Sister     Thyroid Disease Sister         X 2    Diabetes Sister         X3 MORE SISTERS TAKING INSULIN    Diabetes Brother     Heart Disease Brother     Breast Cancer Sister 43    Schizophrenia Sister     Heart Disease Niece/nephew        SOCIAL HISTORY:    Social History     Socioeconomic History    Marital status:      Spouse name: Not on file    Number of children: Not on file    Years of education: Not on file    Highest education level: Not on file   Tobacco Use    Smoking status: Never Smoker    Smokeless tobacco: Never Used   Substance and Sexual Activity    Alcohol use: Yes     Alcohol/week: 0.8 standard drinks     Types: 1 Glasses of wine per week     Comment: ocassional    Drug use: No    Sexual activity: Yes     Partners: Male     Birth control/protection: Surgical     Comment: TL   Social History Narrative    Family History: Mother: , Yoana Lai: , Bee stingSister(s):   yrs, Breast Cancer at 42Brother(s):    , Heart Problems1 brother(s) , 7 sister(s) - healthy. 1 son(s) , 1 daughter(s) - healthy. Social History: Alcohol Use Patient does not use alcohol. Smoking Status Patient is a never smoker. Caffeine: coffee, tea. Marital Status:    . Lives w ith: spouse Alexsandra Rodriguez. Occupation/W ork: office w ork. Medical History: Hypertension, Left Breast papilloma. Gyn History: Last mammogram date 10/28/2014. Last pap date 10/22/2012. Hysterectomy Date . OB History: Total pregnancies 2. Full term delivery (>37 w eeks) 2. Live births 2. C section(s) 2. Pregnancy # 1: , Girl, 11. Pregnancy # 2: Repeat C/S, boy, . Surgical History: hysterectomy, total abdominal w ith BSO , Left w rist surgery , colonoscopy Delbra So and , left lumpectomy . IMMUNIZATIONS:    Immunization History   Administered Date(s) Administered    Influenza Vaccine 10/01/2017, 2018    Influenza Vaccine (Quad) Mdck Pf 2018       PHYSICAL EXAMINATION     Visit Vitals  /82   Pulse 80   Temp 98.3 °F (36.8 °C) (Oral)   Resp 19   Ht 5' 4\" (1.626 m)   Wt 200 lb 8 oz (90.9 kg)   SpO2 96%   BMI 34.42 kg/m²       Weight Metrics 2019   Weight - 200 lb 8 oz - 204 lb 8 oz - - 198 lb 8 oz   Neck Circ (inches) 14.75 - - - - - -   Waist Measure Inches 41 - 38.75 - 39 40 -   Body Fat % 40.2 - - - - - -   BMI - 34.42 kg/m2 - 35.1 kg/m2 - - 34.07 kg/m2       General appearance - Well nourished. Well appearing. Well developed.   No acute distress. Obese. Head - Normocephalic. Atraumatic. Eyes - pupils equal and reactive. Extraocular eye movements intact. Sclera anicteric. Mildly injected sclera. Ears - Hearing is grossly normal bilaterally. Nose - normal and patent. No polyps noted. No erythema. No discharge. Mouth - mucous membranes with adequate moisture. Posterior pharynx normal with cobblestone appearance. No erythema, white exudate or obstruction. Neck - supple. Midline trachea. No carotid bruits noted bilaterally. No thyromegaly noted. Chest - clear to auscultation bilaterally anteriorly and posteriorly. No wheezes. No rales or rhonchi. Breath sounds are symmetrical bilaterally. Unlabored respirations. Heart - normal rate. Regular rhythm. Normal S1, S2. No murmur noted. No rubs, clicks or gallops noted. Abdomen - soft and distended. No masses or organomegaly. No rebound, rigidity or guarding. Bowel sounds normal x 4 quadrants. No tenderness noted. Neurological - awake, alert and oriented to person, place, and time and event. Cranial nerves II through XII intact. Clear speech. Muscle strength is +5/5 x 4 extremities. Sensation is intact to light touch bilaterally. Steady gait. Heme/Lymph - peripheral pulses normal x 4 extremities. No peripheral edema is noted. Musculoskeletal - Intact x 4 extremities. Full ROM x 4 extremities. Mild R pain with movement. Back exam - normal range of motion. No pain on palpation of the spinous processes in the cervical, thoracic, lumbar, sacral regions. No CVA tenderness. No buffalo hump noted. Skin - no rashes, erythema, ecchymosis, lacerations, abrasions, suspicious moles noted. No skin tags or moles. No acanthosis nigricans noted in the axilla or neck. Psychological -   normal behavior, dress and thought processes. Good insight. Good eye contact. Normal affect. Appropriate mood. Normal speech.       DATA REVIEWED    SEE SCANNED DOCUMENT FOR COMPLETE PANEL RESULTS with review. ASSESSMENT and PLAN      ICD-10-CM ICD-9-CM    1. Hypothyroidism due to acquired atrophy of thyroid E03.4 244.8 levothyroxine (SYNTHROID) 88 mcg tablet     246.8    2. Essential hypertension I10 401.9    3. Dyslipidemia E78.5 272.4    4. Hyperinsulinemia E16.1 251.1    5. Hyperglycemia R73.9 790.29    6. Elevated serum creatinine R79.89 790.99    7. Essential fatty acid (EFA) deficiency E63.0 269.8    8. Elevated C-reactive protein (CRP) R79.82 790.95    9. Steatosis of liver K76.0 571.8    10. Statin intolerance Z78.9 995.27    11. Family history of heart disease Z82.49 V17.49    12. Obesity, Class I, BMI 30-34.9 E66.9 278.00 naltrexone (DEPADE) 50 mg tablet   13. Weight gain R63.5 783.1 naltrexone (DEPADE) 50 mg tablet   14. Elevated hemoglobin (HCC) D58.2 282.7    15. Status post surgical removal of nail matrix of toe Z98.890 V45.89    16. Elevated TSH R79.89 794.5    17. High thyroid stimulating hormone (TSH) level R79.89 794.5    18. Encounter for weight management Z76.89 V65.49    19. Hypothyroidism due to acquired atrophy of thyroid E03.4 244.8 levothyroxine (SYNTHROID) 88 mcg tablet     246.8     improving with Synthroid 50 mcg daily       Chart reviewed and updated. Recommend pt refer to LCD manual if experiencing any side effects once program is initiated or call our office.     Referred patient to Randal Henao RD for BS MSWLP to receive Ruth [facility] LCD food products and weekly intervention.     Discussed the patient's BMI with her. [place] BMI follow up plan is as follows: I have counseled this patient on diet and exercise regimens. Decrease carbohydrates (white foods, sweet foods, sweet drinks and alcohol), increase green leafy vegetables and protein (lean meats and beans) with each meal.  Avoid fried foods.  Do not skip meals.  Increase water intake to 1 bottle with each meal. Avoid sugar-sweetened beverages.  Get 7-8 hours uninterrupted sleep nightly.     Diet prescription: LCD (low calorie diet)/8620-2349 calories daily using 2-3 meal replacements daily with Ruth Nicholas food/beverage products recommended. Consume a minimum of 2 L (67 oz) of water daily while utilizing LCD. Avoid sugar-sweetened beverages. Reviewed nutrition and importance of regular protein intake and hidden carbohydrate sources.      Exercise prescription: Advance as tolerated while using LCD. A goal of 30 minutes physical activity daily is recommended for health benefit and at least 60 minutes daily to prevent weight regain.  For weight loss, no less than 75% needs to be aerobic (i.e. Walking) and no more than 25% resistance exercising (i.e. Weight lifting).  For weight maintenance phase, 50% aerobic and 50% resistance exercises. Emphasized importance of physical activity and reducing sedentary time.      Sleep prescription: A goal of 7-8 hours of uninterrupted sleep is recommended to turn off the Grehlin hormone to be released from the stomach and triggers appetite while promoting weight gain. Proper rest turns on Leptin hormone to be released from white adipose tissue and promotes weight loss. Continue current medications and care. Increase Synthroid 88 mcg. Prescriptions written and sent to pharmacy; medication side effects discussed. Synthroid 88 mcg daily. Naltrexone 25 mg daily  Reviewed and discussed [facility] lab results. Copy of [facility] labs given to pt to share with PCP and specialists. Recheck pertinent labs monthly with [facility] lab. Pt is unclear with who her insurer prefers and [facility]. Reviewed coronary calcium scan.      Counseled patient on health concerns:  LCD, weight loss goals, sleep hygiene, strategies to overcome habits or challenges to improve or continue adherence, insulin resistance, thyroid, hyperglycemia, cholesterol   Reminded females of reproductive age that with weight loss, they may become more fertile and recommend the use of condoms if pregnancy is not desired. Weight loss goal of 5-10% in 6-12 months has shown significant improvement in obesity and its health consequences. Immunizations noted. Offered empathy, support, legitimation, prayers, partnership to patient. Praised patient for progress. Follow-up and Dispositions    · Return in about 1 month (around 8/14/2019) for Medication Check, Weight Mgmt, MSWL, Naltr #1 F/U. Patient was offered a choice/choices in the treatment plan today. Patient expresses understanding of the plan and agrees with recommendations. More than 30 mins spent face to face with patient and more than 50% of this time spent in counseling and coordinating care and/or discussing treatment plans in reference to The primary encounter diagnosis was Hypothyroidism due to acquired atrophy of thyroid. Diagnoses of Essential hypertension, Dyslipidemia, Hyperinsulinemia, Hyperglycemia, Elevated serum creatinine, Essential fatty acid (EFA) deficiency, Elevated C-reactive protein (CRP), Steatosis of liver, Statin intolerance, Family history of heart disease, Obesity, Class I, BMI 30-34.9, Weight gain, Elevated hemoglobin ([facility] [address] Status post surgical removal of nail matrix of toe, Elevated TSH, High thyroid stimulating hormone (TSH) level, Encounter for weight management, and Hypothyroidism due to acquired atrophy of thyroid were also pertinent to this visit. Romi Villareal has a reminder for a \"due or due soon\" health maintenance. I have asked pt to contact their primary care provider for follow-up on this health maintenance. [address], [place] FOR ALLOWING ME THE PRIVILEGE TO PARTICIPATE IN THE CARE OF OUR MUTUAL PATIENT, Romi Villareal WITH RESPECT TO WEIGHT MANAGEMENT. There are no Patient Instructions on file for this visit.

## 2019-08-04 RX ORDER — HYDROCHLOROTHIAZIDE 12.5 MG/1
CAPSULE ORAL
Qty: 90 CAP | Refills: 3 | Status: SHIPPED | OUTPATIENT
Start: 2019-08-04 | End: 2020-01-31 | Stop reason: CLARIF

## 2019-08-15 ENCOUNTER — CLINICAL SUPPORT (OUTPATIENT)
Dept: BARIATRICS/WEIGHT MGMT | Age: 59
End: 2019-08-15

## 2019-08-15 DIAGNOSIS — E66.9 OBESITY, CLASS I, BMI 30-34.9: ICD-10-CM

## 2019-08-16 VITALS
HEIGHT: 64 IN | DIASTOLIC BLOOD PRESSURE: 77 MMHG | HEART RATE: 103 BPM | SYSTOLIC BLOOD PRESSURE: 112 MMHG | WEIGHT: 202.5 LBS | BODY MASS INDEX: 34.57 KG/M2

## 2019-08-16 NOTE — PROGRESS NOTES
Progress Note:   Weekly Education Class in the [facility] Weight Loss Program     I am on the  LCD     Did you have any symptoms of physical problems? no What effects     Simon Sandoval is a 60 y.o. female who is enrolled in [facility] Weight Loss Program    Visit Vitals  /77   Pulse (!) 103   Ht 5' 4\" (1.626 m)   Wt 202 lb 8 oz (91.9 kg)   BMI 34.76 kg/m²     Weight Metrics 8/16/2019 8/15/2019 7/17/2019 7/17/2019 7/2/2019 7/2/2019 6/26/2019   Weight - 202 lb 8 oz - 200 lb 8 oz - 204 lb 8 oz -   Neck Circ (inches) - - 14.75 - - - -   Waist Measure Inches 40.5 - 41 - 38.75 - 39   Body Fat % - - 40.2 - - - -   BMI - 34.76 kg/m2 - 34.42 kg/m2 - 35.1 kg/m2 -           Have you received any other medical care this week?no  If yes, where and for what? Have you had any change in your medications since your last visit? no  If yes what? Did you have any problems adhering to the program last week? no  If yes, please explain:       Eating Habits Over Last Week:  Did you take in 64 oz of non-caloric fluids? yes     Did you consume your 4 meal replacements each day?  yes       Physical Activity Over the Past Week:    Aerobic exercise: 360  min    Resistance exercise: 0 workouts / week  Steps weekly: 00    How much exercise do you plan to do next week? 0      I would like a follow-up call (ph#________________) to discuss   Transition into adapting    Question about the diet    Other

## 2019-08-22 ENCOUNTER — CLINICAL SUPPORT (OUTPATIENT)
Dept: BARIATRICS/WEIGHT MGMT | Age: 59
End: 2019-08-22

## 2019-08-22 VITALS
DIASTOLIC BLOOD PRESSURE: 84 MMHG | HEIGHT: 64 IN | SYSTOLIC BLOOD PRESSURE: 128 MMHG | BODY MASS INDEX: 34.23 KG/M2 | HEART RATE: 84 BPM | WEIGHT: 200.5 LBS

## 2019-08-22 DIAGNOSIS — E66.9 OBESITY, CLASS I, BMI 30-34.9: Primary | ICD-10-CM

## 2019-08-22 NOTE — PROGRESS NOTES
Progress Note:   Weekly Education Class in the [facility] Weight Loss Program     I am on the  LCD     Did you have any symptoms of physical problems? no What effects     Bharti Duncan is a 60 y.o. female who is enrolled in [facility] Weight Loss Program    Visit Vitals  /84   Pulse 84   Ht 5' 4\" (1.626 m)   Wt 200 lb 8 oz (90.9 kg)   BMI 34.42 kg/m²     Weight Metrics 8/22/2019 8/16/2019 8/15/2019 7/17/2019 7/17/2019 7/2/2019 7/2/2019   Weight 200 lb 8 oz - 202 lb 8 oz - 200 lb 8 oz - 204 lb 8 oz   Neck Circ (inches) - - - 14.75 - - -   Waist Measure Inches - 40.5 - 41 - 38.75 -   Body Fat % - - - 40.2 - - -   BMI 34.42 kg/m2 - 34.76 kg/m2 - 34.42 kg/m2 - 35.1 kg/m2           Have you received any other medical care this week? no  If yes, where and for what? Have you had any change in your medications since your last visit? no  If yes what? Did you have any problems adhering to the program last week? no  If yes, please explain:       Eating Habits Over Last Week:  Did you take in 64 oz of non-caloric fluids? yes     Did you consume your 4 meal replacements each day?  yes       Physical Activity Over the Past Week:    Aerobic exercise: 330   min    Resistance exercise: 20 workouts / week  Steps weekly:  0    How much exercise do you plan to do next week? 0      I would like a follow-up call (ph#________________) to discuss   Transition into adapting    Question about the diet    Other

## 2019-09-01 NOTE — PROGRESS NOTES
Reviewed and agree with Weekly Education Class nurse progress note for [facility] Medically Supervised Weight Loss Program (MSWLP) using New Direction food products. Continue current diet, care and follow up as planned and outlined in BS MSWLP manual.      Documentation true and accepted by Andrea Rooney.  Evin Tristan.

## 2019-09-01 NOTE — PROGRESS NOTES
Reviewed and agree with Weekly Education Class nurse progress note for [facility] Medically Supervised Weight Loss Program (MSWLP) using New Direction food products. Continue current diet, care and follow up as planned and outlined in BS MSWLP manual.      Documentation true and accepted by Mark Benavides.  Jennie Andrade.

## 2019-11-09 ENCOUNTER — APPOINTMENT (OUTPATIENT)
Dept: CT IMAGING | Age: 59
End: 2019-11-09
Attending: NURSE PRACTITIONER
Payer: COMMERCIAL

## 2019-11-09 ENCOUNTER — HOSPITAL ENCOUNTER (EMERGENCY)
Age: 59
Discharge: HOME | End: 2019-11-09
Attending: EMERGENCY MEDICINE
Payer: COMMERCIAL

## 2019-11-09 VITALS
BODY MASS INDEX: 34.15 KG/M2 | WEIGHT: 200 LBS | OXYGEN SATURATION: 98 % | HEIGHT: 64 IN | RESPIRATION RATE: 18 BRPM | HEART RATE: 84 BPM | SYSTOLIC BLOOD PRESSURE: 148 MMHG | TEMPERATURE: 98 F | DIASTOLIC BLOOD PRESSURE: 82 MMHG

## 2019-11-09 DIAGNOSIS — R10.84 ABDOMINAL PAIN, GENERALIZED: ICD-10-CM

## 2019-11-09 DIAGNOSIS — R11.2 NON-INTRACTABLE VOMITING WITH NAUSEA, UNSPECIFIED VOMITING TYPE: Primary | ICD-10-CM

## 2019-11-09 LAB
ALBUMIN SERPL-MCNC: 4.1 G/DL (ref 3.5–5)
ALBUMIN/GLOB SERPL: 1.1 {RATIO} (ref 1.1–2.2)
ALP SERPL-CCNC: 108 U/L (ref 45–117)
ALT SERPL-CCNC: 22 U/L (ref 12–78)
ANION GAP SERPL CALC-SCNC: 7 MMOL/L (ref 5–15)
APPEARANCE UR: ABNORMAL
AST SERPL-CCNC: 22 U/L (ref 15–37)
BACTERIA URNS QL MICRO: NEGATIVE /HPF
BASOPHILS # BLD: 0.1 K/UL (ref 0–0.1)
BASOPHILS NFR BLD: 0 % (ref 0–1)
BILIRUB SERPL-MCNC: 0.3 MG/DL (ref 0.2–1)
BILIRUB UR QL: NEGATIVE
BUN SERPL-MCNC: 14 MG/DL (ref 6–20)
BUN/CREAT SERPL: 15 (ref 12–20)
CALCIUM SERPL-MCNC: 9.3 MG/DL (ref 8.5–10.1)
CHLORIDE SERPL-SCNC: 101 MMOL/L (ref 97–108)
CO2 SERPL-SCNC: 27 MMOL/L (ref 21–32)
COLOR UR: ABNORMAL
COMMENT, HOLDF: NORMAL
CREAT SERPL-MCNC: 0.94 MG/DL (ref 0.55–1.02)
DIFFERENTIAL METHOD BLD: ABNORMAL
EOSINOPHIL # BLD: 0 K/UL (ref 0–0.4)
EOSINOPHIL NFR BLD: 0 % (ref 0–7)
EPITH CASTS URNS QL MICRO: ABNORMAL /LPF
ERYTHROCYTE [DISTWIDTH] IN BLOOD BY AUTOMATED COUNT: 12.9 % (ref 11.5–14.5)
GLOBULIN SER CALC-MCNC: 3.7 G/DL (ref 2–4)
GLUCOSE SERPL-MCNC: 142 MG/DL (ref 65–100)
GLUCOSE UR STRIP.AUTO-MCNC: NEGATIVE MG/DL
HCT VFR BLD AUTO: 41.5 % (ref 35–47)
HGB BLD-MCNC: 14.6 G/DL (ref 11.5–16)
HGB UR QL STRIP: NEGATIVE
HYALINE CASTS URNS QL MICRO: ABNORMAL /LPF (ref 0–5)
IMM GRANULOCYTES # BLD AUTO: 0.1 K/UL (ref 0–0.04)
IMM GRANULOCYTES NFR BLD AUTO: 0 % (ref 0–0.5)
KETONES UR QL STRIP.AUTO: NEGATIVE MG/DL
LEUKOCYTE ESTERASE UR QL STRIP.AUTO: NEGATIVE
LYMPHOCYTES # BLD: 2.3 K/UL (ref 0.8–3.5)
LYMPHOCYTES NFR BLD: 19 % (ref 12–49)
MCH RBC QN AUTO: 27.8 PG (ref 26–34)
MCHC RBC AUTO-ENTMCNC: 35.2 G/DL (ref 30–36.5)
MCV RBC AUTO: 79 FL (ref 80–99)
MONOCYTES # BLD: 0.4 K/UL (ref 0–1)
MONOCYTES NFR BLD: 3 % (ref 5–13)
NEUTS SEG # BLD: 9.1 K/UL (ref 1.8–8)
NEUTS SEG NFR BLD: 78 % (ref 32–75)
NITRITE UR QL STRIP.AUTO: NEGATIVE
NRBC # BLD: 0 K/UL (ref 0–0.01)
NRBC BLD-RTO: 0 PER 100 WBC
PH UR STRIP: 7.5 [PH] (ref 5–8)
PLATELET # BLD AUTO: 283 K/UL (ref 150–400)
PMV BLD AUTO: 10.2 FL (ref 8.9–12.9)
POTASSIUM SERPL-SCNC: 3.9 MMOL/L (ref 3.5–5.1)
PROT SERPL-MCNC: 7.8 G/DL (ref 6.4–8.2)
PROT UR STRIP-MCNC: NEGATIVE MG/DL
RBC # BLD AUTO: 5.25 M/UL (ref 3.8–5.2)
RBC #/AREA URNS HPF: ABNORMAL /HPF (ref 0–5)
SAMPLES BEING HELD,HOLD: NORMAL
SODIUM SERPL-SCNC: 135 MMOL/L (ref 136–145)
SP GR UR REFRACTOMETRY: 1.02 (ref 1–1.03)
UR CULT HOLD, URHOLD: NORMAL
UROBILINOGEN UR QL STRIP.AUTO: 0.2 EU/DL (ref 0.2–1)
WBC # BLD AUTO: 11.9 K/UL (ref 3.6–11)
WBC URNS QL MICRO: ABNORMAL /HPF (ref 0–4)

## 2019-11-09 PROCEDURE — 74176 CT ABD & PELVIS W/O CONTRAST: CPT

## 2019-11-09 PROCEDURE — 96374 THER/PROPH/DIAG INJ IV PUSH: CPT

## 2019-11-09 PROCEDURE — 99284 EMERGENCY DEPT VISIT MOD MDM: CPT

## 2019-11-09 PROCEDURE — 81001 URINALYSIS AUTO W/SCOPE: CPT

## 2019-11-09 PROCEDURE — 74011250636 HC RX REV CODE- 250/636: Performed by: NURSE PRACTITIONER

## 2019-11-09 PROCEDURE — 96375 TX/PRO/DX INJ NEW DRUG ADDON: CPT

## 2019-11-09 PROCEDURE — 85025 COMPLETE CBC W/AUTO DIFF WBC: CPT

## 2019-11-09 PROCEDURE — 36415 COLL VENOUS BLD VENIPUNCTURE: CPT

## 2019-11-09 PROCEDURE — 96361 HYDRATE IV INFUSION ADD-ON: CPT

## 2019-11-09 PROCEDURE — 80053 COMPREHEN METABOLIC PANEL: CPT

## 2019-11-09 RX ORDER — ONDANSETRON 4 MG/1
4 TABLET, ORALLY DISINTEGRATING ORAL
Qty: 12 TAB | Refills: 0 | Status: SHIPPED | OUTPATIENT
Start: 2019-11-09 | End: 2020-01-31 | Stop reason: ALTCHOICE

## 2019-11-09 RX ORDER — DIPHENHYDRAMINE HYDROCHLORIDE 50 MG/ML
25 INJECTION, SOLUTION INTRAMUSCULAR; INTRAVENOUS
Status: DISCONTINUED | OUTPATIENT
Start: 2019-11-09 | End: 2019-11-10 | Stop reason: HOSPADM

## 2019-11-09 RX ORDER — DICYCLOMINE HYDROCHLORIDE 10 MG/1
10 CAPSULE ORAL 4 TIMES DAILY
Qty: 20 CAP | Refills: 0 | Status: SHIPPED | OUTPATIENT
Start: 2019-11-09 | End: 2019-11-13 | Stop reason: SDUPTHER

## 2019-11-09 RX ORDER — MORPHINE SULFATE 2 MG/ML
2 INJECTION, SOLUTION INTRAMUSCULAR; INTRAVENOUS
Status: COMPLETED | OUTPATIENT
Start: 2019-11-09 | End: 2019-11-09

## 2019-11-09 RX ORDER — KETOROLAC TROMETHAMINE 30 MG/ML
30 INJECTION, SOLUTION INTRAMUSCULAR; INTRAVENOUS
Status: COMPLETED | OUTPATIENT
Start: 2019-11-09 | End: 2019-11-09

## 2019-11-09 RX ORDER — HYDROMORPHONE HYDROCHLORIDE 2 MG/ML
1 INJECTION, SOLUTION INTRAMUSCULAR; INTRAVENOUS; SUBCUTANEOUS ONCE
Status: COMPLETED | OUTPATIENT
Start: 2019-11-09 | End: 2019-11-09

## 2019-11-09 RX ORDER — ONDANSETRON 2 MG/ML
4 INJECTION INTRAMUSCULAR; INTRAVENOUS
Status: COMPLETED | OUTPATIENT
Start: 2019-11-09 | End: 2019-11-09

## 2019-11-09 RX ADMIN — MORPHINE SULFATE 2 MG: 2 INJECTION, SOLUTION INTRAMUSCULAR; INTRAVENOUS at 20:02

## 2019-11-09 RX ADMIN — ONDANSETRON 4 MG: 2 INJECTION INTRAMUSCULAR; INTRAVENOUS at 20:01

## 2019-11-09 RX ADMIN — KETOROLAC TROMETHAMINE 30 MG: 30 INJECTION, SOLUTION INTRAMUSCULAR at 20:02

## 2019-11-09 RX ADMIN — HYDROMORPHONE HYDROCHLORIDE 1 MG: 2 INJECTION INTRAMUSCULAR; INTRAVENOUS; SUBCUTANEOUS at 20:48

## 2019-11-09 RX ADMIN — SODIUM CHLORIDE 1000 ML: 900 INJECTION, SOLUTION INTRAVENOUS at 20:02

## 2019-11-10 NOTE — ED TRIAGE NOTES
Patient reports left sided abdominal pain with vomiting starting this afternoon, denies fevers at home or history of kidney stones

## 2019-11-10 NOTE — ED NOTES
Patient has received discharge instructions from ER NP, verbalizes understanding. Discharged via wheelchair with . Upon discharge, patient vomited small amount of clear emesis while wheeling out. States would like to go home and will fill zofran on way, states is vomiting r/t narcotic pain meds given and motion. ER NP aware, okay with discharge.

## 2019-11-10 NOTE — ED PROVIDER NOTES
Adan Sharma is a 60 y.o. female with Hx of obesity, asthma, migraines who presents ambulatory w/ family  to [facility] ED with cc of L flank to LLQ pain. States pain worsens w/ standing. Vomiting this evening. Had normal BM yesterday, constipated today. (-) fevers, chills, body aches, blood in emesis, urinary symptoms, cough/ SOB, difficulty breathing. Pt took Pt states that she tried to take Bentyl for relief, vomited afterwards. Reports hx of similar pain in the past \"just not this bad or for this long. \" No hx of kidney stones or diverticulitis. PCP: Simón Jaramillo MD    There are no other complaints, changes or physical findings at this time. Past Medical History:   Diagnosis Date    Asthma CHILDHOOD    Breast cancer, left ([address]     followed by gyn md cassius    Chest pain 2017    normal Stress Echo 17.  Dyslipidemia     Heartburn     Hyperglycemia 2016    Dr. Monica Sandoval    Hypertension     Hypothyroid 2018    Neck nodule 05/10/2018    Obesity (BMI 30-39.9) 2017    Dr. Monica Sandoval, endo.  Postsurgical menopause     Dr. Linwood Cruz Spider bite 2016    ? Leg, recurrent.  Steatosis of liver 2016    Tubular adenoma of colon 2017    Dr. Lana Disla    Uterine fibroid     Dr. Alice Nuno    Vitamin D deficiency 2018    Dr. Monica Sandoval       Past Surgical History:   Procedure Laterality Date    HX BREAST LUMPECTOMY Left     Atypical Cells. Dr. King Bile.  HX COLONOSCOPY  2009, 17    with polypectomy. Dr. Lana Disla.  HX REFRACTIVE SURGERY Bilateral     HX MUKUND AND BSO      due to uterine fibroids.   Dr. Collin Nuno         Family History:   Problem Relation Age of Onset    Diabetes Mother     Obesity Mother     Other Father 54         FROM BEE ALLERGY    Obesity Sister         X7    Diabetes Brother     No Known Problems Maternal Grandmother     No Known Problems Maternal Grandfather     No Known Problems Paternal Grandmother     No Known Problems Paternal Grandfather     Sleep Apnea Sister     Diabetes Sister     Hypertension Sister     Thyroid Disease Sister         X 2    Diabetes Sister         X3 MORE SISTERS TAKING INSULIN    Diabetes Brother     Heart Disease Brother     Breast Cancer Sister 43    Schizophrenia Sister     Heart Disease Niece/nephew        Social History     Socioeconomic History    Marital status:      Spouse name: Not on file    Number of children: Not on file    Years of education: Not on file    Highest education level: Not on file   Occupational History    Not on file   Social Needs    Financial resource strain: Not on file    Food insecurity:     Worry: Not on file     Inability: Not on file    Transportation needs:     Medical: Not on file     Non-medical: Not on file   Tobacco Use    Smoking status: Never Smoker    Smokeless tobacco: Never Used   Substance and Sexual Activity    Alcohol use:  Yes     Alcohol/week: 0.8 standard drinks     Types: 1 Glasses of wine per week     Comment: ocassional    Drug use: No    Sexual activity: Yes     Partners: Male     Birth control/protection: Surgical     Comment: TL   Lifestyle    Physical activity:     Days per week: Not on file     Minutes per session: Not on file    Stress: Not on file   Relationships    Social connections:     Talks on phone: Not on file     Gets together: Not on file     Attends Scientologist service: Not on file     Active member of club or organization: Not on file     Attends meetings of clubs or organizations: Not on file     Relationship status: Not on file    Intimate partner violence:     Fear of current or ex partner: Not on file     Emotionally abused: Not on file     Physically abused: Not on file     Forced sexual activity: Not on file   Other Topics Concern    Not on file   Social History Narrative    Family History: Mother: , Refugio Mosley: , Bee stingSister(s):  2010 yrs, Breast Cancer at 42Brother(s):    , Heart Problems1 brother(s) , 7 sister(s) - healthy. 1 son(s) , 1 daughter(s) - healthy. Social History: Alcohol Use Patient does not use alcohol. Smoking Status Patient is a never smoker. Caffeine: coffee, tea. Marital Status:    . Lives w ith: spouse Cira Bhatt. Occupation/W ork: office w ork. Medical History: Hypertension, Left Breast papilloma. Gyn History: Last mammogram date 10/28/2014. Last pap date 10/22/2012. Hysterectomy Date . OB History: Total pregnancies 2. Full term delivery (>37 w eeks) 2. Live births 2. C section(s) 2. Pregnancy # 1: , Girl, 11. Pregnancy # 2: Repeat C/S, boy, . Surgical History: hysterectomy, total abdominal w ith BSO , Left w rist surgery , colonoscopy [facility] and , left lumpectomy . ALLERGIES: Contrave [naltrexone-bupropion]; Crestor [rosuvastatin]; Gadolinium-containing contrast media; and Lipitor [atorvastatin]    Review of Systems   Constitutional: Negative for activity change, appetite change, chills and fever. HENT: Negative for congestion, rhinorrhea, sinus pressure, sneezing and sore throat. Eyes: Negative for pain, discharge and visual disturbance. Respiratory: Negative for cough and shortness of breath. Cardiovascular: Negative for chest pain. Gastrointestinal: Positive for abdominal pain, constipation, nausea and vomiting. Negative for diarrhea. Genitourinary: Negative for dysuria, flank pain, frequency and urgency. Musculoskeletal: Negative for arthralgias, back pain, gait problem, joint swelling, myalgias and neck pain. Skin: Negative for color change and rash. Neurological: Negative for dizziness, speech difficulty, weakness, light-headedness, numbness and headaches.    Psychiatric/Behavioral: Negative for agitation, behavioral problems and confusion. All other systems reviewed and are negative. There were no vitals filed for this visit. Physical Exam   Constitutional: She is oriented to person, place, and time. She appears well-developed and well-nourished. No distress. HENT:   Head: Normocephalic and atraumatic. Right Ear: External ear normal.   Left Ear: External ear normal.   Nose: Nose normal.   Mouth/Throat: Oropharynx is clear and moist. No oropharyngeal exudate. Eyes: Pupils are equal, round, and reactive to light. Conjunctivae and EOM are normal.   Neck: Normal range of motion. Neck supple. Cardiovascular: Normal rate, regular rhythm, normal heart sounds and intact distal pulses. Pulmonary/Chest: Effort normal and breath sounds normal.   Abdominal: Soft. Bowel sounds are normal. There is no tenderness. There is no rebound and no guarding. Musculoskeletal: Normal range of motion. Neurological: She is alert and oriented to person, place, and time. Skin: Skin is warm and dry. Psychiatric: She has a normal mood and affect. Her behavior is normal. Judgment and thought content normal.   Nursing note and vitals reviewed.        MDM  Number of Diagnoses or Management Options  Abdominal pain, generalized:   Non-intractable vomiting with nausea, unspecified vomiting type:   Diagnosis management comments: Ddx: kidney stone, diverticulitis, AGE, IBS, constipation     No TTP, abd exam reassuring   CT w/o any acute or emergent findings; labs reassuring   Had ongoing N/V- relieved and able to tolerate PO in ED; has some residual nausea- pt states this was r/t pain medications   Discussed dietary modifications for s/sx, reasons to return to ED reviewed   Pt agreeable w/ discharge plan        Amount and/or Complexity of Data Reviewed  Clinical lab tests: ordered and reviewed  Tests in the radiology section of CPT®: ordered and reviewed  Review and summarize past medical records: yes Procedures    LABORATORY TESTS:  Recent Results (from the past 12 hour(s))   CBC WITH AUTOMATED DIFF    Collection Time: 11/09/19  7:37 PM   Result Value Ref Range    WBC 11.9 (H) 3.6 - 11.0 K/uL    RBC 5.25 (H) 3.80 - 5.20 M/uL    HGB 14.6 11.5 - 16.0 g/dL    HCT 41.5 35.0 - 47.0 %    MCV 79.0 (L) 80.0 - 99.0 FL    MCH 27.8 26.0 - 34.0 PG    MCHC 35.2 30.0 - 36.5 g/dL    RDW 12.9 11.5 - 14.5 %    PLATELET 045 862 - 589 K/uL    MPV 10.2 8.9 - 12.9 FL    NRBC 0.0 0  WBC    ABSOLUTE NRBC 0.00 0.00 - 0.01 K/uL    NEUTROPHILS 78 (H) 32 - 75 %    LYMPHOCYTES 19 12 - 49 %    MONOCYTES 3 (L) 5 - 13 %    EOSINOPHILS 0 0 - 7 %    BASOPHILS 0 0 - 1 %    IMMATURE GRANULOCYTES 0 0.0 - 0.5 %    ABS. NEUTROPHILS 9.1 (H) 1.8 - 8.0 K/UL    ABS. LYMPHOCYTES 2.3 0.8 - 3.5 K/UL    ABS. MONOCYTES 0.4 0.0 - 1.0 K/UL    ABS. EOSINOPHILS 0.0 0.0 - 0.4 K/UL    ABS. BASOPHILS 0.1 0.0 - 0.1 K/UL    ABS. IMM. GRANS. 0.1 (H) 0.00 - 0.04 K/UL    DF AUTOMATED     METABOLIC PANEL, COMPREHENSIVE    Collection Time: 11/09/19  7:37 PM   Result Value Ref Range    Sodium 135 (L) 136 - 145 mmol/L    Potassium 3.9 3.5 - 5.1 mmol/L    Chloride 101 97 - 108 mmol/L    CO2 27 21 - 32 mmol/L    Anion gap 7 5 - 15 mmol/L    Glucose 142 (H) 65 - 100 mg/dL    BUN 14 6 - 20 MG/DL    Creatinine 0.94 0.55 - 1.02 MG/DL    BUN/Creatinine ratio 15 12 - 20      GFR est AA >60 >60 ml/min/1.73m2    GFR est non-AA >60 >60 ml/min/1.73m2    Calcium 9.3 8.5 - 10.1 MG/DL    Bilirubin, total 0.3 0.2 - 1.0 MG/DL    ALT (SGPT) 22 12 - 78 U/L    AST (SGOT) 22 15 - 37 U/L    Alk.  phosphatase 108 45 - 117 U/L    Protein, total 7.8 6.4 - 8.2 g/dL    Albumin 4.1 3.5 - 5.0 g/dL    Globulin 3.7 2.0 - 4.0 g/dL    A-G Ratio 1.1 1.1 - 2.2     URINALYSIS W/MICROSCOPIC    Collection Time: 11/09/19  7:37 PM   Result Value Ref Range    Color YELLOW/STRAW      Appearance CLOUDY (A) CLEAR      Specific gravity 1.018 1.003 - 1.030      pH (UA) 7.5 5.0 - 8.0      Protein NEGATIVE  NEG mg/dL    Glucose NEGATIVE  NEG mg/dL    Ketone NEGATIVE  NEG mg/dL    Bilirubin NEGATIVE  NEG      Blood NEGATIVE  NEG      Urobilinogen 0.2 0.2 - 1.0 EU/dL    Nitrites NEGATIVE  NEG      Leukocyte Esterase NEGATIVE  NEG      WBC 0-4 0 - 4 /hpf    RBC 0-5 0 - 5 /hpf    Epithelial cells FEW FEW /lpf    Bacteria NEGATIVE  NEG /hpf    Hyaline cast 2-5 0 - 5 /lpf   URINE CULTURE HOLD SAMPLE    Collection Time: 11/09/19  7:37 PM   Result Value Ref Range    Urine culture hold        URINE ON HOLD IN MICROBIOLOGY DEPT FOR 3 DAYS. IF UNPRESERVED URINE IS SUBMITTED, IT CANNOT BE USED FOR ADDITIONAL TESTING AFTER 24 HRS, RECOLLECTION WILL BE REQUIRED. SAMPLES BEING HELD    Collection Time: 11/09/19  7:37 PM   Result Value Ref Range    SAMPLES BEING HELD 1RED 1BLUE     COMMENT        Add-on orders for these samples will be processed based on acceptable specimen integrity and analyte stability, which may vary by analyte. IMAGING RESULTS:  CT ABD PELV WO CONT   Final Result   IMPRESSION: No renal calculi or evidence of obstructive uropathy. MEDICATIONS GIVEN:  Medications   promethazine (PHENERGAN) 12.5 mg in NS 50 mL IVPB (0 mg IntraVENous Held 11/9/19 2147)   diphenhydrAMINE (BENADRYL) injection 25 mg (0 mg IntraVENous Held 11/9/19 2147)   sodium chloride 0.9 % bolus infusion 1,000 mL (0 mL IntraVENous IV Completed 11/9/19 2102)   ondansetron (ZOFRAN) injection 4 mg (4 mg IntraVENous Given 11/9/19 2001)   morphine injection 2 mg (2 mg IntraVENous Given 11/9/19 2002)   ketorolac (TORADOL) injection 30 mg (30 mg IntraVENous Given 11/9/19 2002)   HYDROmorphone (PF) (DILAUDID) injection 1 mg (1 mg IntraVENous Given 11/9/19 2048)       IMPRESSION:  1. Non-intractable vomiting with nausea, unspecified vomiting type    2. Abdominal pain, generalized        PLAN:  1.    Discharge Medication List as of 11/9/2019 10:51 PM      START taking these medications    Details   ondansetron (ZOFRAN ODT) 4 mg disintegrating tablet Take 1 Tab by mouth every eight (8) hours as needed for Nausea. , Print, Disp-12 Tab, R-0      dicyclomine (BENTYL) 10 mg capsule Take 1 Cap by mouth four (4) times daily for 5 days. , Print, Disp-20 Cap, R-0         CONTINUE these medications which have NOT CHANGED    Details   hydroCHLOROthiazide (MICROZIDE) 12.5 mg capsule TAKE 1 CAPSULE BY MOUTH ONCE DAILY, Normal, Disp-90 Cap, R-3      terbinafine HCl (LAMISIL) 250 mg tablet TAKE 1 TABLET BY MOUTH ONCE DAILY, Historical Med, R-2      levothyroxine (SYNTHROID) 88 mcg tablet Take 1 Tab by mouth Daily (before breakfast). , Normal, Disp-90 Tab, R-1      naltrexone (DEPADE) 50 mg tablet Take 0.5 Tabs by mouth daily. , Normal, Disp-45 Tab, R-0      omega 3-DHA-EPA-fish oil 1,000 mg (120 mg-180 mg) capsule Take 1 Cap by mouth daily. , Historical Med      mometasone (NASONEX) 50 mcg/actuation nasal spray USE TWO SPRAY(S) IN EACH NOSTRIL DAILY, NormalPlease consider 90 day supplies to promote better adherenceDisp-1 Container, R-11      albuterol (VENTOLIN HFA) 90 mcg/actuation inhaler Take 2 Puffs by inhalation every four (4) hours as needed for Wheezing., Normal, Disp-1 Inhaler, R-11      glucose blood VI test strips (CONTOUR NEXT STRIPS) strip qd, Normal, Disp-100 Strip, R-11           2. Follow-up Information     Follow up With Specialties Details Why Contact Info    Dayna Veliz MD Internal Medicine Schedule an appointment as soon as possible for a visit  [address] [phone]      [address] [facility] Go to As needed, If symptoms worsen [address] [facility]  [phone]        3.  Return to ED if worse

## 2019-11-11 ENCOUNTER — OFFICE VISIT (OUTPATIENT)
Dept: INTERNAL MEDICINE CLINIC | Age: 59
End: 2019-11-11

## 2019-11-11 VITALS
SYSTOLIC BLOOD PRESSURE: 146 MMHG | OXYGEN SATURATION: 96 % | RESPIRATION RATE: 16 BRPM | DIASTOLIC BLOOD PRESSURE: 92 MMHG | TEMPERATURE: 97.9 F | WEIGHT: 197.1 LBS | HEART RATE: 89 BPM | HEIGHT: 64 IN | BODY MASS INDEX: 33.65 KG/M2

## 2019-11-11 DIAGNOSIS — E63.0 ESSENTIAL FATTY ACID (EFA) DEFICIENCY: ICD-10-CM

## 2019-11-11 DIAGNOSIS — K57.92 DIVERTICULITIS: ICD-10-CM

## 2019-11-11 DIAGNOSIS — I10 ESSENTIAL HYPERTENSION: ICD-10-CM

## 2019-11-11 DIAGNOSIS — Z79.891 USE OF OPIATES FOR THERAPEUTIC PURPOSES: ICD-10-CM

## 2019-11-11 DIAGNOSIS — E78.5 DYSLIPIDEMIA: ICD-10-CM

## 2019-11-11 DIAGNOSIS — R10.9 ABDOMINAL WALL PAIN: Primary | ICD-10-CM

## 2019-11-11 DIAGNOSIS — C50.912 MALIGNANT NEOPLASM OF LEFT FEMALE BREAST, UNSPECIFIED ESTROGEN RECEPTOR STATUS, UNSPECIFIED SITE OF BREAST (HCC): ICD-10-CM

## 2019-11-11 RX ORDER — METRONIDAZOLE 500 MG/1
500 TABLET ORAL 3 TIMES DAILY
Qty: 30 TAB | Refills: 0 | Status: SHIPPED | OUTPATIENT
Start: 2019-11-11 | End: 2019-11-11 | Stop reason: SDUPTHER

## 2019-11-11 RX ORDER — METRONIDAZOLE 500 MG/1
500 TABLET ORAL 3 TIMES DAILY
Qty: 30 TAB | Refills: 0 | Status: SHIPPED | OUTPATIENT
Start: 2019-11-11 | End: 2020-01-31 | Stop reason: ALTCHOICE

## 2019-11-11 RX ORDER — HYDROCODONE BITARTRATE AND ACETAMINOPHEN 5; 325 MG/1; MG/1
1 TABLET ORAL
Qty: 30 TAB | Refills: 0 | Status: SHIPPED | OUTPATIENT
Start: 2019-11-11 | End: 2019-11-11 | Stop reason: SDUPTHER

## 2019-11-11 RX ORDER — CEFUROXIME AXETIL 500 MG/1
500 TABLET ORAL 2 TIMES DAILY
Qty: 20 TAB | Refills: 0 | Status: SHIPPED | OUTPATIENT
Start: 2019-11-11 | End: 2019-11-21

## 2019-11-11 RX ORDER — CEFUROXIME AXETIL 500 MG/1
500 TABLET ORAL 2 TIMES DAILY
Qty: 20 TAB | Refills: 0 | Status: SHIPPED | OUTPATIENT
Start: 2019-11-11 | End: 2019-11-11 | Stop reason: SDUPTHER

## 2019-11-11 RX ORDER — HYDROCODONE BITARTRATE AND ACETAMINOPHEN 5; 325 MG/1; MG/1
1 TABLET ORAL
Qty: 30 TAB | Refills: 0 | Status: SHIPPED | OUTPATIENT
Start: 2019-11-11 | End: 2019-12-11

## 2019-11-11 NOTE — PROGRESS NOTES
SPORTS MEDICINE AND PRIMARY CARE  Ho Spears MD, [address] [address]  Phone:  [phone]  Fax: [phone]       Chief Complaint   Patient presents with    Abdominal Pain     Patient stated that she is having stomach pain. Patient stated that the pain started Saturday. Patient also stated that she went to the ER. .      SUBJECTIVE:    Seferino Seen is a 60 y.o. female Patient returns today with history of abdominal pain, left breast cancer, dyslipidemia, hepatic steatosis, primary hypertension, and is seen for evaluation. Since we last saw her she was seen in the emergency room on 11/09/19 complaining of left flank to left lower quadrant abdominal pain, worse with standing, vomiting on that evening, but normal bowel movement the day before, constipated on the day of the visit, with no fevers or chills, no body aches, no blood in the emesis and no difficulty breathing. She tried to take Benadryl for relief, but vomited afterwards. She has a history of similar pain in the past, but not as bad as this one or as long, and she told the emergency room physician. In the emergency room she had a CBC that was remarkable for a WBC of 11.9, a UA that was negative, chemistries that were negative, and a CT of the abdomen and pelvis, non contrast, that revealed no renal calculi or evidence of obstructive uropathy, and there were no acute findings on the CT scan. Patient comes in today for follow up. Patient returns today continuing to have pain. The pain is in her back and radiates to the left flank and then into the abdomen. It is aggravated by prolonged standing. She was given morphine in the emergency room, which lasted for about 20 minutes. She comes in today continuing to have pain. She has had no vomiting since Sunday. She had one episode of vomiting on Sunday. She has no change her bowel habits, she is not constipated, and is seen for evaluation.            Current Outpatient Medications   Medication Sig Dispense Refill    HYDROcodone-acetaminophen (NORCO) 5-325 mg per tablet Take 1 Tab by mouth every four (4) hours as needed for Pain for up to 30 days. Max Daily Amount: 6 Tabs. 30 Tab 0    metroNIDAZOLE (FLAGYL) 500 mg tablet Take 1 Tab by mouth three (3) times daily. 30 Tab 0    cefUROXime (CEFTIN) 500 mg tablet Take 1 Tab by mouth two (2) times a day for 10 days. 20 Tab 0    dicyclomine (BENTYL) 10 mg capsule Take 1 Cap by mouth four (4) times daily for 5 days. 20 Cap 0    hydroCHLOROthiazide (MICROZIDE) 12.5 mg capsule TAKE 1 CAPSULE BY MOUTH ONCE DAILY 90 Cap 3    terbinafine HCl (LAMISIL) 250 mg tablet TAKE 1 TABLET BY MOUTH ONCE DAILY  2    levothyroxine (SYNTHROID) 88 mcg tablet Take 1 Tab by mouth Daily (before breakfast). 90 Tab 1    omega 3-DHA-EPA-fish oil 1,000 mg (120 mg-180 mg) capsule Take 1 Cap by mouth daily.  mometasone (NASONEX) 50 mcg/actuation nasal spray USE TWO SPRAY(S) IN EACH NOSTRIL DAILY 1 Container 11    albuterol (VENTOLIN HFA) 90 mcg/actuation inhaler Take 2 Puffs by inhalation every four (4) hours as needed for Wheezing. 1 Inhaler 11    glucose blood VI test strips (CONTOUR NEXT STRIPS) strip qd 100 Strip 11    ondansetron (ZOFRAN ODT) 4 mg disintegrating tablet Take 1 Tab by mouth every eight (8) hours as needed for Nausea. 12 Tab 0     Past Medical History:   Diagnosis Date    Asthma CHILDHOOD    Breast cancer, left [address] [address] 2005    followed by gyn md cassius    Chest pain 07/11/2017    normal Stress Echo 07/19/17.  Dyslipidemia     Heartburn 2017    Hyperglycemia 04/16/2016    Dr. Ozzy Patterson    Hypertension     Hypothyroid 08/2018    Neck nodule 05/10/2018    Obesity (BMI 30-39.9) 2017    Dr. Ozzy Patterson, endo.  Postsurgical menopause 2002    Dr. Edwin Escobar Spider bite 2016    ? Leg, recurrent.     Steatosis of liver 06/19/2016    Tubular adenoma of colon 02/2017    Dr. Danielle Morton Uterine fibroid 2002    Dr. Alice Nuno    Vitamin D deficiency 04/18/2018    Dr. Leopold Blanch     Past Surgical History:   Procedure Laterality Date    HX BREAST LUMPECTOMY Left 2005    Atypical Cells. Dr. King Bile.  HX COLONOSCOPY  08/11/2009, 02/07/17    with polypectomy. Dr. Lana Disla.  HX REFRACTIVE SURGERY Bilateral 2000s    HX MUKUND AND BSO  2002    due to uterine fibroids. Dr. Collin Nuno     Allergies   Allergen Reactions    Contrave [Naltrexone-Bupropion] Other (comments)     1 PILL MORNING, 2 PILLS EVENING  FELT ZOMBIED, \"OUT OF MY HEAD\"    Crestor [Rosuvastatin] Other (comments)     Felt like it worsened her memory    Gadolinium-Containing Contrast Media Nausea and Vomiting    Lipitor [Atorvastatin] Other (comments)     Felt like it worsened memory.           REVIEW OF SYSTEMS:  General: negative for - chills or fever  ENT: negative for - headaches, nasal congestion or tinnitus  Respiratory: negative for - cough, hemoptysis, shortness of breath or wheezing  Cardiovascular : negative for - chest pain, edema, palpitations or shortness of breath  Gastrointestinal: negative for - abdominal pain, blood in stools, heartburn or nausea/vomiting  Genito-Urinary: no dysuria, trouble voiding, or hematuria  Musculoskeletal: negative for - gait disturbance, joint pain, joint stiffness or joint swelling  Neurological: no TIA or stroke symptoms  Hematologic: no bruises, no bleeding, no swollen glands  Integument: no lumps, mole changes, nail changes or rash  Endocrine: no malaise/lethargy or unexpected weight changes      Social History     Socioeconomic History    Marital status:      Spouse name: Not on file    Number of children: Not on file    Years of education: Not on file    Highest education level: Not on file   Tobacco Use    Smoking status: Never Smoker    Smokeless tobacco: Never Used   Substance and Sexual Activity    Alcohol use: Yes     Alcohol/week: 0.8 standard drinks     Types: 1 Glasses of wine per week     Comment: ocassional    Drug use: No    Sexual activity: Yes     Partners: Male     Birth control/protection: Surgical     Comment: TL   Social History Narrative    Family History: Mother: , Carmel Lady: , Bee stingSister(s):   yrs, Breast Cancer at 42Brother(s):    , Heart Problems1 brother(s) , 7 sister(s) - healthy. 1 son(s) , 1 daughter(s) - healthy. Social History: Alcohol Use Patient does not use alcohol. Smoking Status Patient is a never smoker. Caffeine: coffee, tea. Marital Status:    . Lives w ith: spouse Remy Helton. Occupation/W ork: office w ork. Medical History: Hypertension, Left Breast papilloma. Gyn History: Last mammogram date 10/28/2014. Last pap date 10/22/2012. Hysterectomy Date . OB History: Total pregnancies 2. Full term delivery (>37 w eeks) 2. Live births 2. C section(s) 2. Pregnancy # 1: , Girl, 11. Pregnancy # 2: Repeat C/S, boy, . Surgical History: hysterectomy, total abdominal w ith BSO , Left w rist surgery , colonoscopy [facility] and , left lumpectomy .      Family History   Problem Relation Age of Onset    Diabetes Mother     Obesity Mother     Other Father 54         FROM BEE ALLERGY    Obesity Sister         X7    Diabetes Brother     No Known Problems Maternal Grandmother     No Known Problems Maternal Grandfather     No Known Problems Paternal Grandmother     No Known Problems Paternal Grandfather     Sleep Apnea Sister     Diabetes Sister     Hypertension Sister     Thyroid Disease Sister         X 2    Diabetes Sister         X3 MORE SISTERS TAKING INSULIN    Diabetes Brother     Heart Disease Brother     Breast Cancer Sister 43    Schizophrenia Sister     Heart Disease Niece/nephew        OBJECTIVE:    Visit Vitals  BP (!) 146/92   Pulse 89   Temp 97.9 °F (36.6 °C)   Resp 16 Ht 5' 4\" (1.626 m)   Wt 197 lb 1.6 oz (89.4 kg)   SpO2 96%   BMI 33.83 kg/m²     CONSTITUTIONAL: well , well nourished, appears age appropriate  EYES: perrla, eom intact  ENMT:moist mucous membranes, pharynx clear  NECK: supple. Thyroid normal  RESPIRATORY: Chest: clear bilaterally   CARDIOVASCULAR: Heart: regular rate and rhythm  GASTROINTESTINAL: Abdomen: soft, bowel sounds active  HEMATOLOGIC: no pathological lymph nodes palpated  MUSCULOSKELETAL: Extremities: no edema, pulse 1+   INTEGUMENT: No unusual rashes or suspicious skin lesions noted. Nails appear normal.  NEUROLOGIC: non-focal exam   MENTAL STATUS: alert and oriented, appropriate affect           ASSESSMENT:  1. Abdominal wall pain    2. Malignant neoplasm of left female breast, unspecified estrogen receptor status, unspecified site of breast ([address]    3. Dyslipidemia    4. Essential fatty acid (EFA) deficiency    5. Essential hypertension    6. Diverticulitis    7. Use of opiates for therapeutic purposes      Patient's exam is compatible with tenderness in the sigmoid and descending colon area on palpation. I think this represents diverticulitis, which would explain the slight elevation of her white count. I do not think it was seen on the CT scan because the CT scan was done without contrast.  She has a history of a CT of the abdomen performed in the past that apparently was not at a [facility] facility and therefore cannot be easily pulled up. We will review it at a later date. We will treat her aggressively with Flagyl and Ceftin and use Tylenol #3 for the discomfort. If it does not resolve she will advise us and we will have to look further. Breast cancer is in remission. She has a known history of dyslipidemia and is controlled with diet and fish oil. The CT scan, as has demonstrated in the past, indicates she has fatty liver, which we encourage a low cholesterol diet to that end.     BP elevation is noted and compatible with the pain she is in. She will be back to see us in 7-10 days, sooner if she needs to. I have discussed the diagnosis with the patient and the intended plan as seen in the  orders above. The patient understands and agees with the plan. The patient has   received an after visit summary and questions were answered concerning  future plans  Patient labs and/or xrays were reviewed  Past records were reviewed. PLAN:  .  Orders Placed This Encounter    PAIN MGMT PANEL W/REFL, UR    DISCONTD: HYDROcodone-acetaminophen ([address]) 5-325 mg per tablet    DISCONTD: metroNIDAZOLE (FLAGYL) 500 mg tablet    DISCONTD: cefUROXime (CEFTIN) 500 mg tablet    HYDROcodone-acetaminophen (NORCO) 5-325 mg per tablet    metroNIDAZOLE (FLAGYL) 500 mg tablet    cefUROXime (CEFTIN) 500 mg tablet       Follow-up and Dispositions    · Return in about 1 week (around 11/18/2019). ATTENTION:   This medical record was transcribed using an electronic medical records system. Although proofread, it may and can contain electronic and spelling errors. Other human spelling and other errors may be present. Corrections may be executed at a later time. Please feel free to contact us for any clarifications as needed.

## 2019-11-11 NOTE — PROGRESS NOTES
Chief Complaint   Patient presents with    Abdominal Pain     Patient stated that she is having stomach pain. Patient stated that the pain started Saturday. Patient also stated that she went to the ER. 1. Have you been to the ER, urgent care clinic since your last visit? Hospitalized since your last visit? Yes Reason for visit: stomach pain    2. Have you seen or consulted any other health care providers outside of the [address] [place] [address] since your last visit? Include any pap smears or colon screening.  No

## 2019-11-13 RX ORDER — BISACODYL 5 MG
10 TABLET, DELAYED RELEASE (ENTERIC COATED) ORAL
Qty: 30 TAB | Refills: 11 | Status: SHIPPED | OUTPATIENT
Start: 2019-11-13 | End: 2020-01-31 | Stop reason: ALTCHOICE

## 2019-11-13 RX ORDER — DICYCLOMINE HYDROCHLORIDE 20 MG/1
20 TABLET ORAL
Qty: 120 TAB | Refills: 5 | Status: SHIPPED | OUTPATIENT
Start: 2019-11-13 | End: 2020-01-31

## 2019-11-13 RX ORDER — POLYETHYLENE GLYCOL 3350 17 G/17G
17 POWDER, FOR SOLUTION ORAL 2 TIMES DAILY
Qty: 60 PACKET | Refills: 11 | Status: SHIPPED | OUTPATIENT
Start: 2019-11-13 | End: 2020-01-31 | Stop reason: ALTCHOICE

## 2019-11-15 ENCOUNTER — OFFICE VISIT (OUTPATIENT)
Dept: INTERNAL MEDICINE CLINIC | Age: 59
End: 2019-11-15

## 2019-11-15 VITALS
RESPIRATION RATE: 16 BRPM | TEMPERATURE: 97.8 F | HEART RATE: 76 BPM | OXYGEN SATURATION: 97 % | HEIGHT: 64 IN | SYSTOLIC BLOOD PRESSURE: 145 MMHG | BODY MASS INDEX: 33.89 KG/M2 | WEIGHT: 198.5 LBS | DIASTOLIC BLOOD PRESSURE: 91 MMHG

## 2019-11-15 DIAGNOSIS — R10.13 EPIGASTRIC PAIN: ICD-10-CM

## 2019-11-15 DIAGNOSIS — R73.03 PREDIABETES: ICD-10-CM

## 2019-11-15 DIAGNOSIS — I10 ESSENTIAL HYPERTENSION: ICD-10-CM

## 2019-11-15 DIAGNOSIS — C50.912 MALIGNANT NEOPLASM OF LEFT FEMALE BREAST, UNSPECIFIED ESTROGEN RECEPTOR STATUS, UNSPECIFIED SITE OF BREAST (HCC): Primary | ICD-10-CM

## 2019-11-15 RX ORDER — PANTOPRAZOLE SODIUM 40 MG/1
40 TABLET, DELAYED RELEASE ORAL DAILY
Qty: 30 TAB | Refills: 1 | Status: SHIPPED | OUTPATIENT
Start: 2019-11-15 | End: 2020-01-31 | Stop reason: ALTCHOICE

## 2019-11-15 NOTE — PROGRESS NOTES
SPORTS MEDICINE [facility] PR[facility]  Fabian Adkins MD, [address] [address]  Phone:  [phone]  Fax: [phone]      Chief Complaint   Patient presents with    Abdominal Pain     Patient is here for abdominal pain and back pain. SUBECTIVE:    Susy Hernandez is a 60 y.o. female Patient returns today continuing to have abdominal pain. We spoke with her  the other day and advised her to come in because of persistent abdominal pain. We reviewed the chart and on 02/02/17 Lakesha Hunter MD performed upper endoscopy with the findings of gastritis and colonoscopy with two sessile polyps in the sigmoid and mid ascending colon, and polypectomy was performed, small non bleeding hemorrhoids, no mention of diverticula. She had a CT scan of the abdomen and pelvis in 2015 with contrast that revealed fatty infiltration of the liver, otherwise unremarkable study. CT scan was repeated in the emergency room without contrast and again no renal calculi or evidence of obstructive uropathy and unremarkable abdominal exam.  Years ago she had an ultrasound of the abdomen, which was unremarkable likewise. Patient has a known history of left breast cancer, primary hypertension, prediabetes, and is status post total abdominal hysterectomy and bilateral salpingo-oophorectomy and is seen for evaluation. The patient complains of pain, both a burning sensation, as well as sharp pains more recently this morning. If she stands it gets worse, if she sits [place] [facility]) style it is less, if she turns on her right decubitus position it is worse, on the left decubitus position it is less. She has had no nausea or vomiting. She had two bowel movements, one yesterday and one this morning, with no change in the pain. The pain medicines help to decrease it, but do not eliminate the pain. Patient is seen for evaluation.         Current Outpatient Medications   Medication Sig Dispense Refill    polyethylene glycol (MIRALAX) 17 gram packet Take 1 Packet by mouth two (2) times a day. 60 Packet 11    bisacodyl (DULCOLAX) 5 mg EC tablet Take 2 Tabs by mouth daily as needed for Constipation. 30 Tab 11    dicyclomine (BENTYL) 20 mg tablet Take 1 Tab by mouth Before breakfast, lunch, dinner and at bedtime. 120 Tab 5    HYDROcodone-acetaminophen (NORCO) 5-325 mg per tablet Take 1 Tab by mouth every four (4) hours as needed for Pain for up to 30 days. Max Daily Amount: 6 Tabs. 30 Tab 0    metroNIDAZOLE (FLAGYL) 500 mg tablet Take 1 Tab by mouth three (3) times daily. 30 Tab 0    cefUROXime (CEFTIN) 500 mg tablet Take 1 Tab by mouth two (2) times a day for 10 days. 20 Tab 0    ondansetron (ZOFRAN ODT) 4 mg disintegrating tablet Take 1 Tab by mouth every eight (8) hours as needed for Nausea. 12 Tab 0    hydroCHLOROthiazide (MICROZIDE) 12.5 mg capsule TAKE 1 CAPSULE BY MOUTH ONCE DAILY 90 Cap 3    terbinafine HCl (LAMISIL) 250 mg tablet TAKE 1 TABLET BY MOUTH ONCE DAILY  2    levothyroxine (SYNTHROID) 88 mcg tablet Take 1 Tab by mouth Daily (before breakfast). 90 Tab 1    omega 3-DHA-EPA-fish oil 1,000 mg (120 mg-180 mg) capsule Take 1 Cap by mouth daily.  mometasone (NASONEX) 50 mcg/actuation nasal spray USE TWO SPRAY(S) IN EACH NOSTRIL DAILY 1 Container 11    albuterol (VENTOLIN HFA) 90 mcg/actuation inhaler Take 2 Puffs by inhalation every four (4) hours as needed for Wheezing. 1 Inhaler 11    glucose blood VI test strips (CONTOUR NEXT STRIPS) strip qd 100 Strip 11     Past Medical History:   Diagnosis Date    Asthma CHILDHOOD    Breast cancer, left ([address] 2005    followed by gyn md cassius    Chest pain 07/11/2017    normal Stress Echo 07/19/17.  Dyslipidemia     Heartburn 2017    Hyperglycemia 04/16/2016    Dr. Irene Petersen    Hypertension     Hypothyroid 08/2018    Neck nodule 05/10/2018    Obesity (BMI 30-39.9) 2017    Dr. Irene Petersen, endo.     Postsurgical menopause 2002 Dr. Dg Carbajal Spider bite     ? Leg, recurrent.  Steatosis of liver 2016    Tubular adenoma of colon 2017    Dr. Desiree Johnson    Uterine fibroid     Dr. Gregg Rnodon    Vitamin D deficiency 2018    Dr. Severiano Sanchez     Past Surgical History:   Procedure Laterality Date    HX BREAST LUMPECTOMY Left     Atypical Cells. Dr. Xochitl Burgess.  HX COLONOSCOPY  2009, 17    with polypectomy. Dr. Desiree Johnson.  HX REFRACTIVE SURGERY Bilateral s    HX MUKUND AND BSO      due to uterine fibroids. Dr. Baudilio Rondon     Allergies   Allergen Reactions    Contrave [Naltrexone-Bupropion] Other (comments)     1 PILL MORNING, 2 PILLS EVENING  FELT ZOMBIED, \"OUT OF MY HEAD\"    Crestor [Rosuvastatin] Other (comments)     Felt like it worsened her memory    Gadolinium-Containing Contrast Media Nausea and Vomiting    Lipitor [Atorvastatin] Other (comments)     Felt like it worsened memory. REVIEW OF SYSTEMS:   She has had no chills or fever. Social History     Socioeconomic History    Marital status:      Spouse name: Not on file    Number of children: Not on file    Years of education: Not on file    Highest education level: Not on file   Tobacco Use    Smoking status: Never Smoker    Smokeless tobacco: Never Used   Substance and Sexual Activity    Alcohol use: Yes     Alcohol/week: 0.8 standard drinks     Types: 1 Glasses of wine per week     Comment: ocassional    Drug use: No    Sexual activity: Yes     Partners: Male     Birth control/protection: Surgical     Comment: TL   Social History Narrative    Family History: Mother: , Pamila Borne: , Bee stingSister(s):   yrs, Breast Cancer at 42Brother(s):    , Heart Problems1 brother(s) , 7 sister(s) - healthy. 1 son(s) , 1 daughter(s) - healthy.     Social History: Alcohol Use Patient does not use alcohol. Smoking Status Patient is a never smoker. Caffeine: coffee, tea. Marital Status:    . Lives w ith: spouse Juan Daniel Miller. Occupation/W ork: office w ork. Medical History: Hypertension, Left Breast papilloma. Gyn History: Last mammogram date 10/28/2014. Last pap date 10/22/2012. Hysterectomy Date . OB History: Total pregnancies 2. Full term delivery (>37 w eeks) 2. Live births 2. C section(s) 2. Pregnancy # 1: , Girl, 11. Pregnancy # 2: Repeat C/S, boy, . Surgical History: hysterectomy, total abdominal w ith BSO , Left w rist surgery , colonoscopy Gear[place]  and , left lumpectomy . r  Family History   Problem Relation Age of Onset    Diabetes Mother     Obesity Mother     Other Father 54         FROM BEE ALLERGY    Obesity Sister         X7    Diabetes Brother     No Known Problems Maternal Grandmother     No Known Problems Maternal Grandfather     No Known Problems Paternal Grandmother     No Known Problems Paternal Grandfather     Sleep Apnea Sister     Diabetes Sister     Hypertension Sister     Thyroid Disease Sister         X 2    Diabetes Sister         X3 MORE SISTERS TAKING INSULIN    Diabetes Brother     Heart Disease Brother     Breast Cancer Sister 43    Schizophrenia Sister     Heart Disease Niece/nephew        OBJECTIVE:  Visit Vitals  BP (!) 145/91   Pulse 76   Temp 97.8 °F (36.6 °C)   Resp 16   Ht 5' 4\" (1.626 m)   Wt 198 lb 8 oz (90 kg)   SpO2 97%   BMI 34.07 kg/m²     ENT: perrla,  eom intact  NECK: supple.  Thyroid normal  CHEST: clear to ascultation and percussion   HEART: regular rate and rhythm  ABD: soft, bowel sounds active  EXTREMITIES: no edema, pulse 1+     Admission on 2019, Discharged on 2019   Component Date Value Ref Range Status    WBC 2019 11.9* 3.6 - 11.0 K/uL Final    RBC 2019 5.25* 3.80 - 5.20 M/uL Final    HGB 2019 14.6  11.5 - 16.0 g/dL Final    HCT 2019 41.5 35.0 - 47.0 % Final    MCV 11/09/2019 79.0* 80.0 - 99.0 FL Final    MCH 11/09/2019 27.8  26.0 - 34.0 PG Final    MCHC 11/09/2019 35.2  30.0 - 36.5 g/dL Final    RDW 11/09/2019 12.9  11.5 - 14.5 % Final    PLATELET 93/65/1388 889  150 - 400 K/uL Final    MPV 11/09/2019 10.2  8.9 - 12.9 FL Final    NRBC 11/09/2019 0.0  0  WBC Final    ABSOLUTE NRBC 11/09/2019 0.00  0.00 - 0.01 K/uL Final    NEUTROPHILS 11/09/2019 78* 32 - 75 % Final    LYMPHOCYTES 11/09/2019 19  12 - 49 % Final    MONOCYTES 11/09/2019 3* 5 - 13 % Final    EOSINOPHILS 11/09/2019 0  0 - 7 % Final    BASOPHILS 11/09/2019 0  0 - 1 % Final    IMMATURE GRANULOCYTES 11/09/2019 0  0.0 - 0.5 % Final    ABS. NEUTROPHILS 11/09/2019 9.1* 1.8 - 8.0 K/UL Final    ABS. LYMPHOCYTES 11/09/2019 2.3  0.8 - 3.5 K/UL Final    ABS. MONOCYTES 11/09/2019 0.4  0.0 - 1.0 K/UL Final    ABS. EOSINOPHILS 11/09/2019 0.0  0.0 - 0.4 K/UL Final    ABS. BASOPHILS 11/09/2019 0.1  0.0 - 0.1 K/UL Final    ABS. IMM. GRANS. 11/09/2019 0.1* 0.00 - 0.04 K/UL Final    DF 11/09/2019 AUTOMATED    Final    Sodium 11/09/2019 135* 136 - 145 mmol/L Final    Potassium 11/09/2019 3.9  3.5 - 5.1 mmol/L Final    Chloride 11/09/2019 101  97 - 108 mmol/L Final    CO2 11/09/2019 27  21 - 32 mmol/L Final    Anion gap 11/09/2019 7  5 - 15 mmol/L Final    Glucose 11/09/2019 142* 65 - 100 mg/dL Final    BUN 11/09/2019 14  6 - 20 MG/DL Final    Creatinine 11/09/2019 0.94  0.55 - 1.02 MG/DL Final    BUN/Creatinine ratio 11/09/2019 15  12 - 20   Final    GFR est AA 11/09/2019 >60  >60 ml/min/1.73m2 Final    GFR est non-AA 11/09/2019 >60  >60 ml/min/1.73m2 Final    Comment: Estimated GFR is calculated using the IDMS-traceable Modification of Diet in Renal Disease (MDRD) Study equation, reported for both  Americans (GFRAA) and non- Americans (GFRNA), and normalized to 1.73m2 body surface area. The physician must decide which value applies to the patient.   The MDRD study equation should only be used in individuals age 25 or older. It has not been validated for the following: pregnant women, patients with serious comorbid conditions, or on certain medications, or persons with extremes of body size, muscle mass, or nutritional status.  Calcium 11/09/2019 9.3  8.5 - 10.1 MG/DL Final    Bilirubin, total 11/09/2019 0.3  0.2 - 1.0 MG/DL Final    ALT (SGPT) 11/09/2019 22  12 - 78 U/L Final    AST (SGOT) 11/09/2019 22  15 - 37 U/L Final    Alk. phosphatase 11/09/2019 108  45 - 117 U/L Final    Protein, total 11/09/2019 7.8  6.4 - 8.2 g/dL Final    Albumin 11/09/2019 4.1  3.5 - 5.0 g/dL Final    Globulin 11/09/2019 3.7  2.0 - 4.0 g/dL Final    A-G Ratio 11/09/2019 1.1  1.1 - 2.2   Final    Color 11/09/2019 YELLOW/STRAW    Final    Color Reference Range: Straw, Yellow or Dark Yellow    Appearance 11/09/2019 CLOUDY* CLEAR   Final    Specific gravity 11/09/2019 1.018  1.003 - 1.030   Final    pH (UA) 11/09/2019 7.5  5.0 - 8.0   Final    Protein 11/09/2019 NEGATIVE   NEG mg/dL Final    Glucose 11/09/2019 NEGATIVE   NEG mg/dL Final    Ketone 11/09/2019 NEGATIVE   NEG mg/dL Final    Bilirubin 11/09/2019 NEGATIVE   NEG   Final    Blood 11/09/2019 NEGATIVE   NEG   Final    Urobilinogen 11/09/2019 0.2  0.2 - 1.0 EU/dL Final    Nitrites 11/09/2019 NEGATIVE   NEG   Final    Leukocyte Esterase 11/09/2019 NEGATIVE   NEG   Final    WBC 11/09/2019 0-4  0 - 4 /hpf Final    RBC 11/09/2019 0-5  0 - 5 /hpf Final    Epithelial cells 11/09/2019 FEW  FEW /lpf Final    Epithelial cell category consists of squamous cells and /or transitional urothelial cells. Renal tubular cells, if present, are separately identified as such.  Bacteria 11/09/2019 NEGATIVE   NEG /hpf Final    Hyaline cast 11/09/2019 2-5  0 - 5 /lpf Final    Urine culture hold 11/09/2019 URINE ON HOLD IN MICROBIOLOGY DEPT FOR 3 DAYS.  IF UNPRESERVED URINE IS SUBMITTED, IT CANNOT BE USED FOR ADDITIONAL TESTING AFTER 24 HRS, RECOLLECTION WILL BE REQUIRED. Final    SAMPLES BEING HELD 11/09/2019 1RED 1BLUE   Final    COMMENT 11/09/2019 Add-on orders for these samples will be processed based on acceptable specimen integrity and analyte stability, which may vary by analyte. Final          ASSESSMENT:  1. Malignant neoplasm of left female breast, unspecified estrogen receptor status, unspecified site of breast ([facility] [address]    2. Essential hypertension    3. Prediabetes    4. Epigastric pain      Patient was concerned about a malignancy as the cause for the discomfort. She does have a history of left breast malignant neoplasm, but I do not find anything to suggest recurrent disease or evidence of disease impacting her current complaint. The etiology of the abdominal pain is not related to diverticulitis as she does not have that disease process, and therefore we suggest she stop both the Flagyl and Ceftin as it does not have an impact on her discomfort. There is a history of gastritis and unlike the other day's exam, she is tender in the supraumbilical area, lower epigastric area, which would raise the question of peptic ulcer disease. However the discomfort seems to be out of proportion to what one would see with peptic ulcer disease. Will therefore, however, place her on a proton pump inhibitor and ask her to take it twice a day to see if it impacts the discomfort. We have reviewed the studies as listed above. In addition we looked to the urine and there is no evidence of urinary tract infection. If the abdominal discomfort does not respond to the PPI, will ask her to see the gastroenterologist for a different opinion. Blood pressure control is at the upper limits if normal related to the pain and prediabetes is stable. I have discussed the diagnosis with the patient and the intended plan as seen in the  orders above. The patient understands and agees with the plan.   The patient has   received an after visit summary and questions were answered concerning  future plans  Patient labs and/or xrays were reviewed  Past records were reviewed. PLAN:  .  Orders Placed This Encounter    XR ABD FLAT/ ERECT    XR SPINE LUMB 2 OR 3 V    CBC WITH AUTOMATED DIFF    SED RATE (ESR)       Follow-up and Dispositions    · Return in about 1 week (around 11/22/2019). ATTENTION:   This medical record was transcribed using an electronic medical records system. Although proofread, it may and can contain electronic and spelling errors. Other human spelling and other errors may be present. Corrections may be executed at a later time. Please feel free to contact us for any clarifications as needed.

## 2019-11-15 NOTE — PROGRESS NOTES
Chief Complaint   Patient presents with    Abdominal Pain     Patient is here for abdominal pain and back pain. 1. Have you been to the ER, urgent care clinic since your last visit? Hospitalized since your last visit? No    2. Have you seen or consulted any other health care providers outside of the [facility] since your last visit? Include any pap smears or colon screening.  No

## 2019-11-16 LAB
AMPHETAMINES UR QL SCN: NEGATIVE NG/ML
BARBITURATES UR QL SCN: NEGATIVE NG/ML
BASOPHILS # BLD AUTO: 0.1 X10E3/UL (ref 0–0.2)
BASOPHILS NFR BLD AUTO: 1 %
BENZODIAZ UR QL SCN: NEGATIVE NG/ML
BZE UR QL SCN: NEGATIVE NG/ML
CANNABINOIDS UR QL SCN: NEGATIVE NG/ML
CREAT UR-MCNC: 289.7 MG/DL (ref 20–300)
EOSINOPHIL # BLD AUTO: 0.1 X10E3/UL (ref 0–0.4)
EOSINOPHIL NFR BLD AUTO: 1 %
ERYTHROCYTE [DISTWIDTH] IN BLOOD BY AUTOMATED COUNT: 13.4 % (ref 12.3–15.4)
ERYTHROCYTE [SEDIMENTATION RATE] IN BLOOD BY WESTERGREN METHOD: 26 MM/HR (ref 0–40)
FENTANYL+NORFENTANYL UR QL SCN: NEGATIVE PG/ML
HCT VFR BLD AUTO: 44.9 % (ref 34–46.6)
HGB BLD-MCNC: 15.3 G/DL (ref 11.1–15.9)
IMM GRANULOCYTES # BLD AUTO: 0 X10E3/UL (ref 0–0.1)
IMM GRANULOCYTES NFR BLD AUTO: 0 %
LYMPHOCYTES # BLD AUTO: 3.3 X10E3/UL (ref 0.7–3.1)
LYMPHOCYTES NFR BLD AUTO: 41 %
MCH RBC QN AUTO: 27.7 PG (ref 26.6–33)
MCHC RBC AUTO-ENTMCNC: 34.1 G/DL (ref 31.5–35.7)
MCV RBC AUTO: 81 FL (ref 79–97)
MEPERIDINE UR QL: NEGATIVE NG/ML
METHADONE UR QL SCN: NEGATIVE NG/ML
MONOCYTES # BLD AUTO: 0.5 X10E3/UL (ref 0.1–0.9)
MONOCYTES NFR BLD AUTO: 6 %
NEUTROPHILS # BLD AUTO: 4 X10E3/UL (ref 1.4–7)
NEUTROPHILS NFR BLD AUTO: 51 %
OPIATES UR QL SCN: POSITIVE NG/ML
OXYCODONE+OXYMORPHONE UR QL SCN: NEGATIVE NG/ML
PCP UR QL: NEGATIVE NG/ML
PH UR: 6 [PH] (ref 4.5–8.9)
PLATELET # BLD AUTO: 335 X10E3/UL (ref 150–450)
PLEASE NOTE:, 733157: ABNORMAL
PROPOXYPH UR QL SCN: NEGATIVE NG/ML
RBC # BLD AUTO: 5.53 X10E6/UL (ref 3.77–5.28)
SP GR UR: 1.01
TRAMADOL UR QL SCN: NEGATIVE NG/ML
WBC # BLD AUTO: 7.9 X10E3/UL (ref 3.4–10.8)

## 2019-11-17 DIAGNOSIS — E03.4 HYPOTHYROIDISM DUE TO ACQUIRED ATROPHY OF THYROID: ICD-10-CM

## 2019-11-18 RX ORDER — LEVOTHYROXINE SODIUM 88 UG/1
TABLET ORAL
Qty: 90 TAB | Refills: 1 | Status: SHIPPED | OUTPATIENT
Start: 2019-11-18 | End: 2020-01-31

## 2019-11-22 ENCOUNTER — HOSPITAL ENCOUNTER (OUTPATIENT)
Age: 59
Setting detail: OUTPATIENT SURGERY
Discharge: HOME | End: 2019-11-22
Attending: INTERNAL MEDICINE | Admitting: INTERNAL MEDICINE
Payer: COMMERCIAL

## 2019-11-22 ENCOUNTER — ANESTHESIA (OUTPATIENT)
Dept: ENDOSCOPY | Age: 59
End: 2019-11-22
Payer: COMMERCIAL

## 2019-11-22 ENCOUNTER — ANESTHESIA EVENT (OUTPATIENT)
Dept: ENDOSCOPY | Age: 59
End: 2019-11-22
Payer: COMMERCIAL

## 2019-11-22 VITALS
RESPIRATION RATE: 14 BRPM | HEART RATE: 77 BPM | OXYGEN SATURATION: 98 % | DIASTOLIC BLOOD PRESSURE: 85 MMHG | TEMPERATURE: 98.1 F | BODY MASS INDEX: 33.32 KG/M2 | HEIGHT: 65 IN | WEIGHT: 200 LBS | SYSTOLIC BLOOD PRESSURE: 115 MMHG

## 2019-11-22 LAB
H PYLORI FROM TISSUE: NEGATIVE
KIT LOT NO., HCLOLOT: NORMAL
NEGATIVE CONTROL: NEGATIVE
POSITIVE CONTROL: POSITIVE

## 2019-11-22 PROCEDURE — 74011000250 HC RX REV CODE- 250: Performed by: ANESTHESIOLOGY

## 2019-11-22 PROCEDURE — 74011250636 HC RX REV CODE- 250/636: Performed by: ANESTHESIOLOGY

## 2019-11-22 PROCEDURE — 77030021593 HC FCPS BIOP ENDOSC BSC -A: Performed by: INTERNAL MEDICINE

## 2019-11-22 PROCEDURE — 88305 TISSUE EXAM BY PATHOLOGIST: CPT

## 2019-11-22 PROCEDURE — 77030019988 HC FCPS ENDOSC DISP BSC -B: Performed by: INTERNAL MEDICINE

## 2019-11-22 PROCEDURE — 76040000007: Performed by: INTERNAL MEDICINE

## 2019-11-22 PROCEDURE — 76060000032 HC ANESTHESIA 0.5 TO 1 HR: Performed by: INTERNAL MEDICINE

## 2019-11-22 PROCEDURE — 74011250636 HC RX REV CODE- 250/636: Performed by: INTERNAL MEDICINE

## 2019-11-22 PROCEDURE — 87077 CULTURE AEROBIC IDENTIFY: CPT | Performed by: INTERNAL MEDICINE

## 2019-11-22 RX ORDER — DEXTROMETHORPHAN/PSEUDOEPHED 2.5-7.5/.8
1.2 DROPS ORAL
Status: DISCONTINUED | OUTPATIENT
Start: 2019-11-22 | End: 2019-11-22 | Stop reason: HOSPADM

## 2019-11-22 RX ORDER — PROPOFOL 10 MG/ML
INJECTION, EMULSION INTRAVENOUS AS NEEDED
Status: DISCONTINUED | OUTPATIENT
Start: 2019-11-22 | End: 2019-11-22 | Stop reason: HOSPADM

## 2019-11-22 RX ORDER — LORAZEPAM 2 MG/ML
2 INJECTION INTRAMUSCULAR AS NEEDED
Status: DISCONTINUED | OUTPATIENT
Start: 2019-11-22 | End: 2019-11-22 | Stop reason: HOSPADM

## 2019-11-22 RX ORDER — EPINEPHRINE 0.1 MG/ML
1 INJECTION INTRACARDIAC; INTRAVENOUS
Status: DISCONTINUED | OUTPATIENT
Start: 2019-11-22 | End: 2019-11-22 | Stop reason: HOSPADM

## 2019-11-22 RX ORDER — FENTANYL CITRATE 50 UG/ML
100 INJECTION, SOLUTION INTRAMUSCULAR; INTRAVENOUS ONCE
Status: DISCONTINUED | OUTPATIENT
Start: 2019-11-22 | End: 2019-11-22 | Stop reason: HOSPADM

## 2019-11-22 RX ORDER — FLUMAZENIL 0.1 MG/ML
0.2 INJECTION INTRAVENOUS
Status: DISCONTINUED | OUTPATIENT
Start: 2019-11-22 | End: 2019-11-22 | Stop reason: HOSPADM

## 2019-11-22 RX ORDER — SODIUM CHLORIDE 9 MG/ML
100 INJECTION, SOLUTION INTRAVENOUS CONTINUOUS
Status: DISCONTINUED | OUTPATIENT
Start: 2019-11-22 | End: 2019-11-22 | Stop reason: HOSPADM

## 2019-11-22 RX ORDER — DEXTROSE MONOHYDRATE AND SODIUM CHLORIDE 5; .9 G/100ML; G/100ML
100 INJECTION, SOLUTION INTRAVENOUS CONTINUOUS
Status: DISCONTINUED | OUTPATIENT
Start: 2019-11-22 | End: 2019-11-22 | Stop reason: HOSPADM

## 2019-11-22 RX ORDER — LIDOCAINE HYDROCHLORIDE 20 MG/ML
5 SOLUTION OROPHARYNGEAL AS NEEDED
Status: DISCONTINUED | OUTPATIENT
Start: 2019-11-22 | End: 2019-11-22 | Stop reason: HOSPADM

## 2019-11-22 RX ORDER — DIPHENHYDRAMINE HYDROCHLORIDE 50 MG/ML
50 INJECTION, SOLUTION INTRAMUSCULAR; INTRAVENOUS ONCE
Status: DISCONTINUED | OUTPATIENT
Start: 2019-11-22 | End: 2019-11-22 | Stop reason: HOSPADM

## 2019-11-22 RX ORDER — ATROPINE SULFATE 0.1 MG/ML
0.5 INJECTION INTRAVENOUS
Status: DISCONTINUED | OUTPATIENT
Start: 2019-11-22 | End: 2019-11-22 | Stop reason: HOSPADM

## 2019-11-22 RX ORDER — SODIUM CHLORIDE 0.9 % (FLUSH) 0.9 %
5-40 SYRINGE (ML) INJECTION EVERY 8 HOURS
Status: DISCONTINUED | OUTPATIENT
Start: 2019-11-22 | End: 2019-11-22 | Stop reason: HOSPADM

## 2019-11-22 RX ORDER — SODIUM CHLORIDE 0.9 % (FLUSH) 0.9 %
5-40 SYRINGE (ML) INJECTION AS NEEDED
Status: DISCONTINUED | OUTPATIENT
Start: 2019-11-22 | End: 2019-11-22 | Stop reason: HOSPADM

## 2019-11-22 RX ORDER — MIDAZOLAM HYDROCHLORIDE 1 MG/ML
5 INJECTION, SOLUTION INTRAMUSCULAR; INTRAVENOUS
Status: DISCONTINUED | OUTPATIENT
Start: 2019-11-22 | End: 2019-11-22 | Stop reason: HOSPADM

## 2019-11-22 RX ORDER — LIDOCAINE HYDROCHLORIDE 20 MG/ML
INJECTION, SOLUTION EPIDURAL; INFILTRATION; INTRACAUDAL; PERINEURAL AS NEEDED
Status: DISCONTINUED | OUTPATIENT
Start: 2019-11-22 | End: 2019-11-22 | Stop reason: HOSPADM

## 2019-11-22 RX ORDER — NALOXONE HYDROCHLORIDE 0.4 MG/ML
0.4 INJECTION, SOLUTION INTRAMUSCULAR; INTRAVENOUS; SUBCUTANEOUS
Status: DISCONTINUED | OUTPATIENT
Start: 2019-11-22 | End: 2019-11-22 | Stop reason: HOSPADM

## 2019-11-22 RX ADMIN — LIDOCAINE HYDROCHLORIDE 100 MG: 20 INJECTION, SOLUTION EPIDURAL; INFILTRATION; INTRACAUDAL; PERINEURAL at 10:25

## 2019-11-22 RX ADMIN — SODIUM CHLORIDE 100 ML/HR: 900 INJECTION, SOLUTION INTRAVENOUS at 10:16

## 2019-11-22 RX ADMIN — PROPOFOL 380 MG: 10 INJECTION, EMULSION INTRAVENOUS at 10:50

## 2019-11-22 NOTE — DISCHARGE INSTRUCTIONS
Endoscopy Discharge Instructions     Dr. Tati Palmer office                                            NAME: Yasmin Keating [id number]    AGE:  60 y.o. YOB: 1960                                                              FINAL Discharge Procedure and Diagnosis:       Procedure(s):  COLONOSCOPY  ESOPHAGOGASTRODUODENOSCOPY (EGD)       FINDINGS:     Gastritis   Healing gastric ulcer 5mm   Biopsies pending  No evidence of cancer                                        MEDICATIONS    [x] CONTINUE CURRENT MEDICATIONS     [] NEW MEDICATIONS           1.    2.    3.         Testing   Schedule              Colonoscopy Screening                                   Recommendations       []  Repeat colonoscopy in 6-12 month 2nd        to Inadequate  prep    []  Repeat colonoscopy in 3 years    []  Repeat colonoscopy in 5 years    [x]  Repeat colonoscopy in 10 years         New additional  Tests  Call the office   [phone] for the appointment time      []      []      []                                     YOUR NEXT APPOINTMENT WITH DR Cordova Cover:                                                                                                                                []   None follow up with pcp   [x]  1 week       []   2 week    []  1 month    Always keep Omega Sen MD for regular medical follow up                                                                                                                         If you had a colonoscopy the \"C\" indicates specific instructions        x                                           Diet Instructions :   Ordinarily you may resume your previous diet but your initial diet should be       Light your discharge nurse will go over this with you. Large meals can cause  abdominal discomfort after these procedures. Specific Diet Recommendations:        [x] High fiber diet. https://www.[employer]/. com/diets/        [] GERD diet: avoid fried and fatty foods, peppermint, chocolate, alcohol,               coffee, citrus fruits and juices, and tomato products. Avoid lying down for            2 to 3  hours after eating. https://www.voss.com/. com/diets/            []  FODMAP DIET  DeathUnit.nl              []  All diets eg high fiber, gastroparesis. , weight loss , gluten free             1. [employer]              2.  https://www.[employer]/. [employer]/diets/           __x__  Kristie Clark may feel quite tired and need to rest and recuperate for several hours    following these procedures. __x__  Due to the fact that sedation was administered for this procedure, do not drive,   operate machinery or sign legal documents for the next 24 hours. __x__  Mild abdominal pain may be experienced after your procedure, but is should   disappear after several hours. Notify your physician if you have persistent pain,   tenderness or abdominal distension. __x__  C    Many patients for the first few hours following the exam may experience         belching or passing gas through the rectum. Walking may help to relieve        distention and gas pains. A warm bath or shower will often help with abdominal  cramping.                                                                                            __x__   Kristie Clark may return to your normal routine tomorrow, according to how you feel        and depending on your doctors instructions. Be sure to call your doctor to make  an appointment for a post-surgery check-up on the date your doctor has   requested.       __x__ C     Rectal bleeding or spotting in small amounts may occur with the first bowel   movement following a colonoscopy or sigmoidoscopy. If a large amount of blood is noted call immediately     __x__  You may experience a numbness or lack of sensation in throat. If present, do not     eat or drink. Before eating, test your ability to drink with small sips of water. Y     You may try clear liquids or soups. If you tolerate these, you may then eat solid     food which is not greasy or spicy. __x__ C     IF POLYPS REMOVED: Avoid any blood thinning medication such as plavix,   aspirin or coumadin  NSAIDS (like advil or alleve) for 7 days. __x__  Notify your physician if you cough or vomit blood or experience chest pain. Your biopsy or testing result should be available in 7-10 days                                                                                                                      Prescription will be electronically sent to your pharmacy you must     let your nurse know your pharmacy:                                                                                                                                          [address] [place] [address]. TO HELP ENSURE A SMOOTH RECOVERY,       IT IS IMPORTANT TO FOLLOW THEM. _x___Pamphlet /Educational Information provided for diagnostic findings     Additional education information can assessed at the sites below:   [place]   http://www.digestive. niddk.nih.gov/ddiseases/a-z.asp      Web MD patient information                                                                                                Signature of individual given instructions :   Date: 11/22/2019

## 2019-11-22 NOTE — PROGRESS NOTES
Anesthesia reports 380 mg Propofol, 100 mg Lidocaine and 550 ml of Normal Saline were given during procedure. Received report from anesthesia staff on vital signs and status of patient.

## 2019-11-22 NOTE — PROCEDURES
G I Procedure Note                         EGD    Dr. Pozo [place] office   [facility] -  [address]                              [phone]                                 [id number]   1960                       60 y.o.                female        Procedure Date: 11/22/2019   Procedure  EGD  with biopsy. [x]  Anesthesia MAC           Pre Op Diagnosis  Indications:                     1. DIARRHA/ABSOMINALPAIN/GERD                                                                                                                                                                          Post Op Diagnosis:                    1.   3 cm hiatal hernia; 5mm healing  gastric ulcer; r/o microscopic colitis                                                               2.    3.             H&p completed  Yes    Anesthesia Assessment Performed prior to procedure:No change   Medications Medication Record            Description of Procedure:  MARIANELA  was seen in the endoscopy suite and the pre procedure evaluation was completed. The patient was identified as Nataliya Greer  and the procedure verified as EGD with biopsy. A Time Out was held and the above information confirmed. The risks, benefits, complications, treatment options and expected outcomes were discussed with the patient. The possibilities of reaction to medication, pulmonary aspiration, perforation of a viscus, bleeding, failure to diagnose a condition and creating a complication requiring transfusion or operation were discussed with the patient who  Permit obtained and risks explained ( [address])     Procedure Note:  With the patient in the left lateral position and after appropriate conscious anesthesia the Olympus Video endoscope was passed under direct vision into the oropharynx.   The oropharynx appeared Normal   The instrument was advanced into the esophagus and the findings were      [x] normal  mucosa [x] hiatal hernia  3 cm      [] normal z line  . The instrument was advanced into the stomach through the esophagogastric junction. The gastroscope was advanced progressively through the stomach visualizing the body and antral areas. The findings were a moderate gastritis with erosions. A biopsy was obtained. The instrument was further advanced through the pylorus into the duodenum. The bulb of the duodenum and the second portion of the duodenum were examined and the findings were a smooth appearing duodenal mucosa with mild erythema. A biopsy was obtained   The endoscope was withdrawn back into the stomach and retroflexed with examination of the fundus and cardia and the findings were no additional abnormalities . The instrument was withdrawn back into the esophagus and the the finding were no additional abnormalities    Biopsies were obtained for helicobacter testing and pathologic analysis from the representative areas. The endoscope was completely withdrawn and the patient tolerated the procedure well. 1.Blood Loss nil  No complications  Anesthesia  MAC  No crystalloids  No Implants  Assistants : per nursing documentation team members     2. For biopsy Specimen verification by physician and nurse two sources name, social security number    Suggestions  Plan 1. - Acid suppression with a proton pump inhibitor.  - Await pathology. - Await EDWIN test result and treat for Helicobacter pylori if positive. Mauro Krause MD                                                                                                                        G I Procedure Note            COLONOSCOPY   Dr. Stormy [place] office   [address]                                   [phone] [id number]   1960                                      60 y.o.                                    female      Procedure Date: 11/22/2019   [x]  Anesthesia MAC                                                                                                Pre Op Diagnosis:    Indications:                   1. DIARRHA/ABSOMINALPAIN/GERD                                                                                                                                                                          Post Op Diagnosis:                    1.    r/o microscopic colitis                                                                            H&p completed: Yes            Anesthesia Assessment: Performed prior to procedure:      No change  Anesthesia Plan: Performed prior to procedure:                   No change       Medications: See Reviewed List and Reconcilation           Informed consent was obtained     Risk Statement:  Prior to the procedure the risks were explained to the patient and/or to the family including but not limited to perforation, bleeding, adverse drug reaction, aspiration, and even the need for possible surgery. A colonoscopy exam is not 100% accurate which may be related to preparation or blind spots during the exam.The possibility that an abnormality and /or cancer could be missed was also discussed as well as alternative x-ray options. Instrument:    Olympus adult Videocolonoscope                                   Immediate Procedure Reassessment Completed     With the patient in the left lateral position, a rectal examination was performed and the findings were: negative without mass, lesions or tenderness   The Olympus Video colonoscope was inserted under direct vision into the rectum. The colonoscope was passed from the rectum to the cecum, which was identified by the ileocecal valve.  The colon findings demonstrated:  ANUS: Anal exam reveals no masses or external hemorrhoids, sphincter tone is normal.   RECTUM: Rectal exam reveals no masses  . SIGMOID COLON: The sigmoid was unremarkable except as noted below   Findings below   DESCENDING COLON:  The videoscolonoscope was advanced carefully. Findings below  SPLENIC FLEXURE: The splenic flexure is normal.   TRANSVERSE COLON:  The typical triangular pattern was noted. Findings below      HEPATIC FLEXURE: The hepatic flexure is normal.   ASCENDING COLON:  No  bleeding Findings below     CECUM:  The ileocecal valve appears normal.   TERMINAL ILEUM: The terminal ileum was not entered. Finding noted      [] mucosa normal      [] Diverticulosis     [] avm     [] Additional findings:      random biopsy was obtained and sent for pathologic analysis. The biopsy  site was free of excessive bleeding. A      The colonoscope was slowly withdrawn >6 minute period and the instrument was retroflexed in the rectum. The rectal findings were:Normal  The patient tolerated the entire procedure well. Blood Loss nil  No complications  Anesthesia  MAC  No crystalloids  No Implants  Assistants : per nursing documentation team members     For biopsy  Specimen verification by physician and nurse two sources, name,           social security numbers     Colon preparation was good    Recommendations:     - Await pathology. - For colon cancer screening in this average-risk patient, colonoscopy may be repeated in 10 years.      - appt one week      Copies sent to   Oli Caceres MD  CC:  Simón Jaramillo MD

## 2019-11-22 NOTE — ANESTHESIA PREPROCEDURE EVALUATION
Relevant Problems   No relevant active problems       Anesthetic History   No history of anesthetic complications            Review of Systems / Medical History  Patient summary reviewed, nursing notes reviewed and pertinent labs reviewed    Pulmonary            Asthma        Neuro/Psych   Within defined limits           Cardiovascular    Hypertension          Hyperlipidemia    Exercise tolerance: >4 METS     GI/Hepatic/Renal     GERD      Liver disease     Endo/Other      Hypothyroidism  Obesity     Other Findings   Comments: L TMJ           Physical Exam    Airway  Mallampati: II  TM Distance: 4 - 6 cm  Neck ROM: normal range of motion   Mouth opening: Normal     Cardiovascular  Regular rate and rhythm,  S1 and S2 normal,  no murmur, click, rub, or gallop             Dental  No notable dental hx       Pulmonary  Breath sounds clear to auscultation               Abdominal  GI exam deferred       Other Findings            Anesthetic Plan    ASA: 2  Anesthesia type: total IV anesthesia and MAC          Induction: Intravenous  Anesthetic plan and risks discussed with: Patient

## 2019-11-22 NOTE — ANESTHESIA POSTPROCEDURE EVALUATION
Procedure(s):  COLONOSCOPY  ESOPHAGOGASTRODUODENOSCOPY (EGD)  ESOPHAGOGASTRODUODENAL (EGD) BIOPSY  COLON BIOPSY. total IV anesthesia    Anesthesia Post Evaluation        Patient location during evaluation: PACU  Note status: Adequate. Level of consciousness: responsive to verbal stimuli and sleepy but conscious  Pain management: satisfactory to patient  Airway patency: patent  Anesthetic complications: no  Cardiovascular status: acceptable  Respiratory status: acceptable  Hydration status: acceptable  Comments: +Post-Anesthesia Evaluation and Assessment    Patient: Dmitry Juarez MRN: [id number]  SSN: [id number]   YOB: 1960  Age: 60 y.o. Sex: female      Cardiovascular Function/Vital Signs    /85   Pulse 77   Temp 36.7 °C (98.1 °F)   Resp 14   Ht 5' 5\" (1.651 m)   Wt 90.7 kg (200 lb)   SpO2 98%   Breastfeeding No   BMI 33.28 kg/m²     Patient is status post Procedure(s):  COLONOSCOPY  ESOPHAGOGASTRODUODENOSCOPY (EGD)  ESOPHAGOGASTRODUODENAL (EGD) BIOPSY  COLON BIOPSY. Nausea/Vomiting: Controlled. Postoperative hydration reviewed and adequate. Pain:  Pain Scale 1: Numeric (0 - 10) (11/22/19 1116)  Pain Intensity 1: 0 (11/22/19 1116)   Managed. Neurological Status: At baseline. Mental Status and Level of Consciousness: Arousable. Pulmonary Status:   O2 Device: Room air (11/22/19 1124)   Adequate oxygenation and airway patent. Complications related to anesthesia: None    Post-anesthesia assessment completed. No concerns. Signed By: Jeferson Drake DO    11/22/2019  Post anesthesia nausea and vomiting:  controlled      Vitals Value Taken Time   /85 11/22/2019 11:25 AM   Temp 36.7 °C (98.1 °F) 11/22/2019 11:04 AM   Pulse 0 11/22/2019 11:27 AM   Resp 14 11/22/2019 11:27 AM   SpO2 100 % 11/22/2019 11:27 AM   Vitals shown include unvalidated device data.

## 2019-11-22 NOTE — H&P
G I Procedure Note           Endoscopy History and Physical               [facility] Silvino [facility] [place] [facility] [phone]  [id number]    1960  60 y.o.  female      Date of Procedure:   Preoperative Diagnosis:       Procedure:    11/22/2019           DIARRHA/ABSOMINALPAIN/GERD                              Procedure(s):  COLONOSCOPY  ESOPHAGOGASTRODUODENOSCOPY (EGD)      Gastroenterologist:  Anesthesia:           MD DAMARIS Mariscal            History and procedure indication:  Dmitry Juarez is a 60 y.o. BLACK OR  female who presents with: DIARRHA/ABSOMINALPAIN/GERD   including the additional history of Abdominal Pain, Heartburn and Nausea ,Chronic diarrhea of 6m duration,Abdominal pain, epigastric, Heartburn, persistent despite therapy,        Past Medical History:   Diagnosis Date    Asthma CHILDHOOD    Breast cancer, left ([facility] [address] 2005    followed by gyn md cassius    Chest pain 07/11/2017    normal Stress Echo 07/19/17.  Dyslipidemia     GERD (gastroesophageal reflux disease)     Heartburn 2017    Hyperglycemia 04/16/2016    Dr. Radha Null Hypertension     Hypothyroid 08/2018    Neck nodule 05/10/2018    Obesity (BMI 30-39.9) 2017    Dr. Chelsea Hill, endo.  Postsurgical menopause 2002    Dr. Lucio Juarez Spider bite 2016    ? Leg, recurrent.  Steatosis of liver 06/19/2016    Tubular adenoma of colon 02/2017    Dr. Elsa Sotomayor    Uterine fibroid 2002    Dr. Davy Mann    Vitamin D deficiency 04/18/2018    Dr. Chelsea Hill      Prior to Admission medications    Medication Sig Start Date End Date Taking?  Authorizing Provider   hydroCHLOROthiazide (MICROZIDE) 12.5 mg capsule TAKE 1 CAPSULE BY MOUTH ONCE DAILY 8/4/19  Yes Orestes Lora MD   levothyroxine (SYNTHROID) 88 mcg tablet TAKE 1 TABLET BY MOUTH  DAILY BEFORE BREAKFAST 11/18/19   Kendrick Baker NP   pantoprazole (PROTONIX) 40 mg tablet Take 1 Tab by mouth daily. 11/15/19   Hilaria Madera MD   polyethylene glycol (MIRALAX) 17 gram packet Take 1 Packet by mouth two (2) times a day. 11/13/19   Obi Casillas MD   bisacodyl (DULCOLAX) 5 mg EC tablet Take 2 Tabs by mouth daily as needed for Constipation. 11/13/19   Obi Casillas MD   dicyclomine (BENTYL) 20 mg tablet Take 1 Tab by mouth Before breakfast, lunch, dinner and at bedtime. 11/13/19   Obi Casillas MD   HYDROcodone-acetaminophen (NORCO) 5-325 mg per tablet Take 1 Tab by mouth every four (4) hours as needed for Pain for up to 30 days. Max Daily Amount: 6 Tabs. 11/11/19 12/11/19  Obi Casillas MD   metroNIDAZOLE (FLAGYL) 500 mg tablet Take 1 Tab by mouth three (3) times daily. 11/11/19   Obi Casillas MD   cefUROXime (CEFTIN) 500 mg tablet Take 1 Tab by mouth two (2) times a day for 10 days. 11/11/19 11/21/19  Obi Casillas MD   ondansetron (ZOFRAN ODT) 4 mg disintegrating tablet Take 1 Tab by mouth every eight (8) hours as needed for Nausea. 11/9/19   Elías Ellis NP   terbinafine HCl (LAMISIL) 250 mg tablet TAKE 1 TABLET BY MOUTH ONCE DAILY 7/12/19   Provider, Historical   omega 3-DHA-EPA-fish oil 1,000 mg (120 mg-180 mg) capsule Take 1 Cap by mouth daily. Provider, Historical   mometasone (NASONEX) 50 mcg/actuation nasal spray USE TWO SPRAY(S) IN EACH NOSTRIL DAILY 1/31/19   Obi Casillas MD   albuterol (VENTOLIN HFA) 90 mcg/actuation inhaler Take 2 Puffs by inhalation every four (4) hours as needed for Wheezing.  10/2/18   Hilaria Madera MD   glucose blood VI test strips (CONTOUR NEXT STRIPS) strip qd 5/23/17   Obi Casillas MD     Allergies   Allergen Reactions    Contrave [Naltrexone-Bupropion] Other (comments)     1 PILL MORNING, 2 PILLS EVENING  FELT ZOMBIED, \"OUT OF MY HEAD\"    Crestor [Rosuvastatin] Other (comments)     Felt like it worsened her memory    Gadolinium-Containing Contrast Media Nausea and Vomiting    Lipitor [Atorvastatin] Other (comments)     Felt like it worsened memory. Past Surgical History:   Procedure Laterality Date    HX BREAST LUMPECTOMY Left     Atypical Cells. Dr. Ruperto Hunter.  HX  SECTION       x 2    HX COLONOSCOPY  2009, 17    with polypectomy. Dr. Trey Liriano.  HX REFRACTIVE SURGERY Bilateral     HX MUKUND AND BSO      due to uterine fibroids. Dr. Mariana Escobar     Family History   Problem Relation Age of Onset    Diabetes Mother     Obesity Mother     Other Father 54         FROM BEE ALLERGY    Obesity Sister         X7    Diabetes Brother     No Known Problems Maternal Grandmother     No Known Problems Maternal Grandfather     No Known Problems Paternal Grandmother     No Known Problems Paternal Grandfather     Sleep Apnea Sister     Diabetes Sister     Hypertension Sister     Thyroid Disease Sister         X 2    Diabetes Sister         X3 MORE SISTERS TAKING INSULIN    Diabetes Brother     Heart Disease Brother     Breast Cancer Sister 43    Schizophrenia Sister     Heart Disease Niece/nephew       Social History     Tobacco Use    Smoking status: Never Smoker    Smokeless tobacco: Never Used   Substance Use Topics    Alcohol use: Yes     Alcohol/week: 0.8 standard drinks     Types: 1 Glasses of wine per week     Comment: ocassional                                                      PHYSICAL EXAM   There were no vitals taken for this visit.     General appearance:  alert, well appearing, and in no distress  Mental status:  normal mood, behavior, speech, dress, motor activity and thought processes  Nose:      normal and patent, no erythema, discharge or polyps  Mouth:- mucous membranes moist, pharynx normal without lesions [x]  No Loose teeth      []    Loose teeth  Finger opening:  []1     []1.5    [] 2     [] 2.5     [x] 3      [] 3.5     [] 4   Mallampati:         [] Class 1     [x] Class 2    [] Class 3      [] Class 4      Neck - supple,      [x] Full ROM [] Decreased ROM  [] Short Neck no significant adenopathy    Chest - clear to auscultation, no wheezes, rales or rhonchi, symmetric air entry  Heart: normal rate, regular rhythm, normal S1, S2, no murmurs, rubs, clicks or gallops  Abdomen: abdomen soft, bowel sounds  [x] normal  [] increased  [] hypoactive                       [] no tenderness  [] epigastric tenderness  [] LLQ tenderness   [] RLQ tenderness                      No masses, organomegaly or guarding. Rectal exam: negative without mass, lesions or tenderness  Extremities: peripheral pulses normal, no pedal edema, no clubbing or cyanosis  Neurologic: Alert and oriented to person, place, and time; normal strength and tone.                          Normal symmetric reflexes  Normal gait:                                      Assessement:                                 Pre op dx:  DIARRHA/ABSOMINALPAIN/GERD   Additional medical problems list below   Patient Active Problem List   Diagnosis Code    Hypertension I10    Breast cancer, left ([facility] [address] C50.912    S/P MUKUND-BSO (total abdominal hysterectomy and bilateral salpingo-oophorectomy) Z90.710, Z90.722, Z90.79    Steatosis of liver K76.0    ACP (advance care planning) Z71.89    Dyslipidemia E78.5    Encounter for Hemoccult screening Z12.11    Normal cardiac stress test [id number]    Abdominal wall pain R10.9    S/P colonoscopic polypectomy Z98.890    Neck nodule R22.1    Asthma J45.909    Gasping for breath R06.89    Postsurgical menopause E89.40    Heartburn R12    Hyperuricemia w/o signs of inflam arthrit and tophaceous dis E79.0    Elevated serum creatinine R79.89    Hyperglycemia R73.9    Essential fatty acid (EFA) deficiency E63.0    Herpes simplex infection of genitourinary system A60.00    Back pain M54.9    Gastroenteritis K52.9                                                                                           This note documentation was performed prior to this planned procedure       after a history and physical was performed in the office. Date: 6 1 19 Immediate update no changes in H&P                          Pre Procedure Evaluation (per anesthesia or per h&p)                                                Sedation/Assessment:                                                                                               Mallampati Classification                            []Class 1                    []Class 2                    [] Class 3                  [] Class 4                                              ASA classfication         []     Class I: Normally healthy         []     Class II: Patient with mild systemic disease (e.g. hypertension)         []     Class III: Patient with severe systemic disease (e.g. CHF), non-decompensated         []     Class IV: Patient with severe systemic disease, decompensated         []     Class V: Moribund patient, survival unlikely                     Plan:  [x]  Egd                                 [x] Colonoscopy                               [] with Moderate Sedation /Conscious Sedation                                 [x] MAC          Patient stable for planned procedure. See orders.      Eliezer Yadav MD

## 2019-11-22 NOTE — ROUTINE PROCESS
Nataliya [facility] 1960 
[id number] Situation: 
Verbal report received from: Gopi Stevenson Procedure: Procedure(s): 
COLONOSCOPY 
ESOPHAGOGASTRODUODENOSCOPY (EGD) ESOPHAGOGASTRODUODENAL (EGD) BIOPSY COLON BIOPSY Background: 
 
Preoperative diagnosis: DIARRHA/ABSOMINALPAIN/GERD Postoperative diagnosis: hiatal hernia; gastric ulcer; r/o microscopic colitis :  Dr. Tanja Cadet Assistant(s): Endoscopy Technician-1: Fareed Small Endoscopy RN-1: Ivon Gabriel RN Specimens:  
ID Type Source Tests Collected by Time Destination 1 : duodenum bx Preservative Duodenum  Tati Nichols MD 11/22/2019 1041 Pathology 2 : gastric bx Preservative Gastric  Tati Nichols MD 11/22/2019 1042 Pathology 3 : random colon bx Preservative   Tati Nichols MD 11/22/2019 1049 Pathology H. Pylori  no Assessment: 
Intra-procedure medications Anesthesia gave intra-procedure sedation and medications, see anesthesia flow sheet yes Intravenous fluids: NS@ Glroia Screws Vital signs stable Abdominal assessment: round and soft Recommendation: 
Discharge patient per MD order. Family or [place]  Permission to share finding with family or friend yes

## 2019-11-26 PROBLEM — Z98.890 S/P COLONOSCOPY: Status: ACTIVE | Noted: 2019-11-22

## 2020-01-31 ENCOUNTER — OFFICE VISIT (OUTPATIENT)
Dept: INTERNAL MEDICINE CLINIC | Age: 60
End: 2020-01-31

## 2020-01-31 VITALS
TEMPERATURE: 98.1 F | RESPIRATION RATE: 16 BRPM | BODY MASS INDEX: 33.54 KG/M2 | SYSTOLIC BLOOD PRESSURE: 130 MMHG | HEART RATE: 76 BPM | HEIGHT: 65 IN | WEIGHT: 201.3 LBS | OXYGEN SATURATION: 96 % | DIASTOLIC BLOOD PRESSURE: 92 MMHG

## 2020-01-31 DIAGNOSIS — K76.0 STEATOSIS OF LIVER: ICD-10-CM

## 2020-01-31 DIAGNOSIS — M25.511 CHRONIC RIGHT SHOULDER PAIN: Primary | ICD-10-CM

## 2020-01-31 DIAGNOSIS — C50.912 MALIGNANT NEOPLASM OF LEFT FEMALE BREAST, UNSPECIFIED ESTROGEN RECEPTOR STATUS, UNSPECIFIED SITE OF BREAST (HCC): ICD-10-CM

## 2020-01-31 DIAGNOSIS — J45.909 UNCOMPLICATED ASTHMA, UNSPECIFIED ASTHMA SEVERITY, UNSPECIFIED WHETHER PERSISTENT: ICD-10-CM

## 2020-01-31 DIAGNOSIS — E78.5 DYSLIPIDEMIA: ICD-10-CM

## 2020-01-31 DIAGNOSIS — I10 ESSENTIAL HYPERTENSION: ICD-10-CM

## 2020-01-31 DIAGNOSIS — K29.90 GASTRITIS AND DUODENITIS: ICD-10-CM

## 2020-01-31 DIAGNOSIS — G89.29 CHRONIC RIGHT SHOULDER PAIN: Primary | ICD-10-CM

## 2020-01-31 DIAGNOSIS — M26.609 TMJ (TEMPOROMANDIBULAR JOINT SYNDROME): ICD-10-CM

## 2020-01-31 DIAGNOSIS — R73.03 PREDIABETES: ICD-10-CM

## 2020-01-31 PROBLEM — K52.839 MICROSCOPIC COLITIS: Status: ACTIVE | Noted: 2019-11-22

## 2020-01-31 RX ORDER — TRIAMTERENE AND HYDROCHLOROTHIAZIDE 37.5; 25 MG/1; MG/1
1 CAPSULE ORAL DAILY
Qty: 90 CAP | Refills: 3 | Status: SHIPPED | OUTPATIENT
Start: 2020-01-31 | End: 2020-07-24

## 2020-01-31 NOTE — PROGRESS NOTES
Chief Complaint   Patient presents with    Neck Pain     Patient is here for neck pain. 1. Have you been to the ER, urgent care clinic since your last visit? Hospitalized since your last visit? No    2. Have you seen or consulted any other health care providers outside of the [address] [place] [address] since your last visit? Include any pap smears or colon screening.  No

## 2020-01-31 NOTE — PROGRESS NOTES
SPORTS MEDICINE AND PRIMARY CARE  Francisco Javier King MD, [address] [address]  Phone:  [phone]  Fax: [phone]       Chief Complaint   Patient presents with    Neck Pain     Patient is here for neck pain. .      SUBJECTIVE:    Angelica Montalvo is a 60 y.o. female Patient returns today after a visit with Dr. Suhail Fischer, who performed colonoscopy, upper endoscopy with post procedure diagnosis of gastritis, duodenitis, microscopic colitis, and path report revealed a gastric biopsy with reactive gastropathy and random colon biopsy no pathological diagnosis, duodenal biopsy no pathological diagnosis on 11/22/19. She was treated with a course of Protonix, with resolution of her symptoms. Other medical problems include TMJ dysfunction, steatosis of the liver, right shoulder pain, prediabetes, primary hypertension, dyslipidemia, left breast cancer, asthma, and is seen for evaluation. Current Outpatient Medications   Medication Sig Dispense Refill    triamterene-hydroCHLOROthiazide (DYAZIDE) 37.5-25 mg per capsule Take 1 Cap by mouth daily. 90 Cap 3    mometasone (NASONEX) 50 mcg/actuation nasal spray USE TWO SPRAY(S) IN EACH NOSTRIL DAILY 1 Container 11    albuterol (VENTOLIN HFA) 90 mcg/actuation inhaler Take 2 Puffs by inhalation every four (4) hours as needed for Wheezing. 1 Inhaler 11    glucose blood VI test strips (CONTOUR NEXT STRIPS) strip qd 100 Strip 11     Past Medical History:   Diagnosis Date    Asthma CHILDHOOD    Breast cancer, left ([address] 2005    followed by gyn md cassius    Chest pain 07/11/2017    normal Stress Echo 07/19/17.     Dyslipidemia     Gastritis and duodenitis 11/22/2019    egd - md mina    GERD (gastroesophageal reflux disease)     Heartburn 2017    Hx of colonoscopy 11/22/2019    colitis - md mina    Hyperglycemia 04/16/2016    Dr. Alec Dominique Hypertension     Hypothyroid 08/2018    Microscopic colitis 2019    Neck nodule 05/10/2018    Obesity (BMI 30-39.9) 2017    Dr. Thad Austin, endo.  Postsurgical menopause     Dr. Norma Hameed S/P colonoscopy 2019    egd - md mina neg    Shoulder pain, right     iggy yi md    Spider bite 2016    ? Leg, recurrent.  Steatosis of liver 2016    TMJ (temporomandibular joint syndrome)     Tubular adenoma of colon 2017    Dr. Kevin Dickerson    Uterine fibroid     Dr. Dave Ascencio    Vitamin D deficiency 2018    Dr. Thad Austin     Past Surgical History:   Procedure Laterality Date    COLONOSCOPY N/A 2019    COLON[facility]aitlin Miller MD at [facility] ENDOSCOPY    HX BREAST LUMPECTOMY Left     Atypical Cells. Dr. Zohra Shaffer.  HX  SECTION       x 2    HX COLONOSCOPY  2009, 17    with polypectomy. Dr. Kevin Dickerson.  HX REFRACTIVE SURGERY Bilateral 2000s    HX MUKUND AND BSO      due to uterine fibroids. Dr. Ángel Ascencio     Allergies   Allergen Reactions    Contrave [Naltrexone-Bupropion] Other (comments)     1 PILL MORNING, 2 PILLS EVENING  FELT ZOMBIED, \"OUT OF MY HEAD\"    Crestor [Rosuvastatin] Other (comments)     Felt like it worsened her memory    Gadolinium-Containing Contrast Media Nausea and Vomiting    Lipitor [Atorvastatin] Other (comments)     Felt like it worsened memory.           REVIEW OF SYSTEMS:  General: negative for - chills or fever  ENT: negative for - headaches, nasal congestion or tinnitus  Respiratory: negative for - cough, hemoptysis, shortness of breath or wheezing  Cardiovascular : negative for - chest pain, edema, palpitations or shortness of breath  Gastrointestinal: negative for - abdominal pain, blood in stools, heartburn or nausea/vomiting  Genito-Urinary: no dysuria, trouble voiding, or hematuria  Musculoskeletal: negative for - gait disturbance, joint pain, joint stiffness or joint swelling  Neurological: no TIA or stroke symptoms  Hematologic: no bruises, no bleeding, no swollen glands  Integument: no lumps, mole changes, nail changes or rash  Endocrine: no malaise/lethargy or unexpected weight changes      Social History     Socioeconomic History    Marital status:      Spouse name: Not on file    Number of children: Not on file    Years of education: Not on file    Highest education level: Not on file   Tobacco Use    Smoking status: Never Smoker    Smokeless tobacco: Never Used   Substance and Sexual Activity    Alcohol use: Yes     Alcohol/week: 0.8 standard drinks     Types: 1 Glasses of wine per week     Comment: ocassional    Drug use: No    Sexual activity: Yes     Partners: Male     Birth control/protection: Surgical     Comment: TL   Social History Narrative    Family History: Mother: , Kvng Dee: , Bee stingSister(s):   yrs, Breast Cancer at 42Brother(s):    , Heart Problems1 brother(s) , 7 sister(s) - healthy. 1 son(s) , 1 daughter(s) - healthy. Social History: Alcohol Use Patient does not use alcohol. Smoking Status Patient is a never smoker. Caffeine: coffee, tea. Marital Status:    . Lives w ith: spouse Joanne Carson. Occupation/W ork: office w ork. Medical History: Hypertension, Left Breast papilloma. Gyn History: Last mammogram date 10/28/2014. Last pap date 10/22/2012. Hysterectomy Date . OB History: Total pregnancies 2. Full term delivery (>37 w eeks) 2. Live births 2. C section(s) 2. Pregnancy # 1: , Girl, 11. Pregnancy # 2: Repeat C/S, boy, . Surgical History: hysterectomy, total abdominal w ith BSO , Left w rist surgery , colonoscopy Katrinka Sat and , left lumpectomy .      Family History   Problem Relation Age of Onset    Diabetes Mother     Obesity Mother     Other Father 54         FROM BEE ALLERGY    Obesity Sister         Robby Colander Diabetes Brother  No Known Problems Maternal Grandmother     No Known Problems Maternal Grandfather     No Known Problems Paternal Grandmother     No Known Problems Paternal Grandfather     Sleep Apnea Sister     Diabetes Sister     Hypertension Sister     Thyroid Disease Sister         X 2    Diabetes Sister         X3 MORE SISTERS TAKING INSULIN    Diabetes Brother     Heart Disease Brother     Breast Cancer Sister 43    Schizophrenia Sister     Heart Disease Niece/nephew        OBJECTIVE:    Visit Vitals  BP (!) 130/92   Pulse 76   Temp 98.1 °F (36.7 °C)   Resp 16   Ht 5' 5\" (1.651 m)   Wt 201 lb 4.8 oz (91.3 kg)   SpO2 96%   BMI 33.50 kg/m²     CONSTITUTIONAL: well , well nourished, appears age appropriate  EYES: perrla, eom intact  ENMT:moist mucous membranes, pharynx clear  NECK: supple. Thyroid normal  RESPIRATORY: Chest: clear bilaterally   CARDIOVASCULAR: Heart: regular rate and rhythm  GASTROINTESTINAL: Abdomen: soft, bowel sounds active  HEMATOLOGIC: no pathological lymph nodes palpated  MUSCULOSKELETAL: Extremities: no edema, pulse 1+   INTEGUMENT: No unusual rashes or suspicious skin lesions noted. Nails appear normal.  NEUROLOGIC: non-focal exam   MENTAL STATUS: alert and oriented, appropriate affect           ASSESSMENT:  1. Chronic right shoulder pain    2. Steatosis of liver    3. Essential hypertension    4. Dyslipidemia    5. Malignant neoplasm of left female breast, unspecified estrogen receptor status, unspecified site of breast ([facility] [address]    6. Uncomplicated asthma, unspecified asthma severity, unspecified whether persistent    7. TMJ (temporomandibular joint syndrome)    8. Prediabetes    9. Gastritis and duodenitis      The shoulder pain I think is not related to the shoulder at all. She has full ROM of the shoulder. I think the discomfort is related to the trapezius muscle and supraspinatus muscle. I think those were strained because of the type of exercises she was doing.   We make recommendations for improvement. She has a known history of steatosis of the liver with normal LFTs, for which we continue to follow. BP elevation is noted. We advise her that the BP ideally should be 120/80 or less. Given the reasoning for it, she only takes the BP medicine when she \"feels like it\". There is a history of dyslipidemia. Will check her lipid panel today in view of the diet that she has been adhering to, as well as cholesterol panel. She is no longer seeing the people at the [facility] because of cost.      She has a known history of breast cancer that is followed by oncology and is up to date with her mammograms. This is outside the [employer]. No evidence of bronchospasm on exam today. She complains of TMJ dysfunction, wonders whom she should see. Will give her some information on TMJ dysfunction. I am not impressed with the TMJ on exam today. It is not particularly tender. Prediabetes, for which we will check hemoglobin A1c and report to her the results. As noted above, the duodenitis and gastritis has been treated and she is no longer having symptoms. She will be back to see us in four to six months, sooner as needed. We encouraged continued physical activity. I have discussed the diagnosis with the patient and the intended plan as seen in the  orders above. The patient understands and agees with the plan. The patient has   received an after visit summary and questions were answered concerning  future plans  Patient labs and/or xrays were reviewed  Past records were reviewed. PLAN:  .  Orders Placed This Encounter    LIPID PANEL    TSH 3RD GENERATION    CBC WITH AUTOMATED DIFF    RENAL FUNCTION PANEL    HEMOGLOBIN A1C WITH EAG    triamterene-hydroCHLOROthiazide (DYAZIDE) 37.5-25 mg per capsule       Follow-up and Dispositions    · Return in about 4 months (around 5/31/2020).                 ATTENTION:   This medical record was transcribed using an electronic medical records system. Although proofread, it may and can contain electronic and spelling errors. Other human spelling and other errors may be present. Corrections may be executed at a later time. Please feel free to contact us for any clarifications as needed.

## 2020-02-03 LAB
ALBUMIN SERPL-MCNC: 4.7 G/DL (ref 3.8–4.9)
BASOPHILS # BLD AUTO: 0 X10E3/UL (ref 0–0.2)
BASOPHILS NFR BLD AUTO: 0 %
BUN SERPL-MCNC: 19 MG/DL (ref 8–27)
BUN/CREAT SERPL: 20 (ref 12–28)
CALCIUM SERPL-MCNC: 9.8 MG/DL (ref 8.7–10.3)
CHLORIDE SERPL-SCNC: 99 MMOL/L (ref 96–106)
CHOLEST SERPL-MCNC: 226 MG/DL (ref 100–199)
CO2 SERPL-SCNC: 20 MMOL/L (ref 20–29)
CREAT SERPL-MCNC: 0.93 MG/DL (ref 0.57–1)
EOSINOPHIL # BLD AUTO: 0.2 X10E3/UL (ref 0–0.4)
EOSINOPHIL NFR BLD AUTO: 2 %
ERYTHROCYTE [DISTWIDTH] IN BLOOD BY AUTOMATED COUNT: 13.6 % (ref 11.7–15.4)
EST. AVERAGE GLUCOSE BLD GHB EST-MCNC: 111 MG/DL
GLUCOSE SERPL-MCNC: 79 MG/DL (ref 65–99)
HBA1C MFR BLD: 5.5 % (ref 4.8–5.6)
HCT VFR BLD AUTO: 41.9 % (ref 34–46.6)
HDLC SERPL-MCNC: 55 MG/DL
HGB BLD-MCNC: 14.7 G/DL (ref 11.1–15.9)
IMM GRANULOCYTES # BLD AUTO: 0 X10E3/UL (ref 0–0.1)
IMM GRANULOCYTES NFR BLD AUTO: 0 %
LDLC SERPL CALC-MCNC: 152 MG/DL (ref 0–99)
LYMPHOCYTES # BLD AUTO: 2.9 X10E3/UL (ref 0.7–3.1)
LYMPHOCYTES NFR BLD AUTO: 34 %
MCH RBC QN AUTO: 28.3 PG (ref 26.6–33)
MCHC RBC AUTO-ENTMCNC: 35.1 G/DL (ref 31.5–35.7)
MCV RBC AUTO: 81 FL (ref 79–97)
MONOCYTES # BLD AUTO: 0.4 X10E3/UL (ref 0.1–0.9)
MONOCYTES NFR BLD AUTO: 5 %
NEUTROPHILS # BLD AUTO: 5.1 X10E3/UL (ref 1.4–7)
NEUTROPHILS NFR BLD AUTO: 59 %
PHOSPHATE SERPL-MCNC: 4.2 MG/DL (ref 3–4.3)
PLATELET # BLD AUTO: 338 X10E3/UL (ref 150–450)
POTASSIUM SERPL-SCNC: 4.5 MMOL/L (ref 3.5–5.2)
RBC # BLD AUTO: 5.2 X10E6/UL (ref 3.77–5.28)
SODIUM SERPL-SCNC: 139 MMOL/L (ref 134–144)
TRIGL SERPL-MCNC: 96 MG/DL (ref 0–149)
TSH SERPL DL<=0.005 MIU/L-ACNC: 2.7 UIU/ML (ref 0.45–4.5)
VLDLC SERPL CALC-MCNC: 19 MG/DL (ref 5–40)
WBC # BLD AUTO: 8.7 X10E3/UL (ref 3.4–10.8)

## 2020-02-03 RX ORDER — EZETIMIBE 10 MG/1
10 TABLET ORAL DAILY
Qty: 30 TAB | Refills: 11 | Status: SHIPPED | OUTPATIENT
Start: 2020-02-03 | End: 2021-10-05

## 2020-07-24 ENCOUNTER — VIRTUAL VISIT (OUTPATIENT)
Dept: INTERNAL MEDICINE CLINIC | Age: 60
End: 2020-07-24

## 2020-07-24 DIAGNOSIS — E78.5 DYSLIPIDEMIA: ICD-10-CM

## 2020-07-24 DIAGNOSIS — K76.0 STEATOSIS OF LIVER: ICD-10-CM

## 2020-07-24 DIAGNOSIS — I10 ESSENTIAL HYPERTENSION: Primary | ICD-10-CM

## 2020-07-24 DIAGNOSIS — G47.33 OSA (OBSTRUCTIVE SLEEP APNEA): ICD-10-CM

## 2020-07-24 DIAGNOSIS — J30.1 ALLERGIC RHINITIS DUE TO POLLEN, UNSPECIFIED SEASONALITY: ICD-10-CM

## 2020-07-24 DIAGNOSIS — K52.839 MICROSCOPIC COLITIS, UNSPECIFIED MICROSCOPIC COLITIS TYPE: ICD-10-CM

## 2020-07-24 DIAGNOSIS — K29.90 GASTRITIS AND DUODENITIS: ICD-10-CM

## 2020-07-24 DIAGNOSIS — C50.912 MALIGNANT NEOPLASM OF LEFT FEMALE BREAST, UNSPECIFIED ESTROGEN RECEPTOR STATUS, UNSPECIFIED SITE OF BREAST (HCC): ICD-10-CM

## 2020-07-24 DIAGNOSIS — J45.909 UNCOMPLICATED ASTHMA, UNSPECIFIED ASTHMA SEVERITY, UNSPECIFIED WHETHER PERSISTENT: ICD-10-CM

## 2020-07-24 DIAGNOSIS — E63.0 ESSENTIAL FATTY ACID (EFA) DEFICIENCY: ICD-10-CM

## 2020-07-24 RX ORDER — HYDROCHLOROTHIAZIDE 12.5 MG/1
12.5 CAPSULE ORAL DAILY
Qty: 90 CAP | Refills: 3 | Status: SHIPPED | OUTPATIENT
Start: 2020-07-24 | End: 2022-03-14 | Stop reason: SDUPTHER

## 2020-07-24 RX ORDER — HYDROCHLOROTHIAZIDE 12.5 MG/1
CAPSULE ORAL
COMMUNITY
End: 2020-07-24 | Stop reason: SDUPTHER

## 2020-07-24 NOTE — LETTER
7/24/2020 4:19 PM 
 
Ms. Doc Tamez [address] [address] To whom it may concern: The above-named patient in my care for multiple medical problems. They include but are not limited to the following: Gastroesophageal reflux disease, primary hypertension, steatosis of the liver, duodenitis, gastritis, dyslipidemia, left breast cancer, allergic rhinosinusitis, and asthma. It intention of  this letter, because of the medical problems listed above, the patient reveive reasonable accommodations to include working from home. Family of any further assistance to hesitate to call Sincerely, Ti Womack MD, [facility].

## 2020-07-24 NOTE — PROGRESS NOTES
Meera Ford is a 60 y.o. female who was seen by synchronous (real-time) audio-video technology on 7/24/2020 for Hypertension        Assessment & Plan:   Diagnoses and all orders for this visit:    1. Essential hypertension we will continue blood pressure control with hydrochlorothiazide and give her a renewal of that prescription. 2. Steatosis of liver we encourage heart healthy diet. 3. Dyslipidemia her cholesterol is controlled with Zetia on January 31, 2020 triglycerides were 96 cholesterol 226 HDL 57 and . Our next visit we will reevaluate her cholesterol. 4. Uncomplicated asthma, unspecified asthma severity, unspecified whether persistent no evidence of bronchospasm currently    5. PITER (obstructive sleep apnea)  -     SLEEP MEDICINE REFERRAL    6. Malignant neoplasm of left female breast, unspecified estrogen receptor status, unspecified site of breast [facility]) she states she was told to have atypical cells although it was my impression she actually had malignancy. 7. Essential fatty acid (EFA) deficiency    8. Gastritis and duodenitis    9. Microscopic colitis, unspecified microscopic colitis type    10. Allergic rhinitis due to pollen, unspecified seasonality            Subjective:   Patient is seen at her home today with multiple medical problems including primary hypertension, steatosis of the liver, bronchial asthma, suspected obstructive sleep apnea, left breast cancer, essential fatty acid deficiency, gastritis, duodenitis, microscopic colitis and is requesting reasonable accommodations for work. She has a capacity want to continue to work from home. She also is requesting a sleep study. Patient seen for evaluation. Prior to Admission medications    Medication Sig Start Date End Date Taking? Authorizing Provider   hydroCHLOROthiazide (MICROZIDE) 12.5 mg capsule Take 1 Cap by mouth daily.  7/24/20  Yes Araseli Davidson MD   ezetimibe (ZETIA) 10 mg tablet Take 1 Tab by mouth daily. 2/3/20  Yes Alison Carpio MD   albuterol (VENTOLIN HFA) 90 mcg/actuation inhaler Take 2 Puffs by inhalation every four (4) hours as needed for Wheezing.  10/2/18  Yes Alison Carpio MD   glucose blood VI test strips (CONTOUR NEXT STRIPS) strip qd 5/23/17  Yes Alison Carpio MD   mometasone (NASONEX) 50 mcg/actuation nasal spray USE TWO SPRAY(S) IN EACH NOSTRIL DAILY 1/31/19   Alison Carpio MD     Patient Active Problem List   Diagnosis Code    Hypertension I10    [facility] C50.912    S/P MUKUND-BSO (total abdominal hysterectomy and bilateral salpingo-oophorectomy) Z90.710, Z90.722, Z90.79    Steatosis of liver K76.0    ACP (advance care planning) Z71.89    Dyslipidemia E78.5    Encounter for Hemoccult screening Z12.11    Normal cardiac stress test [id number]    Abdominal wall pain R10.9    S/P colonoscopic polypectomy Z98.890    Neck nodule R22.1    Asthma J45.909    Gasping for breath R06.89    Postsurgical menopause E89.40    Heartburn R12    Hyperuricemia w/o signs of inflam arthrit and tophaceous dis E79.0    Elevated serum creatinine R79.89    Hyperglycemia R73.9    Essential fatty acid (EFA) deficiency E63.0    Herpes simplex infection of genitourinary system A60.00    Back pain M54.9    Gastroenteritis K52.9    S/P colonoscopy Z98.890    Gastritis and duodenitis K29.90    Microscopic colitis K52.839    Shoulder pain, right M25.511    TMJ (temporomandibular joint syndrome) M26.609    Allergic rhinitis due to pollen J30.1     Patient Active Problem List    Diagnosis Date Noted    Allergic rhinitis due to pollen 07/24/2020    Shoulder pain, right     TMJ (temporomandibular joint syndrome)     S/P colonoscopy 11/22/2019    Gastritis and duodenitis 11/22/2019    Microscopic colitis 11/22/2019    Back pain 07/17/2019    Gastroenteritis 07/17/2019    Herpes simplex infection of genitourinary system 05/16/2019    Hyperglycemia 09/12/2018    Essential fatty acid (EFA) deficiency 09/12/2018    Gasping for breath 08/15/2018    Hyperuricemia w/o signs of inflam arthrit and tophaceous dis 08/15/2018    Elevated serum creatinine 08/15/2018    Asthma     Neck nodule 05/10/2018    Abdominal wall pain     Normal cardiac stress test 07/19/2017    Encounter for Hemoccult screening 06/02/2017    S/P colonoscopic polypectomy 02/07/2017    Heartburn 01/01/2017    Dyslipidemia     ACP (advance care planning) 04/04/2016    Steatosis of liver     Hypertension     Breast cancer, left (HCC)     S/P MUKUND-BSO (total abdominal hysterectomy and bilateral salpingo-oophorectomy)     Postsurgical menopause 01/01/2002     Current Outpatient Medications   Medication Sig Dispense Refill    hydroCHLOROthiazide (MICROZIDE) 12.5 mg capsule Take 1 Cap by mouth daily. 90 Cap 3    ezetimibe (ZETIA) 10 mg tablet Take 1 Tab by mouth daily. 30 Tab 11    albuterol (VENTOLIN HFA) 90 mcg/actuation inhaler Take 2 Puffs by inhalation every four (4) hours as needed for Wheezing. 1 Inhaler 11    glucose blood VI test strips (CONTOUR NEXT STRIPS) strip qd 100 Strip 11    mometasone (NASONEX) 50 mcg/actuation nasal spray USE TWO SPRAY(S) IN EACH NOSTRIL DAILY 1 Container 11     Allergies   Allergen Reactions    Contrave [Naltrexone-Bupropion] Other (comments)     1 PILL MORNING, 2 PILLS EVENING  FELT ZOMBIED, \"OUT OF MY HEAD\"    Crestor [Rosuvastatin] Other (comments)     Felt like it worsened her memory    Gadolinium-Containing Contrast Media Nausea and Vomiting    Lipitor [Atorvastatin] Other (comments)     Felt like it worsened memory.       REVIEW OF SYSTEMS as noted below except that noted in subjective:  General: negative for - chills or fever  ENT: negative for - headaches, nasal congestion or tinnitus  Respiratory: negative for - cough, hemoptysis, shortness of breath or wheezing  Cardiovascular : negative for - chest pain, edema, palpitations or shortness of breath  Gastrointestinal: negative for - abdominal pain, blood in stools, heartburn or nausea/vomiting  Genito-Urinary: no dysuria, trouble voiding, or hematuria  Musculoskeletal: negative for - gait disturbance, joint pain, joint stiffness or joint swelling  Neurological: no TIA or stroke symptoms  Hematologic: no bruises, no bleeding, no swollen glands  Integument: no lumps, mole changes, nail changes or rash  Endocrine:no malaise/lethargy or unexpected weight changes      Past Medical History:   Diagnosis Date    Asthma CHILDHOOD    Breast cancer, left ([address]     followed by gyn md cassius    Chest pain 2017    normal Stress Echo 17.  Dyslipidemia     Gastritis and duodenitis 2019    egd - md mina    GERD (gastroesophageal reflux disease)     Heartburn     Hx of colonoscopy 2019    colitis - md mina    Hyperglycemia 2016    Dr. Marco Ricketts Hypertension     Hypothyroid 2018    Microscopic colitis 2019    Neck nodule 05/10/2018    Obesity (BMI 30-39.9)     Dr. Viri Basurto, endo.  Postsurgical menopause     Dr. Gerald Sanders S/P colonoscopy 2019    egd - md mina neg    Shoulder pain, right     iggy yi md    Spider bite 2016    ? Leg, recurrent.  Steatosis of liver 2016    TMJ (temporomandibular joint syndrome)     Tubular adenoma of colon 2017    Dr. Armando Bowman    Uterine fibroid     Dr. Jed Estrada    Vitamin D deficiency 2018    Dr. Viri Basurto     Past Surgical History:   Procedure Laterality Date    COLONOSCOPY N/A 2019    COLONOSCOPY performed by Tati Nichols MD at \[facility]"" ENDOSCOPY    HX BREAST LUMPECTOMY Left     Atypical Cells. Dr. Katey Méndez.  HX  SECTION       x 2    HX COLONOSCOPY  2009, 17    with polypectomy. Dr. Armando Bowman.     HX REFRACTIVE SURGERY Bilateral 2000s    HX MUKUND AND BSO      due to uterine fibroids. Dr. Mariana Escobar     Family History   Problem Relation Age of Onset    Diabetes Mother     Obesity Mother     Other Father 54         FROM BEE ALLERGY    Obesity Sister         X7    Diabetes Brother     No Known Problems Maternal Grandmother     No Known Problems Maternal Grandfather     No Known Problems Paternal Grandmother     No Known Problems Paternal Grandfather     Sleep Apnea Sister     Diabetes Sister     Hypertension Sister     Thyroid Disease Sister         X 2    Diabetes Sister         X3 MORE SISTERS TAKING INSULIN    Diabetes Brother     Heart Disease Brother     Breast Cancer Sister 43    Schizophrenia Sister     Heart Disease Niece/nephew      Social History     Tobacco Use    Smoking status: Never Smoker    Smokeless tobacco: Never Used   Substance Use Topics    Alcohol use: Yes     Alcohol/week: 0.8 standard drinks     Types: 1 Glasses of wine per week     Comment: ocassional       ROS    Objective:   No flowsheet data found.      [INSTRUCTIONS:  \"[x]\" Indicates a positive item  \"[]\" Indicates a negative item  -- DELETE ALL ITEMS NOT EXAMINED]    Constitutional: [x] Appears well-developed and well-nourished [x] No apparent distress      [] Abnormal -     Mental status: [x] Alert and awake  [x] Oriented to person/place/time [x] Able to follow commands    [] Abnormal -     Eyes:   EOM    [x]  Normal    [] Abnormal -   Sclera  [x]  Normal    [] Abnormal -          Discharge [x]  None visible   [] Abnormal -     HENT: [x] Normocephalic, atraumatic  [] Abnormal -   [x] Mouth/Throat: Mucous membranes are moist    External Ears [x] Normal  [] Abnormal -    Neck: [x] No visualized mass [] Abnormal -     Pulmonary/Chest: [x] Respiratory effort normal   [x] No visualized signs of difficulty breathing or respiratory distress        [] Abnormal -      Musculoskeletal:   [x] Normal gait with no signs of ataxia [x] Normal range of motion of neck        [] Abnormal -     Neurological:        [x] No Facial Asymmetry (Cranial nerve 7 motor function) (limited exam due to video visit)          [x] No gaze palsy        [] Abnormal -          Skin:        [x] No significant exanthematous lesions or discoloration noted on facial skin         [] Abnormal -            Psychiatric:       [x] Normal Affect [] Abnormal -        [x] No Hallucinations    Other pertinent observable physical exam findings:-        We discussed the expected course, resolution and complications of the diagnosis(es) in detail. Medication risks, benefits, costs, interactions, and alternatives were discussed as indicated. I advised her to contact the office if her condition worsens, changes or fails to improve as anticipated. She expressed understanding with the diagnosis(es) and plan. Gerardo Blood, who was evaluated through a patient-initiated, synchronous (real-time) audio-video encounter, and/or her healthcare decision maker, is aware that it is a billable service, with coverage as determined by her insurance carrier. She provided verbal consent to proceed: Yes, and patient identification was verified. It was conducted pursuant to the emergency declaration under the [address] [place] [address], [address] [place] [address] authority and the [employer] and I Do Now I Don'tar General Act. A caregiver was present when appropriate. Ability to conduct physical exam was limited. I was at home. The patient was at home. Rossy Penny MD    Please note that portions of this dictation may have been recorded with voice recognition software. Some unanticipated grammatical, syntax, homophones, and other interpretive errors are inadvertently transcribed by the computer software. An attempt at proof reading has been made to minimize errors and omissions. Please disregard these errors. Thank you.

## 2020-07-24 NOTE — PROGRESS NOTES
Chief Complaint   Patient presents with    Hypertension     Pt presents for Htn 6 month follow up. Patient requesting referral to sleep study and a letter of accomodation for employer. 1. Have you been to the ER, urgent care clinic since your last visit? Hospitalized since your last visit? No    2. Have you seen or consulted any other health care providers outside of the [address] [place] [address] since your last visit? Include any pap smears or colon screening.  No

## 2020-07-31 DIAGNOSIS — K76.0 STEATOSIS OF LIVER: Primary | ICD-10-CM

## 2020-08-10 LAB
CHOLEST SERPL-MCNC: 228 MG/DL (ref 100–199)
HDLC SERPL-MCNC: 53 MG/DL
LDLC SERPL CALC-MCNC: 157 MG/DL (ref 0–99)
SARS-COV-2 ABS, NUCLEOCAPSID: NEGATIVE
T4 FREE SERPL-MCNC: 0.94 NG/DL (ref 0.82–1.77)
TRIGL SERPL-MCNC: 91 MG/DL (ref 0–149)
TSH SERPL DL<=0.005 MIU/L-ACNC: 4.61 UIU/ML (ref 0.45–4.5)
VLDLC SERPL CALC-MCNC: 18 MG/DL (ref 5–40)

## 2020-09-08 ENCOUNTER — VIRTUAL VISIT (OUTPATIENT)
Dept: SLEEP MEDICINE | Age: 60
End: 2020-09-08
Payer: COMMERCIAL

## 2020-09-08 ENCOUNTER — DOCUMENTATION ONLY (OUTPATIENT)
Dept: SLEEP MEDICINE | Age: 60
End: 2020-09-08

## 2020-09-08 DIAGNOSIS — I10 ESSENTIAL HYPERTENSION: ICD-10-CM

## 2020-09-08 DIAGNOSIS — G47.33 OBSTRUCTIVE SLEEP APNEA (ADULT) (PEDIATRIC): Primary | ICD-10-CM

## 2020-09-08 DIAGNOSIS — R73.03 PRE-DIABETES: ICD-10-CM

## 2020-09-08 PROCEDURE — 99204 OFFICE O/P NEW MOD 45 MIN: CPT | Performed by: INTERNAL MEDICINE

## 2020-09-08 NOTE — Clinical Note
Thank you for the referral.  I will keep you informed of her progress.   [address],  Alyssia Esteves

## 2020-09-08 NOTE — PATIENT INSTRUCTIONS
217 [facility]., [place]. 1668 [facility], [address] Tel.  [phone] Fax. 100 [facility] [address] [place], [address] [facility] Tel.  [phone] Fax. [phone] [address] Tel.  [phone] Fax. [phone] Sleep Apnea: After Your Visit Your Care Instructions Sleep apnea occurs when you frequently stop breathing for 10 seconds or longer during sleep. It can be mild to severe, based on the number of times per hour that you stop breathing or have slowed breathing. Blocked or narrowed airways in your nose, mouth, or throat can cause sleep apnea. Your airway can become blocked when your throat muscles and tongue relax during sleep. Sleep apnea is common, occurring in 1 out of 20 individuals. Individuals having any of the following characteristics should be evaluated and treated right away due to high risk and detrimental consequences from untreated sleep apnea: 
1. Obesity 2. Congestive Heart failure 3. Atrial Fibrillation 4. Uncontrolled Hypertension 5. Type II Diabetes 6. Night-time Arrhythmias 7. Stroke 8. Pulmonary Hypertension 9. High-risk Driving Populations (pilots, truck drivers, etc.) 10. Patients Considering Weight-loss Surgery How do you know you have sleep apnea? You probably have sleep apnea if you answer 'yes' to 3 or more of the following questions: S - Have you been told that you Snore? T - Are you often Tired during the day? O - Has anyone Observed you stop breathing while sleeping? P- Do you have (or are being treated for) high blood Pressure? B - Are you obese (Body Mass Index > 35)? A - Is your Age 48years old or older? N - Is your Neck size greater than 16 inches? G - Are you male Gender? A sleep physician can prescribe a breathing device that prevents tissues in the throat from blocking your airway.  Or your doctor may recommend using a dental device (oral breathing device) to help keep your airway open. In some cases, surgery may be needed to remove enlarged tissues in the throat. Follow-up care is a key part of your treatment and safety. Be sure to make and go to all appointments, and call your doctor if you are having problems. It's also a good idea to know your test results and keep a list of the medicines you take. How can you care for yourself at home? · Lose weight, if needed. It may reduce the number of times you stop breathing or have slowed breathing. · Go to bed at the same time every night. · Sleep on your side. It may stop mild apnea. If you tend to roll onto your back, sew a pocket in the back of your pajama top. Put a tennis ball into the pocket, and stitch the pocket shut. This will help keep you from sleeping on your back. · Avoid alcohol and medicines such as sleeping pills and sedatives before bed. · Do not smoke. Smoking can make sleep apnea worse. If you need help quitting, talk to your doctor about stop-smoking programs and medicines. These can increase your chances of quitting for good. · Prop up the head of your bed 4 to 6 inches by putting bricks under the legs of the bed. · Treat breathing problems, such as a stuffy nose, caused by a cold or allergies. · Use a continuous positive airway pressure (CPAP) breathing machine if lifestyle changes do not help your apnea and your doctor recommends it. The machine keeps your airway from closing when you sleep. · If CPAP does not help you, ask your doctor whether you should try other breathing machines. A bilevel positive airway pressure machine has two types of air pressureâone for breathing in and one for breathing out. Another device raises or lowers air pressure as needed while you breathe. · If your nose feels dry or bleeds when using one of these machines, talk with your doctor about increasing moisture in the air. A humidifier may help.  
· If your nose is runny or stuffy from using a breathing machine, talk with your doctor about using decongestants or a corticosteroid nasal spray. When should you call for help? Watch closely for changes in your health, and be sure to contact your doctor if: 
· You still have sleep apnea even though you have made lifestyle changes. · You are thinking of trying a device such as CPAP. · You are having problems using a CPAP or similar machine. Where can you learn more? Go to [employer]. Enter K570 in the search box to learn more about \"Sleep Apnea: After Your Visit. \"  
© 4667-2933 Healthwise, Incorporated. Care instructions adapted under license by [facility] (which disclaims liability or warranty for this information). This care instruction is for use with your licensed healthcare professional. If you have questions about a medical condition or this instruction, always ask your healthcare professional. Shannan Ply any warranty or liability for your use of this information. PROPER SLEEP HYGIENE What to avoid · Do not have drinks with caffeine, such as coffee or black tea, for 8 hours before bed. · Do not smoke or use other types of tobacco near bedtime. Nicotine is a stimulant and can keep you awake. · Avoid drinking alcohol late in the evening, because it can cause you to wake in the middle of the night. · Do not eat a big meal close to bedtime. If you are hungry, eat a light snack. · Do not drink a lot of water close to bedtime, because the need to urinate may wake you up during the night. · Do not read or watch TV in bed. Use the bed only for sleeping and sexual activity. What to try · Go to bed at the same time every night, and wake up at the same time every morning. Do not take naps during the day. · Keep your bedroom quiet, dark, and cool. · Get regular exercise, but not within 3 to 4 hours of your bedtime. Andriy Ott · Sleep on a comfortable pillow and mattress. · If watching the clock makes you anxious, turn it facing away from you so you cannot see the time. · If you worry when you lie down, start a worry book. Well before bedtime, write down your worries, and then set the book and your concerns aside. · Try meditation or other relaxation techniques before you go to bed. · If you cannot fall asleep, get up and go to another room until you feel sleepy. Do something relaxing. Repeat your bedtime routine before you go to bed again. · Make your house quiet and calm about an hour before bedtime. Turn down the lights, turn off the TV, log off the computer, and turn down the volume on music. This can help you relax after a busy day. Drowsy Driving The [place] [address] cites drowsiness as a causing factor in more than 430,036 police reported crashes annually, resulting in 76,000 injuries and 1,500 deaths. Other surveys suggest 55% of people polled have driven while drowsy in the past year, 23% had fallen asleep but not crashed, 3% crashed, and 2% had and accident due to drowsy driving. Who is at risk? Young Drivers: One study of drowsy driving accidents states that 55% of the drivers were under 25 years. Of those, 75% were male. Shift Workers and Travelers: People who work overnight or travel across time zones frequently are at higher risk of experiencing Circadian Rhythm Disorders. They are trying to work and function when their body is programed to sleep. Sleep Deprived: Lack of sleep has a serious impact on your ability to pay attention or focus on a task. Consistently getting less than the average of 8 hours your body needs creates partial or cumulative sleep deprivation. Untreated Sleep Disorders: Sleep Apnea, Narcolepsy, R.L.S., and other sleep disorders (untreated) prevent a person from getting enough restful sleep.  This leads to excessive daytime sleepiness and increases the risk for drowsy driving accidents by up to 7 times. Medications / Alcohol: Even over the counter medications can cause drowsiness. Medications that impair a drivers attention should have a warning label. Alcohol naturally makes you sleepy and on its own can cause accidents. Combined with excessive drowsiness its effects are amplified. Signs of Drowsy Driving: * You don't remember driving the last few miles * You may drift out of your art * You are unable to focus and your thoughts wander * You may yawn more often than normal 
 * You have difficulty keeping your eyes open / nodding off * Missing traffic signs, speeding, or tailgating Prevention-  
Good sleep hygiene, lifestyle and behavioral choices have the most impact on drowsy driving. There is no substitute for sleep and the average person requires 8 hours nightly. If you find yourself driving drowsy, stop and sleep. Consider the sleep hygiene tips provided during your visit as well. Medication Refill Policy: Refills for all medications require 1 week advance notice. Please have your pharmacy fax a refill request. We are unable to fax, or call in \"controled substance\" medications and you will need to pick these prescriptions up from our office. "[employer] Activation Thank you for requesting access to "[employer]". Please follow the instructions below to securely access and download your online medical record. "[employer] allows you to send messages to your doctor, view your test results, renew your prescriptions, schedule appointments, and more. How Do I Sign Up? 1. In your internet browser, go to [email or web address]. 2. Click on the First Time User? Click Here link in the Sign In box. You will see the New Member Sign Up page. 3. Enter your "[employer] Access Code exactly as it appears below. You will not need to use this code after youve completed the sign-up process.  If you do not sign up before the expiration date, you must request a new code. [employer] Access Code: Activation code not generated Current Next [employer] Status: Active (This is the date your [employer] access code will ) 4. Enter the last four digits of your Social Security Number (xxxx) and Date of Birth (mm/dd/yyyy) as indicated and click Submit. You will be taken to the next sign-up page. 5. Create a [place] App[employer] ID. This will be your Next [employer] login ID and cannot be changed, so think of one that is secure and easy to remember. 6. Create a [employer] password. You can change your password at any time. 7. Enter your Password Reset Question and Answer. This can be used at a later time if you forget your password. 8. Enter your e-mail address. You will receive e-mail notification when new information is available in [address] 9. Click Sign Up. You can now view and download portions of your medical record. 10. Click the Download Summary menu link to download a portable copy of your medical information. Additional Information If you have questions, please call [phone]. Remember, [employer] is NOT to be used for urgent needs. For medical emergencies, dial 911.

## 2020-09-08 NOTE — PROGRESS NOTES
217 [facility]., [place]. [place], [address]  Tel.  [phone]  Fax. 100 [facility] 60  [place], 200 [address] [facility]  Tel.  [phone]  Fax. [phone] [address] [place] [address]  Tel.  [phone]  Fax. [phone]         Subjective:        Telemedicine visit performed with verbal consent of the patient. Patient called and identity confirmed with 2 patient identifers    Patient was seen at home  Doc Tamez is a 60 y.o. female who was seen by synchronous (real-time) audio-video technology on 9/8/2020. Consent:  She and/or her healthcare decision maker is aware that this patient-initiated Telehealth encounter is the equivalent to a face to face encounter in the sleep disorder center and has provided verbal consent to proceed: Yes    I was at home while conducting this encounter. Doc Tamez is an 60 y.o. female referred for evaluation for a sleep disorder. She complains of snoring, snorting associated with excessive daytime sleepiness. Symptoms began several years ago, gradually worsening since that time. She usually can fall asleep in variable minutes. Family or house members note snoring, snorting, periods of not breathing. She denies falling asleep while driving. Doc Tamez does wake up frequently at night. She is bothered by waking up too early and left unable to get back to sleep. She actually sleeps about 5 hours at night and wakes up about 4 times during the night. She does not work shifts: [facility] indicates she does get too little sleep at night. Her bedtime is 1000. She awakens at 0530. She does not take naps. . She has the following observed behaviors: Loud snoring, Light snoring, Pauses in breathing, Grinding teeth; (waking with a gasp or snort).   Other remarks:      Lincoln Sleepiness Score: 5     Allergies   Allergen Reactions    Contrave [Naltrexone-Bupropion] Other (comments)     1 PILL MORNING, 2 PILLS EVENING  FELT ZOMBIED, \"OUT OF MY HEAD\"    Crestor [Rosuvastatin] Other (comments)     Felt like it worsened her memory    Gadolinium-Containing Contrast Media Nausea and Vomiting    Lipitor [Atorvastatin] Other (comments)     Felt like it worsened memory. Current Outpatient Medications:     hydroCHLOROthiazide (MICROZIDE) 12.5 mg capsule, Take 1 Cap by mouth daily. , Disp: 90 Cap, Rfl: 3    ezetimibe (ZETIA) 10 mg tablet, Take 1 Tab by mouth daily. , Disp: 30 Tab, Rfl: 11    mometasone (NASONEX) 50 mcg/actuation nasal spray, USE TWO SPRAY(S) IN EACH NOSTRIL DAILY, Disp: 1 Container, Rfl: 11    albuterol (VENTOLIN HFA) 90 mcg/actuation inhaler, Take 2 Puffs by inhalation every four (4) hours as needed for Wheezing., Disp: 1 Inhaler, Rfl: 11    glucose blood VI test strips (CONTOUR NEXT STRIPS) strip, qd, Disp: 100 Strip, Rfl: 11     She  has a past medical history of Asthma (CHILDHOOD), Breast cancer, left ([address] (), Chest pain (2017), Dyslipidemia, Gastritis and duodenitis (2019), GERD (gastroesophageal reflux disease), Heartburn (), colonoscopy (2019), Hyperglycemia (2016), Hypertension, Hypothyroid (2018), Microscopic colitis (2019), Neck nodule (05/10/2018), Obesity (BMI 30-39.9) (), Postsurgical menopause (), S/P colonoscopy (2019), Shoulder pain, right, Spider bite (), Steatosis of liver (2016), TMJ (temporomandibular joint syndrome), Tubular adenoma of colon (2017), Uterine fibroid (), and Vitamin D deficiency (2018). She  has a past surgical history that includes hx colonoscopy (2009, 17); hx refractive surgery (Bilateral, ); hx breast lumpectomy (Left, ); hx dat and bso (); hx tubal ligation (); hx  section; and colonoscopy (N/A, 2019).     She family history includes Breast Cancer (age of onset: 43) in her sister; Diabetes in her brother, brother, mother, sister, and sister; Heart Disease in her brother and niece/nephew; Hypertension in her sister; No Known Problems in her maternal grandfather, maternal grandmother, paternal grandfather, and paternal grandmother; Obesity in her mother and sister; Other (age of onset: 54) in her father; Schizophrenia in her sister; Sleep Apnea in her sister; Thyroid Disease in her sister. She  reports that she has never smoked. She has never used smokeless tobacco. She reports current alcohol use of about 0.8 standard drinks of alcohol per week. She reports that she does not use drugs. Review of Systems:  Constitutional: mild weight gain  Eyes:  No blurred vision  CVS:  No significant chest pain  Pulm:  No significant shortness of breath  GI:  No significant nausea or vomiting, occasional GERD  : +significant nocturia  Musculoskeletal:  No significant joint pain at night  Skin:  No significant rashes  Neuro:  No significant dizziness   Psych:  No active mood issues    Sleep Review of Systems: notable for some difficulty falling asleep; infrequent awakenings at night;  + dreaming noted; no nightmares ; + early morning headaches; + memory problems; no concentration issues; Objective: There were no vitals taken for this visit.       Vital Signs: (As obtained by patient/caregiver at home)        Constitutional: [x] Appears well-developed and well-nourished [x] No apparent distress      [] Abnormal -     Mental status: [x] Alert and awake  [x] Oriented to person/place/time [x] Able to follow commands    [] Abnormal -     Eyes:   EOM    [x]  Normal    [] Abnormal -   Sclera  [x]  Normal    [] Abnormal -          Discharge [x]  None visible   [] Abnormal -     HENT: [x] Normocephalic, atraumatic  [] Abnormal -   [x] Mouth/Throat: Mucous membranes are moist               Class 3 oropharynx, thick tongue base,                    Low-lying soft palate,absent retrognathia    External Ears [x] Normal  [] Abnormal -    Neck: [x] No visualized mass [] Abnormal -     Pulmonary/Chest: [x] Respiratory effort normal   [x] No visualized signs of difficulty breathing or respiratory distress        [] Abnormal -       Neurological:        [x] No Facial Asymmetry (Cranial nerve 7 motor function) (limited exam due to video visit)          [x] No gaze palsy        [] Abnormal -          Skin:        [x] No significant exanthematous lesions or discoloration noted on facial skin         [] Abnormal -            Psychiatric:       [x] Normal Affect [] Abnormal -       Other pertinent observable physical exam findings:-          Assessment:       ICD-10-CM ICD-9-CM    1. Obstructive sleep apnea (adult) (pediatric)  G47.33 327.23 SLEEP STUDY UNATTENDED, 4 CHANNEL   2. Essential hypertension  I10 401.9    3. Pre-diabetes  R73.03 790.29          Plan:       * Sleep testing was ordered for initial evaluation. * She was provided information on sleep apnea including coresponding risk factors and the importance of proper treatment. * Treatment options for sleep apnea were reviewed today. Patient agrees to a trial of APAP therapy if indicated. * Counseling was provided regarding proper sleep hygiene, appropriate sleep schedule, need for sleep environment safety and safe driving. * Effect of sleep disturbance on weight was reviewed. We have recommended a dedicated weight loss through appropriate diet and an exercise regimen as significant weight reduction has been shown to reduce severity of obstructive sleep apnea. * Patient agrees to telephone follow-up by sleep technologist shortly after sleep study to review results and plan final management. 2. Hypertension - she continues on her current regimen. I have reviewed the relationship between hypertension as it relates to sleep-disordered breathing. 3. Pre-diabetes- - she continues on her current regimen.   I have reviewed the relationship between sleep disordered breathing as it relates to diabetes. The treatment plan was reviewed with the patient in detail and reviewed with the patient . she understands that the lead technologist will be calling her  with the results and assisting with the next step in the treatment plan as outlined today during the consultation with me. All of her questions were addressed. Thank you for allowing us to participate in your patient's medical care. We'll keep you updated on these investigations. Pursuant to the emergency declaration under the [address] [place] [address], [facility] waiver authority and the [employer] Act, this Virtual  Visit was conducted, with patient's consent, to reduce the patient's risk of exposure to COVID-19 and provide continuity of care for an established patient. Services were provided through a video synchronous discussion virtually to substitute for in-person clinic visit.     Anyi Reyes MD    Electronically signed by    Amira Mckeon MD  Diplomate in Sleep Medicine  [facility]

## 2020-09-16 ENCOUNTER — HOSPITAL ENCOUNTER (OUTPATIENT)
Dept: SLEEP MEDICINE | Age: 60
Discharge: HOME | End: 2020-09-16
Payer: COMMERCIAL

## 2020-09-16 PROCEDURE — 95806 SLEEP STUDY UNATT&RESP EFFT: CPT | Performed by: INTERNAL MEDICINE

## 2020-09-23 ENCOUNTER — TELEPHONE (OUTPATIENT)
Dept: SLEEP MEDICINE | Age: 60
End: 2020-09-23

## 2020-09-23 DIAGNOSIS — G47.33 OBSTRUCTIVE SLEEP APNEA (ADULT) (PEDIATRIC): Primary | ICD-10-CM

## 2020-09-23 NOTE — TELEPHONE ENCOUNTER
Results of sleep study in R-Character Booster  Lead tech to convey results to patient  HSAT results in R-Character Booster. Test positive for signficant sleep apnea. AHI 9/hour and lowest oxygen saturation was 86%. had discussed treatment options at initial consultation. Based on the results of the home sleep apnea test, I believe a trial of APAP would be an effective mode of therapy. APAP order attached. she should be seen in the sleep disorder center 4-6 weeks after initiating PAP therapy. The APAP will have modem access so she can call the sleep center if she  has questions/concerns regarding the initial PAP settings. She should avoid sleeping on her back until she gets her PAP device as the apnea is more severe when on her back. Front staff to Order PAP and call patient and let them know which DME company they should be hearing from after results reviewed with lead support technologist.   Schedule for first adherence visit in 6 weeks.

## 2020-10-15 NOTE — TELEPHONE ENCOUNTER
Reviewed sleep study results with patient. She expressed understanding and is willing to proceed with a trial of APAP. Fax DME order & Schedule 1st adherence visit in 60 to 90 days.        Mikayla Quinteros,RRT,RPSGT, CSE

## 2020-12-08 ENCOUNTER — OFFICE VISIT (OUTPATIENT)
Dept: INTERNAL MEDICINE CLINIC | Age: 60
End: 2020-12-08
Payer: COMMERCIAL

## 2020-12-08 VITALS
BODY MASS INDEX: 34.01 KG/M2 | HEIGHT: 65 IN | WEIGHT: 204.1 LBS | TEMPERATURE: 98.4 F | HEART RATE: 85 BPM | OXYGEN SATURATION: 92 % | SYSTOLIC BLOOD PRESSURE: 113 MMHG | RESPIRATION RATE: 20 BRPM | DIASTOLIC BLOOD PRESSURE: 77 MMHG

## 2020-12-08 DIAGNOSIS — I10 ESSENTIAL HYPERTENSION: ICD-10-CM

## 2020-12-08 DIAGNOSIS — K76.0 STEATOSIS OF LIVER: Primary | ICD-10-CM

## 2020-12-08 DIAGNOSIS — C50.912 MALIGNANT NEOPLASM OF LEFT FEMALE BREAST, UNSPECIFIED ESTROGEN RECEPTOR STATUS, UNSPECIFIED SITE OF BREAST (HCC): ICD-10-CM

## 2020-12-08 DIAGNOSIS — E78.5 DYSLIPIDEMIA: ICD-10-CM

## 2020-12-08 PROCEDURE — 99214 OFFICE O/P EST MOD 30 MIN: CPT | Performed by: INTERNAL MEDICINE

## 2020-12-08 NOTE — PROGRESS NOTES
SPORTS MEDICINE AND PRIMARY CARE  Karen Gloria MD, [address] [address]  Phone:  [phone]  Fax: [phone]       Chief Complaint   Patient presents with    Hypertension   . SUBJECTIVE:    Glenis Kaur is a 63 y.o. female Patient returns today with a known history of hepatic steatosis, dyslipidemia with some statin intolerance, left breast cancer, primary hypertension, and is seen for evaluation. Patient returns today concerned about her weight and is in a program to lose weight again. She is also concerned about her breasts. They asked her to repeat the breast on the ipsilateral side. We recall she had breast cancer, had a lumpectomy and is followed by a physician at [facility] for her breast cancer and is seen for evaluation. Current Outpatient Medications   Medication Sig Dispense Refill    hydroCHLOROthiazide (MICROZIDE) 12.5 mg capsule Take 1 Cap by mouth daily. 90 Cap 3    ezetimibe (ZETIA) 10 mg tablet Take 1 Tab by mouth daily. 30 Tab 11    mometasone (NASONEX) 50 mcg/actuation nasal spray USE TWO SPRAY(S) IN EACH NOSTRIL DAILY 1 Container 11    albuterol (VENTOLIN HFA) 90 mcg/actuation inhaler Take 2 Puffs by inhalation every four (4) hours as needed for Wheezing. 1 Inhaler 11    glucose blood VI test strips (CONTOUR NEXT STRIPS) strip qd 100 Strip 11     Past Medical History:   Diagnosis Date    Asthma CHILDHOOD    Breast cancer, left ([address] 2005    followed by gyn md cassius    Chest pain 07/11/2017    normal Stress Echo 07/19/17.     Dyslipidemia     Gastritis and duodenitis 11/22/2019    egd - md mina    GERD (gastroesophageal reflux disease)     Heartburn 2017    Hx of colonoscopy 11/22/2019    colitis - md mina    Hyperglycemia 04/16/2016    Dr. Brandi Thomas Hypertension     Hypothyroid 08/2018    Microscopic colitis 11/22/2019    Neck nodule 05/10/2018    Obesity (BMI 30-39.9) 2017    Dr. Magali Nicole Jimmie Alexander.  PITER (obstructive sleep apnea)     Postsurgical menopause     Dr. Moi Angulo S/P colonoscopy 2019    egd - md mina neg    Shoulder pain, right     iggy yi md    Spider bite 2016    ? Leg, recurrent.  Steatosis of liver 2016    TMJ (temporomandibular joint syndrome)     Tubular adenoma of colon 2017    Dr. Nadia Pham    Uterine fibroid     Dr. Radha Meredith    Vitamin D deficiency 2018    Dr. Parviz Jules     Past Surgical History:   Procedure Laterality Date    COLONOSCOPY N/A 2019    COLONOSCOPY performed by Jere Pradhan MD at hospitals ENDOSCOPY    HX BREAST LUMPECTOMY Left     Atypical Cells. Dr. Murtaza Andino.  HX  SECTION       x 2    HX COLONOSCOPY  2009, 17    with polypectomy. Dr. Nadia Pham.  HX REFRACTIVE SURGERY Bilateral 2000s    HX MUKUND AND BSO      due to uterine fibroids. Dr. Patsy Meredith     Allergies   Allergen Reactions    Contrave [Naltrexone-Bupropion] Other (comments)     1 PILL MORNING, 2 PILLS EVENING  FELT ZOMBIED, \"OUT OF MY HEAD\"    Crestor [Rosuvastatin] Other (comments)     Felt like it worsened her memory    Gadolinium-Containing Contrast Media Nausea and Vomiting    Lipitor [Atorvastatin] Other (comments)     Felt like it worsened memory.           REVIEW OF SYSTEMS:  General: negative for - chills or fever  ENT: negative for - headaches, nasal congestion or tinnitus  Respiratory: negative for - cough, hemoptysis, shortness of breath or wheezing  Cardiovascular : negative for - chest pain, edema, palpitations or shortness of breath  Gastrointestinal: negative for - abdominal pain, blood in stools, heartburn or nausea/vomiting  Genito-Urinary: no dysuria, trouble voiding, or hematuria  Musculoskeletal: negative for - gait disturbance, joint pain, joint stiffness or joint swelling  Neurological: no TIA or stroke symptoms  Hematologic: no bruises, no bleeding, no swollen glands  Integument: no lumps, mole changes, nail changes or rash  Endocrine: no malaise/lethargy or unexpected weight changes      Social History     Socioeconomic History    Marital status:      Spouse name: Not on file    Number of children: Not on file    Years of education: Not on file    Highest education level: Not on file   Tobacco Use    Smoking status: Never Smoker    Smokeless tobacco: Never Used   Substance and Sexual Activity    Alcohol use: Yes     Alcohol/week: 0.8 standard drinks     Types: 1 Glasses of wine per week     Comment: ocassional    Drug use: No    Sexual activity: Yes     Partners: Male     Birth control/protection: Surgical     Comment: TL   Social History Narrative    Family History: Mother: , Mario Makeda: , Bee stingSister(s):   yrs, Breast Cancer at 42Brother(s):    , Heart Problems1 brother(s) , 7 sister(s) - healthy. 1 son(s) , 1 daughter(s) - healthy. Social History: Alcohol Use Patient does not use alcohol. Smoking Status Patient is a never smoker. Caffeine: coffee, tea. Marital Status:    . Lives w ith: spouse Bela Crowder. Occupation/W ork: office w ork. Medical History: Hypertension, Left Breast papilloma. Gyn History: Last mammogram date 10/28/2014. Last pap date 10/22/2012. Hysterectomy Date . OB History: Total pregnancies 2. Full term delivery (>37 w eeks) 2. Live births 2. C section(s) 2. Pregnancy # 1: , Girl, 11. Pregnancy # 2: Repeat C/S, boy, . Surgical History: hysterectomy, total abdominal w ith BSO , Left w rist surgery , colonoscopy [facility] and 2009, left lumpectomy .      Family History   Problem Relation Age of Onset    Diabetes Mother     Obesity Mother     Other Father 54         FROM BEE ALLERGY    Obesity Sister         X7    Diabetes Brother     No Known Problems Maternal Grandmother  No Known Problems Maternal Grandfather     No Known Problems Paternal Grandmother     No Known Problems Paternal Grandfather     Sleep Apnea Sister     Diabetes Sister     Hypertension Sister     Thyroid Disease Sister         X 2    Diabetes Sister         X3 MORE SISTERS TAKING INSULIN    Diabetes Brother     Heart Disease Brother     Breast Cancer Sister 43    Schizophrenia Sister     Heart Disease Niece/nephew        OBJECTIVE:    Visit Vitals  /77   Pulse 85   Temp 98.4 °F (36.9 °C) (Oral)   Resp 20   Ht 5' 5\" (1.651 m)   Wt 204 lb 1.6 oz (92.6 kg)   SpO2 92%   BMI 33.96 kg/m²     CONSTITUTIONAL: well , well nourished, appears age appropriate  EYES: perrla, eom intact  ENMT:moist mucous membranes, pharynx clear  NECK: supple. Thyroid normal  RESPIRATORY: Chest: clear bilaterally   CARDIOVASCULAR: Heart: regular rate and rhythm  GASTROINTESTINAL: Abdomen: soft, bowel sounds active  HEMATOLOGIC: no pathological lymph nodes palpated  MUSCULOSKELETAL: Extremities: no edema, pulse 1+   INTEGUMENT: No unusual rashes or suspicious skin lesions noted. Nails appear normal.  NEUROLOGIC: non-focal exam   MENTAL STATUS: alert and oriented, appropriate affect           ASSESSMENT:  1. Steatosis of liver    2. Dyslipidemia    3. Malignant neoplasm of left female breast, unspecified estrogen receptor status, unspecified site of breast ([address]    4. Essential hypertension      Patient is concerned about her fatty liver and wanted to try something to try to decrease fat in her liver. We suggest a heart healthy diet is probably the best answer that would help currently. We will continue to follow LFTs and if they become abnormal we will refer her back to the Siria Wood, who is also following her for this. She was intolerant of two statins. Her cholesterol remains slightly elevated. She is on Zetia currently for her dyslipidemia. History of left breast cancer.   Now she is very concerned for recurrent disease as they have asked her to come back for repeat mammogram.  I do not have the reports of the mammograms and comment can be made. If she has recurrent disease she is considering bilateral mastectomies. She has talked to her  about it. BP control is excellent. We encouraged, in addition to diet, physical activity 30 minutes five days a week. Lab studies will be performed sometime after January. She will be back to see us in three to four months or as needed. I have discussed the diagnosis with the patient and the intended plan as seen in the  Orders. The patient understands and agees with the plan. The patient has   received an after visit summary and questions were answered concerning  future plans  Patient labs and/or xrays were reviewed  Past records were reviewed. PLAN:  . No orders of the defined types were placed in this encounter. Follow-up and Dispositions    · Return in about 4 months (around 4/8/2021). ATTENTION:   This medical record was transcribed using an electronic medical records system. Although proofread, it may and can contain electronic and spelling errors. Other human spelling and other errors may be present. Corrections may be executed at a later time. Please feel free to contact us for any clarifications as needed.

## 2020-12-08 NOTE — PROGRESS NOTES
1. Have you been to the ER, urgent care clinic since your last visit? Hospitalized since your last visit? No    2. Have you seen or consulted any other health care providers outside of the [address] [place] [address] since your last visit? Include any pap smears or colon screening.  No       Wants to discuss mammogram and fatty liver

## 2020-12-14 ENCOUNTER — VIRTUAL VISIT (OUTPATIENT)
Dept: SLEEP MEDICINE | Age: 60
End: 2020-12-14
Payer: COMMERCIAL

## 2020-12-14 DIAGNOSIS — R73.03 PRE-DIABETES: ICD-10-CM

## 2020-12-14 DIAGNOSIS — I10 ESSENTIAL HYPERTENSION: ICD-10-CM

## 2020-12-14 DIAGNOSIS — G47.33 OBSTRUCTIVE SLEEP APNEA (ADULT) (PEDIATRIC): Primary | ICD-10-CM

## 2020-12-14 PROCEDURE — 99213 OFFICE O/P EST LOW 20 MIN: CPT | Performed by: INTERNAL MEDICINE

## 2020-12-14 NOTE — PROGRESS NOTES
[address] [facility] [address]. [place], [address]  Tel.  [phone]  Fax. 100 [facility] 60  [place], [address] [facility]  Tel.  [phone]  Fax. [phone] [address] [facility] Mone Caraballo  Tel.  [phone]  Fax. [phone]       Telemedicine visit performed with verbal consent of the patient. Patient called and identity confirmed with 2 patient identifers    Patient was seen at home  Yung Diana is a 63 y.o. female who was seen by synchronous (real-time) audio-video technology on 12/14/2020. Consent:  She and/or her healthcare decision maker is aware that this patient-initiated Telehealth encounter is the equivalent to a face to face encounter in the sleep disorder center and has provided verbal consent to proceed: Yes    I was at home while conducting this encounter. S>Teagan Luciano is a 63 y.o. female seen at this telemedicine visit for a positive airway pressure follow-up. She reports some problems using the device. She is 87% compliant over the past 30 days. The following problems are identified:    Drowsiness no Problems exhaling no   Snoring no Forget to put on no   Mask Comfortable Not really, but likes full face mask better than pillows  Can't fall asleep no   Dry Mouth At times Mask falls off no   Air Leaking yes Frequent awakenings no       Download reviewed. She admits that her sleep has improved on PAP therapy using full mask and heated tubing. Allergies   Allergen Reactions    Contrave [Naltrexone-Bupropion] Other (comments)     1 PILL MORNING, 2 PILLS EVENING  FELT ZOMBIED, \"OUT OF MY HEAD\"    Crestor [Rosuvastatin] Other (comments)     Felt like it worsened her memory    Gadolinium-Containing Contrast Media Nausea and Vomiting    Lipitor [Atorvastatin] Other (comments)     Felt like it worsened memory.         She has a current medication list which includes the following prescription(s): hydrochlorothiazide, ezetimibe, mometasone, albuterol, and glucose blood vi test strips. .      She  has a past medical history of Asthma (CHILDHOOD), Breast cancer, left ([address] (2005), Chest pain (07/11/2017), Dyslipidemia, Gastritis and duodenitis (11/22/2019), GERD (gastroesophageal reflux disease), Heartburn (2017), colonoscopy (11/22/2019), Hyperglycemia (04/16/2016), Hypertension, Hypothyroid (08/2018), Microscopic colitis (11/22/2019), Neck nodule (05/10/2018), Obesity (BMI 30-39.9) (2017), PITER (obstructive sleep apnea), Postsurgical menopause (2002), S/P colonoscopy (11/22/2019), Shoulder pain, right, Spider bite (2016), Steatosis of liver (06/19/2016), TMJ (temporomandibular joint syndrome), Tubular adenoma of colon (02/2017), Uterine fibroid (2002), and Vitamin D deficiency (04/18/2018). [place] Sleepiness Score: 2   and     which reflect improved sleep quality over therapy time. O>      There were no vitals taken for this visit.       Vital Signs: (As obtained by patient/caregiver at home)        Constitutional: [x] Appears well-developed and well-nourished [x] No apparent distress      [] Abnormal -     Mental status: [x] Alert and awake  [x] Oriented to person/place/time [x] Able to follow commands    [] Abnormal -     Eyes:   EOM    [x]  Normal    [] Abnormal -   Sclera  [x]  Normal    [] Abnormal -          Discharge [x]  None visible   [] Abnormal -     HENT: [x] Normocephalic, atraumatic  [] Abnormal -     External Ears [x] Normal  [] Abnormal -    Neck: [x] No visualized mass [] Abnormal -     Pulmonary/Chest: [x] Respiratory effort normal   [x] No visualized signs of difficulty breathing or respiratory distress        [] Abnormal -       Neurological:        [x] No Facial Asymmetry (Cranial nerve 7 motor function) (limited exam due to video visit)          [x] No gaze palsy        [] Abnormal -          Skin:        [x] No significant exanthematous lesions or discoloration noted on facial skin         [] Abnormal - Psychiatric:       [x] Normal Affect [] Abnormal -        Other pertinent observable physical exam findings:-            A>    ICD-10-CM ICD-9-CM    1. Obstructive sleep apnea (adult) (pediatric)  G47.33 327.23    2. Essential hypertension  I10 401.9    3. Pre-diabetes  R73.03 790.29      AHI = 9 (9-20). On CPAP, Respironics :  5-10 cmH2O. Compliant:      yes    Therapeutic Response:  Positive    P>    she is compliant with PAP therapy and PAP continues to benefit patient and remains necessary for control of her sleep apnea. CPAP setting - change to to 6-10 cmH20     * We have recommended a dedicated weight loss through appropriate diet and an exercise regimen as significant weight reduction has been shown to reduce severity of obstructive sleep apnea. * She was asked to contact our office for any problems regarding PAP therapy. * Counseling was provided regarding the importance of regular PAP use and on proper sleep hygiene and safe driving. * Re-enforced proper and regular cleaning for the device. 2. Hypertension - she continues on her current regimen. I have reviewed the relationship between hypertension as it relates to sleep-disordered breathing. 3. Pre- diabetes - she continues on her current regimen. I have reviewed the relationship between sleep disordered breathing as it relates to diabetes. All of her questions were addressed. Pursuant to the emergency declaration under the [facility] waiver authority and the [employer] Act, this Virtual  Visit was conducted, with patient's consent, to reduce the patient's risk of exposure to COVID-19 and provide continuity of care for an established patient. Services were provided through a video synchronous discussion virtually to substitute for in-person clinic visit.     Rayfield Hodgkin, MD    Electronically signed by    Mary Swanson Mook Mckeon MD  Diplomate in Sleep Medicine  [facility]

## 2020-12-14 NOTE — PATIENT INSTRUCTIONS
[address] [facility] [address] [place] [facility], [address] Tel.  [phone] Fax. 100 [facility] 60 [place], [address] [facility] Tel.  [phone] Fax. [phone] [address] [facility] Mnoe Caraballo Tel.  [phone] Fax. [phone] PROPER SLEEP HYGIENE What to avoid · Do not have drinks with caffeine, such as coffee or black tea, for 8 hours before bed. · Do not smoke or use other types of tobacco near bedtime. Nicotine is a stimulant and can keep you awake. · Avoid drinking alcohol late in the evening, because it can cause you to wake in the middle of the night. · Do not eat a big meal close to bedtime. If you are hungry, eat a light snack. · Do not drink a lot of water close to bedtime, because the need to urinate may wake you up during the night. · Do not read or watch TV in bed. Use the bed only for sleeping and sexual activity. What to try · Go to bed at the same time every night, and wake up at the same time every morning. Do not take naps during the day. · Keep your bedroom quiet, dark, and cool. · Get regular exercise, but not within 3 to 4 hours of your bedtime. Linn Messing · Sleep on a comfortable pillow and mattress. · If watching the clock makes you anxious, turn it facing away from you so you cannot see the time. · If you worry when you lie down, start a worry book. Well before bedtime, write down your worries, and then set the book and your concerns aside. · Try meditation or other relaxation techniques before you go to bed. · If you cannot fall asleep, get up and go to another room until you feel sleepy. Do something relaxing. Repeat your bedtime routine before you go to bed again. · Make your house quiet and calm about an hour before bedtime. Turn down the lights, turn off the TV, log off the computer, and turn down the volume on music. This can help you relax after a busy day. Drowsy Driving [employer] cites drowsiness as a causing factor in more than 110,742 police reported crashes annually, resulting in 76,000 injuries and 1,500 deaths. Other surveys suggest 55% of people polled have driven while drowsy in the past year, 23% had fallen asleep but not crashed, 3% crashed, and 2% had and accident due to drowsy driving. Who is at risk? Young Drivers: One study of drowsy driving accidents states that 55% of the drivers were under 25 years. Of those, 75% were male. Shift Workers and Travelers: People who work overnight or travel across time zones frequently are at higher risk of experiencing Circadian Rhythm Disorders. They are trying to work and function when their body is programed to sleep. Sleep Deprived: Lack of sleep has a serious impact on your ability to pay attention or focus on a task. Consistently getting less than the average of 8 hours your body needs creates partial or cumulative sleep deprivation. Untreated Sleep Disorders: Sleep Apnea, Narcolepsy, R.L.S., and other sleep disorders (untreated) prevent a person from getting enough restful sleep. This leads to excessive daytime sleepiness and increases the risk for drowsy driving accidents by up to 7 times. Medications / Alcohol: Even over the counter medications can cause drowsiness. Medications that impair a drivers attention should have a warning label. Alcohol naturally makes you sleepy and on its own can cause accidents. Combined with excessive drowsiness its effects are amplified. Signs of Drowsy Driving: * You don't remember driving the last few miles * You may drift out of your art * You are unable to focus and your thoughts wander * You may yawn more often than normal 
 * You have difficulty keeping your eyes open / nodding off * Missing traffic signs, speeding, or tailgating Prevention-  
 Good sleep hygiene, lifestyle and behavioral choices have the most impact on drowsy driving. There is no substitute for sleep and the average person requires 8 hours nightly. If you find yourself driving drowsy, stop and sleep. Consider the sleep hygiene tips provided during your visit as well. Medication Refill Policy: Refills for all medications require 1 week advance notice. Please have your pharmacy fax a refill request. We are unable to fax, or call in \"controled substance\" medications and you will need to pick these prescriptions up from our office. [employer] Activation Thank you for requesting access to Srav[employer]i. Please follow the instructions below to securely access and download your online medical record. Sravnik[employer]i allows you to send messages to your doctor, view your test results, renew your prescriptions, schedule appointments, and more. How Do I Sign Up? 1. In your internet browser, go to [email or web address]. 2. Click on the First Time User? Click Here link in the Sign In box. You will see the New Member Sign Up page. 3. Enter your Sravnikupi Access Code exactly as it appears below. You will not need to use this code after youve completed the sign-up process. If you do not sign up before the expiration date, you must request a new code. Sravnikupi Access Code: Activation code not generated Current Sravnikupi Status: Active (This is the date your Sravnikupi access code will ) 4. Enter the last four digits of your Social Security Number (xxxx) and Date of Birth (mm/dd/yyyy) as indicated and click Submit. You will be taken to the next sign-up page. 5. Create a [employer] ID. This will be your Sravnikupi login ID and cannot be changed, so think of one that is secure and easy to remember. 6. Create a Sravnikupi password. You can change your password at any time. 7. Enter your Password Reset Question and Answer. This can be used at a later time if you forget your password. 8. Enter your e-mail address. You will receive e-mail notification when new information is available in [address] 9. Click Sign Up. You can now view and download portions of your medical record. 10. Click the Download Summary menu link to download a portable copy of your medical information. Additional Information If you have questions, please call [phone]. Remember, MyPermissions is NOT to be used for urgent needs. For medical emergencies, dial 911.

## 2021-02-11 ENCOUNTER — CLINICAL SUPPORT (OUTPATIENT)
Dept: INTERNAL MEDICINE CLINIC | Age: 61
End: 2021-02-11

## 2021-02-11 VITALS
HEART RATE: 87 BPM | BODY MASS INDEX: 34.75 KG/M2 | WEIGHT: 208.8 LBS | SYSTOLIC BLOOD PRESSURE: 128 MMHG | DIASTOLIC BLOOD PRESSURE: 83 MMHG

## 2021-02-11 DIAGNOSIS — I10 ESSENTIAL HYPERTENSION: Primary | ICD-10-CM

## 2021-02-11 NOTE — PROGRESS NOTES
Josh Avila  in for BP check.  She  states that she  takes HCTZ 12.5 mg 1 tab PO daily  /83   Pulse 87   Wt 208 lb 12.8 oz (94.7 kg)   BMI 34.75 kg/m²   Patient instructed to continue taking BP medication as prescribed  RTO on next scheduled doctors visit per Dr. Martinez Degree, LPN

## 2021-02-12 ENCOUNTER — TELEPHONE (OUTPATIENT)
Dept: FAMILY MEDICINE CLINIC | Age: 61
End: 2021-02-12

## 2021-02-12 ENCOUNTER — VIRTUAL VISIT (OUTPATIENT)
Dept: FAMILY MEDICINE CLINIC | Age: 61
End: 2021-02-12
Payer: COMMERCIAL

## 2021-02-12 DIAGNOSIS — K76.0 STEATOSIS OF LIVER: ICD-10-CM

## 2021-02-12 DIAGNOSIS — E66.9 OBESITY (BMI 30-39.9): ICD-10-CM

## 2021-02-12 DIAGNOSIS — I10 ESSENTIAL HYPERTENSION: ICD-10-CM

## 2021-02-12 DIAGNOSIS — E78.5 DYSLIPIDEMIA: ICD-10-CM

## 2021-02-12 DIAGNOSIS — E03.4 HYPOTHYROIDISM DUE TO ACQUIRED ATROPHY OF THYROID: Primary | ICD-10-CM

## 2021-02-12 PROCEDURE — 99214 OFFICE O/P EST MOD 30 MIN: CPT | Performed by: FAMILY MEDICINE

## 2021-02-12 NOTE — PROGRESS NOTES
1. Have you been to the ER, urgent care clinic since your last visit? Hospitalized since your last visit? No    2. Have you seen or consulted any other health care providers outside of the [address] [place] [address] since your last visit? Include any pap smears or colon screening. No     Chief Complaint   Patient presents with    Weight Management     3 most recent PHQ Screens 2/12/2021   Little interest or pleasure in doing things Not at all   Feeling down, depressed, irritable, or hopeless Not at all   Total Score PHQ 2 0     Patient-Reported Vitals 2/12/2021   Patient-Reported Weight 208.4lb   Patient-Reported Pulse 82   Patient-Reported Temperature -   Patient-Reported SpO2 -   Patient-Reported Systolic  162   Patient-Reported Diastolic 82      Health Maintenance Due   Topic Date Due    Breast Cancer Screen Mammogram  10/22/2015    PAP AKA CERVICAL CYTOLOGY  10/23/2017     Pharmacy verified.    [address]

## 2021-02-12 NOTE — TELEPHONE ENCOUNTER
----- Message from Source Audio sent at 2/12/2021 12:00 PM EST -----  Regarding: Dr. Carmen Mosley Message/Vendor Calls    Caller's first and last name: Pt      Reason for call: Dr. Vel Sousa was sending  lab orders for patient to  [facility] but patient wants orders sent to [facility] instead. [facility] at [address] [place] [address] is closer to patient.        Callback required yes/no and why: yes, to confirm      Best contact number(s): [phone]           Details to clarify the request: N/A      Source Audio

## 2021-02-12 NOTE — PROGRESS NOTES
[facility] Progress Note: RE- ENTRY  F/up Physician Visit    Sarai Phillips is a 63 y.o. female who was seen by synchronous (real-time) audio-video technology on 2/12/2021. Consent:  She and/or her healthcare decision maker is aware that this patient-initiated Telehealth encounter is a billable service, with coverage as determined by her insurance carrier. She is aware that she may receive a bill and has provided verbal consent to proceed: Yes    I was at home while conducting this encounter. 712  Subjective:   Sarai Phillips was seen for Weight Management  did [place] a few years ago  Stopped at 202  Restart 208     Started 209 in 2018    She has gotten down in upper 190s a few times and then goes back up in 208-209 area    Not taking anything for appetite  Has tried contrave but she did not like the side effects    She takes bp meds and bp is well controlled        f/up physician visit for the VLCD / LCD Program.  Prior to Admission medications    Medication Sig Start Date End Date Taking? Authorizing Provider   hydroCHLOROthiazide (MICROZIDE) 12.5 mg capsule Take 1 Cap by mouth daily. 7/24/20  Yes Malena Burton MD   ezetimibe (ZETIA) 10 mg tablet Take 1 Tab by mouth daily. 2/3/20  Yes Malena Burton MD   mometasone (NASONEX) 50 mcg/actuation nasal spray USE TWO SPRAY(S) IN EACH NOSTRIL DAILY 1/31/19  Yes Malena Burton MD   albuterol (VENTOLIN HFA) 90 mcg/actuation inhaler Take 2 Puffs by inhalation every four (4) hours as needed for Wheezing.  10/2/18  Yes Malena Burton MD   glucose blood VI test strips (CONTOUR NEXT STRIPS) strip qd 5/23/17   Sharif Casillas MD     Allergies   Allergen Reactions    Contrave [Naltrexone-Bupropion] Other (comments)     1 PILL MORNING, 2 PILLS EVENING  FELT ZOMBIED, \"OUT OF MY HEAD\"    Crestor [Rosuvastatin] Other (comments)     Felt like it worsened her memory    Gadolinium-Containing Contrast Media Nausea and Vomiting    Lipitor [Atorvastatin] Other (comments)     Felt like it worsened memory. Patient Active Problem List    Diagnosis Date Noted    PITER (obstructive sleep apnea)     Allergic rhinitis due to pollen 07/24/2020    Shoulder pain, right     TMJ (temporomandibular joint syndrome)     S/P colonoscopy 11/22/2019    Gastritis and duodenitis 11/22/2019    Microscopic colitis 11/22/2019    Back pain 07/17/2019    Gastroenteritis 07/17/2019    Herpes simplex infection of genitourinary system 05/16/2019    Hyperglycemia 09/12/2018    Essential fatty acid (EFA) deficiency 09/12/2018    Gasping for breath 08/15/2018    Hyperuricemia w/o signs of inflam arthrit and tophaceous dis 08/15/2018    Elevated serum creatinine 08/15/2018    Asthma     Neck nodule 05/10/2018    Abdominal wall pain     Normal cardiac stress test 07/19/2017    Encounter for Hemoccult screening 06/02/2017    S/P colonoscopic polypectomy 02/07/2017    Heartburn 01/01/2017    Dyslipidemia     ACP (advance care planning) 04/04/2016    Steatosis of liver     Hypertension     Breast cancer, left ([facility] [address]     S/P MUKUND-BSO (total abdominal hysterectomy and bilateral salpingo-oophorectomy)     Postsurgical menopause 01/01/2002     Current Outpatient Medications   Medication Sig Dispense Refill    hydroCHLOROthiazide (MICROZIDE) 12.5 mg capsule Take 1 Cap by mouth daily. 90 Cap 3    ezetimibe (ZETIA) 10 mg tablet Take 1 Tab by mouth daily. 30 Tab 11    mometasone (NASONEX) 50 mcg/actuation nasal spray USE TWO SPRAY(S) IN EACH NOSTRIL DAILY 1 Container 11    albuterol (VENTOLIN HFA) 90 mcg/actuation inhaler Take 2 Puffs by inhalation every four (4) hours as needed for Wheezing.  1 Inhaler 11    glucose blood VI test strips (CONTOUR NEXT STRIPS) strip qd 100 Strip 11       ROS      CC: Weight Management      Adria Maurice is a 63 y.o. female who is here for her      Weight Metrics 2/11/2021 12/8/2020 1/31/2020 11/22/2019 11/15/2019 11/11/2019 11/9/2019   Weight 208 lb 12.8 oz 204 lb 1.6 oz 201 lb 4.8 oz 200 lb 198 lb 8 oz 197 lb 1.6 oz 200 lb   Neck Circ (inches) - - - - - - -   Waist Measure Inches - - - - - - -   Body Fat % - - - - - - -   BMI 34.75 kg/m2 33.96 kg/m2 33.5 kg/m2 33.28 kg/m2 34.07 kg/m2 33.83 kg/m2 34.33 kg/m2         Outpatient Medications Marked as Taking for the 2/12/21 encounter (Virtual Visit) with Tanya Alcantara MD   Medication Sig Dispense Refill    hydroCHLOROthiazide (MICROZIDE) 12.5 mg capsule Take 1 Cap by mouth daily. 90 Cap 3    ezetimibe (ZETIA) 10 mg tablet Take 1 Tab by mouth daily. 30 Tab 11    mometasone (NASONEX) 50 mcg/actuation nasal spray USE TWO SPRAY(S) IN EACH NOSTRIL DAILY 1 Container 11    albuterol (VENTOLIN HFA) 90 mcg/actuation inhaler Take 2 Puffs by inhalation every four (4) hours as needed for Wheezing. 1 Inhaler 11         Participation   Did you attend clinic and class last week? no    Review of Systems  Since your last visit, have you experienced any complications? no  If yes, please list:       Are you taking an appetite suppressant? no  If so, is there any Chest Pain, Palpitations or Dizziness? BP Readings from Last 3 Encounters:   02/11/21 128/83   12/08/20 113/77   01/31/20 (!) 130/92       SLEEP:    Have you received any other medical care this week? no  If yes, where and for what? Have you discontinued or changed any medicine or dose of your medicine since your last visit with Dr Elvi Tran? no  If yes, where and for what? Diet  How many ounces of calorie-free liquids did you consume each day? 64 oz    How many meal replacements did you take each day? 0    Did you have any problems adhering to the program?  yes If yes, please explain:      Exercise  Aerobic exercise: walking 30 min mon-fri  Resistance exercise: 0 workouts / week  Any discomfort?  no     If yes, where? Objective  There were no vitals taken for this visit. No LMP recorded. Patient has had a hysterectomy.                    PHYSICAL EXAMINATION:  [ INSTRUCTIONS:  \"[x]\" Indicates a positive item  \"[]\" Indicates a negative item  -- DELETE ALL ITEMS NOT EXAMINED]  Vital Signs: (As obtained by patient/caregiver at home)  There were no vitals taken for this visit.     Constitutional: [x] Appears well-developed and well-nourished [x] No apparent distress      [] Abnormal -     Mental status: [x] Alert and awake  [x] Oriented to person/place/time [x] Able to follow commands    [] Abnormal -     Eyes:   EOM    [x]  Normal    [] Abnormal -   Sclera  [x]  Normal    [] Abnormal -          Discharge [x]  None visible   [] Abnormal -     HENT: [x] Normocephalic, atraumatic  [] Abnormal -   [x] Mouth/Throat: Mucous membranes are moist    External Ears [x] Normal  [] Abnormal -    Neck: [x] No visualized mass [] Abnormal -     Pulmonary/Chest: [x] Respiratory effort normal   [x] No visualized signs of difficulty breathing or respiratory distress        [] Abnormal -      Musculoskeletal:   [x] Normal gait with no signs of ataxia         [x] Normal range of motion of neck        [] Abnormal -     Neurological:        [x] No Facial Asymmetry (Cranial nerve 7 motor function) (limited exam due to video visit)          [x] No gaze palsy        [] Abnormal -          Skin:        [x] No significant exanthematous lesions or discoloration noted on facial skin         [] Abnormal -            Psychiatric:       [x] Normal Affect [] Abnormal -        [x] No Hallucinations    Other pertinent observable physical exam findings:-      Assessment / Plan    Encounter Diagnoses   Name Primary?   • Hypothyroidism due to acquired atrophy of thyroid Yes   • Obesity (BMI 30-39.9)    • Essential hypertension    • Dyslipidemia    • Steatosis of liver      Diagnoses and all orders for this visit:    1. Hypothyroidism due to acquired atrophy of thyroid  -     TSH 3RD GENERATION; Future  Treat as indicated  2. Obesity (BMI  30-39. 9)  Restart the products  Increase exercise, look for youtube video workouts  Recheck 1 month  3. Essential hypertension  -     METABOLIC PANEL, COMPREHENSIVE; Future  Well controlled  4. Dyslipidemia  -     LIPID PANEL; Future    5. Steatosis of liver  -     METABOLIC PANEL, COMPREHENSIVE; Future  The weight loss is very important. Will be monitoring the liver functon    1. Weight management borderline controlled   Progress was reviewed with patient    2. Labs    Latest results reviewed with patient          The primary encounter diagnosis was Hypothyroidism due to acquired atrophy of thyroid. Diagnoses of Obesity (BMI 30-39.9), Essential hypertension, Dyslipidemia, and Steatosis of liver were also pertinent to this visit. Coding Help - Use CPT Codes 74656-52883, 60536-06934 for Established and New Patients respectively, either employing EM elements or Time rules. Other codes (example consult codes) may also apply. We discussed the expected course, resolution and complications of the diagnosis(es) in detail. Medication risks, benefits, costs, interactions, and alternatives were discussed as indicated. I advised her to contact the office if her condition worsens, changes or fails to improve as anticipated. She expressed understanding with the diagnosis(es) and plan. Pursuant to the emergency declaration under the [facility], [facility] waiver authority and the [employer] [facility] [employer] General Act, this Virtual  Visit was conducted, with patient's consent, to reduce the patient's risk of exposure to COVID-19 and provide continuity of care for an established patient. Services were provided through a video synchronous discussion virtually to substitute for in-person clinic visit.     Marifer Hunt MD

## 2021-02-16 ENCOUNTER — TELEPHONE (OUTPATIENT)
Dept: FAMILY MEDICINE CLINIC | Age: 61
End: 2021-02-16

## 2021-02-16 NOTE — TELEPHONE ENCOUNTER
Message sent to provider      ----- Message from Jolene Carr sent at 2/15/2021  2:10 PM EST -----  Regarding: Dr. Kevin Melvin first and last name: Pt      Reason for call: Wants to see if patient's lab orders has been switched to [facility]. Patient had called on 2/12/21 to have orders sent to [facility] instead.        Callback required yes/no and why:Yes, to confirm      Best contact number(s): [phone]      Details to clarify the request: N/A      Jolene Carr

## 2021-02-22 ENCOUNTER — TELEPHONE (OUTPATIENT)
Dept: BARIATRICS/WEIGHT MGMT | Age: 61
End: 2021-02-22

## 2021-02-22 NOTE — TELEPHONE ENCOUNTER
Called patient to discuss restart medical weight loss program with Dr. Estephania Contreras.  Left vm with return contact information.

## 2021-03-03 LAB
ALBUMIN SERPL-MCNC: 4.5 G/DL (ref 3.8–4.8)
ALBUMIN/GLOB SERPL: 1.9 {RATIO} (ref 1.2–2.2)
ALP SERPL-CCNC: 122 IU/L (ref 39–117)
ALT SERPL-CCNC: 20 IU/L (ref 0–32)
AST SERPL-CCNC: 22 IU/L (ref 0–40)
BILIRUB SERPL-MCNC: <0.2 MG/DL (ref 0–1.2)
BUN SERPL-MCNC: 15 MG/DL (ref 8–27)
BUN/CREAT SERPL: 17 (ref 12–28)
CALCIUM SERPL-MCNC: 9.3 MG/DL (ref 8.7–10.3)
CHLORIDE SERPL-SCNC: 103 MMOL/L (ref 96–106)
CHOLEST SERPL-MCNC: 242 MG/DL (ref 100–199)
CO2 SERPL-SCNC: 24 MMOL/L (ref 20–29)
CREAT SERPL-MCNC: 0.9 MG/DL (ref 0.57–1)
GLOBULIN SER CALC-MCNC: 2.4 G/DL (ref 1.5–4.5)
GLUCOSE SERPL-MCNC: 134 MG/DL (ref 65–99)
HDLC SERPL-MCNC: 53 MG/DL
LDLC SERPL CALC-MCNC: 160 MG/DL (ref 0–99)
POTASSIUM SERPL-SCNC: 4.2 MMOL/L (ref 3.5–5.2)
PROT SERPL-MCNC: 6.9 G/DL (ref 6–8.5)
SODIUM SERPL-SCNC: 139 MMOL/L (ref 134–144)
TRIGL SERPL-MCNC: 162 MG/DL (ref 0–149)
TSH SERPL DL<=0.005 MIU/L-ACNC: 4.69 UIU/ML (ref 0.45–4.5)
VLDLC SERPL CALC-MCNC: 29 MG/DL (ref 5–40)

## 2021-03-08 NOTE — PROGRESS NOTES
The cholesterol needs improvement  Avoid fried food, fast food and junk food. Also move more each day. Exercise 300 mins a week. I want to recheck the cholesterol levels in three months  The liver and kidney are fine  The thyroid hormone needs adjusting. The hormone level is low. Please inform your pcp. There is no thyroid hormone pill  listed on your medlist but there is a history noted on your problem list of hypothyroidism. I assume you are taking something but we do not have that information on your chart yet.  The low thyroid hormone will slow down your weight loss  I want to recheck these things in 3 months

## 2021-03-12 ENCOUNTER — VIRTUAL VISIT (OUTPATIENT)
Dept: FAMILY MEDICINE CLINIC | Age: 61
End: 2021-03-12
Payer: COMMERCIAL

## 2021-03-12 DIAGNOSIS — E78.5 DYSLIPIDEMIA: ICD-10-CM

## 2021-03-12 DIAGNOSIS — E03.4 HYPOTHYROIDISM DUE TO ACQUIRED ATROPHY OF THYROID: Primary | ICD-10-CM

## 2021-03-12 DIAGNOSIS — I10 ESSENTIAL HYPERTENSION: ICD-10-CM

## 2021-03-12 DIAGNOSIS — K76.0 STEATOSIS OF LIVER: ICD-10-CM

## 2021-03-12 DIAGNOSIS — E66.9 OBESITY (BMI 30-39.9): ICD-10-CM

## 2021-03-12 PROCEDURE — 99214 OFFICE O/P EST MOD 30 MIN: CPT | Performed by: FAMILY MEDICINE

## 2021-03-12 RX ORDER — LEVOTHYROXINE SODIUM 25 UG/1
25 TABLET ORAL
Qty: 90 TAB | Refills: 1 | Status: SHIPPED | OUTPATIENT
Start: 2021-03-12 | End: 2021-09-01 | Stop reason: SDUPTHER

## 2021-03-12 NOTE — PROGRESS NOTES
[facility] Progress Note:   F/up Physician Visit    Milton Browning is a 63 y.o. female who was seen by synchronous (real-time) audio-video technology on 3/12/2021. Consent:  She and/or her healthcare decision maker is aware that this patient-initiated Telehealth encounter is a billable service, with coverage as determined by her insurance carrier. She is aware that she may receive a bill and has provided verbal consent to proceed: Yes    I was at home while conducting this encounter. 712  Subjective:   Milton Browning was seen for Weight Management  now 202.4 Feb 208  f/up physician visit for the VLCD / LCD Program.  Prior to Admission medications    Medication Sig Start Date End Date Taking? Authorizing Provider   levothyroxine (SYNTHROID) 25 mcg tablet Take 1 Tab by mouth Daily (before breakfast). 3/12/21  Yes Radha Castillo MD   hydroCHLOROthiazide (MICROZIDE) 12.5 mg capsule Take 1 Cap by mouth daily. 7/24/20  Yes Amina Bowles MD   ezetimibe (ZETIA) 10 mg tablet Take 1 Tab by mouth daily. 2/3/20  Yes Amina Bowles MD   mometasone (NASONEX) 50 mcg/actuation nasal spray USE TWO SPRAY(S) IN EACH NOSTRIL DAILY 1/31/19  Yes Amina Bowles MD   albuterol (VENTOLIN HFA) 90 mcg/actuation inhaler Take 2 Puffs by inhalation every four (4) hours as needed for Wheezing. 10/2/18  Yes Amina Bowles MD   glucose blood VI test strips (CONTOUR NEXT STRIPS) strip qd 5/23/17   Amina Bowles MD     Allergies   Allergen Reactions    Contrave [Naltrexone-Bupropion] Other (comments)     1 PILL MORNING, 2 PILLS EVENING  FELT ZOMBIED, \"OUT OF MY HEAD\"    Crestor [Rosuvastatin] Other (comments)     Felt like it worsened her memory    Gadolinium-Containing Contrast Media Nausea and Vomiting    Lipitor [Atorvastatin] Other (comments)     Felt like it worsened memory.         Patient Active Problem List    Diagnosis Date Noted    PITER (obstructive sleep apnea)     Allergic rhinitis due to pollen 07/24/2020    Shoulder pain, right     TMJ (temporomandibular joint syndrome)     S/P colonoscopy 11/22/2019    Gastritis and duodenitis 11/22/2019    Microscopic colitis 11/22/2019    Back pain 07/17/2019    Gastroenteritis 07/17/2019    Herpes simplex infection of genitourinary system 05/16/2019    Hyperglycemia 09/12/2018    Essential fatty acid (EFA) deficiency 09/12/2018    Gasping for breath 08/15/2018    Hyperuricemia w/o signs of inflam arthrit and tophaceous dis 08/15/2018    Elevated serum creatinine 08/15/2018    Asthma     Neck nodule 05/10/2018    Abdominal wall pain     Normal cardiac stress test 07/19/2017    Encounter for Hemoccult screening 06/02/2017    S/P colonoscopic polypectomy 02/07/2017    Heartburn 01/01/2017    Dyslipidemia     ACP (advance care planning) 04/04/2016    Steatosis of liver     Hypertension     Breast cancer, left ([address]     S/P MUKUND-BSO (total abdominal hysterectomy and bilateral salpingo-oophorectomy)     Postsurgical menopause 01/01/2002     Current Outpatient Medications   Medication Sig Dispense Refill    levothyroxine (SYNTHROID) 25 mcg tablet Take 1 Tab by mouth Daily (before breakfast). 90 Tab 1    hydroCHLOROthiazide (MICROZIDE) 12.5 mg capsule Take 1 Cap by mouth daily. 90 Cap 3    ezetimibe (ZETIA) 10 mg tablet Take 1 Tab by mouth daily. 30 Tab 11    mometasone (NASONEX) 50 mcg/actuation nasal spray USE TWO SPRAY(S) IN EACH NOSTRIL DAILY 1 Container 11    albuterol (VENTOLIN HFA) 90 mcg/actuation inhaler Take 2 Puffs by inhalation every four (4) hours as needed for Wheezing.  1 Inhaler 11    glucose blood VI test strips (CONTOUR NEXT STRIPS) strip qd 100 Strip 11       ROS      CC: Weight Management      Alfa Benavides is a 63 y.o. female who is here for her      Weight Metrics 2/11/2021 12/8/2020 1/31/2020 11/22/2019 11/15/2019 11/11/2019 11/9/2019   Weight 208 lb 12.8 oz 204 lb 1.6 oz 201 lb 4.8 oz 200 lb 198 lb 8 oz 197 lb 1.6 oz 200 lb   Neck Circ (inches) - - - - - - -   Waist Measure Inches - - - - - - -   Body Fat % - - - - - - -   BMI 34.75 kg/m2 33.96 kg/m2 33.5 kg/m2 33.28 kg/m2 34.07 kg/m2 33.83 kg/m2 34.33 kg/m2         Outpatient Medications Marked as Taking for the 3/12/21 encounter (Virtual Visit) with Claudia Hoyos MD   Medication Sig Dispense Refill    levothyroxine (SYNTHROID) 25 mcg tablet Take 1 Tab by mouth Daily (before breakfast). 90 Tab 1    hydroCHLOROthiazide (MICROZIDE) 12.5 mg capsule Take 1 Cap by mouth daily. 90 Cap 3    ezetimibe (ZETIA) 10 mg tablet Take 1 Tab by mouth daily. 30 Tab 11    mometasone (NASONEX) 50 mcg/actuation nasal spray USE TWO SPRAY(S) IN EACH NOSTRIL DAILY 1 Container 11    albuterol (VENTOLIN HFA) 90 mcg/actuation inhaler Take 2 Puffs by inhalation every four (4) hours as needed for Wheezing. 1 Inhaler 11         Participation   Did you attend clinic and class last week? yes    Review of Systems  Since your last visit, have you experienced any complications? no  If yes, please list:       Are you taking an appetite suppressant? no  If so, is there any Chest Pain, Palpitations or Dizziness? BP Readings from Last 3 Encounters:   02/11/21 128/83   12/08/20 113/77   01/31/20 (!) 130/92       SLEEP:    Have you received any other medical care this week? no  If yes, where and for what? Have you discontinued or changed any medicine or dose of your medicine since your last visit with Dr Sheryle Orange? no  If yes, where and for what?          Diet  How many ounces of calorie-free liquids did you consume each day?  100 oz    How many meal replacements did you take each day? 3 and a meal  Sometimes 4 supplements and no meal  Last weekend she did a lot of extra eating      Did you have any problems adhering to the program?  no If yes, please explain:      Exercise  Aerobic exercise: 30 min walk each day outside  Resistance exercise: 0 workouts / week  Any discomfort?  no     If yes, where? Objective  There were no vitals taken for this visit. No LMP recorded. Patient has had a hysterectomy. PHYSICAL EXAMINATION:  [ INSTRUCTIONS:  \"[x]\" Indicates a positive item  \"[]\" Indicates a negative item  -- DELETE ALL ITEMS NOT EXAMINED]  Vital Signs: (As obtained by patient/caregiver at home)  There were no vitals taken for this visit. Constitutional: [x] Appears well-developed and well-nourished [x] No apparent distress      [] Abnormal -     Mental status: [x] Alert and awake  [x] Oriented to person/place/time [x] Able to follow commands    [] Abnormal -     Eyes:   EOM    [x]  Normal    [] Abnormal -   Sclera  [x]  Normal    [] Abnormal -          Discharge [x]  None visible   [] Abnormal -     HENT: [x] Normocephalic, atraumatic  [] Abnormal -   [x] Mouth/Throat: Mucous membranes are moist    External Ears [x] Normal  [] Abnormal -    Neck: [x] No visualized mass [] Abnormal -     Pulmonary/Chest: [x] Respiratory effort normal   [x] No visualized signs of difficulty breathing or respiratory distress        [] Abnormal -      Musculoskeletal:   [x] Normal gait with no signs of ataxia         [x] Normal range of motion of neck        [] Abnormal -     Neurological:        [x] No Facial Asymmetry (Cranial nerve 7 motor function) (limited exam due to video visit)          [x] No gaze palsy        [] Abnormal -          Skin:        [x] No significant exanthematous lesions or discoloration noted on facial skin         [] Abnormal -            Psychiatric:       [x] Normal Affect [] Abnormal -        [x] No Hallucinations    Other pertinent observable physical exam findings:-      Assessment / Plan    Encounter Diagnoses   Name Primary?  Hypothyroidism due to acquired atrophy of thyroid Yes    Obesity (BMI 30-39. 9)     Dyslipidemia     Essential hypertension     Steatosis of liver      Diagnoses and all orders for this visit:    1. Hypothyroidism due to acquired atrophy of thyroid  -     levothyroxine (SYNTHROID) 25 mcg tablet; Take 1 Tab by mouth Daily (before breakfast). -     TSH 3RD GENERATION; Future  Monitor and treat as indicated  2. Obesity (BMI 30-39.9)  -     HEMOGLOBIN A1C WITH EAG; Future  -     METABOLIC PANEL, COMPREHENSIVE; Future  -     TSH 3RD GENERATION; Future  -     LIPID PANEL; Future  The weight loss is medically necessary since it will help lower the lipids  3. Dyslipidemia  -     LIPID PANEL; Future    4. Essential hypertension  -     METABOLIC PANEL, COMPREHENSIVE; Future  The weight loss will also help lower the blood pressure  5. Steatosis of liver  -     METABOLIC PANEL, COMPREHENSIVE; Future      1. Weight management improved   Progress was reviewed with patient    2. Labs    Latest results reviewed with patient          The primary encounter diagnosis was Hypothyroidism due to acquired atrophy of thyroid. Diagnoses of Obesity (BMI 30-39.9), Dyslipidemia, Essential hypertension, and Steatosis of liver were also pertinent to this visit. Coding Help - Use CPT Codes 05805-86117, 37272-14665 for Established and New Patients respectively, either employing EM elements or Time rules. Other codes (example consult codes) may also apply. We discussed the expected course, resolution and complications of the diagnosis(es) in detail. Medication risks, benefits, costs, interactions, and alternatives were discussed as indicated. I advised her to contact the office if her condition worsens, changes or fails to improve as anticipated. She expressed understanding with the diagnosis(es) and plan.      Pursuant to the emergency declaration under the [facility] waiver authority and the [employer] General Act, this Virtual  Visit was conducted, with patient's consent, to reduce the patient's risk of exposure to COVID-19 and provide continuity of care for an established patient. Services were provided through a video synchronous discussion virtually to substitute for in-person clinic visit.     Zuleika Acevedo MD

## 2021-03-12 NOTE — PROGRESS NOTES
1. Have you been to the ER, urgent care clinic since your last visit? Hospitalized since your last visit? No    2. Have you seen or consulted any other health care providers outside of the [address] [place] [address] since your last visit? Include any pap smears or colon screening.  No     Chief Complaint   Patient presents with    Weight Management     Health Maintenance Due   Topic Date Due    Breast Cancer Screen Mammogram  10/22/2015    PAP AKA CERVICAL CYTOLOGY  10/23/2017     3 most recent PHQ Screens 3/12/2021   Little interest or pleasure in doing things Not at all   Feeling down, depressed, irritable, or hopeless Not at all   Total Score PHQ 2 0     Patient-Reported Vitals 3/12/2021   Patient-Reported Weight 202.4lb   Patient-Reported Pulse 90   Patient-Reported Temperature -   Patient-Reported SpO2 -   Patient-Reported Systolic  730   Patient-Reported Diastolic 80

## 2021-03-28 RX ORDER — PANTOPRAZOLE SODIUM 40 MG/1
40 TABLET, DELAYED RELEASE ORAL 2 TIMES DAILY
Qty: 60 TAB | Refills: 3 | Status: SHIPPED | OUTPATIENT
Start: 2021-03-28 | End: 2022-01-14

## 2021-04-28 ENCOUNTER — OFFICE VISIT (OUTPATIENT)
Dept: FAMILY MEDICINE CLINIC | Age: 61
End: 2021-04-28
Payer: COMMERCIAL

## 2021-04-28 VITALS
HEIGHT: 65 IN | DIASTOLIC BLOOD PRESSURE: 84 MMHG | TEMPERATURE: 98.1 F | OXYGEN SATURATION: 97 % | HEART RATE: 97 BPM | WEIGHT: 204.2 LBS | SYSTOLIC BLOOD PRESSURE: 135 MMHG | RESPIRATION RATE: 16 BRPM | BODY MASS INDEX: 34.02 KG/M2

## 2021-04-28 DIAGNOSIS — I10 ESSENTIAL HYPERTENSION: ICD-10-CM

## 2021-04-28 DIAGNOSIS — E66.9 OBESITY (BMI 30-39.9): ICD-10-CM

## 2021-04-28 DIAGNOSIS — E78.5 DYSLIPIDEMIA: ICD-10-CM

## 2021-04-28 DIAGNOSIS — E03.4 HYPOTHYROIDISM DUE TO ACQUIRED ATROPHY OF THYROID: Primary | ICD-10-CM

## 2021-04-28 PROCEDURE — 99214 OFFICE O/P EST MOD 30 MIN: CPT | Performed by: FAMILY MEDICINE

## 2021-04-28 NOTE — PROGRESS NOTES
1. Have you been to the ER, urgent care clinic since your last visit? Hospitalized since your last visit? No    2. Have you seen or consulted any other health care providers outside of the [address] [place] [address] since your last visit? Include any pap smears or colon screening. No     Chief Complaint   Patient presents with    Weight Management     3 most recent PHQ Screens 4/28/2021   Little interest or pleasure in doing things Not at all   Feeling down, depressed, irritable, or hopeless Not at all   Total Score PHQ 2 0     Abuse Screening Questionnaire 4/28/2021   Do you ever feel afraid of your partner? N   Are you in a relationship with someone who physically or mentally threatens you? N   Is it safe for you to go home?  Y     Learning Assessment 11/11/2019   PRIMARY LEARNER Patient   HIGHEST LEVEL OF EDUCATION - PRIMARY LEARNER  4 YEARS OF COLLEGE   BARRIERS PRIMARY LEARNER NONE   PRIMARY LANGUAGE ENGLISH   LEARNER PREFERENCE PRIMARY DEMONSTRATION   ANSWERED BY Patient   RELATIONSHIP SELF     Body Weight: 204.2 LB  Body Fat%: 41.3 %   BMI: 33.6  Body Water Weight: 43.8 %  Resting Energy: 1486    Visit Vitals  /84 (BP 1 Location: Left arm, BP Patient Position: Sitting, BP Cuff Size: Adult)   Pulse 97   Temp 98.1 °F (36.7 °C) (Skin)   Resp 16   Ht 5' 5\" (1.651 m)   Wt 204 lb 3.2 oz (92.6 kg)   SpO2 97%   BMI 33.98 kg/m²

## 2021-04-28 NOTE — PROGRESS NOTES
[facility] Progress Note:   F/up Physician Visit    CC: Weight Management      Al Rodas is a 63 y.o. female who is here for her  f/up physician visit for the VLCD / LCD Program.      She has gained 2 lbs. Was on vacation last week and ate a whole lot  She did a lot of sitting around on a cold beach week    Weight Metrics 4/28/2021 2/11/2021 12/8/2020 1/31/2020 11/22/2019 11/15/2019 11/11/2019   Weight 204 lb 3.2 oz 208 lb 12.8 oz 204 lb 1.6 oz 201 lb 4.8 oz 200 lb 198 lb 8 oz 197 lb 1.6 oz   Neck Circ (inches) - - - - - - -   Waist Measure Inches - - - - - - -   Body Fat % - - - - - - -   BMI 33.98 kg/m2 34.75 kg/m2 33.96 kg/m2 33.5 kg/m2 33.28 kg/m2 34.07 kg/m2 33.83 kg/m2         Outpatient Medications Marked as Taking for the 4/28/21 encounter (Office Visit) with Hugo Rodriguez MD   Medication Sig Dispense Refill    pantoprazole (PROTONIX) 40 mg tablet Take 1 Tab by mouth two (2) times a day. 60 Tab 3    levothyroxine (SYNTHROID) 25 mcg tablet Take 1 Tab by mouth Daily (before breakfast). 90 Tab 1    hydroCHLOROthiazide (MICROZIDE) 12.5 mg capsule Take 1 Cap by mouth daily. 90 Cap 3    ezetimibe (ZETIA) 10 mg tablet Take 1 Tab by mouth daily. 30 Tab 11    mometasone (NASONEX) 50 mcg/actuation nasal spray USE TWO SPRAY(S) IN EACH NOSTRIL DAILY 1 Container 11    albuterol (VENTOLIN HFA) 90 mcg/actuation inhaler Take 2 Puffs by inhalation every four (4) hours as needed for Wheezing. 1 Inhaler 11         Participation   Did you attend clinic and class last week? no    Review of Systems  Since your last visit, have you experienced any complications? no  If yes, please list:       Are you taking an appetite suppressant? no  If so, is there any Chest Pain, Palpitations or Dizziness? BP Readings from Last 3 Encounters:   04/28/21 135/84   02/11/21 128/83   12/08/20 113/77       SLEEP:6    Have you received any other medical care this week? no  If yes, where and for what?        Have you discontinued or changed any medicine or dose of your medicine since your last visit with Dr Cris Lazar? no  If yes, where and for what? Diet  How many ounces of calorie-free liquids did you consume each day? Last week was not compliant oz    How many meal replacements did you take each day? 2-3 the whole week last week, the weeks prior she was in compliance    Did you have any problems adhering to the program?  no If yes, please explain:      Exercise  Aerobic exercise: 40 min bike riding 5 days  Resistance exercise: 0 workouts / week  Any discomfort?  no     If yes, where? Objective  Visit Vitals  /84 (BP 1 Location: Left arm, BP Patient Position: Sitting, BP Cuff Size: Adult)   Pulse 97   Temp 98.1 °F (36.7 °C) (Skin)   Resp 16   Ht 5' 5\" (1.651 m)   Wt 204 lb 3.2 oz (92.6 kg)   SpO2 97%   BMI 33.98 kg/m²     No LMP recorded. Patient has had a hysterectomy. Physical Exam  Appearance: well,   Mental:A&O x 3, NAD  H:NC/AT,  EENT:   EOMI, PERRL, No scleral icterus  Neck: no bruit or JVD  Lung: clear, No W/R  ABD: soft, active, nontender  Ext:  no Edema  Neuro: nonfocal  Assessment / Plan    Encounter Diagnoses   Name Primary?  Hypothyroidism due to acquired atrophy of thyroid Yes    Obesity (BMI 30-39. 9)     Dyslipidemia     Essential hypertension      Diagnoses and all orders for this visit:    1. Hypothyroidism due to acquired atrophy of thyroid  No change in meds  2. Obesity (BMI 30-39. 9)  DIET low carb LCD at 1200 gee max, given written details as well as verbal  ACTIVITY use you tube chair exercise  Or other videos as tolerated  MEDICATION: nothing for appetite  3. Dyslipidemia  The eating guidelines as well as the ativity should help lower the cholesterol  4. Essential hypertension    increase sleep to 7 hrs CONT using the cpap      1. Weight management borderline controlled   Progress was reviewed with patient    2.   Labs    Latest results reviewed with patient          The primary encounter diagnosis was Hypothyroidism due to acquired atrophy of thyroid. Diagnoses of Obesity (BMI 30-39.9), Dyslipidemia, and Essential hypertension were also pertinent to this visit.

## 2021-05-28 ENCOUNTER — VIRTUAL VISIT (OUTPATIENT)
Dept: FAMILY MEDICINE CLINIC | Age: 61
End: 2021-05-28
Payer: COMMERCIAL

## 2021-05-28 DIAGNOSIS — E03.4 HYPOTHYROIDISM DUE TO ACQUIRED ATROPHY OF THYROID: ICD-10-CM

## 2021-05-28 DIAGNOSIS — G47.33 OSA (OBSTRUCTIVE SLEEP APNEA): ICD-10-CM

## 2021-05-28 DIAGNOSIS — E66.9 OBESITY (BMI 30-39.9): ICD-10-CM

## 2021-05-28 DIAGNOSIS — I10 ESSENTIAL HYPERTENSION: Primary | ICD-10-CM

## 2021-05-28 PROCEDURE — 99214 OFFICE O/P EST MOD 30 MIN: CPT | Performed by: FAMILY MEDICINE

## 2021-05-28 RX ORDER — BUPROPION HYDROCHLORIDE 100 MG/1
100 TABLET, EXTENDED RELEASE ORAL 2 TIMES DAILY
Qty: 60 TABLET | Refills: 2 | Status: SHIPPED | OUTPATIENT
Start: 2021-05-28 | End: 2021-06-30

## 2021-05-28 NOTE — PROGRESS NOTES
1. Have you been to the ER, urgent care clinic since your last visit? Hospitalized since your last visit? No    2. Have you seen or consulted any other health care providers outside of the [address] [place] [address] since your last visit? Include any pap smears or colon screening. No     Chief Complaint   Patient presents with    Weight Management     Patient-Reported Vitals 5/28/2021   Patient-Reported Weight 202.4lb   Patient-Reported Pulse 88   Patient-Reported Temperature -   Patient-Reported SpO2 -   Patient-Reported Systolic  657   Patient-Reported Diastolic 89      Health Maintenance Due   Topic Date Due    Shingrix Vaccine Age 49> (1 of 2) Never done    Breast Cancer Screen Mammogram  10/22/2015    PAP AKA CERVICAL CYTOLOGY  10/23/2017     3 most recent PHQ Screens 5/28/2021   Little interest or pleasure in doing things Not at all   Feeling down, depressed, irritable, or hopeless Not at all   Total Score PHQ 2 0     Abuse Screening Questionnaire 5/28/2021   Do you ever feel afraid of your partner? N   Are you in a relationship with someone who physically or mentally threatens you? N   Is it safe for you to go home?  Y     Learning Assessment 11/11/2019   PRIMARY LEARNER Patient   HIGHEST LEVEL OF EDUCATION - PRIMARY LEARNER  4 YEARS OF COLLEGE   BARRIERS PRIMARY LEARNER NONE   PRIMARY LANGUAGE ENGLISH   LEARNER PREFERENCE PRIMARY DEMONSTRATION   ANSWERED BY Patient   RELATIONSHIP SELF

## 2021-05-28 NOTE — PROGRESS NOTES
[facility] Progress Note:   F/up Physician Visit    Jaleesa Lorenzana is a 63 y.o. female who was seen by synchronous (real-time) audio-video technology on 5/28/2021. Consent:  She and/or her healthcare decision maker is aware that this patient-initiated Telehealth encounter is a billable service, with coverage as determined by her insurance carrier. She is aware that she may receive a bill and has provided verbal consent to proceed: Yes    I was at home while conducting this encounter. 712  Subjective:   Jaleesa Lorenzana was seen for Weight Management  She feels like the progress is slow  She is adding in popcorn or nuts  She has lost 2 lbs this month  204 April  202 may    f/up physician visit for the VLCD / LCD Program.  Prior to Admission medications    Medication Sig Start Date End Date Taking? Authorizing Provider   buPROPion SR [facility] SR) 100 mg SR tablet Take 1 Tablet by mouth two (2) times a day. 5/28/21  Yes Nelson Stockton MD   pantoprazole (PROTONIX) 40 mg tablet Take 1 Tab by mouth two (2) times a day. 3/28/21  Yes Talat Oakes MD   levothyroxine (SYNTHROID) 25 mcg tablet Take 1 Tab by mouth Daily (before breakfast). 3/12/21  Yes Nelson Stockton MD   hydroCHLOROthiazide (MICROZIDE) 12.5 mg capsule Take 1 Cap by mouth daily. 7/24/20  Yes Talat Oakes MD   ezetimibe (ZETIA) 10 mg tablet Take 1 Tab by mouth daily. 2/3/20  Yes Talat Oakes MD   mometasone (NASONEX) 50 mcg/actuation nasal spray USE TWO SPRAY(S) IN EACH NOSTRIL DAILY 1/31/19  Yes Talat Oakes MD   albuterol (VENTOLIN HFA) 90 mcg/actuation inhaler Take 2 Puffs by inhalation every four (4) hours as needed for Wheezing.  10/2/18  Yes Talat Oakes MD   glucose blood VI test strips (CONTOUR NEXT STRIPS) strip qd  Patient not taking: Reported on 5/28/2021 5/23/17   Talat Oakes MD     Allergies   Allergen Reactions    Contrave [Naltrexone-Bupropion] Other (comments)     1 PILL MORNING, 2 PILLS EVENING  FELT ZOMBIED, \"OUT OF MY HEAD\"    Crestor [Rosuvastatin] Other (comments)     Felt like it worsened her memory    Gadolinium-Containing Contrast Media Nausea and Vomiting    Lipitor [Atorvastatin] Other (comments)     Felt like it worsened memory. Patient Active Problem List    Diagnosis Date Noted    PITER (obstructive sleep apnea)     Allergic rhinitis due to pollen 07/24/2020    Shoulder pain, right     TMJ (temporomandibular joint syndrome)     S/P colonoscopy 11/22/2019    Gastritis and duodenitis 11/22/2019    Microscopic colitis 11/22/2019    Back pain 07/17/2019    Gastroenteritis 07/17/2019    Herpes simplex infection of genitourinary system 05/16/2019    Hyperglycemia 09/12/2018    Essential fatty acid (EFA) deficiency 09/12/2018    Gasping for breath 08/15/2018    Hyperuricemia w/o signs of inflam arthrit and tophaceous dis 08/15/2018    Elevated serum creatinine 08/15/2018    Asthma     Neck nodule 05/10/2018    Abdominal wall pain     Normal cardiac stress test 07/19/2017    Encounter for Hemoccult screening 06/02/2017    S/P colonoscopic polypectomy 02/07/2017    Heartburn 01/01/2017    Dyslipidemia     ACP (advance care planning) 04/04/2016    Steatosis of liver     Hypertension     Breast cancer, left ([address]     S/P MUKUND-BSO (total abdominal hysterectomy and bilateral salpingo-oophorectomy)     Postsurgical menopause 01/01/2002     Current Outpatient Medications   Medication Sig Dispense Refill    buPROPion SR (WELLBUTRIN SR) 100 mg SR tablet Take 1 Tablet by mouth two (2) times a day. 60 Tablet 2    pantoprazole (PROTONIX) 40 mg tablet Take 1 Tab by mouth two (2) times a day. 60 Tab 3    levothyroxine (SYNTHROID) 25 mcg tablet Take 1 Tab by mouth Daily (before breakfast). 90 Tab 1    hydroCHLOROthiazide (MICROZIDE) 12.5 mg capsule Take 1 Cap by mouth daily.  90 Cap 3    ezetimibe (ZETIA) 10 mg tablet Take 1 Tab by mouth daily. 30 Tab 11    mometasone (NASONEX) 50 mcg/actuation nasal spray USE TWO SPRAY(S) IN EACH NOSTRIL DAILY 1 Container 11    albuterol (VENTOLIN HFA) 90 mcg/actuation inhaler Take 2 Puffs by inhalation every four (4) hours as needed for Wheezing. 1 Inhaler 11    glucose blood VI test strips (CONTOUR NEXT STRIPS) strip qd (Patient not taking: Reported on 5/28/2021) 100 Strip 11       ROS      CC: Weight Management      Martha Russell is a 63 y.o. female who is here for her      Weight Metrics 4/28/2021 2/11/2021 12/8/2020 1/31/2020 11/22/2019 11/15/2019 11/11/2019   Weight 204 lb 3.2 oz 208 lb 12.8 oz 204 lb 1.6 oz 201 lb 4.8 oz 200 lb 198 lb 8 oz 197 lb 1.6 oz   Neck Circ (inches) - - - - - - -   Waist Measure Inches - - - - - - -   Body Fat % - - - - - - -   BMI 33.98 kg/m2 34.75 kg/m2 33.96 kg/m2 33.5 kg/m2 33.28 kg/m2 34.07 kg/m2 33.83 kg/m2         Outpatient Medications Marked as Taking for the 5/28/21 encounter (Virtual Visit) with Yaakov Hightower MD   Medication Sig Dispense Refill    buPROPion SR [facility] SR) 100 mg SR tablet Take 1 Tablet by mouth two (2) times a day. 60 Tablet 2    pantoprazole (PROTONIX) 40 mg tablet Take 1 Tab by mouth two (2) times a day. 60 Tab 3    levothyroxine (SYNTHROID) 25 mcg tablet Take 1 Tab by mouth Daily (before breakfast). 90 Tab 1    hydroCHLOROthiazide (MICROZIDE) 12.5 mg capsule Take 1 Cap by mouth daily. 90 Cap 3    ezetimibe (ZETIA) 10 mg tablet Take 1 Tab by mouth daily. 30 Tab 11    mometasone (NASONEX) 50 mcg/actuation nasal spray USE TWO SPRAY(S) IN EACH NOSTRIL DAILY 1 Container 11    albuterol (VENTOLIN HFA) 90 mcg/actuation inhaler Take 2 Puffs by inhalation every four (4) hours as needed for Wheezing. 1 Inhaler 11         Participation   Did you attend clinic and class last week? yes    Review of Systems  Since your last visit, have you experienced any complications? no  If yes, please list:       Are you taking an appetite suppressant? yes  If so, is there any Chest Pain, Palpitations or Dizziness? BP Readings from Last 3 Encounters:   04/28/21 135/84   02/11/21 128/83   12/08/20 113/77       SLEEP: 4 on cpap     Have you received any other medical care this week? no  If yes, where and for what? Have you discontinued or changed any medicine or dose of your medicine since your last visit with Dr Nissa Hinds? no  If yes, where and for what? Diet  How many ounces of calorie-free liquids did you consume each day? 64 oz    How many meal replacements did you take each day? 2 and a meal and snacks    Did you have any problems adhering to the program?  yes If yes, please explain:      Exercise  A: Aerobic exercise: stopped walking and exercising because she thought it was making her gain weight min  Resistance exercise: *0workouts / week  Any discomfort?  no      EXAM  HEENT nl  Cv: no distress  Resp: nl    Skin: nl    Ext: nl  Diagnoses and all orders for this visit:    1. Essential hypertension  Control is not at goal  No change in meds yet  2. Obesity (BMI 30-39.9)  -     buPROPion SR (WELLBUTRIN SR) 100 mg SR tablet; Take 1 Tablet by mouth two (2) times a day. Try this for better diet control  3. Hypothyroidism due to acquired atrophy of thyroid  Cont to monitor the levels and adjust as needed  4. PITER (obstructive sleep apnea)  Need for [place]orn[place] hours on  cpap    1. Weight management improved   Progress was reviewed with patient    2. Labs    Latest results reviewed with patient          The primary encounter diagnosis was Essential hypertension. Diagnoses of Obesity (BMI 30-39.9), Hypothyroidism due to acquired atrophy of thyroid, and PITER (obstructive sleep apnea) were also pertinent to this visit. Coding Help - Use CPT Codes 08655-44612, [id number] for Established and New Patients respectively, either employing EM elements or Time rules. Other codes (example consult codes) may also apply.       We discussed the expected course, resolution and complications of the diagnosis(es) in detail. Medication risks, benefits, costs, interactions, and alternatives were discussed as indicated. I advised her to contact the office if her condition worsens, changes or fails to improve as anticipated. She expressed understanding with the diagnosis(es) and plan. Pursuant to the emergency declaration under the [address] [place] [address], [facility] waiver authority and the [employer] Act, this Virtual  Visit was conducted, with patient's consent, to reduce the patient's risk of exposure to COVID-19 and provide continuity of care for an established patient. Services were provided through a video synchronous discussion virtually to substitute for in-person clinic visit.

## 2021-06-29 ENCOUNTER — OFFICE VISIT (OUTPATIENT)
Dept: SURGERY | Age: 61
End: 2021-06-29
Payer: COMMERCIAL

## 2021-06-29 VITALS
HEIGHT: 65 IN | OXYGEN SATURATION: 97 % | HEART RATE: 86 BPM | RESPIRATION RATE: 16 BRPM | WEIGHT: 211.1 LBS | SYSTOLIC BLOOD PRESSURE: 133 MMHG | DIASTOLIC BLOOD PRESSURE: 89 MMHG | BODY MASS INDEX: 35.17 KG/M2 | TEMPERATURE: 98.4 F

## 2021-06-29 DIAGNOSIS — E66.01 CLASS 3 SEVERE OBESITY DUE TO EXCESS CALORIES WITHOUT SERIOUS COMORBIDITY IN ADULT, UNSPECIFIED BMI (HCC): Primary | ICD-10-CM

## 2021-06-29 DIAGNOSIS — G47.33 OSA (OBSTRUCTIVE SLEEP APNEA): ICD-10-CM

## 2021-06-29 DIAGNOSIS — R73.9 HYPERGLYCEMIA: ICD-10-CM

## 2021-06-29 PROCEDURE — 99214 OFFICE O/P EST MOD 30 MIN: CPT | Performed by: FAMILY MEDICINE

## 2021-06-29 NOTE — PROGRESS NOTES
[facility] Progress Note:   F/up Physician Visit  NEW TO [facility]  CC: Weight Management      Elyse Huddleston is a 63 y.o. female who is here for her  f/up physician visit for the  LCD Program.    Weight Metrics 6/29/2021 6/29/2021 4/28/2021 2/11/2021 12/8/2020 1/31/2020 11/22/2019   Weight - 211 lb 1.6 oz 204 lb 3.2 oz 208 lb 12.8 oz 204 lb 1.6 oz 201 lb 4.8 oz 200 lb   Neck Circ (inches) 14.5 - - - - - -   Waist Measure Inches 44 - - - - - -   Body Fat % 41.6 - - - - - -   BMI - 35.13 kg/m2 33.98 kg/m2 34.75 kg/m2 33.96 kg/m2 33.5 kg/m2 33.28 kg/m2         Outpatient Medications Marked as Taking for the 6/29/21 encounter (Office Visit) with Maco Salamanca MD   Medication Sig Dispense Refill    buPROPion SR [facility]) 100 mg SR tablet Take 1 Tablet by mouth two (2) times a day. (Patient taking differently: Take 100 mg by mouth two (2) times a day. Indications: taking prn) 60 Tablet 2    levothyroxine (SYNTHROID) 25 mcg tablet Take 1 Tab by mouth Daily (before breakfast). 90 Tab 1    hydroCHLOROthiazide (MICROZIDE) 12.5 mg capsule Take 1 Cap by mouth daily. 90 Cap 3    mometasone (NASONEX) 50 mcg/actuation nasal spray USE TWO SPRAY(S) IN EACH NOSTRIL DAILY 1 Container 11    albuterol (VENTOLIN HFA) 90 mcg/actuation inhaler Take 2 Puffs by inhalation every four (4) hours as needed for Wheezing. 1 Inhaler 11    glucose blood VI test strips (CONTOUR NEXT STRIPS) strip qd 100 Strip 11         Participation   Did you attend clinic and class last week? yes    Review of Systems  Since your last visit, have you experienced any complications? no  If yes, please list:       Are you taking an appetite suppressant? yes  If so, is there any Chest Pain, Palpitations or Dizziness?     BP Readings from Last 3 Encounters:   06/29/21 133/89   04/28/21 135/84   02/11/21 128/83       SLEEP: 6-7, on cpap but sometimes forgets to put it on    Have you received any other medical care this week? no  If yes, where and for what? Have you discontinued or changed any medicine or dose of your medicine since your last visit with Dr Trenton Whittaker? no  If yes, where and for what? Diet  How many ounces of calorie-free liquids did you consume each day? 64 oz    How many meal replacements did you take each day? 2 and a meal and other foods  Some weekends she does no replacements    Did you have any problems adhering to the program?  no If yes, please explain:      Exercise  Aerobic exercise: works in garden or treadmill 30 min usually active 5 days  Resistance exercise: sit ups  workouts / week  Any discomfort?  no     If yes, where? Objective  Visit Vitals  /89 (BP 1 Location: Left arm, BP Patient Position: Sitting)   Pulse 86   Temp 98.4 °F (36.9 °C) (Oral)   Resp 16   Ht 5' 5\" (1.651 m)   Wt 211 lb 1.6 oz (95.8 kg)   SpO2 97%   BMI 35.13 kg/m²     No LMP recorded. Patient has had a hysterectomy. Physical Exam  Appearance: well,   Mental:A&O x 3, NAD  H:NC/AT,  EENT:   EOMI, PERRL, No scleral icterus  Neck: no bruit or JVD  Lung: clear, No W/R  ABD: soft, active, nontender  Ext:  no Edema  Neuro: nonfocal  Assessment / Plan    Encounter Diagnoses   Name Primary?  Class 3 severe obesity due to excess calories without serious comorbidity in adult, unspecified BMI (HCC) Yes    BMI 35.0-35.9,adult     Hyperglycemia     PITER (obstructive sleep apnea)      Diagnoses and all orders for this visit:    1. Class 3 severe obesity due to excess calories without serious comorbidity in adult, unspecified BMI ([address]  Continue LCD    2. BMI 35.0-35.9,adult    3. Hyperglycemia  The weight loss process will help lower the blood sugar  4. PITER (obstructive sleep apnea)  I explained the connection of OSAand weight loss resistance  Use of cpap is important    1. Weight management poorly controlled   Progress was reviewed with patient    2.   Labs    Latest results reviewed with patient          The primary encounter diagnosis was Class 3 severe obesity due to excess calories without serious comorbidity in adult, unspecified BMI ([facility] [address] Diagnoses of BMI 35.0-35.9,adult, Hyperglycemia, and PITER (obstructive sleep apnea) were also pertinent to this visit.

## 2021-06-29 NOTE — PROGRESS NOTES
Chief Complaint   Patient presents with    Weight Management       1. Have you been to the ER, urgent care clinic since your last visit? Hospitalized since your last visit? No    2. Have you seen or consulted any other health care providers outside of the [address] [place] [address] since your last visit? Include any pap smears or colon screening.  No    Visit Vitals  /89 (BP 1 Location: Left arm, BP Patient Position: Sitting)   Pulse 86   Temp 98.4 °F (36.9 °C) (Oral)   Resp 16   Ht 5' 5\" (1.651 m)   Wt 211 lb 1.6 oz (95.8 kg)   SpO2 97%   BMI 35.13 kg/m²

## 2021-06-30 ENCOUNTER — CLINICAL SUPPORT (OUTPATIENT)
Dept: SURGERY | Age: 61
End: 2021-06-30

## 2021-07-01 NOTE — PROGRESS NOTES
Metabolic Program Initial Nutrition Consult    Date: 2021   Physician: Chaz Martino MD  Name: Oziel Dean  :  1960  Age:  63  Gender: F  Type of Plan: LCD  Weeks on Plan: hasn't started plan yet at [facility] but previously a patient with Dr. Jackie Rob in her PCP office. Virtual visit was completed through [address] [place] [address]. ASSESSMENT:      Medications/Supplements:   Prior to Admission medications    Medication Sig Start Date End Date Taking? Authorizing Provider   buPROPion SR [facility] SR) 100 mg SR tablet Take 1 Tablet by mouth two (2) times a day. Patient taking differently: Take 100 mg by mouth two (2) times a day. Indications: taking prn 21  Juaquin Jean MD   pantoprazole (PROTONIX) 40 mg tablet Take 1 Tab by mouth two (2) times a day. Patient not taking: Reported on 2021 3/28/21   Colette Robert MD   levothyroxine (SYNTHROID) 25 mcg tablet Take 1 Tab by mouth Daily (before breakfast). 3/12/21   Juaquin Jean MD   hydroCHLOROthiazide (MICROZIDE) 12.5 mg capsule Take 1 Cap by mouth daily. 20   Georgiana Casillas MD   ezetimibe (ZETIA) 10 mg tablet Take 1 Tab by mouth daily. Patient not taking: Reported on 2021 2/3/20   Colette Robert MD   mometasone (NASONEX) 50 mcg/actuation nasal spray USE TWO SPRAY(S) IN EACH NOSTRIL DAILY 19   Georgiana Casillas MD   albuterol (VENTOLIN HFA) 90 mcg/actuation inhaler Take 2 Puffs by inhalation every four (4) hours as needed for Wheezing.  10/2/18   Colette Robert MD   glucose blood VI test strips (CONTOUR NEXT STRIPS) strip qd 17   Colette Robert MD       Anthropometrics:    Ht:65\"   Wt: 211#    IBW: 125#    %IBW: 168%     BMI:35    Category: Obesity Class II    Reported wt history: Pt presents today for initial nutrition consult for the [address].      Exercise/Physical Activity:daily 11:00am-11:30am walking or gardening    Reported Diet History:pt reports problem is with snacking and sweets, especially in afternoon between 1-5pm.  Likes most protein and vegetables, refrains from carbs most dinners. Pt has said she will officially start program next week after 4th of July holiday. 24 Hour Diet Recall  Breakfast  Coffee and ND bar or shake   Lunch  1/2 chicken sandwich and waffle fries   Dinner  Leftovers from lunch   Snacks  bar   Beverages  Water 80oz every day, green tea with stevia       Environment/Psychosocial/Support:  supportive but still likes and she will still cook carb for meal: mac and cheese, potatoes, yams. NUTRITION INTERVENTION:  Pt educated on nutrition recommendations for LCD, specifically 2 ND meal replacements PLUS one grocery meal PLUS one snack (ND replacement, ND bar, OR grocery snack)    Grocery meal:  Use the balanced plate method to plan meals, include 3-6 oz of lean source of protein, unlimited non-starchy vegetables, 1/2 cup whole grains/beans OR 1/2 cup fruit OR 1 serving of low fat dairy. Utilize handouts listing healthy snack and meal ideas to limit restaurant meals. Read all nutrition labels. Demonstrated and emphasized identifying serving size, total fat, sugar and protein content. Defined low fat as </= 3 g per serving. Discussed lean and extra lean sources of protein. Provided list of low fat cooking methods. Avoid foods with sugar listed in the first 3 ingredients and >/15 g sugar per serving. Excess sugar/fat intake may lead to dumping syndrome. Discussed signs and symptoms of dumping syndrome. Practice mindful eating habits; take small bites, chew thoroughly, avoid distractions, utilize hunger/fullness scale. Attend Metabolic Support Group and increase physical activity (approved per MD) for long term weight maintenance.       NUTRITION MONITORING AND EVALUATION:    The following goals were established with patient;  1) have a ND shake or bar around 3pm to prevent grazing in afternoon hours  2) increase intensity and/or time of exercise daily  3) at fourth of July cookout, have hotdog no bun if wanting a 1/2 cup baked beans. Pt engaged and asked specific questions, she is encouraged to lose weight especially working from home, doesn't want to gain more. Adherence to goals likely. Specific tips and techniques to facilitate compliance with above recommendations were provided and discussed. If further details are desired please contact me at [phone]. This phone number was also provided to the patient for any further questions or concerns.           Mary Wade, MS, RD, LDN

## 2021-07-15 ENCOUNTER — OFFICE VISIT (OUTPATIENT)
Dept: SURGERY | Age: 61
End: 2021-07-15

## 2021-07-15 DIAGNOSIS — E66.9 OBESITY (BMI 30.0-34.9): Primary | ICD-10-CM

## 2021-07-16 ENCOUNTER — CLINICAL SUPPORT (OUTPATIENT)
Dept: SURGERY | Age: 61
End: 2021-07-16

## 2021-07-16 VITALS
HEIGHT: 65 IN | WEIGHT: 200.2 LBS | OXYGEN SATURATION: 97 % | HEART RATE: 80 BPM | DIASTOLIC BLOOD PRESSURE: 84 MMHG | SYSTOLIC BLOOD PRESSURE: 138 MMHG | BODY MASS INDEX: 33.36 KG/M2 | RESPIRATION RATE: 18 BRPM

## 2021-07-16 DIAGNOSIS — G47.33 OSA (OBSTRUCTIVE SLEEP APNEA): ICD-10-CM

## 2021-07-16 DIAGNOSIS — E66.01 CLASS 3 SEVERE OBESITY DUE TO EXCESS CALORIES WITHOUT SERIOUS COMORBIDITY IN ADULT, UNSPECIFIED BMI (HCC): Primary | ICD-10-CM

## 2021-07-16 DIAGNOSIS — R73.9 HYPERGLYCEMIA: ICD-10-CM

## 2021-07-16 NOTE — PROGRESS NOTES
Progress Note: Weekly Education Class in the [facility] Weight Loss Program     1) Patient is on Very Low Calorie Diet [] (4 meal replacements per day, 800 kcal/day)      Low Calorie Diet [x] (2-3 meal replacements per day, 6469-4227 kcal/day)    Did patient have any new symptoms or physical problems? Yes []   or  No [x]    If yes, check & comment: weakness [], fatigue [], lightheadedness [], headache [], cramps [], cold intolerance [], hair loss [], diarrhea [], constipation [],  NA [] other:  Finger numbness                               Has patient had any medical attention from other providers, urgent care or the emergency room this week? Yes []  or No [x]       NA [], If yes, why:                                         2) Number of meal replacements consumed daily? 3 (range)  NA []    Did you eat any food outside of the program? Yes [x] No []    3) Average ounces of water patient consumed daily this week (not including shakes)? 42     (divide the weekly total by 7)    Any other sugar sweetened beverages consumed this week? Yes [x]  No []    Did patient have any problems adhering to the diet? Yes []  No [x] NA []    If yes, Vacation [], Celebrations [], Conferences [], Family Reunions [] other:                                                4) How has patient mood overall been this week? Sad [], Happy [], Stressed [], Tired [], Content [x], NA [], other            5) Physical Activity Over the Past Week:    Cardio exercise: 270 min  Strength exercise: 1 workouts / week  Number of steps walked per day: N/A      Medications reconciled by nurse Yes [x]  No[]    Patient was given therapeutic recommendations for any noted side effects of their dietary approach based upon [facility] patient manual per providers recommendation.

## 2021-07-16 NOTE — PROGRESS NOTES
Progress Note: Weekly Education Class in the [facility] Weight Loss Program     1) Patient is on Very Low Calorie Diet [] (4 meal replacements per day, 800 kcal/day)      Low Calorie Diet [x] (2-3 meal replacements per day, 9011-6608 kcal/day)    Did patient have any new symptoms or physical problems? Yes [x]   or  No []    If yes, check & comment: weakness [], fatigue [], lightheadedness [], headache [], cramps [], cold intolerance [], hair loss [], diarrhea [], constipation [],  NA [] other: Finger numb, Carpal tunnel                                Has patient had any medical attention from other providers, urgent care or the emergency room this week? Yes []  or No [x]       NA [], If yes, why:                                         2) Number of meal replacements consumed daily? 3 (range)  NA []    Did you eat any food outside of the program? Yes [x] No []    3) Average ounces of water patient consumed daily this week (not including shakes)? 100     (divide the weekly total by 7)    Any other sugar sweetened beverages consumed this week? Yes [x]  No []    Did patient have any problems adhering to the diet? Yes []  No [x] NA []    If yes, Vacation [], Celebrations [], Conferences [], Family Reunions [] other:                                                4) How has patient mood overall been this week? Sad [], Happy [], Stressed [], Tired [], Content [x], NA [], other            5) Physical Activity Over the Past Week:    Cardio exercise: 162 min  Strength exercise: 5 workouts / week  Number of steps walked per day: N/A      Medications reconciled by nurse Yes [x]  No[]    Patient was given therapeutic recommendations for any noted side effects of their dietary approach based upon [facility] patient manual per providers recommendation.

## 2021-07-20 ENCOUNTER — OFFICE VISIT (OUTPATIENT)
Dept: SURGERY | Age: 61
End: 2021-07-20

## 2021-07-20 DIAGNOSIS — E66.9 OBESITY (BMI 30.0-34.9): Primary | ICD-10-CM

## 2021-07-28 ENCOUNTER — VIRTUAL VISIT (OUTPATIENT)
Dept: SURGERY | Age: 61
End: 2021-07-28
Payer: COMMERCIAL

## 2021-07-28 DIAGNOSIS — E78.5 DYSLIPIDEMIA: ICD-10-CM

## 2021-07-28 DIAGNOSIS — G47.33 OSA (OBSTRUCTIVE SLEEP APNEA): ICD-10-CM

## 2021-07-28 DIAGNOSIS — R73.9 HYPERGLYCEMIA: ICD-10-CM

## 2021-07-28 DIAGNOSIS — E66.01 CLASS 3 SEVERE OBESITY DUE TO EXCESS CALORIES WITHOUT SERIOUS COMORBIDITY IN ADULT, UNSPECIFIED BMI (HCC): Primary | ICD-10-CM

## 2021-07-28 DIAGNOSIS — E03.9 ACQUIRED HYPOTHYROIDISM: ICD-10-CM

## 2021-07-28 PROCEDURE — 99214 OFFICE O/P EST MOD 30 MIN: CPT | Performed by: FAMILY MEDICINE

## 2021-07-28 NOTE — PROGRESS NOTES
[facility] Progress Note:   F/up Physician Visit  LCD    Shadi Gong is a 63 y.o. female who was seen by synchronous (real-time) audio-video technology on 7/28/2021. Consent:  She and/or her healthcare decision maker is aware that this patient-initiated Telehealth encounter is a billable service, with coverage as determined by her insurance carrier. She is aware that she may receive a bill and has provided verbal consent to proceed: Yes    I was at home while conducting this encounter. 712  Subjective:   Shadi Gong was seen for Weight Management    f/up physician visit for the VLCD / LCD Program.  Prior to Admission medications    Medication Sig Start Date End Date Taking? Authorizing Provider   pantoprazole (PROTONIX) 40 mg tablet Take 1 Tab by mouth two (2) times a day. 3/28/21  Yes Frida Medina MD   levothyroxine (SYNTHROID) 25 mcg tablet Take 1 Tab by mouth Daily (before breakfast). 3/12/21  Yes Jamar Moran MD   hydroCHLOROthiazide (MICROZIDE) 12.5 mg capsule Take 1 Cap by mouth daily. 7/24/20  Yes Frida Medina MD   ezetimibe (ZETIA) 10 mg tablet Take 1 Tab by mouth daily. 2/3/20  Yes Frida Medina MD   mometasone (NASONEX) 50 mcg/actuation nasal spray USE TWO SPRAY(S) IN EACH NOSTRIL DAILY 1/31/19  Yes Frida Medina MD   glucose blood VI test strips (CONTOUR NEXT STRIPS) strip qd 5/23/17  Yes Frida Medina MD   albuterol (VENTOLIN HFA) 90 mcg/actuation inhaler Take 2 Puffs by inhalation every four (4) hours as needed for Wheezing.  10/2/18   Frida Medina MD     Allergies   Allergen Reactions    Contrave [Naltrexone-Bupropion] Other (comments)     1 PILL MORNING, 2 PILLS EVENING  FELT ZOMBIED, \"OUT OF MY HEAD\"    Crestor [Rosuvastatin] Other (comments)     Felt like it worsened her memory    Gadolinium-Containing Contrast Media Nausea and Vomiting    Lipitor [Atorvastatin] Other (comments)     Felt like it worsened memory. Patient Active Problem List    Diagnosis Date Noted    PITER (obstructive sleep apnea)     Allergic rhinitis due to pollen 07/24/2020    Shoulder pain, right     TMJ (temporomandibular joint syndrome)     S/P colonoscopy 11/22/2019    Gastritis and duodenitis 11/22/2019    Microscopic colitis 11/22/2019    Back pain 07/17/2019    Gastroenteritis 07/17/2019    Herpes simplex infection of genitourinary system 05/16/2019    Hyperglycemia 09/12/2018    Essential fatty acid (EFA) deficiency 09/12/2018    Gasping for breath 08/15/2018    Hyperuricemia w/o signs of inflam arthrit and tophaceous dis 08/15/2018    Elevated serum creatinine 08/15/2018    Asthma     Neck nodule 05/10/2018    Abdominal wall pain     Normal cardiac stress test 07/19/2017    Encounter for Hemoccult screening 06/02/2017    S/P colonoscopic polypectomy 02/07/2017    Heartburn 01/01/2017    Dyslipidemia     ACP (advance care planning) 04/04/2016    Steatosis of liver     Hypertension     Breast cancer, left ([facility] [address]     S/P MUKUND-[id number] (total abdominal hysterectomy and bilateral salpingo-oophorectomy)     Postsurgical menopause 01/01/2002     Current Outpatient Medications   Medication Sig Dispense Refill    pantoprazole (PROTONIX) 40 mg tablet Take 1 Tab by mouth two (2) times a day. 60 Tab 3    levothyroxine (SYNTHROID) 25 mcg tablet Take 1 Tab by mouth Daily (before breakfast). 90 Tab 1    hydroCHLOROthiazide (MICROZIDE) 12.5 mg capsule Take 1 Cap by mouth daily. 90 Cap 3    ezetimibe (ZETIA) 10 mg tablet Take 1 Tab by mouth daily. 30 Tab 11    mometasone (NASONEX) 50 mcg/actuation nasal spray USE TWO SPRAY(S) IN EACH NOSTRIL DAILY 1 Container 11    glucose blood VI test strips (CONTOUR NEXT STRIPS) strip qd 100 Strip 11    albuterol (VENTOLIN HFA) 90 mcg/actuation inhaler Take 2 Puffs by inhalation every four (4) hours as needed for Wheezing.  1 Inhaler 11       ROS      CC: Weight Management      Baltazar Hu is a 63 y.o. female who is here for her      Weight Metrics 7/16/2021 6/29/2021 6/29/2021 4/28/2021 2/11/2021 12/8/2020 1/31/2020   Weight 200 lb 3.2 oz - 211 lb 1.6 oz 204 lb 3.2 oz 208 lb 12.8 oz 204 lb 1.6 oz 201 lb 4.8 oz   Neck Circ (inches) - 14.5 - - - - -   Waist Measure Inches - 44 - - - - -   Body Fat % - 41.6 - - - - -   BMI 33.32 kg/m2 - 35.13 kg/m2 33.98 kg/m2 34.75 kg/m2 33.96 kg/m2 33.5 kg/m2         Outpatient Medications Marked as Taking for the 7/28/21 encounter (Virtual Visit) with Mary Smith MD   Medication Sig Dispense Refill    pantoprazole (PROTONIX) 40 mg tablet Take 1 Tab by mouth two (2) times a day. 60 Tab 3    levothyroxine (SYNTHROID) 25 mcg tablet Take 1 Tab by mouth Daily (before breakfast). 90 Tab 1    hydroCHLOROthiazide (MICROZIDE) 12.5 mg capsule Take 1 Cap by mouth daily. 90 Cap 3    ezetimibe (ZETIA) 10 mg tablet Take 1 Tab by mouth daily. 30 Tab 11    mometasone (NASONEX) 50 mcg/actuation nasal spray USE TWO SPRAY(S) IN EACH NOSTRIL DAILY 1 Container 11    glucose blood VI test strips (CONTOUR NEXT STRIPS) strip qd 100 Strip 11         Participation   Did you attend clinic and class last week? yes    Review of Systems  Since your last visit, have you experienced any complications? no  If yes, please list:       Are you taking an appetite suppressant? no  If so, is there any Chest Pain, Palpitations or Dizziness? BP Readings from Last 3 Encounters:   07/16/21 138/84   06/29/21 133/89   04/28/21 135/84       SLEEP:  6, not using her cpap due to a recall  Has been off 3-4 weeks      Have you received any other medical care this week? no  If yes, where and for what? Have you discontinued or changed any medicine or dose of your medicine since your last visit with Dr Sandra Bianchi? no  If yes, where and for what?          Diet  How many ounces of calorie-free liquids did you consume each day?  80 oz    How many meal replacements did you take each day? 3 and a meal    Did you have any problems adhering to the program?  no If yes, please explain:      Exercise  Aerobic exercise: 30 min on TM 5 days  Resistance exercise:  workouts / week  Any discomfort?  no     If yes, where? Objective  There were no vitals taken for this visit. No LMP recorded. Patient has had a hysterectomy. PHYSICAL EXAMINATION:  [ INSTRUCTIONS:  \"[x]\" Indicates a positive item  \"[]\" Indicates a negative item  -- DELETE ALL ITEMS NOT EXAMINED]  Vital Signs: (As obtained by patient/caregiver at home)  There were no vitals taken for this visit. Constitutional: [x] Appears well-developed and well-nourished [x] No apparent distress      [] Abnormal -     Mental status: [x] Alert and awake  [x] Oriented to person/place/time [x] Able to follow commands    [] Abnormal -     Eyes:   EOM    [x]  Normal    [] Abnormal -   Sclera  [x]  Normal    [] Abnormal -          Discharge [x]  None visible   [] Abnormal -     HENT: [x] Normocephalic, atraumatic  [] Abnormal -   [x] Mouth/Throat: Mucous membranes are moist    External Ears [x] Normal  [] Abnormal -    Neck: [x] No visualized mass [] Abnormal -     Pulmonary/Chest: [x] Respiratory effort normal   [x] No visualized signs of difficulty breathing or respiratory distress        [] Abnormal -      Musculoskeletal:   [x] Normal gait with no signs of ataxia         [x] Normal range of motion of neck        [] Abnormal -     Neurological:        [x] No Facial Asymmetry (Cranial nerve 7 motor function) (limited exam due to video visit)          [x] No gaze palsy        [] Abnormal -          Skin:        [x] No significant exanthematous lesions or discoloration noted on facial skin         [] Abnormal -            Psychiatric:       [x] Normal Affect [] Abnormal -        [x] No Hallucinations    Other pertinent observable physical exam findings:-      Assessment / Plan    Encounter Diagnoses   Name Primary?     Class 3 severe obesity due to excess calories without serious comorbidity in adult, unspecified BMI ([facility] [address] Yes    BMI 35.0-35.9,adult     Hyperglycemia     PITER (obstructive sleep apnea)     Acquired hypothyroidism     Dyslipidemia      Diagnoses and all orders for this visit:    1. Class 3 severe obesity due to excess calories without serious comorbidity in adult, unspecified BMI (HCC)  -     HEMOGLOBIN A1C WITH EAG; Future  -     LIPID PANEL; Future  -     TSH 3RD GENERATION; Future  -     METABOLIC PANEL, COMPREHENSIVE; Future  Has done well and down 11 pounds so far in 2 weeks  2. BMI 35.0-35.9,adult    3. Hyperglycemia  -     HEMOGLOBIN A1C WITH EAG; Future  -     METABOLIC PANEL, COMPREHENSIVE; Future  The weight management process will help lower the sugar level  4. PITER (obstructive sleep apnea)    5. Acquired hypothyroidism  -     TSH 3RD GENERATION; Future  Cont to treat as indicated. As the weight comes down the   6. Dyslipidemia  -     LIPID PANEL; Future  Cont to monitor and treat as indicated  she did a calcium scoring a few years ago  She was told she was good  She has high LDL and does not tolerate statins or zetia    1. Weight management improved   Progress was reviewed with patient    2. Labs    Latest results reviewed with patient          The primary encounter diagnosis was Class 3 severe obesity due to excess calories without serious comorbidity in adult, unspecified BMI ([facility] [address] Diagnoses of BMI 35.0-35.9,adult, Hyperglycemia, PITER (obstructive sleep apnea), Acquired hypothyroidism, and Dyslipidemia were also pertinent to this visit. Coding Help - Use CPT Codes 47308-68006, 51823-89969 for Established and New Patients respectively, either employing EM elements or Time rules. Other codes (example consult codes) may also apply. We discussed the expected course, resolution and complications of the diagnosis(es) in detail.   Medication risks, benefits, costs, interactions, and alternatives were discussed as indicated. I advised her to contact the office if her condition worsens, changes or fails to improve as anticipated. She expressed understanding with the diagnosis(es) and plan. Pursuant to the emergency declaration under the [facility] waiver authority and the [employer] and [employer] Act, this Virtual  Visit was conducted, with patient's consent, to reduce the patient's risk of exposure to COVID-19 and provide continuity of care for an established patient. Services were provided through a video synchronous discussion virtually to substitute for in-person clinic visit.     Yo Cox MD

## 2021-07-29 ENCOUNTER — OFFICE VISIT (OUTPATIENT)
Dept: SURGERY | Age: 61
End: 2021-07-29

## 2021-07-29 DIAGNOSIS — E66.9 OBESITY (BMI 30.0-34.9): Primary | ICD-10-CM

## 2021-07-29 RX ORDER — SCOLOPAMINE TRANSDERMAL SYSTEM 1 MG/1
1 PATCH, EXTENDED RELEASE TRANSDERMAL
Qty: 3 PATCH | Refills: 0 | Status: SHIPPED | OUTPATIENT
Start: 2021-07-29 | End: 2022-03-14 | Stop reason: SDUPTHER

## 2021-07-29 RX ORDER — ACYCLOVIR 50 MG/G
OINTMENT TOPICAL
Qty: 30 G | Refills: 1 | Status: SHIPPED | OUTPATIENT
Start: 2021-07-29

## 2021-07-29 NOTE — PROGRESS NOTES
[facility]  Metabolic Weight Loss Program        Patient's Name: Chanel Felix  : 1960    This patient is enrolled in [address] [place] [address] Weight Loss Program and attended the required weekly virtual nutrition class hosted via [employer] today.       Mi Bella, MS, RD, LDN

## 2021-07-31 LAB
ALBUMIN SERPL-MCNC: 4.2 G/DL (ref 3.8–4.8)
ALBUMIN/GLOB SERPL: 1.7 {RATIO} (ref 1.2–2.2)
ALP SERPL-CCNC: 104 IU/L (ref 48–121)
ALT SERPL-CCNC: 16 IU/L (ref 0–32)
AST SERPL-CCNC: 20 IU/L (ref 0–40)
BILIRUB SERPL-MCNC: 0.4 MG/DL (ref 0–1.2)
BUN SERPL-MCNC: 19 MG/DL (ref 8–27)
BUN/CREAT SERPL: 19 (ref 12–28)
CALCIUM SERPL-MCNC: 9.3 MG/DL (ref 8.7–10.3)
CHLORIDE SERPL-SCNC: 104 MMOL/L (ref 96–106)
CHOLEST SERPL-MCNC: 193 MG/DL (ref 100–199)
CO2 SERPL-SCNC: 23 MMOL/L (ref 20–29)
CREAT SERPL-MCNC: 1 MG/DL (ref 0.57–1)
EST. AVERAGE GLUCOSE BLD GHB EST-MCNC: 120 MG/DL
GLOBULIN SER CALC-MCNC: 2.5 G/DL (ref 1.5–4.5)
GLUCOSE SERPL-MCNC: 102 MG/DL (ref 65–99)
HBA1C MFR BLD: 5.8 % (ref 4.8–5.6)
HDLC SERPL-MCNC: 52 MG/DL
LDLC SERPL CALC-MCNC: 128 MG/DL (ref 0–99)
POTASSIUM SERPL-SCNC: 4.1 MMOL/L (ref 3.5–5.2)
PROT SERPL-MCNC: 6.7 G/DL (ref 6–8.5)
SODIUM SERPL-SCNC: 142 MMOL/L (ref 134–144)
TRIGL SERPL-MCNC: 73 MG/DL (ref 0–149)
TSH SERPL DL<=0.005 MIU/L-ACNC: 3.73 UIU/ML (ref 0.45–4.5)
VLDLC SERPL CALC-MCNC: 13 MG/DL (ref 5–40)

## 2021-07-31 NOTE — PROGRESS NOTES
-Called pt to informed Dr. Adler sent in depo refill. No answer, unable to leave a message as her voicemail box has not been set up yet.     -Called pt to informed Dr. Adler sent in depo refill. No answer, unable to leave a message as her voicemail box has not been set up yet.    [facility]  Metabolic Weight Loss Program        Patient's Name: Kendra Cueva  : 1960    This patient is enrolled in [address] [place] [address] Weight Loss Program and attended the required weekly virtual nutrition class hosted via [address] [place] [address] today.       Dina Alex MS, RD, LDN

## 2021-07-31 NOTE — PROGRESS NOTES
Nurse note from patient's weekly VLCD / LCD / Maintenance class was reviewed. Pertinent medical concerns were:   Down 11 lbs     BP Readings from Last 3 Encounters:   07/16/21 138/84   06/29/21 133/89   04/28/21 135/84       Weight Metrics 7/16/2021 6/29/2021 6/29/2021 4/28/2021 2/11/2021 12/8/2020 1/31/2020   Weight 200 lb 3.2 oz - 211 lb 1.6 oz 204 lb 3.2 oz 208 lb 12.8 oz 204 lb 1.6 oz 201 lb 4.8 oz   Neck Circ (inches) - 14.5 - - - - -   Waist Measure Inches - 44 - - - - -   Body Fat % - 41.6 - - - - -   BMI 33.32 kg/m2 - 35.13 kg/m2 33.98 kg/m2 34.75 kg/m2 33.96 kg/m2 33.5 kg/m2       Current Outpatient Medications   Medication Sig Dispense Refill    pantoprazole (PROTONIX) 40 mg tablet Take 1 Tab by mouth two (2) times a day. 60 Tab 3    levothyroxine (SYNTHROID) 25 mcg tablet Take 1 Tab by mouth Daily (before breakfast). 90 Tab 1    hydroCHLOROthiazide (MICROZIDE) 12.5 mg capsule Take 1 Cap by mouth daily. 90 Cap 3    ezetimibe (ZETIA) 10 mg tablet Take 1 Tab by mouth daily. 30 Tab 11    mometasone (NASONEX) 50 mcg/actuation nasal spray USE TWO SPRAY(S) IN EACH NOSTRIL DAILY 1 Container 11    glucose blood VI test strips (CONTOUR NEXT STRIPS) strip qd 100 Strip 11    acyclovir (ZOVIRAX) 5 % ointment Apply to affected area 6 times a day 30 g 1    scopolamine (TRANSDERM-SCOP) 1 mg over 3 days pt3d 1 Patch by TransDERmal route every seventy-two (72) hours. 3 Patch 0    albuterol (VENTOLIN HFA) 90 mcg/actuation inhaler Take 2 Puffs by inhalation every four (4) hours as needed for Wheezing.  1 Inhaler 11

## 2021-08-01 NOTE — PROGRESS NOTES
[address] [place] [address] Weight Management Center  Metabolic Weight Loss Program        Patient's Name: Waldo Gloria  : 1960    This patient is enrolled in [address] [place] [address] Weight Loss Program and attended the required weekly virtual nutrition class hosted via [address] [place] [address] today.       Darinel Esparza MS, RD, LDN

## 2021-08-02 NOTE — PROGRESS NOTES
The blood sugar is in the prediabetes zone  The thyroid level is normal  The kidney and liver tests are normal  Your cholesterol numbers are not at goal. To provide you with the best heart health the LDL should be under 100 if you have no heart disease or history of diabetes. If you have a history of diabetes  or PREDIABETESor heart disease the LDL goal should be less than 70. Your LDL is 128  Avoid fried food, fast food and junk food. Also move more each day. aim for at least 150 minutes of activity each week.  I want to recheck the cholesterol levels in three months

## 2021-08-05 ENCOUNTER — OFFICE VISIT (OUTPATIENT)
Dept: SURGERY | Age: 61
End: 2021-08-05

## 2021-08-05 DIAGNOSIS — E66.9 OBESITY (BMI 30.0-34.9): Primary | ICD-10-CM

## 2021-08-12 ENCOUNTER — OFFICE VISIT (OUTPATIENT)
Dept: SURGERY | Age: 61
End: 2021-08-12

## 2021-08-12 DIAGNOSIS — E66.01 MORBID OBESITY (HCC): Primary | ICD-10-CM

## 2021-08-13 ENCOUNTER — CLINICAL SUPPORT (OUTPATIENT)
Dept: SURGERY | Age: 61
End: 2021-08-13

## 2021-08-13 VITALS
HEIGHT: 65 IN | HEART RATE: 100 BPM | OXYGEN SATURATION: 100 % | DIASTOLIC BLOOD PRESSURE: 82 MMHG | BODY MASS INDEX: 33.49 KG/M2 | RESPIRATION RATE: 18 BRPM | WEIGHT: 201 LBS | SYSTOLIC BLOOD PRESSURE: 122 MMHG

## 2021-08-13 DIAGNOSIS — E03.9 ACQUIRED HYPOTHYROIDISM: ICD-10-CM

## 2021-08-13 DIAGNOSIS — G47.33 OSA (OBSTRUCTIVE SLEEP APNEA): ICD-10-CM

## 2021-08-13 DIAGNOSIS — R73.9 HYPERGLYCEMIA: ICD-10-CM

## 2021-08-13 DIAGNOSIS — E66.9 CLASS 2 OBESITY: Primary | ICD-10-CM

## 2021-08-13 NOTE — PROGRESS NOTES
Progress Note: Weekly Education Class in the [facility] Weight Loss Program         Patient is on Very Low Calorie Diet [] (4 meal replacements per day, 800 kcal/day)      Low Calorie Diet [x] (2-3 meal replacements per day, 9146-6555 kcal/day)    1) Did patient have any new symptoms or physical problems? Yes []    No [x]    If yes, check & comment: weakness [], fatigue [], lightheadedness [], headache [], cramps [], cold intolerance [], hair loss [], diarrhea [], constipation [],  NA [] other:                            2) Has patient had any medical attention from other providers, urgent care or the emergency room this week? Yes []  No [x]       NA [], If yes, why:                                3) Any other sugar sweetened beverages consumed this week? Yes []  No [x]    4) Did patient have any problems adhering to the diet? Yes []  No [x] NA []    If yes, Vacation [], Celebrations [], Conferences [], Family Reunions [] other:                                      5) How many hours of sleep this week?    (range)  NA []    Number of meal replacements consumed daily? 1 range)  NA []    Average ounces of water patient consumed daily this week (not including shakes)? 20.5   (divide the weekly total by 7)    Did you eat any food outside of the program? Yes [] No [x]    Physical Activity Over the Past Week:    Cardio exercise: 300  min  Strength exercise:  workouts / week  Number of steps walked per day: How has patient mood overall been this week? Sad [], Happy [], Stressed [], Tired [], Content [], NA [], other FIne           Medications reconciled by nurse Yes [x]  No[]    Patient was given therapeutic recommendations for any noted side effects of their dietary approach based upon [facility] patient manual per providers recommendation.        WEEK 6        Progress Note: Weekly Education Class in the [facility] Weight Loss Program         Patient is on Very Low Calorie Diet [] (4 meal replacements per day, 800 kcal/day)      Low Calorie Diet [x] (2-3 meal replacements per day, 7661-6380 kcal/day)    1) Did patient have any new symptoms or physical problems? Yes []    No [x]    If yes, check & comment: weakness [], fatigue [], lightheadedness [], headache [], cramps [], cold intolerance [], hair loss [], diarrhea [], constipation [],  NA [] other:                            2) Has patient had any medical attention from other providers, urgent care or the emergency room this week? Yes []  No [x]       NA [], If yes, why:                                3) Any other sugar sweetened beverages consumed this week? Yes []  No [x]    4) Did patient have any problems adhering to the diet? Yes []  No [x] NA []    If yes, Vacation [], Celebrations [], Conferences [], Family Reunions [] other:                                 5) How many hours of sleep this week?   (range)  NA [x]    Number of meal replacements consumed daily? 3-4range)  NA []    Average ounces of water patient consumed daily this week (not including shakes)? (divide the weekly total by 7) 60    Did you eat any food outside of the program? Yes [] No [x]    Physical Activity Over the Past Week:   Cardio exercise: 2.25 hrs. Strength exercise: 15 workouts / week  Number of steps walked per day: How has patient mood overall been this week? Sad [], Happy [], Stressed [], Tired [], Content [], NA [x], other  Fine           Medications reconciled by nurse Yes [x]  No[]    Patient was given therapeutic recommendations for any noted side effects of their dietary approach based upon [facility] patient manual per providers recommendation.

## 2021-08-16 NOTE — PROGRESS NOTES
[facility]  Metabolic Weight Loss Program        Patient's Name: Vahe Hill  : 1960    This patient is enrolled in [address] [place] [address] Weight Loss Program and attended the required weekly virtual nutrition class hosted via [address] [place] [address] today.       Lonnie Barone RD

## 2021-08-19 ENCOUNTER — OFFICE VISIT (OUTPATIENT)
Dept: SURGERY | Age: 61
End: 2021-08-19

## 2021-08-19 DIAGNOSIS — E66.01 MORBID OBESITY (HCC): Primary | ICD-10-CM

## 2021-08-26 ENCOUNTER — OFFICE VISIT (OUTPATIENT)
Dept: SURGERY | Age: 61
End: 2021-08-26

## 2021-08-26 DIAGNOSIS — E66.9 OBESITY (BMI 30.0-34.9): Primary | ICD-10-CM

## 2021-08-27 ENCOUNTER — CLINICAL SUPPORT (OUTPATIENT)
Dept: SURGERY | Age: 61
End: 2021-08-27

## 2021-08-27 VITALS
BODY MASS INDEX: 33.17 KG/M2 | SYSTOLIC BLOOD PRESSURE: 114 MMHG | DIASTOLIC BLOOD PRESSURE: 86 MMHG | HEART RATE: 99 BPM | OXYGEN SATURATION: 99 % | RESPIRATION RATE: 18 BRPM | HEIGHT: 65 IN | WEIGHT: 199.1 LBS

## 2021-08-27 DIAGNOSIS — E66.9 OBESITY (BMI 30.0-34.9): Primary | ICD-10-CM

## 2021-08-27 DIAGNOSIS — E03.9 ACQUIRED HYPOTHYROIDISM: ICD-10-CM

## 2021-08-27 DIAGNOSIS — E78.5 DYSLIPIDEMIA: ICD-10-CM

## 2021-08-27 DIAGNOSIS — R73.9 HYPERGLYCEMIA: ICD-10-CM

## 2021-08-27 DIAGNOSIS — G47.33 OSA (OBSTRUCTIVE SLEEP APNEA): ICD-10-CM

## 2021-08-27 NOTE — PROGRESS NOTES
Progress Note: Weekly Education Class in the [facility] Weight Loss Program         Patient is on Very Low Calorie Diet [] (4 meal replacements per day, 800 kcal/day)      Low Calorie Diet [x] (2-3 meal replacements per day, 7444-4770 kcal/day)    1) Did patient have any new symptoms or physical problems? Yes []    No [x]    If yes, check & comment: weakness [], fatigue [], lightheadedness [], headache [], cramps [], cold intolerance [], hair loss [], diarrhea [], constipation [],  NA [] other:                                 2) Has patient had any medical attention from other providers, urgent care or the emergency room this week? Yes []  No [x]       NA [], If yes, why:                                       3) Any other sugar sweetened beverages consumed this week? Yes [x]  No []    4) Did patient have any problems adhering to the diet? Yes [x]  No [] NA []    If yes, Vacation [], Celebrations [], Conferences [], Family Reunions [x] other: moving                                               5) How many hours of sleep this week?     (range)  NA [x]    Number of meal replacements consumed daily? 3-4 (range)  NA []    Average ounces of water patient consumed daily this week (not including shakes)? 70     (divide the weekly total by 7)    Did you eat any food outside of the program? Yes [x] No []    Physical Activity Over the Past Week:    Cardio exercise: 0 min  Strength exercise: 0  workouts / week  Number of steps walked per day: 4990-4662    How has patient mood overall been this week? Sad [], Happy [], Stressed [], Tired [], Content [], NA [x], other            Medications reconciled by nurse Yes [x]  No[]    Patient was given therapeutic recommendations for any noted side effects of their dietary approach based upon [facility] patient manual per providers recommendation.

## 2021-08-27 NOTE — PROGRESS NOTES
Progress Note: Weekly Education Class in the [facility] Weight Loss Program         Patient is on Very Low Calorie Diet [] (4 meal replacements per day, 800 kcal/day)      Low Calorie Diet [x] (2-3 meal replacements per day, 3818-4590 kcal/day)    1) Did patient have any new symptoms or physical problems? Yes []    No [x]    If yes, check & comment: weakness [], fatigue [], lightheadedness [], headache [], cramps [], cold intolerance [], hair loss [], diarrhea [], constipation [],  NA [] other:                                 2) Has patient had any medical attention from other providers, urgent care or the emergency room this week? Yes []  No [x]       NA [], If yes, why:                                      3) Any other sugar sweetened beverages consumed this week? Yes []  No [x]    4) Did patient have any problems adhering to the diet? Yes []  No [x] NA []    If yes, Vacation [], Celebrations [], Conferences [], Family Reunions [] other:                                                5) How many hours of sleep this week?     (range)  NA [x]    Number of meal replacements consumed daily? 2-4 (range)  NA []    Average ounces of water patient consumed daily this week (not including shakes)? 60  (divide the weekly total by 7)    Did you eat any food outside of the program? Yes [x] No []    Physical Activity Over the Past Week:    Cardio exercise: 80 min  Strength exercise: 3 workouts / week  Number of steps walked per day: How has patient mood overall been this week? Sad [], Happy [], Stressed [], Tired [], Content [x], NA [], other            Medications reconciled by nurse Yes [x]  No[]    Patient was given therapeutic recommendations for any noted side effects of their dietary approach based upon [facility] patient manual per providers recommendation.

## 2021-09-01 ENCOUNTER — VIRTUAL VISIT (OUTPATIENT)
Dept: SURGERY | Age: 61
End: 2021-09-01
Payer: COMMERCIAL

## 2021-09-01 DIAGNOSIS — E03.4 HYPOTHYROIDISM DUE TO ACQUIRED ATROPHY OF THYROID: ICD-10-CM

## 2021-09-01 DIAGNOSIS — G47.33 OSA (OBSTRUCTIVE SLEEP APNEA): ICD-10-CM

## 2021-09-01 DIAGNOSIS — E66.9 CLASS 1 OBESITY: Primary | ICD-10-CM

## 2021-09-01 PROCEDURE — 99214 OFFICE O/P EST MOD 30 MIN: CPT | Performed by: FAMILY MEDICINE

## 2021-09-01 RX ORDER — LEVOTHYROXINE SODIUM 25 UG/1
25 TABLET ORAL
Qty: 90 TABLET | Refills: 1 | Status: SHIPPED | OUTPATIENT
Start: 2021-09-01 | End: 2022-03-14 | Stop reason: SDUPTHER

## 2021-09-01 NOTE — PROGRESS NOTES
Nurse note from patient's weekly VLCD / LCD / Maintenance class was reviewed. Pertinent medical concerns were:  Bouncing around a bit      BP Readings from Last 3 Encounters:   08/27/21 114/86   08/13/21 122/82   07/16/21 138/84       Weight Metrics 8/27/2021 8/13/2021 7/16/2021 6/29/2021 6/29/2021 4/28/2021 2/11/2021   Weight 199 lb 1.6 oz 201 lb 200 lb 3.2 oz - 211 lb 1.6 oz 204 lb 3.2 oz 208 lb 12.8 oz   Neck Circ (inches) - - - 14.5 - - -   Waist Measure Inches - - - 44 - - -   Body Fat % - - - 41.6 - - -   BMI 33.13 kg/m2 33.45 kg/m2 33.32 kg/m2 - 35.13 kg/m2 33.98 kg/m2 34.75 kg/m2       Current Outpatient Medications   Medication Sig Dispense Refill    acyclovir (ZOVIRAX) 5 % ointment Apply to affected area 6 times a day 30 g 1    scopolamine (TRANSDERM-SCOP) 1 mg over 3 days pt3d 1 Patch by TransDERmal route every seventy-two (72) hours. 3 Patch 0    pantoprazole (PROTONIX) 40 mg tablet Take 1 Tab by mouth two (2) times a day. 60 Tab 3    hydroCHLOROthiazide (MICROZIDE) 12.5 mg capsule Take 1 Cap by mouth daily. 90 Cap 3    ezetimibe (ZETIA) 10 mg tablet Take 1 Tab by mouth daily. 30 Tab 11    mometasone (NASONEX) 50 mcg/actuation nasal spray USE TWO SPRAY(S) IN EACH NOSTRIL DAILY 1 Container 11    albuterol (VENTOLIN HFA) 90 mcg/actuation inhaler Take 2 Puffs by inhalation every four (4) hours as needed for Wheezing. 1 Inhaler 11    glucose blood VI test strips (CONTOUR NEXT STRIPS) strip qd 100 Strip 11    levothyroxine (SYNTHROID) 25 mcg tablet Take 1 Tablet by mouth Daily (before breakfast).  90 Tablet 1

## 2021-09-01 NOTE — PROGRESS NOTES
[facility]  Metabolic Weight Loss Program        Patient's Name: Abundio Lewis  : 1960    This patient is enrolled in [address] [place] [address] Weight Loss Program and attended the required weekly virtual nutrition class hosted via [employer].       Vale Owens, MS, RD, LDN

## 2021-09-01 NOTE — PROGRESS NOTES
[facility]  Metabolic Weight Loss Program        Patient's Name: Leslye Escobar  : 1960    This patient is enrolled in [address] [place] [address] Weight Loss Program and attended the required weekly virtual nutrition class hosted via [employer].       Aj Chaves, MS, RD, LDN

## 2021-09-01 NOTE — PROGRESS NOTES
[facility] Progress Note:   F/up Physician Visit  LCD    Sosa Gutiérrez is a 63 y.o. female who was seen by synchronous (real-time) audio-video technology on 9/1/2021. Consent:  She and/or her healthcare decision maker is aware that this patient-initiated Telehealth encounter is a billable service, with coverage as determined by her insurance carrier. She is aware that she may receive a bill and has provided verbal consent to proceed: Yes    I was at home while conducting this encounter. 712  Subjective:   Sosa Gutiérrez was seen for Weight Management    f/up physician visit for the VLCD / LCD Program.  Prior to Admission medications    Medication Sig Start Date End Date Taking? Authorizing Provider   levothyroxine (SYNTHROID) 25 mcg tablet Take 1 Tablet by mouth Daily (before breakfast). 9/1/21  Yes Oli Mcmanus MD   acyclovir (ZOVIRAX) 5 % ointment Apply to affected area 6 times a day 7/29/21  Yes Katelyn Weinstein MD   scopolamine (TRANSDERM-SCOP) 1 mg over 3 days pt3d 1 Patch by TransDERmal route every seventy-two (72) hours. 7/29/21  Yes Katelyn Weinstein MD   pantoprazole (PROTONIX) 40 mg tablet Take 1 Tab by mouth two (2) times a day. 3/28/21  Yes Katelyn Weinstein MD   hydroCHLOROthiazide (MICROZIDE) 12.5 mg capsule Take 1 Cap by mouth daily. 7/24/20  Yes Katelyn Weinstein MD   ezetimibe (ZETIA) 10 mg tablet Take 1 Tab by mouth daily. 2/3/20  Yes Katelyn Weinstein MD   mometasone (NASONEX) 50 mcg/actuation nasal spray USE TWO SPRAY(S) IN EACH NOSTRIL DAILY 1/31/19  Yes Katelyn Weinstein MD   albuterol (VENTOLIN HFA) 90 mcg/actuation inhaler Take 2 Puffs by inhalation every four (4) hours as needed for Wheezing.  10/2/18  Yes Katelyn Weinstein MD   glucose blood VI test strips (CONTOUR NEXT STRIPS) strip qd 5/23/17  Yes Katelyn Weinstein MD     Allergies   Allergen Reactions    Contrave [Naltrexone-Bupropion] Other (comments)     1 PILL MORNING, 2 PILLS EVENING  FELT ZOMBIED, \"OUT OF MY HEAD\"    Crestor [Rosuvastatin] Other (comments)     Felt like it worsened her memory    Gadolinium-Containing Contrast Media Nausea and Vomiting    Lipitor [Atorvastatin] Other (comments)     Felt like it worsened memory.         Patient Active Problem List   Diagnosis Code    Hypertension I10    Breast cancer, left ([facility] [address] C50.912    S/P MUKUND-BSO (total abdominal hysterectomy and bilateral salpingo-oophorectomy) Z90.710, Z90.722, Z90.79    Steatosis of liver K76.0    ACP (advance care planning) Z71.89    Dyslipidemia E78.5    Encounter for Hemoccult screening Z12.11    Normal cardiac stress test [id number]    Abdominal wall pain R10.9    S/P colonoscopic polypectomy Z98.890    Neck nodule R22.1    Asthma J45.909    Gasping for breath R06.89    Postsurgical menopause E89.40    Heartburn R12    Hyperuricemia w/o signs of inflam arthrit and tophaceous dis E79.0    Elevated serum creatinine R79.89    Hyperglycemia R73.9    Essential fatty acid (EFA) deficiency E63.0    Herpes simplex infection of genitourinary system A60.00    Back pain M54.9    Gastroenteritis K52.9    S/P colonoscopy Z98.890    Gastritis and duodenitis K29.90    Microscopic colitis K52.839    Shoulder pain, right M25.511    TMJ (temporomandibular joint syndrome) M26.609    Allergic rhinitis due to pollen J30.1    PITER (obstructive sleep apnea) G47.33       ROS      CC: Weight Management      Teto Ta is a 63 y.o. female who is here for her      Weight Metrics 8/27/2021 8/13/2021 7/16/2021 6/29/2021 6/29/2021 4/28/2021 2/11/2021   Weight 199 lb 1.6 oz 201 lb 200 lb 3.2 oz - 211 lb 1.6 oz 204 lb 3.2 oz 208 lb 12.8 oz   Neck Circ (inches) - - - 14.5 - - -   Waist Measure Inches - - - 44 - - -   Body Fat % - - - 41.6 - - -   BMI 33.13 kg/m2 33.45 kg/m2 33.32 kg/m2 - 35.13 kg/m2 33.98 kg/m2 34.75 kg/m2         Outpatient Medications Marked as Taking for the 9/1/21 encounter (Virtual Visit) with Ad Wood MD   Medication Sig Dispense Refill    levothyroxine (SYNTHROID) 25 mcg tablet Take 1 Tablet by mouth Daily (before breakfast). 90 Tablet 1    acyclovir (ZOVIRAX) 5 % ointment Apply to affected area 6 times a day 30 g 1    scopolamine (TRANSDERM-SCOP) 1 mg over 3 days pt3d 1 Patch by TransDERmal route every seventy-two (72) hours. 3 Patch 0    pantoprazole (PROTONIX) 40 mg tablet Take 1 Tab by mouth two (2) times a day. 60 Tab 3    hydroCHLOROthiazide (MICROZIDE) 12.5 mg capsule Take 1 Cap by mouth daily. 90 Cap 3    ezetimibe (ZETIA) 10 mg tablet Take 1 Tab by mouth daily. 30 Tab 11    mometasone (NASONEX) 50 mcg/actuation nasal spray USE TWO SPRAY(S) IN EACH NOSTRIL DAILY 1 Container 11    albuterol (VENTOLIN HFA) 90 mcg/actuation inhaler Take 2 Puffs by inhalation every four (4) hours as needed for Wheezing. 1 Inhaler 11    glucose blood VI test strips (CONTOUR NEXT STRIPS) strip qd 100 Strip 11         Participation   Did you attend clinic and class last week? yes    Review of Systems  Since your last visit, have you experienced any complications? no  If yes, please list:       Are you taking an appetite suppressant? no  If so, is there any Chest Pain, Palpitations or Dizziness? BP Readings from Last 3 Encounters:   08/27/21 114/86   08/13/21 122/82   07/16/21 138/84       SLEEP:  6    Have you received any other medical care this week? no  If yes, where and for what? Have you discontinued or changed any medicine or dose of your medicine since your last visit with Dr Elvi Pickens? no  If yes, where and for what?          Diet  How many ounces of calorie-free liquids did you consume each day?  80 oz    How many meal replacements did you take each day?2-4 and a meal    Did you have any problems adhering to the program?  no If yes, please explain:      Exercise  Aerobic exercise: walking Tm 30 min 3 days, 20 min tu an th and some resisitance on those 2 days min  Resistance exercise:  workouts / week  Any discomfort?  no     If yes, where? Objective  There were no vitals taken for this visit. No LMP recorded. Patient has had a hysterectomy. PHYSICAL EXAMINATION:  [ INSTRUCTIONS:  \"[x]\" Indicates a positive item  \"[]\" Indicates a negative item  -- DELETE ALL ITEMS NOT EXAMINED]  Vital Signs: (As obtained by patient/caregiver at home)  There were no vitals taken for this visit. Constitutional: [x] Appears well-developed and well-nourished [x] No apparent distress      [] Abnormal -     Mental status: [x] Alert and awake  [x] Oriented to person/place/time [x] Able to follow commands    [] Abnormal -     Eyes:   EOM    [x]  Normal    [] Abnormal -   Sclera  [x]  Normal    [] Abnormal -          Discharge [x]  None visible   [] Abnormal -     HENT: [x] Normocephalic, atraumatic  [] Abnormal -   [x] Mouth/Throat: Mucous membranes are moist    External Ears [x] Normal  [] Abnormal -    Neck: [x] No visualized mass [] Abnormal -     Pulmonary/Chest: [x] Respiratory effort normal   [x] No visualized signs of difficulty breathing or respiratory distress        [] Abnormal -      Musculoskeletal:   [x] Normal gait with no signs of ataxia         [x] Normal range of motion of neck        [] Abnormal -     Neurological:        [x] No Facial Asymmetry (Cranial nerve 7 motor function) (limited exam due to video visit)          [x] No gaze palsy        [] Abnormal -          Skin:        [x] No significant exanthematous lesions or discoloration noted on facial skin         [] Abnormal -            Psychiatric:       [x] Normal Affect [] Abnormal -        [x] No Hallucinations    Other pertinent observable physical exam findings:-      Assessment / Plan    Encounter Diagnoses   Name Primary?     Class 1 obesity Yes    BMI 33.0-33.9,adult     Hypothyroidism due to acquired atrophy of thyroid     PITER (obstructive sleep apnea)      Diagnoses and all orders for this visit:    1. Class 1 obesity  Has been in the class three obesity  Now at class 1  Doing well  No change in LCD plan  2. BMI 33.0-33.9,adult    3. Hypothyroidism due to acquired atrophy of thyroid  -     levothyroxine (SYNTHROID) 25 mcg tablet; Take 1 Tablet by mouth Daily (before breakfast). Continue the levothyroxine at curren dose  tsh therapeutic  4. PITER (obstructive sleep apnea)  Getting 6 hrs sleep. Need to aim for 7-8        1. Weight management improved   Progress was reviewed with patient    2. Labs    Latest results reviewed with patient          face to face time with Angelina Wilks consisted of counseling & coordinating and/or discussing treatment plans in reference to her obesity The primary encounter diagnosis was Class 1 obesity. Diagnoses of BMI 33.0-33.9,adult, Hypothyroidism due to acquired atrophy of thyroid, and PITER (obstructive sleep apnea) were also pertinent to this visit. Coding Help - Use CPT Codes 07319-34105, 51617-75879 for Established and New Patients respectively, either employing EM elements or Time rules. Other codes (example consult codes) may also apply. We discussed the expected course, resolution and complications of the diagnosis(es) in detail. Medication risks, benefits, costs, interactions, and alternatives were discussed as indicated. I advised her to contact the office if her condition worsens, changes or fails to improve as anticipated. She expressed understanding with the diagnosis(es) and plan. Pursuant to the emergency declaration under the [facility], [facility] waiver authority and the [employer] Act, this Virtual  Visit was conducted, with patient's consent, to reduce the patient's risk of exposure to COVID-19 and provide continuity of care for an established patient.      Services were provided through a video synchronous discussion virtually to substitute for in-person clinic visit.     Airam Ferguson MD

## 2021-09-02 ENCOUNTER — OFFICE VISIT (OUTPATIENT)
Dept: SURGERY | Age: 61
End: 2021-09-02

## 2021-09-02 DIAGNOSIS — E66.9 CLASS 1 OBESITY: Primary | ICD-10-CM

## 2021-09-03 NOTE — PROGRESS NOTES
Nurse note from patient's weekly VLCD / LCD / Maintenance class was reviewed. Pertinent medical concerns were:    Gained 1 lb   Keep working   BP Readings from Last 3 Encounters:   08/27/21 114/86   08/13/21 122/82   07/16/21 138/84       Weight Metrics 8/27/2021 8/13/2021 7/16/2021 6/29/2021 6/29/2021 4/28/2021 2/11/2021   Weight 199 lb 1.6 oz 201 lb 200 lb 3.2 oz - 211 lb 1.6 oz 204 lb 3.2 oz 208 lb 12.8 oz   Neck Circ (inches) - - - 14.5 - - -   Waist Measure Inches - - - 44 - - -   Body Fat % - - - 41.6 - - -   BMI 33.13 kg/m2 33.45 kg/m2 33.32 kg/m2 - 35.13 kg/m2 33.98 kg/m2 34.75 kg/m2       Current Outpatient Medications   Medication Sig Dispense Refill    acyclovir (ZOVIRAX) 5 % ointment Apply to affected area 6 times a day 30 g 1    scopolamine (TRANSDERM-SCOP) 1 mg over 3 days pt3d 1 Patch by TransDERmal route every seventy-two (72) hours. 3 Patch 0    pantoprazole (PROTONIX) 40 mg tablet Take 1 Tab by mouth two (2) times a day. 60 Tab 3    hydroCHLOROthiazide (MICROZIDE) 12.5 mg capsule Take 1 Cap by mouth daily. 90 Cap 3    ezetimibe (ZETIA) 10 mg tablet Take 1 Tab by mouth daily. 30 Tab 11    mometasone (NASONEX) 50 mcg/actuation nasal spray USE TWO SPRAY(S) IN EACH NOSTRIL DAILY 1 Container 11    albuterol (VENTOLIN HFA) 90 mcg/actuation inhaler Take 2 Puffs by inhalation every four (4) hours as needed for Wheezing. 1 Inhaler 11    glucose blood VI test strips (CONTOUR NEXT STRIPS) strip qd 100 Strip 11    levothyroxine (SYNTHROID) 25 mcg tablet Take 1 Tablet by mouth Daily (before breakfast).  90 Tablet 1

## 2021-09-07 ENCOUNTER — OFFICE VISIT (OUTPATIENT)
Dept: SURGERY | Age: 61
End: 2021-09-07

## 2021-09-07 DIAGNOSIS — E66.9 CLASS 1 OBESITY: Primary | ICD-10-CM

## 2021-09-16 ENCOUNTER — OFFICE VISIT (OUTPATIENT)
Dept: SURGERY | Age: 61
End: 2021-09-16

## 2021-09-16 NOTE — PROGRESS NOTES
[facility]  Metabolic Weight Loss Program        Patient's Name: James Reza  : 1960    This patient is enrolled in [address] [place] [address] Weight Loss Program and attended the required weekly virtual nutrition class hosted via [address] [place] [address] today.       Jenney Sandifer, MS, RD, LDN

## 2021-09-17 ENCOUNTER — CLINICAL SUPPORT (OUTPATIENT)
Dept: SURGERY | Age: 61
End: 2021-09-17

## 2021-09-17 VITALS
DIASTOLIC BLOOD PRESSURE: 88 MMHG | SYSTOLIC BLOOD PRESSURE: 128 MMHG | RESPIRATION RATE: 18 BRPM | WEIGHT: 203.2 LBS | BODY MASS INDEX: 33.85 KG/M2 | HEART RATE: 91 BPM | HEIGHT: 65 IN | OXYGEN SATURATION: 98 %

## 2021-09-17 DIAGNOSIS — E66.9 CLASS 1 OBESITY: Primary | ICD-10-CM

## 2021-09-17 DIAGNOSIS — E03.4 HYPOTHYROIDISM DUE TO ACQUIRED ATROPHY OF THYROID: ICD-10-CM

## 2021-09-17 DIAGNOSIS — G47.33 OSA (OBSTRUCTIVE SLEEP APNEA): ICD-10-CM

## 2021-09-17 NOTE — PROGRESS NOTES
Progress Note: Weekly Education Class in the [facility] Weight Loss Program         Patient is on Very Low Calorie Diet [] (4 meal replacements per day, 800 kcal/day)      Low Calorie Diet [x] (2-3 meal replacements per day, 9013-8979 kcal/day)    1) Did patient have any new symptoms or physical problems? Yes []    No [x]    If yes, check & comment: weakness [], fatigue [], lightheadedness [], headache [], cramps [], cold intolerance [], hair loss [], diarrhea [], constipation [],  NA [] other:                                 2) Has patient had any medical attention from other providers, urgent care or the emergency room this week? Yes []  No [x]       NA [], If yes, why:                                      3) Any other sugar sweetened beverages consumed this week? Yes [x]  No []    4) Did patient have any problems adhering to the diet? Yes [x]  No [] NA []    If yes, Vacation [], Celebrations [], Conferences [], Family Reunions [] other: Campground                                               5) How many hours of sleep this week?     (range)  NA [x]    Number of meal replacements consumed daily? 1-4 (range)  NA []    Average ounces of water patient consumed daily this week (not including shakes)? 56    (divide the weekly total by 7)    Did you eat any food outside of the program? Yes [] No []    Physical Activity Over the Past Week:    Cardio exercise: 100 min  Strength exercise: 0 workouts / week  Number of steps walked per day: 0-200    How has patient mood overall been this week? Sad [], Happy [], Stressed [], Tired [], Content [x], NA [], other            Medications reconciled by nurse Yes []  No[]    Patient was given therapeutic recommendations for any noted side effects of their dietary approach based upon [facility] patient manual per providers recommendation.

## 2021-09-17 NOTE — PROGRESS NOTES
Progress Note: Weekly Education Class in the [facility] Weight Loss Program         Patient is on Very Low Calorie Diet [] (4 meal replacements per day, 800 kcal/day)      Low Calorie Diet [x] (2-3 meal replacements per day, 9428-8736 kcal/day)    1) Did patient have any new symptoms or physical problems? Yes []    No [x]    If yes, check & comment: weakness [], fatigue [], lightheadedness [], headache [], cramps [], cold intolerance [], hair loss [], diarrhea [], constipation [],  NA [] other:                                2) Has patient had any medical attention from other providers, urgent care or the emergency room this week? Yes []  No [x]       NA [], If yes, why:                                      3) Any other sugar sweetened beverages consumed this week? Yes []  No [x]    4) Did patient have any problems adhering to the diet? Yes []  No [] NA []    If yes, Vacation [], Celebrations [], Conferences [], Family Reunions [] other: Family birthdays labor day                                               5) How many hours of sleep this week?     (range)  NA [x]    Number of meal replacements consumed daily? 1-3 (range)  NA []    Average ounces of water patient consumed daily this week (not including shakes)? 56   (divide the weekly total by 7)    Did you eat any food outside of the program? Yes [x] No []    Physical Activity Over the Past Week:    Cardio exercise: 75 min  Strength exercise: 0 workouts / week  Number of steps walked per day: 200-7500    How has patient mood overall been this week? Sad [], Happy [], Stressed [], Tired [], Content [x], NA [], other           Medications reconciled by nurse Yes [x]  No[]    Patient was given therapeutic recommendations for any noted side effects of their dietary approach based upon [facility] patient manual per providers recommendation.

## 2021-09-17 NOTE — PROGRESS NOTES
[facility]  Metabolic Weight Loss Program        Patient's Name: Leslye Escobar  : 1960    This patient is enrolled in [address] [place] [address] Weight Loss Program and attended the required weekly virtual nutrition class hosted via [employer].       Aj Chaves, MS, RD, LDN

## 2021-09-23 ENCOUNTER — OFFICE VISIT (OUTPATIENT)
Dept: SURGERY | Age: 61
End: 2021-09-23

## 2021-09-23 DIAGNOSIS — E66.9 CLASS 1 OBESITY: Primary | ICD-10-CM

## 2021-09-26 NOTE — PROGRESS NOTES
Nurse note from patient's weekly VLCD / LCD / Maintenance class was reviewed. Pertinent medical concerns were:   Up a little compared to august     BP Readings from Last 3 Encounters:   09/17/21 128/88   08/27/21 114/86   08/13/21 122/82       Weight Metrics 9/17/2021 8/27/2021 8/13/2021 7/16/2021 6/29/2021 6/29/2021 4/28/2021   Weight 203 lb 3.2 oz 199 lb 1.6 oz 201 lb 200 lb 3.2 oz - 211 lb 1.6 oz 204 lb 3.2 oz   Neck Circ (inches) - - - - 14.5 - -   Waist Measure Inches - - - - 44 - -   Body Fat % - - - - 41.6 - -   BMI 33.81 kg/m2 33.13 kg/m2 33.45 kg/m2 33.32 kg/m2 - 35.13 kg/m2 33.98 kg/m2       Current Outpatient Medications   Medication Sig Dispense Refill    levothyroxine (SYNTHROID) 25 mcg tablet Take 1 Tablet by mouth Daily (before breakfast). 90 Tablet 1    acyclovir (ZOVIRAX) 5 % ointment Apply to affected area 6 times a day 30 g 1    scopolamine (TRANSDERM-SCOP) 1 mg over 3 days pt3d 1 Patch by TransDERmal route every seventy-two (72) hours. 3 Patch 0    pantoprazole (PROTONIX) 40 mg tablet Take 1 Tab by mouth two (2) times a day. 60 Tab 3    hydroCHLOROthiazide (MICROZIDE) 12.5 mg capsule Take 1 Cap by mouth daily. 90 Cap 3    ezetimibe (ZETIA) 10 mg tablet Take 1 Tab by mouth daily. 30 Tab 11    mometasone (NASONEX) 50 mcg/actuation nasal spray USE TWO SPRAY(S) IN EACH NOSTRIL DAILY 1 Container 11    albuterol (VENTOLIN HFA) 90 mcg/actuation inhaler Take 2 Puffs by inhalation every four (4) hours as needed for Wheezing.  1 Inhaler 11    glucose blood VI test strips (CONTOUR NEXT STRIPS) strip qd 100 Strip 11

## 2021-09-28 NOTE — PROGRESS NOTES
[facility]  Metabolic Weight Loss Program        Patient's Name: Annalisa Hunt  : 1960    This patient is enrolled in [address] [place] [address] Weight Loss Program and attended the required weekly virtual nutrition class hosted via [address] [place] [address] today.       Kalina Miranda, MS, RD, LDN

## 2021-09-29 NOTE — PROGRESS NOTES
[facility]  Metabolic Weight Loss Program        Patient's Name: Teto Ta  : 1960    This patient is enrolled in [address] [place] [address] Weight Loss Program and attended the required weekly virtual nutrition class hosted via [employer] today.       Sofia Velasco, MS, RD, LDN

## 2021-10-01 ENCOUNTER — CLINICAL SUPPORT (OUTPATIENT)
Dept: SURGERY | Age: 61
End: 2021-10-01

## 2021-10-01 VITALS
SYSTOLIC BLOOD PRESSURE: 126 MMHG | DIASTOLIC BLOOD PRESSURE: 82 MMHG | WEIGHT: 198.7 LBS | HEART RATE: 88 BPM | RESPIRATION RATE: 18 BRPM | BODY MASS INDEX: 35.21 KG/M2 | HEIGHT: 63 IN | OXYGEN SATURATION: 97 % | TEMPERATURE: 97.9 F

## 2021-10-01 DIAGNOSIS — G47.33 OSA (OBSTRUCTIVE SLEEP APNEA): ICD-10-CM

## 2021-10-01 DIAGNOSIS — E03.9 ACQUIRED HYPOTHYROIDISM: ICD-10-CM

## 2021-10-01 DIAGNOSIS — E03.4 HYPOTHYROIDISM DUE TO ACQUIRED ATROPHY OF THYROID: ICD-10-CM

## 2021-10-01 DIAGNOSIS — R73.9 HYPERGLYCEMIA: ICD-10-CM

## 2021-10-01 NOTE — PROGRESS NOTES
Week 1:  9/20/21    Progress Note: Weekly Education Class in the [facility] Weight Loss Program         Patient is on Very Low Calorie Diet [] (4 meal replacements per day, 800 kcal/day)      Low Calorie Diet [x] (2-3 meal replacements per day, 8886-8292 kcal/day)    1) Did patient have any new symptoms or physical problems? Yes []    No [x]    If yes, check & comment: weakness [], fatigue [], lightheadedness [], headache [], cramps [], cold intolerance [], hair loss [], diarrhea [], constipation [],  NA [] other:                                 2) Has patient had any medical attention from other providers, urgent care or the emergency room this week? Yes []  No [x]       NA [], If yes, why:                                      3) Any other sugar sweetened beverages consumed this week? Yes []  No [x]    4) Did patient have any problems adhering to the diet? Yes []  No [x] NA []    If yes, Vacation [], Celebrations [], Conferences [], Family Reunions [] other:                                                5) How many hours of sleep this week? 41  (range)  NA []    Number of meal replacements consumed daily? 17 (range)  NA []    Average ounces of water patient consumed daily this week (not including shakes)? 460 oz    (divide the weekly total by 7)    Did you eat any food outside of the program? Yes [x] No []    Physical Activity Over the Past Week:    Cardio exercise: 50 min  Strength exercise: 20 workouts / week  Number of steps walked per day: 8,275    How has patient mood overall been this week? Sad [], Happy [x], Stressed [], Tired [x], Content [], NA [], other            Medications reconciled by nurse Yes [x]  No[]    Patient was given therapeutic recommendations for any noted side effects of their dietary approach based upon [facility] patient manual per providers recommendation.       Week 2:  9/27/21      Progress Note: Weekly Education Class in the [facility] Weight Loss Program Patient is on Very Low Calorie Diet [] (4 meal replacements per day, 800 kcal/day)      Low Calorie Diet [x] (2-3 meal replacements per day, 7921-1914 kcal/day)    1) Did patient have any new symptoms or physical problems? Yes []    No [x]    If yes, check & comment: weakness [], fatigue [], lightheadedness [], headache [], cramps [], cold intolerance [], hair loss [], diarrhea [], constipation [],  NA [] other:                                 2) Has patient had any medical attention from other providers, urgent care or the emergency room this week? Yes []  No [x]       NA [], If yes, why:                                      3) Any other sugar sweetened beverages consumed this week? Yes []  No []    4) Did patient have any problems adhering to the diet? Yes []  No [x] NA []    If yes, Vacation [], Celebrations [], Conferences [], Family Reunions [] other:                                                5) How many hours of sleep this week?   13 (range)  NA [x] other days not documented     Number of meal replacements consumed daily?  (range)  NA []    Average ounces of water patient consumed daily this week (not including shakes)? 12     (divide the weekly total by 7)    Did you eat any food outside of the program? Yes [x] No []    Physical Activity Over the Past Week:    Cardio exercise: 120 min  Strength exercise:  workouts / week 60  Number of steps walked per day:4,034    How has patient mood overall been this week? Sad [], Happy [], Stressed [], Tired [], Content [], NA [x], other Fine           Medications reconciled by nurse Yes [x]  No[]    Patient was given therapeutic recommendations for any noted side effects of their dietary approach based upon [facility] patient manual per providers recommendation.

## 2021-10-01 NOTE — PROGRESS NOTES
Nurse note from patient's weekly VLCD / LCD / Maintenance class was reviewed. Pertinent medical concerns were:      Did well this week  BP Readings from Last 3 Encounters:   10/01/21 126/82   09/17/21 128/88   08/27/21 114/86       Weight Metrics 10/1/2021 9/17/2021 8/27/2021 8/13/2021 7/16/2021 6/29/2021 6/29/2021   Weight 198 lb 11.2 oz 203 lb 3.2 oz 199 lb 1.6 oz 201 lb 200 lb 3.2 oz - 211 lb 1.6 oz   Neck Circ (inches) - - - - - 14.5 -   Waist Measure Inches - - - - - 44 -   Body Fat % - - - - - 41.6 -   BMI 35.2 kg/m2 33.81 kg/m2 33.13 kg/m2 33.45 kg/m2 33.32 kg/m2 - 35.13 kg/m2       Current Outpatient Medications   Medication Sig Dispense Refill    levothyroxine (SYNTHROID) 25 mcg tablet Take 1 Tablet by mouth Daily (before breakfast). 90 Tablet 1    acyclovir (ZOVIRAX) 5 % ointment Apply to affected area 6 times a day 30 g 1    scopolamine (TRANSDERM-SCOP) 1 mg over 3 days pt3d 1 Patch by TransDERmal route every seventy-two (72) hours. 3 Patch 0    pantoprazole (PROTONIX) 40 mg tablet Take 1 Tab by mouth two (2) times a day. 60 Tab 3    hydroCHLOROthiazide (MICROZIDE) 12.5 mg capsule Take 1 Cap by mouth daily. 90 Cap 3    ezetimibe (ZETIA) 10 mg tablet Take 1 Tab by mouth daily. 30 Tab 11    mometasone (NASONEX) 50 mcg/actuation nasal spray USE TWO SPRAY(S) IN EACH NOSTRIL DAILY 1 Container 11    albuterol (VENTOLIN HFA) 90 mcg/actuation inhaler Take 2 Puffs by inhalation every four (4) hours as needed for Wheezing.  1 Inhaler 11    glucose blood VI test strips (CONTOUR NEXT STRIPS) strip qd 100 Strip 11

## 2021-10-05 ENCOUNTER — OFFICE VISIT (OUTPATIENT)
Dept: SURGERY | Age: 61
End: 2021-10-05
Payer: COMMERCIAL

## 2021-10-05 VITALS
HEIGHT: 63 IN | BODY MASS INDEX: 36.11 KG/M2 | SYSTOLIC BLOOD PRESSURE: 120 MMHG | WEIGHT: 203.8 LBS | RESPIRATION RATE: 20 BRPM | HEART RATE: 75 BPM | OXYGEN SATURATION: 98 % | DIASTOLIC BLOOD PRESSURE: 86 MMHG

## 2021-10-05 DIAGNOSIS — E03.9 ACQUIRED HYPOTHYROIDISM: ICD-10-CM

## 2021-10-05 DIAGNOSIS — G47.33 OSA (OBSTRUCTIVE SLEEP APNEA): ICD-10-CM

## 2021-10-05 DIAGNOSIS — E66.9 CLASS 2 OBESITY: Primary | ICD-10-CM

## 2021-10-05 PROCEDURE — 99214 OFFICE O/P EST MOD 30 MIN: CPT | Performed by: FAMILY MEDICINE

## 2021-10-05 NOTE — PROGRESS NOTES
[facility] Progress Note:   F/up Physician Visit    CC: Weight Management  She was at 198 Friday and today is 0   Her bday was this weekend    Using her cpap     Long Drake is a 57 y.o. female who is here for her  f/up physician visit for the VLCD / LCD Program.    Weight Metrics 10/5/2021 10/5/2021 10/1/2021 9/17/2021 8/27/2021 8/13/2021 7/16/2021   Weight - 203 lb 12.8 oz 198 lb 11.2 oz 203 lb 3.2 oz 199 lb 1.6 oz 201 lb 200 lb 3.2 oz   Neck Circ (inches) 14.5 - - - - - -   Waist Measure Inches 43.25 - - - - - -   Body Fat % 41.4 - - - - - -   BMI - 36.1 kg/m2 35.2 kg/m2 33.81 kg/m2 33.13 kg/m2 33.45 kg/m2 33.32 kg/m2         Outpatient Medications Marked as Taking for the 10/5/21 encounter (Office Visit) with Tina Montes MD   Medication Sig Dispense Refill    levothyroxine (SYNTHROID) 25 mcg tablet Take 1 Tablet by mouth Daily (before breakfast). 90 Tablet 1    acyclovir (ZOVIRAX) 5 % ointment Apply to affected area 6 times a day 30 g 1    scopolamine (TRANSDERM-SCOP) 1 mg over 3 days pt3d 1 Patch by TransDERmal route every seventy-two (72) hours. 3 Patch 0    hydroCHLOROthiazide (MICROZIDE) 12.5 mg capsule Take 1 Cap by mouth daily. 90 Cap 3    mometasone (NASONEX) 50 mcg/actuation nasal spray USE TWO SPRAY(S) IN EACH NOSTRIL DAILY 1 Container 11    albuterol (VENTOLIN HFA) 90 mcg/actuation inhaler Take 2 Puffs by inhalation every four (4) hours as needed for Wheezing. 1 Inhaler 11    glucose blood VI test strips (CONTOUR NEXT STRIPS) strip qd 100 Strip 11         Participation   Did you attend clinic and class last week? yes    Review of Systems  Since your last visit, have you experienced any complications? no  If yes, please list:       Are you taking an appetite suppressant? no  If so, is there any Chest Pain, Palpitations or Dizziness?     BP Readings from Last 3 Encounters:   10/05/21 120/86   10/01/21 126/82   09/17/21 128/88       SLEEP:      Have you received any other medical care this week? no  If yes, where and for what? Have you discontinued or changed any medicine or dose of your medicine since your last visit with Dr Abhishek Chen? no  If yes, where and for what? Diet  How many ounces of calorie-free liquids did you consume each day?  60 or more oz    How many meal replacements did you take each day? 3 and outside food    Did you have any problems adhering to the program?  no If yes, please explain:      Exercise  Aerobic exercise: walk 30 min 5 days a week  Resistance exercise: 0 workouts / week  Any discomfort?  no     If yes, where? Objective  Visit Vitals  /86 (BP 1 Location: Left upper arm)   Pulse 75   Resp 20   Ht 5' 3\" (1.6 m)   Wt 203 lb 12.8 oz (92.4 kg)   SpO2 98%   BMI 36.10 kg/m²     No LMP recorded. Patient has had a hysterectomy. Physical Exam  Appearance: well,   Mental:A&O x 3, NAD  H:NC/AT,  EENT:   EOMI, PERRL, No scleral icterus  Neck: no bruit or JVD  Lung: clear, No W/R  ABD: soft, active, nontender  Ext:  no Edema  Neuro: nonfocal  Assessment / Plan    Encounter Diagnoses   Name Primary?  Class 2 obesity Yes    BMI 36.0-36.9,adult     PITER (obstructive sleep apnea)     Acquired hypothyroidism      Diagnoses and all orders for this visit:    1. Class 2 obesity  Cont the process   her weight is going up and down now   Needs more focus on being consistent  2. BMI 36.0-36.9,adult    3. PITER (obstructive sleep apnea)  Urged her to be compliant , not using it consistently  4. Acquired hypothyroidism  Cont the same dose. No change is needed    1. Weight management improved   Progress was reviewed with patient    2. Labs    Latest results reviewed with patient       face to face time with Mar Banks consisted of counseling & coordinating and/or discussing treatment plans in reference to her obesity The primary encounter diagnosis was Class 2 obesity.  Diagnoses of BMI 36.0-36.9,adult, PITER (obstructive sleep apnea), and Acquired hypothyroidism were also pertinent to this visit.

## 2021-10-07 ENCOUNTER — OFFICE VISIT (OUTPATIENT)
Dept: SURGERY | Age: 61
End: 2021-10-07

## 2021-10-07 DIAGNOSIS — E66.9 CLASS 2 OBESITY: Primary | ICD-10-CM

## 2021-10-09 NOTE — PROGRESS NOTES
[facility]  Metabolic Weight Loss Program        Patient's Name: Shayy Wade  : 1960    This patient is enrolled in [address] [place] [address] Weight Loss Program and attended the required weekly virtual nutrition class hosted via [address] [place] [address] today.       Lawrence Phillip, MS, RD, LDN

## 2021-10-14 ENCOUNTER — OFFICE VISIT (OUTPATIENT)
Dept: SURGERY | Age: 61
End: 2021-10-14

## 2021-10-14 DIAGNOSIS — E66.01 MORBID OBESITY (HCC): Primary | ICD-10-CM

## 2021-10-15 ENCOUNTER — CLINICAL SUPPORT (OUTPATIENT)
Dept: SURGERY | Age: 61
End: 2021-10-15

## 2021-10-15 VITALS
BODY MASS INDEX: 35.6 KG/M2 | WEIGHT: 200.9 LBS | OXYGEN SATURATION: 92 % | SYSTOLIC BLOOD PRESSURE: 134 MMHG | HEART RATE: 89 BPM | HEIGHT: 63 IN | TEMPERATURE: 98.3 F | DIASTOLIC BLOOD PRESSURE: 78 MMHG

## 2021-10-15 DIAGNOSIS — E66.01 MORBID OBESITY (HCC): Primary | ICD-10-CM

## 2021-10-15 DIAGNOSIS — E03.9 ACQUIRED HYPOTHYROIDISM: ICD-10-CM

## 2021-10-15 DIAGNOSIS — G47.33 OSA (OBSTRUCTIVE SLEEP APNEA): ICD-10-CM

## 2021-10-15 NOTE — PROGRESS NOTES
1. Have you been to the ER, urgent care clinic since your last visit? Hospitalized since your last visit? No    2. Have you seen or consulted any other health care providers outside of the [address] [place] [address] since your last visit? Include any pap smears or colon screening. No         Progress Note: Weekly Education Class in the [facility] Weight Loss Program         Patient is on Very Low Calorie Diet [] (4 meal replacements per day, 800 kcal/day)      Low Calorie Diet [x] (2-3 meal replacements per day, 4604-2485 kcal/day)    1) Did patient have any new symptoms or physical problems? Yes []    No [x]    If yes, check & comment: weakness [], fatigue [], lightheadedness [], headache [], cramps [], cold intolerance [], hair loss [], diarrhea [], constipation [],  NA [] other:                                2) Has patient had any medical attention from other providers, urgent care or the emergency room this week? Yes []  No [x]       NA [], If yes, why:                                      3) Any other sugar sweetened beverages consumed this week? Yes [x]  No []    4) Did patient have any problems adhering to the diet? Yes []  No [] NA []    If yes, Vacation [], Celebrations [x], Conferences [], Family Reunions [] other: Birthday                                               5) How many hours of sleep this week? 6.5-8  (range)  NA []    Number of meal replacements consumed daily? 0-2 (range)  NA []    Average ounces of water patient consumed daily this week (not including shakes)? 65    (divide the weekly total by 7)    Did you eat any food outside of the program? Yes [x] No []    Physical Activity Over the Past Week:    Cardio exercise: 120 min  Strength exercise: 4 workouts / week  Number of steps walked per day:2848-10,620    How has patient mood overall been this week?  Sad [], Happy [], Stressed [], Tired [], Content [], NA [], other fine           Medications reconciled by nurse Yes [x] No[]    Patient was given therapeutic recommendations for any noted side effects of their dietary approach based upon [facility] patient manual per providers recommendation. Progress Note: Weekly Education Class in the [facility] Weight Loss Program         Patient is on Very Low Calorie Diet [] (4 meal replacements per day, 800 kcal/day)      Low Calorie Diet [x] (2-3 meal replacements per day, 7126-6895 kcal/day)    1) Did patient have any new symptoms or physical problems? Yes []    No [x]    If yes, check & comment: weakness [], fatigue [], lightheadedness [], headache [], cramps [], cold intolerance [], hair loss [], diarrhea [], constipation [],  NA [] other:                                 2) Has patient had any medical attention from other providers, urgent care or the emergency room this week? Yes []  No [x]       NA [], If yes, why:                                      3) Any other sugar sweetened beverages consumed this week? Yes []  No [x]    4) Did patient have any problems adhering to the diet? Yes []  No [x] NA []    If yes, Vacation [], Celebrations [], Conferences [], Family Reunions [] other:                                                5) How many hours of sleep this week? 6.5   (range)  NA []    Number of meal replacements consumed daily? 2-3 (range)  NA []    Average ounces of water patient consumed daily this week (not including shakes)? 60    (divide the weekly total by 7)    Did you eat any food outside of the program? Yes [x] No []    Physical Activity Over the Past Week:    Cardio exercise: 90 min  Strength exercise: 3 workouts / week  Number of steps walked per day: 4012-5232    How has patient mood overall been this week?  Sad [], Happy [], Stressed [], Tired [], Content [], NA [], other fine           Medications reconciled by nurse Yes [x]  No[]    Patient was given therapeutic recommendations for any noted side effects of their dietary approach based upon [facility] patient manual per providers recommendation.

## 2021-10-18 NOTE — PROGRESS NOTES
[facility]  Metabolic Weight Loss Program        Patient's Name: Jelly Kraft  : 1960    This patient is enrolled in [address] [place] [address] Weight Loss Program and attended the required weekly virtual nutrition class hosted via [employer].       Hannah Bingham RD

## 2021-10-21 ENCOUNTER — OFFICE VISIT (OUTPATIENT)
Dept: SURGERY | Age: 61
End: 2021-10-21

## 2021-10-21 DIAGNOSIS — E66.01 MORBID OBESITY (HCC): Primary | ICD-10-CM

## 2021-10-26 NOTE — PROGRESS NOTES
[facility]  Metabolic Weight Loss Program        Patient's Name: Zion Barragan  : 1960    This patient is enrolled in [address] [place] [address] Weight Loss Program and attended the required weekly virtual nutrition class hosted via [employer].       Brayan Torres, MS, RD, LDN

## 2021-10-28 ENCOUNTER — OFFICE VISIT (OUTPATIENT)
Dept: SURGERY | Age: 61
End: 2021-10-28

## 2021-10-28 DIAGNOSIS — E66.9 CLASS 2 OBESITY: Primary | ICD-10-CM

## 2021-10-29 ENCOUNTER — CLINICAL SUPPORT (OUTPATIENT)
Dept: SURGERY | Age: 61
End: 2021-10-29

## 2021-10-29 VITALS
RESPIRATION RATE: 18 BRPM | OXYGEN SATURATION: 98 % | DIASTOLIC BLOOD PRESSURE: 88 MMHG | BODY MASS INDEX: 36.02 KG/M2 | SYSTOLIC BLOOD PRESSURE: 116 MMHG | HEIGHT: 63 IN | HEART RATE: 77 BPM | WEIGHT: 203.3 LBS

## 2021-10-29 DIAGNOSIS — G47.33 OSA (OBSTRUCTIVE SLEEP APNEA): ICD-10-CM

## 2021-10-29 DIAGNOSIS — E66.9 CLASS 2 OBESITY: Primary | ICD-10-CM

## 2021-10-29 DIAGNOSIS — E03.9 ACQUIRED HYPOTHYROIDISM: ICD-10-CM

## 2021-10-29 NOTE — PROGRESS NOTES
Weight Management. 1. Have you been to the ER, urgent care clinic since your last visit? Hospitalized since your last visit? No    2. Have you seen or consulted any other health care providers outside of the [address] [place] [address] since your last visit? Include any pap smears or colon screening. No     Week # 1     Progress Note: Weekly Education Class in the [facility] Weight Loss Program         Patient is on Very Low Calorie Diet [] (4 meal replacements per day, 800 kcal/day)      Low Calorie Diet [x] (2-3 meal replacements per day, 4340-5996 kcal/day)    1) Did patient have any new symptoms or physical problems? Yes []    No [x]    If yes, check & comment: weakness [], fatigue [], lightheadedness [], headache [], cramps [], cold intolerance [], hair loss [], diarrhea [], constipation [],  NA [] other:                                 2) Has patient had any medical attention from other providers, urgent care or the emergency room this week? Yes []  No [x]       NA [], If yes, why:                                      3) Any other sugar sweetened beverages consumed this week? Yes []  No [x]    4) Did patient have any problems adhering to the diet? Yes []  No [x] NA []    If yes, Vacation [], Celebrations [], Conferences [], Family Reunions [] other:                                                5) How many hours of sleep this week? 4.5-7    (range)  NA []    Number of meal replacements consumed daily? 1-3 (range)  NA []    Average ounces of water patient consumed daily this week (not including shakes)? 80     (divide the weekly total by 7)    Did you eat any food outside of the program? Yes [x] No []    Physical Activity Over the Past Week:    Cardio exercise: 190 min  Strength exercise: 0 workouts / week  Number of steps walked per day: 8443    How has patient mood overall been this week?  Sad [], Happy [], Stressed [], Tired [], Content [], NA [], other Fine           Medications reconciled by nurse Yes [x]  No[]    Patient was given therapeutic recommendations for any noted side effects of their dietary approach based upon [facility] patient manual per providers recommendation. Week # 2      Progress Note: Weekly Education Class in the [facility] Weight Loss Program         Patient is on Very Low Calorie Diet [] (4 meal replacements per day, 800 kcal/day)      Low Calorie Diet [x] (2-3 meal replacements per day, 2209-5533 kcal/day)    1) Did patient have any new symptoms or physical problems? Yes []    No [x]    If yes, check & comment: weakness [], fatigue [], lightheadedness [], headache [], cramps [], cold intolerance [], hair loss [], diarrhea [], constipation [],  NA [] other:                                 2) Has patient had any medical attention from other providers, urgent care or the emergency room this week? Yes []  No [x]       NA [], If yes, why:                                       3) Any other sugar sweetened beverages consumed this week? Yes []  No [x]    4) Did patient have any problems adhering to the diet? Yes []  No [x] NA []    If yes, Vacation [], Celebrations [], Conferences [], Family Reunions [] other:                                                5) How many hours of sleep this week? 5.5-6    (range)  NA []    Number of meal replacements consumed daily? 2-3 (range)  NA []    Average ounces of water patient consumed daily this week (not including shakes)? 80     (divide the weekly total by 7)    Did you eat any food outside of the program? Yes [x] No []    Physical Activity Over the Past Week:    Cardio exercise: 120 min  Strength exercise: 4 workouts / week  Number of steps walked per day: 3937    How has patient mood overall been this week?  Sad [], Happy [], Stressed [], Tired [], Content [], NA [], other Fine           Medications reconciled by nurse Yes [x]  No[]    Patient was given therapeutic recommendations for any noted side effects of their dietary approach based upon [facility] patient manual per providers recommendation.

## 2021-10-29 NOTE — PROGRESS NOTES
[facility]  Metabolic Weight Loss Program        Patient's Name: Elke Henry  : 1960    This patient is enrolled in [address] [place] [address] Weight Loss Program and attended the required weekly virtual nutrition class hosted via [employer].       Ulises Scanlon, MS, RD, LDN

## 2021-11-01 NOTE — PROGRESS NOTES
Nurse note from patient's weekly VLCD / LCD / Maintenance class was reviewed. Pertinent medical concerns were:   Up 3 lbs  Needs closer look with dietitian     BP Readings from Last 3 Encounters:   10/29/21 116/88   10/15/21 134/78   10/05/21 120/86       Weight Metrics 10/29/2021 10/15/2021 10/5/2021 10/5/2021 10/1/2021 9/17/2021 8/27/2021   Weight 203 lb 4.8 oz 200 lb 14.4 oz - 203 lb 12.8 oz 198 lb 11.2 oz 203 lb 3.2 oz 199 lb 1.6 oz   Neck Circ (inches) - - 14.5 - - - -   Waist Measure Inches - - 43.25 - - - -   Body Fat % - - 41.4 - - - -   BMI 36.01 kg/m2 35.59 kg/m2 - 36.1 kg/m2 35.2 kg/m2 33.81 kg/m2 33.13 kg/m2       Current Outpatient Medications   Medication Sig Dispense Refill    levothyroxine (SYNTHROID) 25 mcg tablet Take 1 Tablet by mouth Daily (before breakfast). 90 Tablet 1    acyclovir (ZOVIRAX) 5 % ointment Apply to affected area 6 times a day 30 g 1    scopolamine (TRANSDERM-SCOP) 1 mg over 3 days pt3d 1 Patch by TransDERmal route every seventy-two (72) hours. 3 Patch 0    pantoprazole (PROTONIX) 40 mg tablet Take 1 Tab by mouth two (2) times a day. 60 Tab 3    hydroCHLOROthiazide (MICROZIDE) 12.5 mg capsule Take 1 Cap by mouth daily. (Patient taking differently: Take 12.5 mg by mouth as needed.) 90 Cap 3    mometasone (NASONEX) 50 mcg/actuation nasal spray USE TWO SPRAY(S) IN EACH NOSTRIL DAILY 1 Container 11    albuterol (VENTOLIN HFA) 90 mcg/actuation inhaler Take 2 Puffs by inhalation every four (4) hours as needed for Wheezing.  1 Inhaler 11    glucose blood VI test strips (CONTOUR NEXT STRIPS) strip qd 100 Strip 11

## 2021-11-01 NOTE — PROGRESS NOTES
Nurse note from patient's weekly VLCD / LCD / Maintenance class was reviewed. Pertinent medical concerns were:   Good profgress     BP Readings from Last 3 Encounters:   10/29/21 116/88   10/15/21 134/78   10/05/21 120/86       Weight Metrics 10/29/2021 10/15/2021 10/5/2021 10/5/2021 10/1/2021 9/17/2021 8/27/2021   Weight 203 lb 4.8 oz 200 lb 14.4 oz - 203 lb 12.8 oz 198 lb 11.2 oz 203 lb 3.2 oz 199 lb 1.6 oz   Neck Circ (inches) - - 14.5 - - - -   Waist Measure Inches - - 43.25 - - - -   Body Fat % - - 41.4 - - - -   BMI 36.01 kg/m2 35.59 kg/m2 - 36.1 kg/m2 35.2 kg/m2 33.81 kg/m2 33.13 kg/m2       Current Outpatient Medications   Medication Sig Dispense Refill    levothyroxine (SYNTHROID) 25 mcg tablet Take 1 Tablet by mouth Daily (before breakfast). 90 Tablet 1    acyclovir (ZOVIRAX) 5 % ointment Apply to affected area 6 times a day 30 g 1    scopolamine (TRANSDERM-SCOP) 1 mg over 3 days pt3d 1 Patch by TransDERmal route every seventy-two (72) hours. 3 Patch 0    pantoprazole (PROTONIX) 40 mg tablet Take 1 Tab by mouth two (2) times a day. 60 Tab 3    hydroCHLOROthiazide (MICROZIDE) 12.5 mg capsule Take 1 Cap by mouth daily. (Patient taking differently: Take 12.5 mg by mouth as needed.) 90 Cap 3    mometasone (NASONEX) 50 mcg/actuation nasal spray USE TWO SPRAY(S) IN EACH NOSTRIL DAILY 1 Container 11    albuterol (VENTOLIN HFA) 90 mcg/actuation inhaler Take 2 Puffs by inhalation every four (4) hours as needed for Wheezing.  1 Inhaler 11    glucose blood VI test strips (CONTOUR NEXT STRIPS) strip qd 100 Strip 11

## 2021-11-03 ENCOUNTER — VIRTUAL VISIT (OUTPATIENT)
Dept: SURGERY | Age: 61
End: 2021-11-03
Payer: COMMERCIAL

## 2021-11-03 DIAGNOSIS — E03.9 ACQUIRED HYPOTHYROIDISM: ICD-10-CM

## 2021-11-03 DIAGNOSIS — E66.9 CLASS 2 OBESITY: Primary | ICD-10-CM

## 2021-11-03 DIAGNOSIS — G47.33 OSA (OBSTRUCTIVE SLEEP APNEA): ICD-10-CM

## 2021-11-03 PROCEDURE — 99213 OFFICE O/P EST LOW 20 MIN: CPT | Performed by: FAMILY MEDICINE

## 2021-11-03 NOTE — PROGRESS NOTES
1 month follow up. Weight Management. 1. Have you been to the ER, urgent care clinic since your last visit? Hospitalized since your last visit? No    2. Have you seen or consulted any other health care providers outside of the [address] [place] [address] since your last visit? Include any pap smears or colon screening.  No

## 2021-11-03 NOTE — PROGRESS NOTES
[facility] Progress Note:   F/up Physician Visit    Marnie Molina is a 57 y.o. female who was seen by synchronous (real-time) audio-video technology on 11/3/2021. Consent:  She and/or her healthcare decision maker is aware that this patient-initiated Telehealth encounter is a billable service, with coverage as determined by her insurance carrier. She is aware that she may receive a bill and has provided verbal consent to proceed: Yes    I was at home while conducting this encounter. 712  Subjective:   Marnie Molina was seen for Weight Management    f/up physician visit for the [facility] / [facility] Program.  Prior to Admission medications    Medication Sig Start Date End Date Taking? Authorizing Provider   levothyroxine (SYNTHROID) 25 mcg tablet Take 1 Tablet by mouth Daily (before breakfast). 9/1/21  Yes Swapnil Howe MD   scopolamine (TRANSDERM-SCOP) 1 mg over 3 days pt3d 1 Patch by TransDERmal route every seventy-two (72) hours. 7/29/21  Yes Felipe Reese MD   pantoprazole (PROTONIX) 40 mg tablet Take 1 Tab by mouth two (2) times a day. 3/28/21  Yes Felipe Reese MD   hydroCHLOROthiazide (MICROZIDE) 12.5 mg capsule Take 1 Cap by mouth daily. Patient taking differently: Take 12.5 mg by mouth as needed. 7/24/20  Yes Felipe Reese MD   mometasone (NASONEX) 50 mcg/actuation nasal spray USE TWO SPRAY(S) IN EACH NOSTRIL DAILY 1/31/19  Yes Felipe Reese MD   albuterol (VENTOLIN HFA) 90 mcg/actuation inhaler Take 2 Puffs by inhalation every four (4) hours as needed for Wheezing.  10/2/18  Yes Felipe Reese MD   glucose blood VI test strips (CONTOUR NEXT STRIPS) strip qd 5/23/17  Yes Felipe Reese MD   acyclovir (ZOVIRAX) 5 % ointment Apply to affected area 6 times a day 7/29/21   Felipe Reese MD     Allergies   Allergen Reactions    Contrave [Naltrexone-Bupropion] Other (comments)     1 PILL MORNING, [address], \"OUT OF MY HEAD\"    Crestor [Rosuvastatin] Other (comments)     Felt like it worsened her memory    Gadolinium-Containing Contrast Media Nausea and Vomiting    Lipitor [Atorvastatin] Other (comments)     Felt like it worsened memory. Patient Active Problem List    Diagnosis Date Noted    PITER (obstructive sleep apnea)     Allergic rhinitis due to pollen 07/24/2020    Shoulder pain, right     TMJ (temporomandibular joint syndrome)     S/P colonoscopy 11/22/2019    Gastritis and duodenitis 11/22/2019    Microscopic colitis 11/22/2019    Back pain 07/17/2019    Gastroenteritis 07/17/2019    Herpes simplex infection of genitourinary system 05/16/2019    Hyperglycemia 09/12/2018    Essential fatty acid (EFA) deficiency 09/12/2018    Gasping for breath 08/15/2018    Hyperuricemia w/o signs of inflam arthrit and tophaceous dis 08/15/2018    Elevated serum creatinine 08/15/2018    Asthma     Neck nodule 05/10/2018    Abdominal wall pain     Normal cardiac stress test 07/19/2017    Encounter for Hemoccult screening 06/02/2017    S/P colonoscopic polypectomy 02/07/2017    Heartburn 01/01/2017    Dyslipidemia     ACP (advance care planning) 04/04/2016    Steatosis of liver     Hypertension     Breast cancer, left ([facility] [address]     S/P [id number] (total abdominal hysterectomy and bilateral salpingo-oophorectomy)     Postsurgical menopause 01/01/2002     Current Outpatient Medications   Medication Sig Dispense Refill    levothyroxine (SYNTHROID) 25 mcg tablet Take 1 Tablet by mouth Daily (before breakfast). 90 Tablet 1    scopolamine (TRANSDERM-SCOP) 1 mg over 3 days pt3d 1 Patch by TransDERmal route every seventy-two (72) hours. 3 Patch 0    pantoprazole (PROTONIX) 40 mg tablet Take 1 Tab by mouth two (2) times a day. 60 Tab 3    hydroCHLOROthiazide (MICROZIDE) 12.5 mg capsule Take 1 Cap by mouth daily.  (Patient taking differently: Take 12.5 mg by mouth as needed.) 90 Cap 3    mometasone (NASONEX) 50 mcg/actuation nasal spray USE TWO SPRAY(S) IN EACH NOSTRIL DAILY 1 Container 11    albuterol (VENTOLIN HFA) 90 mcg/actuation inhaler Take 2 Puffs by inhalation every four (4) hours as needed for Wheezing. 1 Inhaler 11    glucose blood VI test strips (CONTOUR NEXT STRIPS) strip qd 100 Strip 11    acyclovir (ZOVIRAX) 5 % ointment Apply to affected area 6 times a day 30 g 1       ROS      CC: Weight Management    Her kitchen is being remodeled  She is eating at friends house  Not using the meal replacement        Chantelle Villanueva is a 57 y.o. female who is here for her      Weight Metrics 10/29/2021 10/15/2021 10/5/2021 10/5/2021 10/1/2021 9/17/2021 8/27/2021   Weight 203 lb 4.8 oz 200 lb 14.4 oz - 203 lb 12.8 oz 198 lb 11.2 oz 203 lb 3.2 oz 199 lb 1.6 oz   Neck Circ (inches) - - 14.5 - - - -   Waist Measure Inches - - 43.25 - - - -   Body Fat % - - 41.4 - - - -   BMI 36.01 kg/m2 35.59 kg/m2 - 36.1 kg/m2 35.2 kg/m2 33.81 kg/m2 33.13 kg/m2         Outpatient Medications Marked as Taking for the 11/3/21 encounter (Virtual Visit) with Lissette Coreas MD   Medication Sig Dispense Refill    levothyroxine (SYNTHROID) 25 mcg tablet Take 1 Tablet by mouth Daily (before breakfast). 90 Tablet 1    scopolamine (TRANSDERM-SCOP) 1 mg over 3 days pt3d 1 Patch by TransDERmal route every seventy-two (72) hours. 3 Patch 0    pantoprazole (PROTONIX) 40 mg tablet Take 1 Tab by mouth two (2) times a day. 60 Tab 3    hydroCHLOROthiazide (MICROZIDE) 12.5 mg capsule Take 1 Cap by mouth daily. (Patient taking differently: Take 12.5 mg by mouth as needed.) 90 Cap 3    mometasone (NASONEX) 50 mcg/actuation nasal spray USE TWO SPRAY(S) IN EACH NOSTRIL DAILY 1 Container 11    albuterol (VENTOLIN HFA) 90 mcg/actuation inhaler Take 2 Puffs by inhalation every four (4) hours as needed for Wheezing.  1 Inhaler 11    glucose blood VI test strips (CONTOUR NEXT STRIPS) strip qd 100 Strip 11         Participation   Did you attend clinic and class last week? yes    Review of Systems  Since your last visit, have you experienced any complications? no  If yes, please list:     Are you taking an appetite suppressant? no  If so, is there any Chest Pain, Palpitations or Dizziness? HUNGER CONTROL: yes    BP Readings from Last 3 Encounters:   10/29/21 116/88   10/15/21 134/78   10/05/21 120/86       SLEEP:  cpap test friday    Have you received any other medical care this week? no  If yes, where and for what? Have you discontinued or changed any medicine or dose of your medicine since your last visit with Dr Clayton Petersen? no  If yes, where and for what? Diet  How many ounces of calorie-free liquids did you consume each day? 64  oz    How many meal replacements did you take each day?david the last 2 weeks because her kitchen is under construction    Did you have any problems adhering to the program?  yes If yes, please explain:      Exercise  Aerobic exercise: 3 - 4 days a week min  Resistance exercise: 0 workouts / week  Any discomfort?  no     If yes, where? Objective  There were no vitals taken for this visit. No LMP recorded. Patient has had a hysterectomy. PHYSICAL EXAMINATION:  [ INSTRUCTIONS:  \"[x]\" Indicates a positive item  \"[]\" Indicates a negative item  -- DELETE ALL ITEMS NOT EXAMINED]  Vital Signs: (As obtained by patient/caregiver at home)  There were no vitals taken for this visit.      Constitutional: [x] Appears well-developed and well-nourished [x] No apparent distress      [] Abnormal -   Mental status: [x] Alert and awake  [x] Oriented to person/place/time [x] Able to follow commands    [] Abnormal -     Eyes:   EOM    [x]  Normal    [] Abnormal -   Sclera  [x]  Normal    [] Abnormal -          Discharge [x]  None visible   [] Abnormal -     HENT: [x] Normocephalic, atraumatic  [] Abnormal -   [x] Mouth/Throat: Mucous membranes are moist    External Ears [x] Normal  [] Abnormal -    Neck: [x] No visualized mass [] Abnormal -     Pulmonary/Chest: [x] Respiratory effort normal   [x] No visualized signs of difficulty breathing or respiratory distress        [] Abnormal -      Musculoskeletal:   [x] Normal gait with no signs of ataxia         [x] Normal range of motion of neck        [] Abnormal -     Neurological:        [x] No Facial Asymmetry (Cranial nerve 7 motor function) (limited exam due to video visit)          [x] No gaze palsy        [] Abnormal -          Skin:        [x] No significant exanthematous lesions or discoloration noted on facial skin         [] Abnormal -            Psychiatric:       [x] Normal Affect [] Abnormal -        [x] No Hallucinations    Other pertinent observable physical exam findings:-      Assessment / Plan    Encounter Diagnoses   Name Primary?  Class 2 obesity Yes    BMI 36.0-36.9,adult     PITER (obstructive sleep apnea)     Acquired hypothyroidism      Diagnoses and all orders for this visit:    1. Class 2 obesity  Has lost three and then regained it  Waist is down almost an inch  May need to add an appetite med  She states her kitchen is under construction and that has caused her to eat more foods other than the replacements  I suggested  This is the ideal time to use the replacements and then only need to eat out for the dinner meal  I counseled for more than 50% of this more than 20 min visit on ways to adhere to the plan while house under construction  2. BMI 36.0-36.9,adult    3. PITER (obstructive sleep apnea)  I encourage cpap  4. Acquired hypothyroidism  Cont to monitor. As weight goes down sometimes the dose of med can be reduced    1. Weight management needs further observation   Progress was reviewed with patient    2. Labs    Latest results reviewed with patient         face to face time with Neena Pod consisted of counseling & coordinating and/or discussing treatment plans in reference to her obesity The primary encounter diagnosis was Class 2 obesity. Diagnoses of BMI 36.0-36.9,adult, PITER (obstructive sleep apnea), and Acquired hypothyroidism were also pertinent to this visit. Coding Help - Use CPT Codes 25634-26909, 70589-44210 for Established and New Patients respectively, either employing EM elements or Time rules. Other codes (example consult codes) may also apply. We discussed the expected course, resolution and complications of the diagnosis(es) in detail. Medication risks, benefits, costs, interactions, and alternatives were discussed as indicated. I advised her to contact the office if her condition worsens, changes or fails to improve as anticipated. She expressed understanding with the diagnosis(es) and plan. Pursuant to the emergency declaration under the [facility], [facility] waiver authority and the [employer] Act, this Virtual  Visit was conducted, with patient's consent, to reduce the patient's risk of exposure to COVID-19 and provide continuity of care for an established patient. Services were provided through a video synchronous discussion virtually to substitute for in-person clinic visit.     Olinda Yu MD

## 2021-11-09 ENCOUNTER — OFFICE VISIT (OUTPATIENT)
Dept: SURGERY | Age: 61
End: 2021-11-09

## 2021-11-09 DIAGNOSIS — E66.9 CLASS 2 OBESITY: Primary | ICD-10-CM

## 2021-11-12 ENCOUNTER — CLINICAL SUPPORT (OUTPATIENT)
Dept: SURGERY | Age: 61
End: 2021-11-12

## 2021-11-12 VITALS
HEIGHT: 64 IN | OXYGEN SATURATION: 97 % | RESPIRATION RATE: 18 BRPM | BODY MASS INDEX: 34.97 KG/M2 | SYSTOLIC BLOOD PRESSURE: 130 MMHG | DIASTOLIC BLOOD PRESSURE: 80 MMHG | HEART RATE: 90 BPM | WEIGHT: 204.8 LBS

## 2021-11-12 DIAGNOSIS — E66.9 CLASS 2 OBESITY: Primary | ICD-10-CM

## 2021-11-12 NOTE — PROGRESS NOTES
Weight Management. 1. Have you been to the ER, urgent care clinic since your last visit? Hospitalized since your last visit? No    2. Have you seen or consulted any other health care providers outside of the [address] [place] [address] since your last visit? Include any pap smears or colon screening. No     11/1/2021    Progress Note: Weekly Education Class in the [facility] Program         Patient is on Very Low Calorie Diet [] (4 meal replacements per day, 800 kcal/day)      Low Calorie Diet [x] (2-3 meal replacements per day, 3711-0521 kcal/day)    1) Did patient have any new symptoms or physical problems? Yes []    No [x]    If yes, check & comment: weakness [], fatigue [], lightheadedness [], headache [], cramps [], cold intolerance [], hair loss [], diarrhea [], constipation [],  NA [] other:                                 2) Has patient had any medical attention from other providers, urgent care or the emergency room this week? Yes []  No [x]       NA [], If yes, why:                                       3) Any other sugar sweetened beverages consumed this week? Yes [x]  No []    4) Did patient have any problems adhering to the diet? Yes []  No [x] NA []    If yes, Vacation [], Celebrations [], Conferences [], Family Reunions [] other:                                                5) How many hours of sleep this week? 4.5-8    (range)  NA []    Number of meal replacements consumed daily? 1-2 (range)  NA []    Average ounces of water patient consumed daily this week (not including shakes)? 60     (divide the weekly total by 7)    Did you eat any food outside of the program? Yes [x] No []    Physical Activity Over the Past Week:    Cardio exercise: 97 min  Strength exercise: 0 workouts / week  Number of steps walked per day: 9283    How has patient mood overall been this week?  Sad [], Happy [], Stressed [], Tired [], Content [], NA [], other   Fine           Medications reconciled by nurse Yes [x]  No[]    Patient was given therapeutic recommendations for any noted side effects of their dietary approach based upon [facility] patient manual per providers recommendation. 11/8/2021      Progress Note: Weekly Education Class in the [facility] Program         Patient is on Very Low Calorie Diet [] (4 meal replacements per day, 800 kcal/day)      Low Calorie Diet [x] (2-3 meal replacements per day, 4483-6267 kcal/day)    1) Did patient have any new symptoms or physical problems? Yes []    No [x]    If yes, check & comment: weakness [], fatigue [], lightheadedness [], headache [], cramps [], cold intolerance [], hair loss [], diarrhea [], constipation [],  NA [] other:                                 2) Has patient had any medical attention from other providers, urgent care or the emergency room this week? Yes []  No [x]       NA [], If yes, why:                                       3) Any other sugar sweetened beverages consumed this week? Yes []  No [x]    4) Did patient have any problems adhering to the diet? Yes []  No [x] NA []    If yes, Vacation [], Celebrations [], Conferences [], Family Reunions [] other:                                                5) How many hours of sleep this week? 5.5-6    (range)  NA []    Number of meal replacements consumed daily? 1-4 (range)  NA []    Average ounces of water patient consumed daily this week (not including shakes)? 34     (divide the weekly total by 7)    Did you eat any food outside of the program? Yes [x] No []    Physical Activity Over the Past Week:    Cardio exercise: 150 min  Strength exercise: 3 workouts / week  Number of steps walked per day: 5820    How has patient mood overall been this week?  Sad [], Happy [], Stressed [], Tired [], Content [], NA [], other   Fine, optimistic           Medications reconciled by nurse Yes [x]  No[]    Patient was given therapeutic recommendations for any noted side effects of their dietary approach based upon [facility] patient manual per providers recommendation.

## 2021-11-16 ENCOUNTER — OFFICE VISIT (OUTPATIENT)
Dept: SURGERY | Age: 61
End: 2021-11-16

## 2021-11-16 DIAGNOSIS — E66.9 CLASS 2 OBESITY: Primary | ICD-10-CM

## 2021-11-16 NOTE — PROGRESS NOTES
[facility]  Metabolic Weight Loss Program        Patient's Name: Jelly Kraft  : 1960    This patient is enrolled in [address] [place] [address] Weight Loss Program and attended the required weekly virtual nutrition class hosted via [employer].       Liz Spear, MS, RD, LDN

## 2021-11-22 ENCOUNTER — CLINICAL SUPPORT (OUTPATIENT)
Dept: SURGERY | Age: 61
End: 2021-11-22

## 2021-11-22 VITALS
BODY MASS INDEX: 35.12 KG/M2 | HEIGHT: 64 IN | OXYGEN SATURATION: 97 % | SYSTOLIC BLOOD PRESSURE: 122 MMHG | HEART RATE: 83 BPM | DIASTOLIC BLOOD PRESSURE: 78 MMHG | RESPIRATION RATE: 18 BRPM | WEIGHT: 205.7 LBS

## 2021-11-22 DIAGNOSIS — E66.9 CLASS 2 OBESITY: Primary | ICD-10-CM

## 2021-11-22 NOTE — PROGRESS NOTES
Weight Management. 1. Have you been to the ER, urgent care clinic since your last visit? Hospitalized since your last visit? No    2. Have you seen or consulted any other health care providers outside of the [address] [place] [address] since your last visit? Include any pap smears or colon screening. No         Progress Note: Weekly Education Class in the [facility] Loss Program         Patient is on Very Low Calorie Diet [] (4 meal replacements per day, 800 kcal/day)      Low Calorie Diet [x] (2-3 meal replacements per day, 8420-9160 kcal/day)    1) Did patient have any new symptoms or physical problems? Yes []    No [x]    If yes, check & comment: weakness [], fatigue [], lightheadedness [], headache [], cramps [], cold intolerance [], hair loss [], diarrhea [], constipation [],  NA [] other:                                 2) Has patient had any medical attention from other providers, urgent care or the emergency room this week? Yes []  No [x]       NA [], If yes, why:                                      3) Any other sugar sweetened beverages consumed this week? Yes [x]  No []    4) Did patient have any problems adhering to the diet? Yes [x]  No [] NA []    If yes, Vacation [], Celebrations [], Conferences [], Family Reunions [] other:                                                5) How many hours of sleep this week? 5-8    (range)  NA []    Number of meal replacements consumed daily? 1-3 (range)  NA []    Average ounces of water patient consumed daily this week (not including shakes)? 80     (divide the weekly total by 7)    Did you eat any food outside of the program? Yes [x] No []    Physical Activity Over the Past Week:    Cardio exercise: 56 min  Strength exercise: 7 workouts / week  Number of steps walked per day: 11,276    How has patient mood overall been this week?  Sad [], Happy [], Stressed [], Tired [x], Content [], NA [], other   and sore           Medications reconciled by nurse Yes [x]  No[]    Patient was given therapeutic recommendations for any noted side effects of their dietary approach based upon [facility] patient manual per providers recommendation.

## 2021-12-02 NOTE — PROGRESS NOTES
Kathryn Duran presents for weekly or bi-monthly evaluation of Obesity Body mass index is 34.88 kg/m². Visit Vitals  /80 (BP 1 Location: Right arm, BP Patient Position: Sitting, BP Cuff Size: Adult)   Pulse 90   Resp 18   Ht 5' 4.25\" (1.632 m)   Wt 204 lb 12.8 oz (92.9 kg)   SpO2 97%   BMI 34.88 kg/m²       Wt Readings from Last 3 Encounters:   11/22/21 205 lb 11.2 oz (93.3 kg)   11/12/21 204 lb 12.8 oz (92.9 kg)   10/29/21 203 lb 4.8 oz (92.2 kg)       1. Class 2 obesity         I have reviewed and agree with nurse documentation of Weekly Education Class nurse progress note for [facility] Weight [address] [facility] [place]). Kathryn Duran is compliant with program requirements and may continue participation utilizing the [facility] meal replacements, as discussed. Patient has been advised to refer to the [facility] Patient Manual and notify us if any side effects to this meal plan are present and/or persist.  Kathryn Duran may continue the current dietary approach, care and follow up at the monthly office visit with the provider, as scheduled. Follow-up and Dispositions    · Return in about 2 weeks (around 11/26/2021). Documentation true and accepted by Odilia Morris.  Shlomo Ortega., AOBFP, Diplomate [facility]

## 2021-12-02 NOTE — PROGRESS NOTES
Kami Maria presents for weekly or bi-monthly evaluation of Obesity Body mass index is 35.03 kg/m². Visit Vitals  /78 (BP 1 Location: Right arm, BP Patient Position: Sitting, BP Cuff Size: Adult long)   Pulse 83   Resp 18   Ht 5' 4.25\" (1.632 m)   Wt 205 lb 11.2 oz (93.3 kg)   SpO2 97%   BMI 35.03 kg/m²       Wt Readings from Last 3 Encounters:   11/22/21 205 lb 11.2 oz (93.3 kg)   11/12/21 204 lb 12.8 oz (92.9 kg)   10/29/21 203 lb 4.8 oz (92.2 kg)       1. Class 2 obesity         I have reviewed and agree with nurse documentation of Weekly Education Class nurse progress note for [facility] Weight [facility] [place]). Kami Maria is compliant with program requirements and may continue participation utilizing the [facility] meal replacements, as discussed. Patient has been advised to refer to the [facility] Patient Manual and notify us if any side effects to this meal plan are present and/or persist.  Kami Maria may continue the current dietary approach, care and follow up at the monthly office visit with the provider, as scheduled. Follow-up and Dispositions    · Return in about 2 weeks (around 12/6/2021). Documentation true and accepted by Francia Kennedy.  Rogelio Milo., [email or web address], Diplomate [facility]Z

## 2021-12-04 NOTE — PROGRESS NOTES
[employer] Medically Supervised   [facility] Loss Program   [facility]    FOLLOW UP PHYSICIAN VISIT    HISTORY OF PRESENT ILLNESS  Agree with nurse and registered dietician notes. Keagan Morrow is a 60 y.o. female with Obesity Class I, Body mass index is 34.67 kg/m². and associated health concerns presents for evaluation and treatment of weight management. Pt has been participating in the [employer] [facility] Super[facility] Weight Loss Program using the [facility] Very Low Calorie Diet (VLCD, 800 kcal/day) since 8/15/18.  She transitioned to LCD on 2018.     Weight History  Start weight 209 lbs on 2018. Current weight 202 lbs, up 7 lbs since 3/2019. Percent weight loss 3.35% and total pounds lost since startin lbs. Goal weight 170 lbs. Waist measurement is 40\", decreased from 39.5\". (Acceptable Range: M <40\" & F <35\")     Neck circumference is 14.5\"; unchanged. (Acceptable Range: M <17\" & F <16\")    BMR is 1486, increased from 1438. Are you satisfied with your progress since the last ov? No, but she was not following LCD. Barriers to adherence to this plan of care? She was on vacation for 1 week in [address] [place] [address] and did not follow the diet at all while there She tried following a different diet with her  that encouraged eating meat and vegetables for breakfast and lunch and meat, vegetable and healthy starch for dinner. She decided to restart LCD on 2019. Eating Habits  3 meals daily. Any skipped meals? No  Breakfast: 2 eggs with peppers and onions in wheat wrap or oatmeal with apples. Lunch: meat with vegetable  Dinner: meat or fish with vegetable  Snacks: candy, chips,    Drinking Habits  How much water do you consume daily? 32 oz (goal of 2 L or 67 oz daily, minimally)  How much caffeine do you drink daily? 10 oz coffee with cream and 2 packets of stevia. 12 oz of tea with 2 packets of stevia. How much alcohol do you drink daily and weekly? 12 oz of beer every other day on vacation. Do you consume any sugar-sweetened beverages (sodas, teas, juices, etc.)? Sleep Habits  She averages 6.5 hours of sleep nighty. Exercise  She rode her bike for 30-45 minutes every morning. Medication(s):  Are you taking any medications to control the appetite? No.     Other Medical Care    Pt with hypothyroidism, hyperglycemia, dyslipidemia, elevated cr, hyperinsulinemia, hyperuricemia, elevated hgb, efa deficiency, elevated crp, and hypertension presents to the office with a BP of 115/81. She started Synthroid 50 mcg about 3 weeks ago after she saw her recent TSH of 8.26. It was first rx'd on 11/7/2018 when TSH was 6.25. Pt shares she felt pressure in her head and extremely tired soon after starting the medication and wonders if it could be the medicine. Written by karina Sarah, as dictated by Dr. Roney Barlow DO.    ROS    Pt denies hunger, cravings, lack of focus, fatigue, feeling weak, headaches, dizziness, light headedness, nausea, vomiting, diarrhea, constipation, indigestion, rapid heart rate, SOB, low blood sugar, feeling cold, hair loss, rash, fluid retention, leg aches, difficulty sleeping, irritability, mood swings, or other associated sxs. Review of Systems negative except as noted above in HPI. ALLERGIES:    Allergies   Allergen Reactions    Contrave [Naltrexone-Bupropion] Other (comments)     1 PILL MORNING, 2 PILLS EVENING  FELT ZOMBIED, \"OUT OF MY HEAD\"    Crestor [Rosuvastatin] Other (comments)     Felt like it worsened her memory    Lipitor [Atorvastatin] Other (comments)     Felt like it worsened memory.         CURRENT MEDICATIONS:    Outpatient Medications Marked as Taking for the 5/1/19 encounter (Office Visit) with Skylar Moctezuma DO   Medication Sig Dispense Refill    hydroCHLOROthiazide (MICROZIDE) 12.5 mg capsule       mometasone (NASONEX) 50 mcg/actuation nasal spray USE TWO SPRAY(S) IN [facility] 11 NOSTRIL DAILY 1 Container 11    [DISCONTINUED] scopolamine (TRANSDERM-SCOP) 1 mg over 3 days pt3d 1 Patch by TransDERmal route every seventy-two (72) hours. 4 Patch 0    levothyroxine (SYNTHROID) 50 mcg tablet Take 1 Tab by mouth Daily (before breakfast). 30 Tab 5    albuterol (VENTOLIN HFA) 90 mcg/actuation inhaler Take 2 Puffs by inhalation every four (4) hours as needed for Wheezing. 1 Inhaler 11    glucose blood VI test strips (CONTOUR NEXT STRIPS) strip qd 100 Strip 11       PAST MEDICAL HISTORY:    Past Medical History:   Diagnosis Date    Asthma CHILDHOOD    Breast cancer, left ([facility] [address]     followed by gyn md cassius    Chest pain 2017    normal Stress Echo 17.  Dyslipidemia     Heartburn     Hyperglycemia 2016    Dr. Gabriela Segovia    Hypertension     Hypothyroid 2018    Neck nodule 05/10/2018    Obesity (BMI 30-39.9)     elham Chávez.  Postsurgical menopause     Dr. Elvis Grant Spider bite     ? Leg, recurrent.  Steatosis of liver 2016    Tubular adenoma of colon 2017    Dr. Anjel Dorsey    Uterine fibroid     Dr. Giles Mckeon    Vitamin D deficiency 2018    Dr. Arnold Whitlash:    Past Surgical History:   Procedure Laterality Date    HX BREAST LUMPECTOMY Left     Atypical Cells. Dr. Satnam Nuñez.  HX COLONOSCOPY  2009, 16    with polypectomy. Dr. Anjel Dorsey.  HX REFRACTIVE SURGERY Bilateral s    HX MUKUND AND BSO      due to uterine fibroids.   Dr. Alberts Hands    Dr. Giles Mckeon       FAMILY HISTORY:    Family History   Problem Relation Age of Onset    Diabetes Mother     Obesity Mother     Other Father 54         FROM BEE ALLERGY    Obesity Sister         X7    Diabetes Brother     No Known Problems Maternal Grandmother     No Known Problems Maternal Grandfather     No Known Problems Paternal Grandmother  No Known Problems Paternal Grandfather     Sleep Apnea Sister     Diabetes Sister     Hypertension Sister     Thyroid Disease Sister         X 2    Diabetes Sister         X3 MORE SISTERS TAKING INSULIN    Diabetes Brother     Breast Cancer Sister 43    Schizophrenia Sister        SOCIAL HISTORY:    Social History     Socioeconomic History    Marital status:      Spouse name: Not on file    Number of children: Not on file    Years of education: Not on file    Highest education level: Not on file   Tobacco Use    Smoking status: Never Smoker    Smokeless tobacco: Never Used   Substance and Sexual Activity    Alcohol use: Yes     Alcohol/week: 0.5 oz     Types: 1 Glasses of wine per week     Comment: ocassional    Drug use: No    Sexual activity: Yes     Partners: Male     Birth control/protection: Surgical     Comment: TL   Social History Narrative    Family History: Mother: , Jacquelyn Jacoby: , Bee stingSister(s):   yrs, Breast Cancer at 42Brother(s):    , Heart Problems1 brother(s) , 7 sister(s) - healthy. 1 son(s) , 1 daughter(s) - healthy. Social History: Alcohol Use Patient does not use alcohol. Smoking Status Patient is a never smoker. Caffeine: coffee, tea. Marital Status:    . Lives w ith: spouse Carrillo Ballesteros. Occupation/W ork: office w ork. Medical History: Hypertension, Left Breast papilloma. Gyn History: Last mammogram date 10/28/2014. Last pap date 10/22/2012. Hysterectomy Date . OB History: Total pregnancies 2. Full term delivery (>37 w eeks) 2. Live births 2. C section(s) 2. Pregnancy # 1: , Girl, 11. Pregnancy # 2: Repeat C/S, boy, . Surgical History: hysterectomy, total abdominal w ith BSO , Left w rist surgery , colonoscopy [facility] and 2009, left lumpectomy .        IMMUNIZATIONS:    Immunization History   Administered Date(s) Administered    Influenza Vaccine 10/01/2017, 2018 PHYSICAL EXAMINATION    Vital Signs    Visit Vitals  /81   Pulse 91   Temp 98 °F (36.7 °C) (Oral)   Resp 18   Ht 5' 4\" (1.626 m)   Wt 202 lb (91.6 kg)   SpO2 98%   BMI 34.67 kg/m²       Weight Metrics 5/24/2019 5/23/2019 5/20/2019 5/16/2019 5/13/2019 5/10/2019 5/9/2019   Weight - 199 lb 8 oz - 200 lb 203 lb 9.6 oz - 198 lb 8 oz   Neck Circ (inches) - - - - - - -   Waist Measure Inches 38.5 - 39.75 - - 39.5 -   Body Fat % - - - - - 39.1 -   BMI - 34.24 kg/m2 - 34.33 kg/m2 34.95 kg/m2 - 34.07 kg/m2         General appearance - Well nourished. Well appearing. Well developed. No acute distress. Obese. Head - Normocephalic. Atraumatic. Eyes - pupils equal and reactive. Extraocular eye movements intact. Sclera anicteric. Mildly injected sclera. Ears - Hearing is grossly normal bilaterally. Nose - normal and patent. No polyps noted. No erythema. No discharge. Mouth - mucous membranes with adequate moisture. Posterior pharynx normal with cobblestone appearance. No erythema, white exudate or obstruction. Neck - supple. Midline trachea. No carotid bruits noted bilaterally. No thyromegaly noted. Chest - clear to auscultation bilaterally anteriorly and posteriorly. No wheezes. No rales or rhonchi. Breath sounds are symmetrical bilaterally. Unlabored respirations. Heart - normal rate. Regular rhythm. Normal S1, S2. No murmur noted. No rubs, clicks or gallops noted. Abdomen - soft and distended. No masses or organomegaly. No rebound, rigidity or guarding. Bowel sounds normal x 4 quadrants. No tenderness noted. Neurological - awake, alert and oriented to person, place, and time and event. Cranial nerves II through XII intact. Clear speech. Muscle strength is +5/5 x 4 extremities. Sensation is intact to light touch bilaterally. Steady gait. Heme/Lymph - peripheral pulses normal x 4 extremities. No peripheral edema is noted. Musculoskeletal - Intact x 4 extremities. Full ROM x 4 extremities. No pain with movement. Back exam - normal range of motion. No pain on palpation of the spinous processes in the cervical, thoracic, lumbar, sacral regions. No CVA tenderness. No buffalo hump noted. Skin - no rashes, erythema, ecchymosis, lacerations, abrasions, suspicious moles noted. No skin tags or moles. No acanthosis nigricans noted in the axilla or neck. Psychological -   normal behavior, dress and thought processes. Good insight. Good eye contact. Normal affect. Appropriate mood. Normal speech. DATA REVIEWED    Labs dated 4/11/2019 from [facility] compared with labs from 2/25/2019. LDL was 178, up from 165. HDL was 61, up from 55. Triglycerides were 123, up from 84. LDL-P was 2467, up from 2091. Small LDL-P was 1299, up from 818. Hs-CRP was 1.0, up from 0.6. Vit D was 52. Uric Acid was 6.8. Hgb A1C was 5.7, up from 5.5. Leptin was 55. Adiponectin was 6, up from 5. Insulin was 24, up from 15. Creatinine was 1.0. ALT was 14. AST was 18. ALP was 118. TSH was 8.26. FT4 was 0.94. Omega 3 was 5.6, up from 4.7. SEE SCANNED DOCUMENT FOR COMPLETE PANEL RESULTS. ASSESSMENT and PLAN      ICD-10-CM ICD-9-CM    1. Hypothyroidism due to acquired atrophy of thyroid E03.4 244.8      246.8     uncontrolled, started sythroid 50 mcg 2 weeks ago   2. Hyperglycemia R73.9 790.29     worse while off LCD x1 month    3. Dyslipidemia E78.5 272.4     with elevated LDL-P and sLDL-P, worse while off LCD    4. Elevated serum creatinine R79.89 790.99     stable   5. Hyperinsulinemia E16.1 251.1     worse     6. Obesity, Class I, BMI 30-34.9 E66.9 278.00    7. Hyperuricemia E79.0 790.6    8. Weight gain R63.5 783.1     7# since 3/2019    9. Elevated hemoglobin (HCC) D58.2 282.7    10. Essential fatty acid (EFA) deficiency E63.0 269.8    11. Elevated C-reactive protein (CRP) R79.82 790.95    12.  Essential hypertension I10 401.9     stable Chart reviewed and updated. Recommend pt refer to LCD manual if experiencing any side effects once program is initiated or call our office.     Referred patient to Jaden Ardon RD for BS MSWLP to receive [employer] LCD food products and weekly intervention.     Discussed the patient's BMI with her. Hari Fry BMI follow up plan is as follows: I have counseled this patient on diet and exercise regimens. Decrease carbohydrates (white foods, sweet foods, sweet drinks and alcohol), increase green leafy vegetables and protein (lean meats and beans) with each meal.  Avoid fried foods. Do not skip meals.  Increase water intake to 1 bottle with each meal. Avoid sugar-sweetened beverages.  Get 7-8 hours uninterrupted sleep nightly.     Diet prescription: LCD (low calorie diet)/6859-0401 calories daily using 2-3 meal replacements daily with [employer] food/beverage products recommended. Consume a minimum of 2 L (67 oz) of water daily while utilizing LCD. Avoid sugar-sweetened beverages. Reviewed nutrition and importance of regular protein intake and hidden carbohydrate sources.      Exercise prescription: Advance as tolerated while using LCD. A goal of 30 minutes physical activity daily is recommended for health benefit and at least 60 minutes daily to prevent weight regain.  For weight loss, no less than 75% needs to be aerobic (i.e. Walking) and no more than 25% resistance exercising (i.e. Weight lifting).  For weight maintenance phase, 50% aerobic and 50% resistance exercises. Emphasized importance of physical activity and reducing sedentary time.      Sleep prescription: A goal of 7-8 hours of uninterrupted sleep is recommended to turn off the Grehlin hormone to be released from the stomach and triggers appetite while promoting weight gain. Proper rest turns on Leptin hormone to be released from white adipose tissue and promotes weight loss. Continue current medications and care.  Continue Synthroid 50 mcg daily. Advised pt headaches could be a sign of allergies or dehydration. Use Tylenol prn and stay hydrated. Reviewed and discussed Castromout lab results. Copy of [facility] labs given to pt to share with PCP and specialists. Recheck pertinent labs monthly with [facility] lab. Counseled patient on health concerns:  LCD, weight loss goals, sleep hygiene, strategies to overcome habits or challenges to improve or continue adherence, thyroid, cholesterol, hyperglycemia. Reminded females of reproductive age that with weight loss, they may become more fertile and recommend the use of condoms if pregnancy is not desired. Weight loss goal of 5-10% in 6-12 months has shown significant improvement in obesity and its health consequences. Immunizations noted. Offered empathy, support, legitimation, prayers, partnership to patient. Praised patient for progress. Follow-up and Dispositions    · Return in about 1 month (around 6/1/2019) for MSWL, LCD, Results. Patient was offered a choice/choices in the treatment plan today. Patient expresses understanding of the plan and agrees with recommendations. Patient declines any additional handouts. Patient is satisfied with previous handouts received from our office. Marjan Costa has a reminder for a \"due or due soon\" health maintenance. I have asked pt to contact their primary care provider for follow-up on this health maintenance. Written by karina Edwards, as dictated by Dr. Edgar Mcnair DO. Documentation True and Accepted by Ashlie Poole. [id number] Shanell Mcneil MD FOR ALLOWING ME THE PRIVILEGE TO PARTICIPATE IN THE CARE OF OUR MUTUAL PATIENT, Marjan Costa WITH RESPECT TO WEIGHT MANAGEMENT.

## 2021-12-07 ENCOUNTER — OFFICE VISIT (OUTPATIENT)
Dept: SURGERY | Age: 61
End: 2021-12-07

## 2021-12-07 DIAGNOSIS — E66.9 CLASS 2 OBESITY: Primary | ICD-10-CM

## 2021-12-07 NOTE — PROGRESS NOTES
[facility]  Metabolic Weight Loss Program        Patient's Name: Elliot Duran  : 1960    This patient is enrolled in [address] [place] [address] Weight Loss Program and attended the required weekly virtual nutrition class hosted via [employer].       Easton Weldon, MS, RD, LDN

## 2021-12-13 ENCOUNTER — CLINICAL SUPPORT (OUTPATIENT)
Dept: SURGERY | Age: 61
End: 2021-12-13

## 2021-12-13 VITALS
WEIGHT: 207.6 LBS | OXYGEN SATURATION: 98 % | SYSTOLIC BLOOD PRESSURE: 132 MMHG | HEIGHT: 64 IN | RESPIRATION RATE: 18 BRPM | DIASTOLIC BLOOD PRESSURE: 86 MMHG | HEART RATE: 91 BPM | BODY MASS INDEX: 35.44 KG/M2

## 2021-12-13 DIAGNOSIS — E66.9 OBESITY (BMI 30.0-34.9): Primary | ICD-10-CM

## 2021-12-13 NOTE — PROGRESS NOTES
Weight Management. 1. Have you been to the ER, urgent care clinic since your last visit? Hospitalized since your last visit? No    2. Have you seen or consulted any other health care providers outside of the [address] [place] [address] since your last visit? Include any pap smears or colon screening. No     11/26/2021    Progress Note: Weekly Education Class in the [facility] Weight Loss Program         Patient is on Very Low Calorie Diet [] (4 meal replacements per day, 800 kcal/day)      Low Calorie Diet [x] (2-3 meal replacements per day, 4389-8688 kcal/day)    1) Did patient have any new symptoms or physical problems? Yes []    No [x]    If yes, check & comment: weakness [], fatigue [], lightheadedness [], headache [], cramps [], cold intolerance [], hair loss [], diarrhea [], constipation [],  NA [] other:                                 2) Has patient had any medical attention from other providers, urgent care or the emergency room this week? Yes []  No [x]       NA [], If yes, why:                                      3) Any other sugar sweetened beverages consumed this week? Yes [x]  No []    4) Did patient have any problems adhering to the diet? Yes [x]  No [] NA []    If yes, Vacation [], Celebrations [], Conferences [], Family Reunions [] other: Other foods around holidays -   approaching Thanksgiving                                               5) How many hours of sleep this week? 4-8    (range)  NA []    Number of meal replacements consumed daily? 1 (range)  NA []    Average ounces of water patient consumed daily this week (not including shakes)? 80     (divide the weekly total by 7)    Did you eat any food outside of the program? Yes [x] No []    Physical Activity Over the Past Week:    Cardio exercise: 120 min  Strength exercise: 3 workouts / week  Number of steps walked per day: 2628-53,516    How has patient mood overall been this week?  Sad [], Happy [], Stressed [], Tired [], Content [], NA [], other Fine           Medications reconciled by nurse Yes [x]  No[]    Patient was given therapeutic recommendations for any noted side effects of their dietary approach based upon [facility] patient manual per providers recommendation. 12/6/2021    Progress Note: Weekly Education Class in the [facility] Weight Loss Program         Patient is on Very Low Calorie Diet [] (4 meal replacements per day, 800 kcal/day)      Low Calorie Diet [x] (2-3 meal replacements per day, 2700-2291 kcal/day)    1) Did patient have any new symptoms or physical problems? Yes []    No [x]    If yes, check & comment: weakness [], fatigue [], lightheadedness [], headache [], cramps [], cold intolerance [], hair loss [], diarrhea [], constipation [],  NA [] other:                                 2) Has patient had any medical attention from other providers, urgent care or the emergency room this week? Yes []  No [x]       NA [], If yes, why:                                      3) Any other sugar sweetened beverages consumed this week? Yes [x]  No []    4) Did patient have any problems adhering to the diet? Yes [x]  No [] NA []    If yes, Vacation [], Celebrations [], Conferences [], Family Reunions [] other: Other foods around, [place]  approaching                                               5) How many hours of sleep this week? 4-7    (range)  NA []    Number of meal replacements consumed daily? 1-2 (range)  NA []    Average ounces of water patient consumed daily this week (not including shakes)? 80     (divide the weekly total by 7)    Did you eat any food outside of the program? Yes [x] No []    Physical Activity Over the Past Week:    Cardio exercise: 120 min  Strength exercise: 3 workouts / week  Number of steps walked per day: 7847-54,518    How has patient mood overall been this week?  Sad [], Happy [], Stressed [], Tired [], Content [], NA [], other Fine           Medications reconciled by nurse Yes [x] No[]    Patient was given therapeutic recommendations for any noted side effects of their dietary approach based upon [facility] patient manual per providers recommendation.

## 2021-12-14 ENCOUNTER — OFFICE VISIT (OUTPATIENT)
Dept: SURGERY | Age: 61
End: 2021-12-14

## 2021-12-14 DIAGNOSIS — E66.9 CLASS 2 OBESITY: Primary | ICD-10-CM

## 2021-12-14 NOTE — PROGRESS NOTES
[facility]  Metabolic Weight Loss Program        Patient's Name: Vandana Andino  : 1960    This patient is enrolled in [address] [place] [address] Weight Loss Program and attended the required weekly virtual nutrition class hosted via [employer].       Sharon Carroll, MS, RD, LDN

## 2021-12-20 ENCOUNTER — VIRTUAL VISIT (OUTPATIENT)
Dept: SLEEP MEDICINE | Age: 61
End: 2021-12-20
Payer: COMMERCIAL

## 2021-12-20 DIAGNOSIS — G47.33 OBSTRUCTIVE SLEEP APNEA (ADULT) (PEDIATRIC): Primary | ICD-10-CM

## 2021-12-20 DIAGNOSIS — R73.03 PRE-DIABETES: ICD-10-CM

## 2021-12-20 DIAGNOSIS — I10 ESSENTIAL HYPERTENSION: ICD-10-CM

## 2021-12-20 PROCEDURE — 99213 OFFICE O/P EST LOW 20 MIN: CPT | Performed by: INTERNAL MEDICINE

## 2021-12-20 NOTE — PROGRESS NOTES
217 [facility] [place]. [place], [address]  Tel.  [phone]  Fax. 100 [facility] 60  [place], [address] [facility]  Tel.  [phone]  Fax. [phone] 9250 [facility] Mone [place]   Tel.  [phone]  Fax. [phone]       Telemedicine visit performed with verbal consent of the patient. Patient called and identity confirmed with 2 patient identifers     Patient was seen at home  Stephanie Regan is a 57 y.o. female who was seen by synchronous (real-time) audio-video technology on 12/20/2021. Consent:  She and/or her healthcare decision maker is aware that this patient-initiated Telehealth encounter is the equivalent to a face to face encounter in the sleep disorder center and has provided verbal consent to proceed: Yes    I was in the office while conducting this encounter. S>Teagan Miranda is a 57 y.o. female seen at this telemedicine visit for a positive airway pressure follow-up. She reports some problems using the device. She is 27% compliant over the past 90 days. The following problems are identified:    Drowsiness no Problems exhaling no   Snoring no Forget to put on No-but not using it when nose feels congested   Mask Comfortable yes  Can't fall asleep no   Dry Mouth no Mask falls off no   Air Leaking no Frequent awakenings no       Download reviewed. she  is aware of the Emilie recall of PAP devices and has registered her device on the recall registry   She admits that her sleep has improved on PAP therapy. Allergies   Allergen Reactions    Contrave [Naltrexone-Bupropion] Other (comments)     1 PILL MORNING, 2 PILLS EVENING  FELT ZOMBIED, \"OUT OF MY HEAD\"    Crestor [Rosuvastatin] Other (comments)     Felt like it worsened her memory    Gadolinium-Containing Contrast Media Nausea and Vomiting    Lipitor [Atorvastatin] Other (comments)     Felt like it worsened memory.         She has a current medication list which includes the following prescription(s): levothyroxine, acyclovir, scopolamine, pantoprazole, mometasone, albuterol, glucose blood vi test strips, and hydrochlorothiazide. .      She  has a past medical history of Asthma (CHILDHOOD), Breast cancer, left ([address] (2005), Chest pain (07/11/2017), Dyslipidemia, Gastritis and duodenitis (11/22/2019), GERD (gastroesophageal reflux disease), Heartburn (2017), colonoscopy (11/22/2019), Hyperglycemia (04/16/2016), Hypertension, Hypothyroid (08/2018), Microscopic colitis (11/22/2019), Neck nodule (05/10/2018), Obesity (BMI 30-39.9) (2017), PITER (obstructive sleep apnea), Postsurgical menopause (2002), S/P colonoscopy (11/22/2019), Shoulder pain, right, Spider bite (2016), Steatosis of liver (06/19/2016), TMJ (temporomandibular joint syndrome), Tubular adenoma of colon (02/2017), Uterine fibroid (2002), and Vitamin D deficiency (04/18/2018). Overton Sleepiness Score: 3   and Modified F.O.S.Q. Score Total / 2: 19.5   which reflect improved sleep quality over therapy time. O>      There were no vitals taken for this visit.       Vital Signs: (As obtained by patient/caregiver at home)        Constitutional: [x] Appears well-developed and well-nourished [x] No apparent distress      [] Abnormal -     Mental status: [x] Alert and awake  [x] Oriented to person/place/time [x] Able to follow commands    [] Abnormal -     Eyes:   EOM    [x]  Normal    [] Abnormal -   Sclera  [x]  Normal    [] Abnormal -          Discharge [x]  None visible   [] Abnormal -     HENT: [x] Normocephalic, atraumatic  [] Abnormal -     External Ears [x] Normal  [] Abnormal -    Neck: [x] No visualized mass [] Abnormal -     Pulmonary/Chest: [x] Respiratory effort normal   [x] No visualized signs of difficulty breathing or respiratory distress        [] Abnormal -       Neurological:        [x] No Facial Asymmetry (Cranial nerve 7 motor function) (limited exam due to video visit)          [x] No gaze palsy        [] Abnormal -          Skin:        [x] No significant exanthematous lesions or discoloration noted on facial skin         [] Abnormal -            Psychiatric:       [x] Normal Affect [] Abnormal -        Other pertinent observable physical exam findings:-            A>    ICD-10-CM ICD-9-CM    1. Obstructive sleep apnea (adult) (pediatric)  G47.33 327.23 AMB SUPPLY ORDER   2. Essential hypertension  I10 401.9    3. Pre-diabetes  R73.03 790.29      AHI = 9 (9-20). On CPAP, Respironics :  5-10 cmH2O. Compliant:      no  Not optimally  Therapeutic Response:  Positive    P>    she is compliant with PAP therapy and PAP continues to benefit patient and remains necessary for control of her sleep apnea. CPAP setting - 5-10 cmH20   She will increase humidity setting. Nasal saline in morning and night was advised to try. * We have recommended a dedicated weight loss through appropriate diet and an exercise regimen as significant weight reduction has been shown to reduce severity of obstructive sleep apnea. *  The Emilie PAP recall was reviewed. We discussed the problem of potential breakdown of the sound abatement foam. she not use a commercial PAP cleaning device (such as the so-clean device). she understands that the use of those devices may increase the likelihood of foam breakdown. We discussed the pros and cons of continuing use of the recalled PAP device. she was informed that [employer] is working to replace the recalled devices. she was advised to check the Emilie site regularly for updates. she was advised that if/when she receives the replacement PAP device from [employer], she should contact his medical supply company so they can set the PAP to her previous settings and nicolasa the [facility] access to the modem. Previous hose and mask should be compatible with the new device. * She was asked to contact our office for any problems regarding PAP therapy.     * Counseling was provided regarding the importance of regular PAP use and on proper sleep hygiene and safe driving. * Re-enforced proper and regular cleaning for the device. 2. Hypertension - she continues on her current regimen. I have reviewed the relationship between hypertension as it relates to sleep-disordered breathing. 3. Pre- diabetes - she continues on her current regimen. I have reviewed the relationship between sleep disordered breathing as it relates to diabetes. All of her questions were addressed. Pursuant to the emergency declaration under the [address] [place] [address], [facility] waiver authority and the [employer] Act, this Virtual  Visit was conducted, with patient's consent, to reduce the patient's risk of exposure to COVID-19 and provide continuity of care for an established patient. Services were provided through a video synchronous discussion virtually to substitute for in-person clinic visit.     Omari Brar MD    Electronically signed by    Braxton Agee MD  Diplomate in Sleep Medicine  [facility]

## 2021-12-20 NOTE — PATIENT INSTRUCTIONS
217 [facility]., [place]. [place], [address]  Tel.  [phone]  Fax. 100 [facility] 60  1001 [facility] [address], [address]  Tel.  [phone]  Fax. [phone] [address]  Tel.  [phone]  Fax. [phone]     PROPER SLEEP HYGIENE    What to avoid  · Do not have drinks with caffeine, such as coffee or black tea, for 8 hours before bed. · Do not smoke or use other types of tobacco near bedtime. Nicotine is a stimulant and can keep you awake. · Avoid drinking alcohol late in the evening, because it can cause you to wake in the middle of the night. · Do not eat a big meal close to bedtime. If you are hungry, eat a light snack. · Do not drink a lot of water close to bedtime, because the need to urinate may wake you up during the night. · Do not read or watch TV in bed. Use the bed only for sleeping and sexual activity. What to try  · Go to bed at the same time every night, and wake up at the same time every morning. Do not take naps during the day. · Keep your bedroom quiet, dark, and cool. · Get regular exercise, but not within 3 to 4 hours of your bedtime. .  · Sleep on a comfortable pillow and mattress. · If watching the clock makes you anxious, turn it facing away from you so you cannot see the time. · If you worry when you lie down, start a worry book. Well before bedtime, write down your worries, and then set the book and your concerns aside. · Try meditation or other relaxation techniques before you go to bed. · If you cannot fall asleep, get up and go to another room until you feel sleepy. Do something relaxing. Repeat your bedtime routine before you go to bed again. · Make your house quiet and calm about an hour before bedtime. Turn down the lights, turn off the TV, log off the computer, and turn down the volume on music. This can help you relax after a busy day.     Drowsy Driving  The [address] [place] [address] Road Traffic Safety Administration cites drowsiness as a causing factor in more than 661,169 police reported crashes annually, resulting in 76,000 injuries and 1,500 deaths. Other surveys suggest 55% of people polled have driven while drowsy in the past year, 23% had fallen asleep but not crashed, 3% crashed, and 2% had and accident due to drowsy driving. Who is at risk? Young Drivers: One study of drowsy driving accidents states that 55% of the drivers were under 25 years. Of those, 75% were male. Shift Workers and Travelers: People who work overnight or travel across time zones frequently are at higher risk of experiencing Circadian Rhythm Disorders. They are trying to work and function when their body is programed to sleep. Sleep Deprived: Lack of sleep has a serious impact on your ability to pay attention or focus on a task. Consistently getting less than the average of 8 hours your body needs creates partial or cumulative sleep deprivation. Untreated Sleep Disorders: Sleep Apnea, Narcolepsy, R.L.S., and other sleep disorders (untreated) prevent a person from getting enough restful sleep. This leads to excessive daytime sleepiness and increases the risk for drowsy driving accidents by up to 7 times. Medications / Alcohol: Even over the counter medications can cause drowsiness. Medications that impair a drivers attention should have a warning label. Alcohol naturally makes you sleepy and on its own can cause accidents. Combined with excessive drowsiness its effects are amplified. Signs of Drowsy Driving:   * You don't remember driving the last few miles   * You may drift out of your art   * You are unable to focus and your thoughts wander   * You may yawn more often than normal   * You have difficulty keeping your eyes open / nodding off   * Missing traffic signs, speeding, or tailgating  Prevention-   Good sleep hygiene, lifestyle and behavioral choices have the most impact on drowsy driving.  There is no substitute for sleep and the average person requires 8 hours nightly. If you find yourself driving drowsy, stop and sleep. Consider the sleep hygiene tips provided during your visit as well. Medication Refill Policy: Refills for all medications require 1 week advance notice. Please have your pharmacy fax a refill request. We are unable to fax, or call in \"controled substance\" medications and you will need to pick these prescriptions up from our office. [employer] Activation    Thank you for requesting access to [employer]. Please follow the instructions below to securely access and download your online medical record. [employer] allows you to send messages to your doctor, view your test results, renew your prescriptions, schedule appointments, and more. How Do I Sign Up? 1. In your internet browser, go to [email or web address]. 2. Click on the First Time User? Click Here link in the Sign In box. You will see the New Member Sign Up page. 3. Enter your [employer] Access Code exactly as it appears below. You will not need to use this code after youve completed the sign-up process. If you do not sign up before the expiration date, you must request a new code. [employer] Access Code: Activation code not generated  Current [employer] Status: Active (This is the date your [employer] access code will )    4. Enter the last four digits of your Social Security Number (xxxx) and Date of Birth (mm/dd/yyyy) as indicated and click Submit. You will be taken to the next sign-up page. 5. Create a [employer] ID. This will be your [employer] login ID and cannot be changed, so think of one that is secure and easy to remember. 6. Create a [employer] password. You can change your password at any time. 7. Enter your Password Reset Question and Answer. This can be used at a later time if you forget your password. 8. Enter your e-mail address. You will receive e-mail notification when new information is available in [address]   9. Click Sign Up. You can now view and download portions of your medical record. 10. Click the Download Summary menu link to download a portable copy of your medical information. Additional Information    If you have questions, please call [phone]. Remember, [employer] is NOT to be used for urgent needs. For medical emergencies, dial 911.

## 2021-12-22 ENCOUNTER — DOCUMENTATION ONLY (OUTPATIENT)
Dept: SLEEP MEDICINE | Age: 61
End: 2021-12-22

## 2021-12-27 ENCOUNTER — CLINICAL SUPPORT (OUTPATIENT)
Dept: SURGERY | Age: 61
End: 2021-12-27

## 2021-12-27 VITALS
HEIGHT: 64 IN | BODY MASS INDEX: 34.97 KG/M2 | WEIGHT: 204.8 LBS | OXYGEN SATURATION: 97 % | DIASTOLIC BLOOD PRESSURE: 70 MMHG | RESPIRATION RATE: 18 BRPM | HEART RATE: 86 BPM | SYSTOLIC BLOOD PRESSURE: 116 MMHG

## 2021-12-27 NOTE — PROGRESS NOTES
Weight Management. 1. Have you been to the ER, urgent care clinic since your last visit? Hospitalized since your last visit? No    2. Have you seen or consulted any other health care providers outside of the [address] [place] [address] since your last visit? Include any pap smears or colon screening. No     12/13/2021    Progress Note: Weekly Education Class in the [facility] Weight Loss Program         Patient is on Very Low Calorie Diet [] (4 meal replacements per day, 800 kcal/day)      Low Calorie Diet [x] (2-3 meal replacements per day, 5806-6197 kcal/day)    1) Did patient have any new symptoms or physical problems? Yes []    No [x]    If yes, check & comment: weakness [], fatigue [], lightheadedness [], headache [], cramps [], cold intolerance [], hair loss [], diarrhea [], constipation [],  NA [] other:                                 2) Has patient had any medical attention from other providers, urgent care or the emergency room this week? Yes []  No [x]       NA [], If yes, why:                                       3) Any other sugar sweetened beverages consumed this week? Yes [x]  No []    4) Did patient have any problems adhering to the diet? Yes []  No [x] NA []    If yes, Vacation [], Celebrations [], Conferences [], Family Reunions [] other:                                                5) How many hours of sleep this week? 4-6.5    (range)  NA []    Number of meal replacements consumed daily? 1-3 (range)  NA []    Average ounces of water patient consumed daily this week (not including shakes)? 71     (divide the weekly total by 7)    Did you eat any food outside of the program? Yes [x] No []    Physical Activity Over the Past Week:    Cardio exercise: 90 min  Strength exercise: 3 workouts / week  Number of steps walked per day: 3978-49,966    How has patient mood overall been this week?  Sad [], Happy [], Stressed [], Tired [], Content [], NA [], other Fine           Medications reconciled by nurse Yes [x]  No[]    Patient was given therapeutic recommendations for any noted side effects of their dietary approach based upon [facility] patient manual per providers recommendation. 21/20/2021    Progress Note: Weekly Education Class in the [facility] Weight Loss Program         Patient is on Very Low Calorie Diet [] (4 meal replacements per day, 800 kcal/day)      Low Calorie Diet [x] (2-3 meal replacements per day, 7059-4887 kcal/day)    1) Did patient have any new symptoms or physical problems? Yes []    No [x]    If yes, check & comment: weakness [], fatigue [], lightheadedness [], headache [], cramps [], cold intolerance [], hair loss [], diarrhea [], constipation [],  NA [] other:                                 2) Has patient had any medical attention from other providers, urgent care or the emergency room this week? Yes []  No [x]       NA [], If yes, why:                                       3) Any other sugar sweetened beverages consumed this week? Yes [x]  No []    4) Did patient have any problems adhering to the diet? Yes []  No [x] NA []    If yes, Vacation [], Celebrations [], Conferences [], Family Reunions [] other:                                                5) How many hours of sleep this week? 4-7  (range)  NA []    Number of meal replacements consumed daily? 1-2 (range)  NA []    Average ounces of water patient consumed daily this week (not including shakes)? 54     (divide the weekly total by 7)    Did you eat any food outside of the program? Yes [x] No []    Physical Activity Over the Past Week:    Cardio exercise: 0 min  Strength exercise: 0 workouts / week  Number of steps walked per day: 6059-10,237    How has patient mood overall been this week?  Sad [], Happy [], Stressed [], Tired [], Content [], NA [], other Fine           Medications reconciled by nurse Yes [x]  No[]    Patient was given therapeutic recommendations for any noted side effects of their dietary approach based upon [employer] patient manual per providers recommendation.

## 2021-12-30 ENCOUNTER — OFFICE VISIT (OUTPATIENT)
Dept: SURGERY | Age: 61
End: 2021-12-30

## 2021-12-30 DIAGNOSIS — E66.9 OBESITY (BMI 30.0-34.9): Primary | ICD-10-CM

## 2021-12-30 NOTE — PROGRESS NOTES
[facility] [employer] [facility]  Metabolic Weight Loss Program        Patient's Name: Fritz Tatum  : 1960    This patient is enrolled in [address] [place] [address] Weight Loss Program and attended the required weekly virtual nutrition class hosted via [employer].       Nanda Castillo, MS, RD, LDN

## 2021-12-31 NOTE — PROGRESS NOTES
Kathryn Duran presents for weekly or bi-monthly evaluation of Obesity Body mass index is 35.36 kg/m². Visit Vitals  /86 (BP 1 Location: Right arm, BP Patient Position: Sitting, BP Cuff Size: Adult long)   Pulse 91   Resp 18   Ht 5' 4.25\" (1.632 m)   Wt 207 lb 9.6 oz (94.2 kg)   SpO2 98%   BMI 35.36 kg/m²       Wt Readings from Last 3 Encounters:   12/27/21 204 lb 12.8 oz (92.9 kg)   12/13/21 207 lb 9.6 oz (94.2 kg)   11/22/21 205 lb 11.2 oz (93.3 kg)       1. Obesity (BMI 30.0-34.9)         I have reviewed and agree with nurse documentation of Weekly Education Class nurse progress note for [facility] Weight [facility] [place]). Kathryn Duran is compliant with program requirements and may continue participation utilizing the [employer]irection meal replacements, as discussed. Patient has been advised to refer to the [facility] Patient Manual and notify us if any side effects to this meal plan are present and/or persist.  Kathryn Duran may continue the current dietary approach, care and follow up at the monthly office visit with the provider, as scheduled. Follow-up and Dispositions    · Return in about 2 weeks (around 12/27/2021). Documentation true and accepted by Odilia Morris.  Shlomo Ortega., AOBFP, Diplomate IVONE RUIZ

## 2022-01-04 ENCOUNTER — OFFICE VISIT (OUTPATIENT)
Dept: SURGERY | Age: 62
End: 2022-01-04

## 2022-01-04 DIAGNOSIS — E66.9 OBESITY (BMI 30.0-34.9): Primary | ICD-10-CM

## 2022-01-12 ENCOUNTER — OFFICE VISIT (OUTPATIENT)
Dept: SURGERY | Age: 62
End: 2022-01-12

## 2022-01-12 DIAGNOSIS — E66.9 OBESITY (BMI 30.0-34.9): Primary | ICD-10-CM

## 2022-01-13 NOTE — PROGRESS NOTES
[facility] Center  Metabolic Weight Loss Program        Patient's Name: Jean Zacarias  : 1960    This patient is enrolled in [address] [place] [address] Weight Loss Program and attended the required weekly virtual nutrition class hosted via [employer] today.       Elizabeth Weiner RD

## 2022-01-14 ENCOUNTER — CLINICAL SUPPORT (OUTPATIENT)
Dept: SURGERY | Age: 62
End: 2022-01-14

## 2022-01-14 VITALS
WEIGHT: 206.8 LBS | OXYGEN SATURATION: 98 % | SYSTOLIC BLOOD PRESSURE: 122 MMHG | DIASTOLIC BLOOD PRESSURE: 86 MMHG | TEMPERATURE: 98.2 F | HEART RATE: 89 BPM | RESPIRATION RATE: 18 BRPM | HEIGHT: 64 IN | BODY MASS INDEX: 35.3 KG/M2

## 2022-01-14 DIAGNOSIS — G47.33 OSA (OBSTRUCTIVE SLEEP APNEA): ICD-10-CM

## 2022-01-14 DIAGNOSIS — E03.9 ACQUIRED HYPOTHYROIDISM: ICD-10-CM

## 2022-01-14 DIAGNOSIS — E66.9 CLASS 2 OBESITY: Primary | ICD-10-CM

## 2022-01-14 NOTE — PROGRESS NOTES
1. Have you been to the ER, urgent care clinic since your last visit? Hospitalized since your last visit? No    2. Have you seen or consulted any other health care providers outside of the [address] [place] [address] since your last visit? Include any pap smears or colon screening. No     Pt states since 01/10/2022 she has been having urge in the morning to urinate and then not able to go when she is there. Pt indicates to writer that sensation happens 1st thing in the morning since Monday then no more issues with urgency throughout the day. Pt stated on Monday, she also experienced right hip pain with the urgency; but no more hip pain after Monday. Pt denied having or seeing blood in her urine, fever, or abdominal pressure at any time throughout the day. Pt indicated to writer that she believes that she has a UTI and the cause is the [employer] products. The Pt is requesting that Dr. Zechariah Cevallos, write her a order to go to the lab to have a urinalysis done. Writer informed pt that there is no known correlation of the [employer]irection products and UTI. That the UTI, issue would need to be address by her PCP. Writer also notified pt that Dr. Zechariah Cevallos is only in the clinic Thursdays. Writer inquired of pt if she had reached out to her PCP as her problem started on Monday. Pt stated to writer that she thought that since she was coming here today she would just wait until seen  here today. Per Dr. Heladio Castrejon informed pt that she should go to an urgent care center today or definitely reach out to her PCP office. Writer also noted to pt that the concern is that the she may have waited too long, for treatment and don't want the infection to go to the Kidneys. Writer then ask the pt to follow up with Dr. Zechariah Cevallos after she has had the UTI issue addressed. Pt then inquired from writer why she would follow up with Dr. Zechariah Cevallos as this office will not provide her with an order to have the urinalysis.   Writer notified pt that Dr. Heriberto Shoemaker would like to know the outcome of the urinalysis, and any other issues related to the new direction products. Pt then left the clinic. She did not indicate to writer if she would go to urgent care or reach out to her PCP.

## 2022-01-14 NOTE — PROGRESS NOTES
Progress Note: Weekly Education Class in the [facility] Weight Loss Program         Patient is on Very Low Calorie Diet [] (4 meal replacements per day, 800 kcal/day)      Low Calorie Diet [x] (2-3 meal replacements per day, 1943-2597 kcal/day)    1) Did patient have any new symptoms or physical problems? Yes []    No [x]    If yes, check & comment: weakness [], fatigue [], lightheadedness [], headache [], cramps [], cold intolerance [], hair loss [], diarrhea [], constipation [],  NA [] other:                                2) Has patient had any medical attention from other providers, urgent care or the emergency room this week? Yes []  No [x]       NA [], If yes, why:                                      3) Any other sugar sweetened beverages consumed this week? Yes [x]  No []    4) Did patient have any problems adhering to the diet? Yes []  No [x] NA []    If yes, Vacation [x], Celebrations [], Conferences [], Family Reunions [] other:                                               5) How many hours of sleep this week? 5-8   (range)  NA []    Number of meal replacements consumed daily? 0-2 (range)  NA []    Average ounces of water patient consumed daily this week (not including shakes)? 69   (divide the weekly total by 7)    Did you eat any food outside of the program? Yes [x] No []    Physical Activity Over the Past Week:    Cardio exercise: 30 min  Strength exercise: 1 workouts / week  Number of steps walked per day: 4689-58842    How has patient mood overall been this week? Sad [], Happy [], Stressed [], Tired [], Content [], NA [], other fine          Medications reconciled by nurse Yes [x]  No[]    Patient was given therapeutic recommendations for any noted side effects of their dietary approach based upon [facility] patient manual per providers recommendation.          Progress Note: Weekly Education Class in the [facility] Weight Loss Program         Patient is on Very Low Calorie Diet [] (4 meal replacements per day, 800 kcal/day)      Low Calorie Diet [x] (2-3 meal replacements per day, 7077-2251 kcal/day)    1) Did patient have any new symptoms or physical problems? Yes []    No [x]    If yes, check & comment: weakness [], fatigue [], lightheadedness [], headache [], cramps [], cold intolerance [], hair loss [], diarrhea [], constipation [],  NA [] other:                                 2) Has patient had any medical attention from other providers, urgent care or the emergency room this week? Yes []  No [x]       NA [], If yes, why:                                      3) Any other sugar sweetened beverages consumed this week? Yes [x]  No []    4) Did patient have any problems adhering to the diet? Yes []  No [x] NA []    If yes, Vacation [], Celebrations [], Conferences [], Family Reunions [] other:                                               5) How many hours of sleep this week? 4.5-8   (range)  NA []    Number of meal replacements consumed daily? 0-1 (range)  NA []    Average ounces of water patient consumed daily this week (not including shakes)? 66  (divide the weekly total by 7)    Did you eat any food outside of the program? Yes [x] No []    Physical Activity Over the Past Week:    Cardio exercise: 30 min  Strength exercise: n/a workouts / week  Number of steps walked per day: 3429-4042    How has patient mood overall been this week? Sad [], Happy [], Stressed [], Tired [], Content [], NA [], other  fine         Medications reconciled by nurse Yes [x]  No[]    Patient was given therapeutic recommendations for any noted side effects of their dietary approach based upon [facility] patient manual per providers recommendation.

## 2022-01-15 NOTE — PROGRESS NOTES
[facility]  Metabolic Weight Loss Program        Patient's Name: Praneeth Ignacio  : 1960    This patient is enrolled in [address] [place] [address] Weight Loss Program and attended the required weekly virtual nutrition class hosted via [employer].       Pat Wilks, MS, RD, LDN

## 2022-01-18 ENCOUNTER — VIRTUAL VISIT (OUTPATIENT)
Dept: SURGERY | Age: 62
End: 2022-01-18
Payer: COMMERCIAL

## 2022-01-18 DIAGNOSIS — E03.9 ACQUIRED HYPOTHYROIDISM: ICD-10-CM

## 2022-01-18 DIAGNOSIS — R73.9 HYPERGLYCEMIA: ICD-10-CM

## 2022-01-18 DIAGNOSIS — G47.33 OSA (OBSTRUCTIVE SLEEP APNEA): ICD-10-CM

## 2022-01-18 DIAGNOSIS — E78.5 DYSLIPIDEMIA: ICD-10-CM

## 2022-01-18 PROCEDURE — 99213 OFFICE O/P EST LOW 20 MIN: CPT | Performed by: FAMILY MEDICINE

## 2022-01-18 RX ORDER — NITROFURANTOIN 25; 75 MG/1; MG/1
CAPSULE ORAL 2 TIMES DAILY
COMMUNITY
Start: 2022-01-14 | End: 2022-01-21

## 2022-01-18 NOTE — PROGRESS NOTES
Weight Management. 1 month follow up. Virtual Visit. 1. Have you been to the ER, urgent care clinic since your last visit? Hospitalized since your last visit? No    2. Have you seen or consulted any other health care providers outside of the [address] [place] [address] since your last visit? Include any pap smears or colon screening.  No

## 2022-01-18 NOTE — PROGRESS NOTES
[facility] Progress Note:   F/up Physician Visit    Lavelle Diamond is a 57 y.o. female who was seen by synchronous (real-time) audio-video technology on 1/18/2022. Consent:  She and/or her healthcare decision maker is aware that this patient-initiated Telehealth encounter is a billable service, with coverage as determined by her insurance carrier. She is aware that she may receive a bill and has provided verbal consent to proceed: Yes    I was at home while conducting this encounter. LCD  Has not been exercising    712  Subjective:   Lavelle Diamond was seen for Weight Management    f/up physician visit for the VLCD / LCD Program.  Prior to Admission medications    Medication Sig Start Date End Date Taking? Authorizing Provider   nitrofurantoin, macrocrystal-monohydrate, (MACROBID) 100 mg capsule Take  by mouth two (2) times a day. 1/14/22 1/21/22 Yes Provider, Historical   levothyroxine (SYNTHROID) 25 mcg tablet Take 1 Tablet by mouth Daily (before breakfast). 9/1/21  Yes Carol Rivera MD   acyclovir (ZOVIRAX) 5 % ointment Apply to affected area 6 times a day 7/29/21  Yes Jai Locke MD   scopolamine (TRANSDERM-SCOP) 1 mg over 3 days pt3d 1 Patch by TransDERmal route every seventy-two (72) hours. 7/29/21  Yes Jai Locke MD   hydroCHLOROthiazide (MICROZIDE) 12.5 mg capsule Take 1 Cap by mouth daily. Patient taking differently: Take 12.5 mg by mouth as needed. 7/24/20  Yes Jai Locke MD   mometasone (NASONEX) 50 mcg/actuation nasal spray USE TWO SPRAY(S) IN EACH NOSTRIL DAILY 1/31/19  Yes Jai Locke MD   albuterol (VENTOLIN HFA) 90 mcg/actuation inhaler Take 2 Puffs by inhalation every four (4) hours as needed for Wheezing.  10/2/18  Yes Jai Locke MD   glucose blood VI test strips (CONTOUR NEXT STRIPS) strip qd 5/23/17  Yes Jai Locke MD     Allergies   Allergen Reactions    Contrave [Naltrexone-Bupropion] Other (comments)     1 PILL MORNING, 2 PILLS EVENING  FELT ZOMBIED, \"OUT OF MY HEAD\"    Crestor [Rosuvastatin] Other (comments)     Felt like it worsened her memory    Gadolinium-Containing Contrast Media Nausea and Vomiting    Lipitor [Atorvastatin] Other (comments)     Felt like it worsened memory. Patient Active Problem List    Diagnosis Date Noted    PITER (obstructive sleep apnea)     Allergic rhinitis due to pollen 07/24/2020    Shoulder pain, right     TMJ (temporomandibular joint syndrome)     S/P colonoscopy 11/22/2019    Gastritis and duodenitis 11/22/2019    Microscopic colitis 11/22/2019    Back pain 07/17/2019    Gastroenteritis 07/17/2019    Herpes simplex infection of genitourinary system 05/16/2019    Hyperglycemia 09/12/2018    Essential fatty acid (EFA) deficiency 09/12/2018    Gasping for breath 08/15/2018    Hyperuricemia w/o signs of inflam arthrit and tophaceous dis 08/15/2018    Elevated serum creatinine 08/15/2018    Asthma     Neck nodule 05/10/2018    Abdominal wall pain     Normal cardiac stress test 07/19/2017    Encounter for Hemoccult screening 06/02/2017    S/P colonoscopic polypectomy 02/07/2017    Heartburn 01/01/2017    Dyslipidemia     ACP (advance care planning) 04/04/2016    Steatosis of liver     Hypertension     Breast cancer, left ([address]     S/P MUKUND-[id number] (total abdominal hysterectomy and bilateral salpingo-oophorectomy)     Postsurgical menopause 01/01/2002     Current Outpatient Medications   Medication Sig Dispense Refill    nitrofurantoin, macrocrystal-monohydrate, (MACROBID) 100 mg capsule Take  by mouth two (2) times a day.  levothyroxine (SYNTHROID) 25 mcg tablet Take 1 Tablet by mouth Daily (before breakfast). 90 Tablet 1    acyclovir (ZOVIRAX) 5 % ointment Apply to affected area 6 times a day 30 g 1    scopolamine (TRANSDERM-SCOP) 1 mg over 3 days pt3d 1 Patch by TransDERmal route every seventy-two (72) hours.  3 Patch 0    hydroCHLOROthiazide (MICROZIDE) 12.5 mg capsule Take 1 Cap by mouth daily. (Patient taking differently: Take 12.5 mg by mouth as needed.) 90 Cap 3    mometasone (NASONEX) 50 mcg/actuation nasal spray USE TWO SPRAY(S) IN EACH NOSTRIL DAILY 1 Container 11    albuterol (VENTOLIN HFA) 90 mcg/actuation inhaler Take 2 Puffs by inhalation every four (4) hours as needed for Wheezing. 1 Inhaler 11    glucose blood VI test strips (CONTOUR NEXT STRIPS) strip qd 100 Strip 11       ROS      CC: Weight Management      Rosalva Sweet is a 57 y.o. female who is here for her      Weight Metrics 1/14/2022 12/27/2021 12/13/2021 11/22/2021 11/12/2021 10/29/2021 10/15/2021   Weight 206 lb 12.8 oz 204 lb 12.8 oz 207 lb 9.6 oz 205 lb 11.2 oz 204 lb 12.8 oz 203 lb 4.8 oz 200 lb 14.4 oz   Neck Circ (inches) - - - - - - -   Waist Measure Inches - - - - - - -   Body Fat % - - - - - - -   BMI 35.5 kg/m2 34.88 kg/m2 35.36 kg/m2 35.03 kg/m2 34.88 kg/m2 36.01 kg/m2 35.59 kg/m2         Outpatient Medications Marked as Taking for the 1/18/22 encounter (Virtual Visit) with Magda Akbar MD   Medication Sig Dispense Refill    nitrofurantoin, macrocrystal-monohydrate, (MACROBID) 100 mg capsule Take  by mouth two (2) times a day.  levothyroxine (SYNTHROID) 25 mcg tablet Take 1 Tablet by mouth Daily (before breakfast). 90 Tablet 1    acyclovir (ZOVIRAX) 5 % ointment Apply to affected area 6 times a day 30 g 1    scopolamine (TRANSDERM-SCOP) 1 mg over 3 days pt3d 1 Patch by TransDERmal route every seventy-two (72) hours. 3 Patch 0    hydroCHLOROthiazide (MICROZIDE) 12.5 mg capsule Take 1 Cap by mouth daily. (Patient taking differently: Take 12.5 mg by mouth as needed.) 90 Cap 3    mometasone (NASONEX) 50 mcg/actuation nasal spray USE TWO SPRAY(S) IN EACH NOSTRIL DAILY 1 Container 11    albuterol (VENTOLIN HFA) 90 mcg/actuation inhaler Take 2 Puffs by inhalation every four (4) hours as needed for Wheezing.  1 Inhaler 11    glucose blood VI test strips (CONTOUR NEXT STRIPS) strip qd 100 Strip 11         Participation   Did you attend clinic and class last week? yes    Review of Systems  Since your last visit, have you experienced any complications? no  If yes, please list:       Are you taking an appetite suppressant? no  If so, is there any Chest Pain, Palpitations or Dizziness? HUNGER CONTROL: yes    BP Readings from Last 3 Encounters:   01/14/22 122/86   12/27/21 116/70   12/13/21 132/86       SLEEP:  6-7    Have you received any other medical care this week? no  If yes, where and for what? Have you discontinued or changed any medicine or dose of your medicine since your last visit with Dr Verenice Youssef? no  If yes, where and for what? Diet  How many ounces of calorie-free liquids did you consume each day? 64 or more closer to 90 oz    How many meal replacements did you take each day? 2-3 and a meal  Popcorn or nut    Did you have any problems adhering to the program?  no If yes, please explain:      Exercise  Aerobic exercise: none last week, the week prior 3 days  ymca  Resistance exercise:  workouts / week  Any discomfort?  no     If yes, where? Objective  There were no vitals taken for this visit. No LMP recorded. Patient has had a hysterectomy. PHYSICAL EXAMINATION:  [ INSTRUCTIONS:  \"[x]\" Indicates a positive item  \"[]\" Indicates a negative item  -- DELETE ALL ITEMS NOT EXAMINED]  Vital Signs: (As obtained by patient/caregiver at home)  There were no vitals taken for this visit.      Constitutional: [x] Appears well-developed and well-nourished [x] No apparent distress      [] Abnormal -     Mental status: [x] Alert and awake  [x] Oriented to person/place/time [x] Able to follow commands    [] Abnormal -     Eyes:   EOM    [x]  Normal    [] Abnormal -   Sclera  [x]  Normal    [] Abnormal -          Discharge [x]  None visible   [] Abnormal -     HENT: [x] Normocephalic, atraumatic  [] Abnormal -   [x] Mouth/Throat: Mucous membranes are moist    External Ears [x] Normal  [] Abnormal -    Neck: [x] No visualized mass [] Abnormal -     Pulmonary/Chest: [x] Respiratory effort normal   [x] No visualized signs of difficulty breathing or respiratory distress        [] Abnormal -      Musculoskeletal:   [x] Normal gait with no signs of ataxia         [x] Normal range of motion of neck        [] Abnormal -     Neurological:        [x] No Facial Asymmetry (Cranial nerve 7 motor function) (limited exam due to video visit)          [x] No gaze palsy        [] Abnormal -          Skin:        [x] No significant exanthematous lesions or discoloration noted on facial skin         [] Abnormal -            Psychiatric:       [x] Normal Affect [] Abnormal -        [x] No Hallucinations    Other pertinent observable physical exam findings:-      Assessment / Plan    Encounter Diagnoses   Name Primary?  BMI 35.0-35.9,adult Yes    PITER (obstructive sleep apnea)     Acquired hypothyroidism     Hyperglycemia     Dyslipidemia      Diagnoses and all orders for this visit:    1. BMI 74.2-33.1,OHSVI  -     METABOLIC PANEL, COMPREHENSIVE; Future  Cont the LCD  Some of the snack choices may be slowing down the weight loss process  2. PITER (obstructive sleep apnea)  Using cpap, this helps improve weight los results  3. Acquired hypothyroidism  -     TSH 3RD GENERATION; Future  Check current status  Change in weight sometimes changes the dose requirement  5. Hyperglycemia  -     METABOLIC PANEL, COMPREHENSIVE; Future  -     HEMOGLOBIN A1C WITH EAG; Future  The changes involved here will typically help lower the blood sugar as well as the cholesterol    6. Dyslipidemia  -     LIPID PANEL; Future  See above    1. Weight management stable   Progress was reviewed with patient    2.   Labs    Latest results reviewed with patient          face to face time with Timbo Garcia consisted of counseling & coordinating and/or discussing treatment plans in reference to her obesity The primary encounter diagnosis was BMI 35.0-35.9,adult. Diagnoses of PITER (obstructive sleep apnea), Acquired hypothyroidism, Hyperglycemia, and Dyslipidemia were also pertinent to this visit. Coding Help - Use CPT Codes 33856-71602, 96329-50145 for Established and New Patients respectively, either employing EM elements or Time rules. Other codes (example consult codes) may also apply. We discussed the expected course, resolution and complications of the diagnosis(es) in detail. Medication risks, benefits, costs, interactions, and alternatives were discussed as indicated. I advised her to contact the office if her condition worsens, changes or fails to improve as anticipated. She expressed understanding with the diagnosis(es) and plan. Pursuant to the emergency declaration under the [address] [place] [address], On license of [facility] waiver authority and the [employer] General Act, this Virtual  Visit was conducted, with patient's consent, to reduce the patient's risk of exposure to COVID-19 and provide continuity of care for an established patient. Services were provided through a video synchronous discussion virtually to substitute for in-person clinic visit.     Sil Moura MD

## 2022-01-20 ENCOUNTER — OFFICE VISIT (OUTPATIENT)
Dept: SURGERY | Age: 62
End: 2022-01-20

## 2022-01-21 NOTE — PROGRESS NOTES
[facility]  Metabolic Weight Loss Program        Patient's Name: Anita Simon  : 1960    This patient is enrolled in [address] [place] [address] Weight Loss Program and attended the required weekly virtual nutrition class hosted via [employer].       Alf Rizvi, MS, RD, LDN

## 2022-01-25 ENCOUNTER — OFFICE VISIT (OUTPATIENT)
Dept: SURGERY | Age: 62
End: 2022-01-25

## 2022-01-27 NOTE — PROGRESS NOTES
[facility]  Metabolic Weight Loss Program        Patient's Name: Nidia Sotelo  : 1960    This patient is enrolled in [address] [place] [address] Weight Loss Program and attended the required weekly virtual nutrition class hosted via [address] [place] [address] today.       Leighann Victoria, MS, RD, LDN

## 2022-01-28 ENCOUNTER — CLINICAL SUPPORT (OUTPATIENT)
Dept: SURGERY | Age: 62
End: 2022-01-28

## 2022-01-28 VITALS
HEART RATE: 100 BPM | SYSTOLIC BLOOD PRESSURE: 123 MMHG | DIASTOLIC BLOOD PRESSURE: 83 MMHG | HEIGHT: 64 IN | RESPIRATION RATE: 18 BRPM | BODY MASS INDEX: 35.26 KG/M2 | TEMPERATURE: 97.6 F | WEIGHT: 206.5 LBS | OXYGEN SATURATION: 97 %

## 2022-01-28 DIAGNOSIS — E78.5 DYSLIPIDEMIA: ICD-10-CM

## 2022-01-28 DIAGNOSIS — E03.9 ACQUIRED HYPOTHYROIDISM: ICD-10-CM

## 2022-01-28 DIAGNOSIS — N28.9 ABNORMAL KIDNEY FUNCTION: Primary | ICD-10-CM

## 2022-01-28 DIAGNOSIS — G47.33 OSA (OBSTRUCTIVE SLEEP APNEA): ICD-10-CM

## 2022-01-28 NOTE — PROGRESS NOTES
Weight Management. 1. Have you been to the ER, urgent care clinic since your last visit? Hospitalized since your last visit? No    2. Have you seen or consulted any other health care providers outside of the [address] [place] [address] since your last visit? Include any pap smears or colon screening. No     1/17/2022    Progress Note: Weekly Education Class in the [facility]         Patient is on Very Low Calorie Diet [] (4 meal replacements per day, 800 kcal/day)      Low Calorie Diet [] (2-3 meal replacements per day, 2736-7931 kcal/day)    1) Did patient have any new symptoms or physical problems? Yes []    No [x]    If yes, check & comment: weakness [], fatigue [], lightheadedness [], headache [], cramps [], cold intolerance [], hair loss [], diarrhea [], constipation [],  NA [] other:                                 2) Has patient had any medical attention from other providers, urgent care or the emergency room this week? Yes []  No [x]       NA [], If yes, why:                                       3) Any other sugar sweetened beverages consumed this week? Yes [x]  No []    4) Did patient have any problems adhering to the diet? Yes []  No [x] NA []    If yes, Vacation [], Celebrations [], Conferences [], Family Reunions [] other:                                                5) How many hours of sleep this week? 4.5-7    (range)  NA []    Number of meal replacements consumed daily? 1-3 (range)  NA []    Average ounces of water patient consumed daily this week (not including shakes)? 66     (divide the weekly total by 7)    Did you eat any food outside of the program? Yes [x] No []    Physical Activity Over the Past Week:    Cardio exercise: 60 min  Strength exercise: 1 workouts / week  Number of steps walked per day: 2462-56,867    How has patient mood overall been this week?  Sad [], Happy [], Stressed [], Tired [x], Content [], NA [], other            Medications reconciled by nurse Yes [x]  No[]    Patient was given therapeutic recommendations for any noted side effects of their dietary approach based upon [facility] patient manual per providers recommendation. 1/24/2022    Progress Note: Weekly Education Class in the [facility] Weight Loss Program         Patient is on Very Low Calorie Diet [] (4 meal replacements per day, 800 kcal/day)      Low Calorie Diet [x] (2-3 meal replacements per day, 2870-5359 kcal/day)    1) Did patient have any new symptoms or physical problems? Yes []    No [x]    If yes, check & comment: weakness [], fatigue [], lightheadedness [], headache [], cramps [], cold intolerance [], hair loss [], diarrhea [], constipation [],  NA [] other:                                 2) Has patient had any medical attention from other providers, urgent care or the emergency room this week? Yes []  No [x]       NA [], If yes, why:                                       3) Any other sugar sweetened beverages consumed this week? Yes [x]  No []    4) Did patient have any problems adhering to the diet? Yes []  No [x] NA []    If yes, Vacation [], Celebrations [], Conferences [], Family Reunions [] other:                                                5) How many hours of sleep this week? 5.5-7    (range)  NA []    Number of meal replacements consumed daily? 1-2 (range)  NA []    Average ounces of water patient consumed daily this week (not including shakes)? 60     (divide the weekly total by 7)    Did you eat any food outside of the program? Yes [x] No []    Physical Activity Over the Past Week:    Cardio exercise: 110 min  Strength exercise: 3 workouts / week  Number of steps walked per day: 1457-55,652    How has patient mood overall been this week?  Sad [], Happy [], Stressed [], Tired [], Content [], NA [], other Fine           Medications reconciled by nurse Yes [x]  No[]    Patient was given therapeutic recommendations for any noted side effects of their dietary approach based upon [facility] patient manual per providers recommendation.

## 2022-01-29 LAB
ALBUMIN SERPL-MCNC: 4.6 G/DL (ref 3.8–4.8)
ALBUMIN/GLOB SERPL: 1.8 {RATIO} (ref 1.2–2.2)
ALP SERPL-CCNC: 115 IU/L (ref 44–121)
ALT SERPL-CCNC: 19 IU/L (ref 0–32)
AST SERPL-CCNC: 28 IU/L (ref 0–40)
BILIRUB SERPL-MCNC: 0.4 MG/DL (ref 0–1.2)
BUN SERPL-MCNC: 17 MG/DL (ref 8–27)
BUN/CREAT SERPL: 16 (ref 12–28)
CALCIUM SERPL-MCNC: 9.8 MG/DL (ref 8.7–10.3)
CHLORIDE SERPL-SCNC: 101 MMOL/L (ref 96–106)
CHOLEST SERPL-MCNC: 254 MG/DL (ref 100–199)
CO2 SERPL-SCNC: 20 MMOL/L (ref 20–29)
CREAT SERPL-MCNC: 1.06 MG/DL (ref 0.57–1)
EST. AVERAGE GLUCOSE BLD GHB EST-MCNC: 128 MG/DL
GLOBULIN SER CALC-MCNC: 2.6 G/DL (ref 1.5–4.5)
GLUCOSE SERPL-MCNC: 123 MG/DL (ref 65–99)
HBA1C MFR BLD: 6.1 % (ref 4.8–5.6)
HDLC SERPL-MCNC: 52 MG/DL
LDLC SERPL CALC-MCNC: 181 MG/DL (ref 0–99)
POTASSIUM SERPL-SCNC: 4.4 MMOL/L (ref 3.5–5.2)
PROT SERPL-MCNC: 7.2 G/DL (ref 6–8.5)
SODIUM SERPL-SCNC: 139 MMOL/L (ref 134–144)
TRIGL SERPL-MCNC: 119 MG/DL (ref 0–149)
TSH SERPL-ACNC: 3.53 UIU/ML (ref 0.45–4.5)
VLDLC SERPL CALC-MCNC: 21 MG/DL (ref 5–40)

## 2022-02-01 ENCOUNTER — OFFICE VISIT (OUTPATIENT)
Dept: SURGERY | Age: 62
End: 2022-02-01

## 2022-02-01 NOTE — PROGRESS NOTES
Nurse note from patient's weekly  LCD / Maintenance class was reviewed. Pertinent medical concerns were:  Up 2 lbs      BP Readings from Last 3 Encounters:   01/28/22 123/83   01/14/22 122/86   12/27/21 116/70       Weight Metrics 1/28/2022 1/14/2022 12/27/2021 12/13/2021 11/22/2021 11/12/2021 10/29/2021   Weight 206 lb 8 oz 206 lb 12.8 oz 204 lb 12.8 oz 207 lb 9.6 oz 205 lb 11.2 oz 204 lb 12.8 oz 203 lb 4.8 oz   Neck Circ (inches) - - - - - - -   Waist Measure Inches - - - - - - -   Body Fat % - - - - - - -   BMI 35.45 kg/m2 35.5 kg/m2 34.88 kg/m2 35.36 kg/m2 35.03 kg/m2 34.88 kg/m2 36.01 kg/m2       Current Outpatient Medications   Medication Sig Dispense Refill    levothyroxine (SYNTHROID) 25 mcg tablet Take 1 Tablet by mouth Daily (before breakfast). 90 Tablet 1    acyclovir (ZOVIRAX) 5 % ointment Apply to affected area 6 times a day 30 g 1    scopolamine (TRANSDERM-SCOP) 1 mg over 3 days pt3d 1 Patch by TransDERmal route every seventy-two (72) hours. 3 Patch 0    hydroCHLOROthiazide (MICROZIDE) 12.5 mg capsule Take 1 Cap by mouth daily. (Patient taking differently: Take 12.5 mg by mouth as needed.) 90 Cap 3    mometasone (NASONEX) 50 mcg/actuation nasal spray USE TWO SPRAY(S) IN EACH NOSTRIL DAILY 1 Container 11    albuterol (VENTOLIN HFA) 90 mcg/actuation inhaler Take 2 Puffs by inhalation every four (4) hours as needed for Wheezing.  1 Inhaler 11    glucose blood VI test strips (CONTOUR NEXT STRIPS) strip qd 100 Strip 11

## 2022-02-01 NOTE — PROGRESS NOTES
[facility]  Metabolic Weight Loss Program        Patient's Name: Faina Liang  : 1960    This patient is enrolled in [address] [place] [address] Weight Loss Program and attended the required weekly virtual nutrition class hosted via [employer].       Nicki Arechiga, MS, RD, LDN

## 2022-02-04 NOTE — PROGRESS NOTES
The kidney test is slightly abnormal. I want to recheck on the next nurse visit  The blood sugar is still in the prediabetes zone but a little higher  Your cholesterol numbers are not at goal. To provide you with the best heart health the LDL should be under 100 if you have no heart disease or history of prediabetes or diabetes. If you have a history of prediabetes or diabetes or heart disease the LDL goal should be less than 70. The LDL is 181 which is higher tahn 6 months ago  Avoid fried food, fast food and junk food. Also move more each day. aim for at least 150 minutes of activity each week.  I want to recheck the cholesterol levels in three months    The thyroid level is normal

## 2022-03-14 ENCOUNTER — OFFICE VISIT (OUTPATIENT)
Dept: INTERNAL MEDICINE CLINIC | Age: 62
End: 2022-03-14
Payer: COMMERCIAL

## 2022-03-14 VITALS
HEIGHT: 64 IN | RESPIRATION RATE: 18 BRPM | HEART RATE: 71 BPM | WEIGHT: 212.2 LBS | BODY MASS INDEX: 36.23 KG/M2 | SYSTOLIC BLOOD PRESSURE: 124 MMHG | TEMPERATURE: 98 F | DIASTOLIC BLOOD PRESSURE: 81 MMHG | OXYGEN SATURATION: 96 %

## 2022-03-14 DIAGNOSIS — R73.02 IGT (IMPAIRED GLUCOSE TOLERANCE): ICD-10-CM

## 2022-03-14 DIAGNOSIS — I10 PRIMARY HYPERTENSION: Primary | ICD-10-CM

## 2022-03-14 DIAGNOSIS — E03.4 HYPOTHYROIDISM DUE TO ACQUIRED ATROPHY OF THYROID: ICD-10-CM

## 2022-03-14 DIAGNOSIS — E66.01 SEVERE OBESITY (BMI 35.0-39.9) WITH COMORBIDITY (HCC): ICD-10-CM

## 2022-03-14 DIAGNOSIS — G47.33 OSA (OBSTRUCTIVE SLEEP APNEA): ICD-10-CM

## 2022-03-14 DIAGNOSIS — T75.3XXS MOTION SICKNESS, SEQUELA: ICD-10-CM

## 2022-03-14 DIAGNOSIS — K76.0 STEATOSIS OF LIVER: ICD-10-CM

## 2022-03-14 DIAGNOSIS — E78.5 DYSLIPIDEMIA: ICD-10-CM

## 2022-03-14 DIAGNOSIS — C50.912 MALIGNANT NEOPLASM OF LEFT FEMALE BREAST, UNSPECIFIED ESTROGEN RECEPTOR STATUS, UNSPECIFIED SITE OF BREAST (HCC): ICD-10-CM

## 2022-03-14 PROBLEM — Z78.9 STATIN INTOLERANCE: Status: ACTIVE | Noted: 2022-03-14

## 2022-03-14 PROCEDURE — 99214 OFFICE O/P EST MOD 30 MIN: CPT | Performed by: INTERNAL MEDICINE

## 2022-03-14 RX ORDER — LEVOTHYROXINE SODIUM 25 UG/1
25 TABLET ORAL
Qty: 90 TABLET | Refills: 1 | Status: SHIPPED | OUTPATIENT
Start: 2022-03-14 | End: 2022-09-11

## 2022-03-14 RX ORDER — HYDROCHLOROTHIAZIDE 12.5 MG/1
12.5 CAPSULE ORAL DAILY
Qty: 90 CAPSULE | Refills: 3 | Status: SHIPPED | OUTPATIENT
Start: 2022-03-14 | End: 2022-10-11

## 2022-03-14 RX ORDER — SCOLOPAMINE TRANSDERMAL SYSTEM 1 MG/1
1 PATCH, EXTENDED RELEASE TRANSDERMAL
Qty: 3 PATCH | Refills: 0 | Status: SHIPPED | OUTPATIENT
Start: 2022-03-14 | End: 2022-10-06

## 2022-03-14 NOTE — PROGRESS NOTES
SPORTS MEDICINE AND PRIMARY CARE  Yung Arroyo MD, [address] [address] [facility],[address] [address]  Phone:  [phone]  Fax: [phone]       Chief Complaint   Patient presents with    Cholesterol Problem   . SUBJECTIVE:    Oliver Wilson is a 57 y.o. female Patient returns today with a known history of primary hypertension, steatosis of the liver, elevated hemoglobin, left breast cancer, obesity, and is seen for evaluation. Since we last saw her, she was seen by Jonas Junior dietitian. She has a CPAP and is followed by David Murphy MD and was seen in December. She was started on Repatha because statins caused her to have problems with her memory. We will put the request in and see if we can get her insurance company to pay for it because of statin intolerance. She is going fishing on a cruise and wants some more scopolamine. Patient is seen for evaluation. Current Outpatient Medications   Medication Sig Dispense Refill    evolocumab (REPATHA) syringe 1 mL by SubCUTAneous route every fourteen (14) days. 2 mL 11    levothyroxine (SYNTHROID) 25 mcg tablet Take 1 Tablet by mouth Daily (before breakfast). 90 Tablet 1    hydroCHLOROthiazide (MICROZIDE) 12.5 mg capsule Take 1 Capsule by mouth daily. 90 Capsule 3    scopolamine (TRANSDERM-SCOP) 1 mg over 3 days pt3d 1 Patch by TransDERmal route every seventy-two (72) hours. 3 Patch 0    acyclovir (ZOVIRAX) 5 % ointment Apply to affected area 6 times a day 30 g 1    mometasone (NASONEX) 50 mcg/actuation nasal spray USE TWO SPRAY(S) IN EACH NOSTRIL DAILY 1 Container 11    albuterol (VENTOLIN HFA) 90 mcg/actuation inhaler Take 2 Puffs by inhalation every four (4) hours as needed for Wheezing.  1 Inhaler 11    glucose blood VI test strips (CONTOUR NEXT STRIPS) strip qd 100 Strip 11     Past Medical History:   Diagnosis Date    Asthma CHILDHOOD    Breast cancer, left ([address] 2005    followed by gyn md cassius    Chest pain 2017    normal Stress Echo 17.  Dyslipidemia     Gastritis and duodenitis 2019    egd - md mina    GERD (gastroesophageal reflux disease)     Heartburn     Hx of colonoscopy 2019    colitis - md mina    Hyperglycemia 2016    Dr. Nahomy Herrera Hypertension     Hypothyroid 2018    Microscopic colitis 2019    Neck nodule 05/10/2018    Obesity (BMI 30-39.9)     elham Bartlett.  PITER (obstructive sleep apnea)     Postsurgical menopause     Dr. Latrell Funez S/P colonoscopy 2019    [place] - md mina neg    Shoulder pain, right     iggy yi md    Spider bite     ? Leg, recurrent.  Statin intolerance 2022    Steatosis of liver 2016    TMJ (temporomandibular joint syndrome)     Tubular adenoma of colon 2017    Dr. Paulina Gallagher    Uterine fibroid     Dr. Elio Jean    Vitamin D deficiency 2018    Dr. Stevie Chavez     Past Surgical History:   Procedure Laterality Date    COLONOSCOPY N/A 2019    COLONOSCOPY performed by Ernesto Richard MD at [facility] ENDOSCOPY    HX BREAST LUMPECTOMY Left     Atypical Cells. Dr. Man Herzog.  HX  SECTION       x 2    HX COLONOSCOPY  2009, 17    with polypectomy. Dr. Paulina Gallagher.  HX REFRACTIVE SURGERY Bilateral     HX MUKUND AND BSO      due to uterine fibroids. Dr. Suresh Jean     Allergies   Allergen Reactions    Contrave [Naltrexone-Bupropion] Other (comments)     1 PILL MORNING, 2 PILLS EVENING  FELT ZOMBIED, \"OUT OF MY HEAD\"    Crestor [Rosuvastatin] Other (comments)     Felt like it worsened her memory    Gadolinium-Containing Contrast Media Nausea and Vomiting    Lipitor [Atorvastatin] Other (comments)     Felt like it worsened memory.           REVIEW OF SYSTEMS:  General: negative for - chills or fever  ENT: negative for - headaches, nasal congestion or tinnitus  Respiratory: negative for - cough, hemoptysis, shortness of breath or wheezing  Cardiovascular : negative for - chest pain, edema, palpitations or shortness of breath  Gastrointestinal: negative for - abdominal pain, blood in stools, heartburn or nausea/vomiting  Genito-Urinary: no dysuria, trouble voiding, or hematuria  Musculoskeletal: negative for - gait disturbance, joint pain, joint stiffness or joint swelling  Neurological: no TIA or stroke symptoms  Hematologic: no bruises, no bleeding, no swollen glands  Integument: no lumps, mole changes, nail changes or rash  Endocrine: no malaise/lethargy or unexpected weight changes      Social History     Socioeconomic History    Marital status:    Tobacco Use    Smoking status: Never Smoker    Smokeless tobacco: Never Used   Vaping Use    Vaping Use: Never used   Substance and Sexual Activity    Alcohol use: Yes     Alcohol/week: 1.0 standard drink     Types: 1 Glasses of wine per week     Comment: ocassional    Drug use: No    Sexual activity: Yes     Partners: Male     Birth control/protection: Surgical     Comment: TL   Social History Narrative    Family History: Mother: , Zohaib Moss: , Bee stingSister(s):   yrs, Breast Cancer at 42Brother(s):    , Heart Problems1 brother(s) , 7 sister(s) - healthy. 1 son(s) , 1 daughter(s) - healthy. Social History: Alcohol Use Patient does not use alcohol. Smoking Status Patient is a never smoker. Caffeine: coffee, tea. Marital Status:    . Lives w ith: spouse Elio Echavarria. Occupation/W ork: office w ork. Medical History: Hypertension, Left Breast papilloma. Gyn History: Last mammogram date 10/28/2014. Last pap date 10/22/2012. Hysterectomy Date . OB History: Total pregnancies 2. Full term delivery (>37 w eeks) 2. Live births 2. C section(s) 2. Pregnancy # 1: , Girl, 11. Pregnancy # 2: Repeat C/S, boy, .     Surgical History: hysterectomy, total abdominal w ith BSO , Left w rist surgery , colonoscopy Caroline Flock and 2009, left lumpectomy . Family History   Problem Relation Age of Onset    Diabetes Mother     Obesity Mother     Other Father 54         FROM BEE ALLERGY    Obesity Sister         X7    Diabetes Brother     No Known Problems Maternal Grandmother     No Known Problems Maternal Grandfather     No Known Problems Paternal Grandmother     No Known Problems Paternal Grandfather     Sleep Apnea Sister     Diabetes Sister     Hypertension Sister     Thyroid Disease Sister         X 2    Diabetes Sister         X3 MORE SISTERS TAKING INSULIN    Diabetes Brother     Heart Disease Brother     Breast Cancer Sister 43    Schizophrenia Sister     Heart Disease Niece/nephew        OBJECTIVE:    There were no vitals taken for this visit. CONSTITUTIONAL: well , well nourished, appears age appropriate  EYES: perrla, eom intact  ENMT:moist mucous membranes, pharynx clear  NECK: supple. Thyroid normal  RESPIRATORY: Chest: clear bilaterally   CARDIOVASCULAR: Heart: regular rate and rhythm  GASTROINTESTINAL: Abdomen: soft, bowel sounds active  HEMATOLOGIC: no pathological lymph nodes palpated  MUSCULOSKELETAL: Extremities: no edema, pulse 1+   INTEGUMENT: No unusual rashes or suspicious skin lesions noted. Nails appear normal.  NEUROLOGIC: non-focal exam   MENTAL STATUS: alert and oriented, appropriate affect           ASSESSMENT:  1. Primary hypertension    2. Severe obesity (BMI 35.0-39. 9) with comorbidity (HCC)    3. Steatosis of liver    4. PITER (obstructive sleep apnea)    5. Malignant neoplasm of left female breast, unspecified estrogen receptor status, unspecified site of breast ([facility] [address]    6. Dyslipidemia    7. IGT (impaired glucose tolerance)    8. Hypothyroidism due to acquired atrophy of thyroid    9. Motion sickness, sequela      Blood pressure control is at goal at 124/81.   Titration will not be needed. Obesity remains a concern. She has been going to the weight loss clinic, dietitian, and all of these have not been successful at weight reduction. She has steatosis of the liver, which obviously she needs to make adjustments in her diet. She states it is all in her head. She has obstructive sleep apnea and is supposed to be using a CPAP machine. She has not seen an oncologist for some time regarding left breast cancer and she is willing to see one that we recommend and we refer her to oncology. History of dyslipidemia. She does not want to take a statin because she is concerned about the effect on her memory. We therefore request Repatha. She has impaired glucose tolerance and we will check hemoglobin A1c. She wants a refill on her thyroid and we will check a TSH. Because of the trip we are refilling scopolamine patch for the motion sickness. She will be back to see me in four months. I have discussed the diagnosis with the patient and the intended plan as seen in the  Orders. The patient understands and agees with the plan. The patient has   received an after visit summary and questions were answered concerning  future plans  Patient labs and/or xrays were reviewed  Past records were reviewed. PLAN:  .  Orders Placed This Encounter    CBC WITH AUTOMATED DIFF    URINALYSIS W/ RFLX MICROSCOPIC    TSH 3RD GENERATION    VITAMIN D, 25 HYDROXY    APOLIPOPROTEIN B    LIPID PANEL    Jessica [facility]    evolocumab (REPATHA) syringe    levothyroxine (SYNTHROID) 25 mcg tablet    hydroCHLOROthiazide (MICROZIDE) 12.5 mg capsule    scopolamine (TRANSDERM-SCOP) 1 mg over 3 days pt3d       Follow-up and Dispositions    · Return in about 6 months (around 9/14/2022). ATTENTION:   This medical record was transcribed using an electronic medical records system. Although proofread, it may and can contain electronic and spelling errors.   Other human spelling and other errors may be present. Corrections may be executed at a later time. Please feel free to contact us for any clarifications as needed.

## 2022-03-14 NOTE — PROGRESS NOTES
1. Have you been to the ER, urgent care clinic since your last visit? Hospitalized since your last visit? No    2. Have you seen or consulted any other health care providers outside of the [address] [place] [address] since your last visit? Include any pap smears or colon screening.  No    Medication refill

## 2022-03-15 LAB
25(OH)D3 SERPL-MCNC: 25.4 NG/ML (ref 30–100)
APPEARANCE UR: CLEAR
BASOPHILS # BLD: 0.1 K/UL (ref 0–0.1)
BASOPHILS NFR BLD: 1 % (ref 0–1)
BILIRUB UR QL: NEGATIVE
CHOLEST SERPL-MCNC: 207 MG/DL
COLOR UR: NORMAL
DIFFERENTIAL METHOD BLD: ABNORMAL
EOSINOPHIL # BLD: 0.1 K/UL (ref 0–0.4)
EOSINOPHIL NFR BLD: 2 % (ref 0–7)
ERYTHROCYTE [DISTWIDTH] IN BLOOD BY AUTOMATED COUNT: 13.5 % (ref 11.5–14.5)
GLUCOSE UR STRIP.AUTO-MCNC: NEGATIVE MG/DL
HCT VFR BLD AUTO: 44.9 % (ref 35–47)
HDLC SERPL-MCNC: 53 MG/DL
HDLC SERPL: 3.9 {RATIO} (ref 0–5)
HGB BLD-MCNC: 15.4 G/DL (ref 11.5–16)
HGB UR QL STRIP: NEGATIVE
IMM GRANULOCYTES # BLD AUTO: 0 K/UL (ref 0–0.04)
IMM GRANULOCYTES NFR BLD AUTO: 0 % (ref 0–0.5)
KETONES UR QL STRIP.AUTO: NEGATIVE MG/DL
LDLC SERPL CALC-MCNC: 131.6 MG/DL (ref 0–100)
LEUKOCYTE ESTERASE UR QL STRIP.AUTO: NEGATIVE
LYMPHOCYTES # BLD: 2.8 K/UL (ref 0.8–3.5)
LYMPHOCYTES NFR BLD: 44 % (ref 12–49)
MCH RBC QN AUTO: 27.7 PG (ref 26–34)
MCHC RBC AUTO-ENTMCNC: 34.3 G/DL (ref 30–36.5)
MCV RBC AUTO: 80.9 FL (ref 80–99)
MONOCYTES # BLD: 0.4 K/UL (ref 0–1)
MONOCYTES NFR BLD: 7 % (ref 5–13)
NEUTS SEG # BLD: 3 K/UL (ref 1.8–8)
NEUTS SEG NFR BLD: 46 % (ref 32–75)
NITRITE UR QL STRIP.AUTO: NEGATIVE
NRBC # BLD: 0 K/UL (ref 0–0.01)
NRBC BLD-RTO: 0 PER 100 WBC
PH UR STRIP: 5.5 [PH] (ref 5–8)
PLATELET # BLD AUTO: 322 K/UL (ref 150–400)
PMV BLD AUTO: 10.5 FL (ref 8.9–12.9)
PROT UR STRIP-MCNC: NEGATIVE MG/DL
RBC # BLD AUTO: 5.55 M/UL (ref 3.8–5.2)
SP GR UR REFRACTOMETRY: 1.01 (ref 1–1.03)
TRIGL SERPL-MCNC: 112 MG/DL (ref ?–150)
TSH SERPL DL<=0.05 MIU/L-ACNC: 2.29 UIU/ML (ref 0.36–3.74)
UROBILINOGEN UR QL STRIP.AUTO: 0.2 EU/DL (ref 0.2–1)
VLDLC SERPL CALC-MCNC: 22.4 MG/DL
WBC # BLD AUTO: 6.4 K/UL (ref 3.6–11)

## 2022-03-15 RX ORDER — EZETIMIBE 10 MG/1
10 TABLET ORAL DAILY
Qty: 30 TABLET | Refills: 5 | Status: SHIPPED | OUTPATIENT
Start: 2022-03-15 | End: 2022-10-11

## 2022-03-16 ENCOUNTER — DOCUMENTATION ONLY (OUTPATIENT)
Dept: INTERNAL MEDICINE CLINIC | Age: 62
End: 2022-03-16

## 2022-03-16 LAB — APO B SERPL-MCNC: 113 MG/DL

## 2022-03-16 NOTE — PROGRESS NOTES
There is another cholesterol-lowering medication called Zetia which I do not see that we have tried. It is not a statin. I sent a prescription to your pharmacist for your consideration.

## 2022-03-17 NOTE — PROGRESS NOTES
This is another way of looking at your cholesterol and confirms the need for reduction.   We are currently working on the prior authorization for [address] [place] [address]

## 2022-03-18 PROBLEM — Z78.9 STATIN INTOLERANCE: Status: ACTIVE | Noted: 2022-03-14

## 2022-03-18 PROBLEM — A60.00 HERPES SIMPLEX INFECTION OF GENITOURINARY SYSTEM: Status: ACTIVE | Noted: 2019-05-16

## 2022-03-18 PROBLEM — Z98.890 S/P COLONOSCOPY: Status: ACTIVE | Noted: 2019-11-22

## 2022-03-18 PROBLEM — E63.0 ESSENTIAL FATTY ACID (EFA) DEFICIENCY: Status: ACTIVE | Noted: 2018-09-12

## 2022-03-18 PROBLEM — R73.9 HYPERGLYCEMIA: Status: ACTIVE | Noted: 2018-09-12

## 2022-03-18 PROBLEM — E79.0 HYPERURICEMIA W/O SIGNS OF INFLAM ARTHRIT AND TOPHACEOUS DIS: Status: ACTIVE | Noted: 2018-08-15

## 2022-03-19 PROBLEM — R12 HEARTBURN: Status: ACTIVE | Noted: 2017-01-01

## 2022-03-19 PROBLEM — Z12.11 ENCOUNTER FOR HEMOCCULT SCREENING: Status: ACTIVE | Noted: 2017-06-02

## 2022-03-19 PROBLEM — R79.89 ELEVATED SERUM CREATININE: Status: ACTIVE | Noted: 2018-08-15

## 2022-03-19 PROBLEM — K52.9 GASTROENTERITIS: Status: ACTIVE | Noted: 2019-07-17

## 2022-03-19 PROBLEM — J30.1 ALLERGIC RHINITIS DUE TO POLLEN: Status: ACTIVE | Noted: 2020-07-24

## 2022-03-19 PROBLEM — R22.1 NECK NODULE: Status: ACTIVE | Noted: 2018-05-10

## 2022-03-19 PROBLEM — R06.89 GASPING FOR BREATH: Status: ACTIVE | Noted: 2018-08-15

## 2022-03-19 PROBLEM — K29.90 GASTRITIS AND DUODENITIS: Status: ACTIVE | Noted: 2019-11-22

## 2022-03-20 PROBLEM — K52.839 MICROSCOPIC COLITIS: Status: ACTIVE | Noted: 2019-11-22

## 2022-03-20 PROBLEM — Z98.890 S/P COLONOSCOPIC POLYPECTOMY: Status: ACTIVE | Noted: 2017-02-07

## 2022-03-20 PROBLEM — M54.9 BACK PAIN: Status: ACTIVE | Noted: 2019-07-17

## 2022-04-05 ENCOUNTER — TELEPHONE (OUTPATIENT)
Dept: ONCOLOGY | Age: 62
End: 2022-04-05

## 2022-04-05 ENCOUNTER — DOCUMENTATION ONLY (OUTPATIENT)
Dept: ONCOLOGY | Age: 62
End: 2022-04-05

## 2022-04-05 NOTE — TELEPHONE ENCOUNTER
Call to patient, [phone], to request who has managed her since Dr. Fausto Hatfield in 2005. Left VM to contact RN at DR office, contact info/hours supplied.      Update to Provider/PSR

## 2022-04-06 NOTE — PROGRESS NOTES
Patient's new patient appt rescheduled. Patient states had lump by Dr. Nikunj Kim in 2005 at [facility], faxed request to Ci for any available records related to surgery/pathology in 2005. Patient states did not have prior oncologist care. Update to Provider.

## 2022-04-08 ENCOUNTER — OFFICE VISIT (OUTPATIENT)
Dept: ONCOLOGY | Age: 62
End: 2022-04-08
Payer: COMMERCIAL

## 2022-04-08 ENCOUNTER — DOCUMENTATION ONLY (OUTPATIENT)
Dept: ONCOLOGY | Age: 62
End: 2022-04-08

## 2022-04-08 VITALS
HEART RATE: 101 BPM | WEIGHT: 205.2 LBS | BODY MASS INDEX: 35.03 KG/M2 | DIASTOLIC BLOOD PRESSURE: 79 MMHG | SYSTOLIC BLOOD PRESSURE: 117 MMHG | TEMPERATURE: 96.5 F | HEIGHT: 64 IN | OXYGEN SATURATION: 97 %

## 2022-04-08 DIAGNOSIS — Z85.3 HISTORY OF BREAST CANCER: Primary | ICD-10-CM

## 2022-04-08 PROCEDURE — 99203 OFFICE O/P NEW LOW 30 MIN: CPT | Performed by: INTERNAL MEDICINE

## 2022-04-08 NOTE — PROGRESS NOTES
Oncology Social Work  Psychosocial Assessment    Reason for Assessment:      [] Social Work Referral [x] Initial Assessment [] New Diagnosis [] Other    Advance Care Planning:  Patient does not have an advanced medical directive, and did not express interest in completing one today. Sources of Information:    [x]Patient  []Family  []Staff  []Medical Record    Mental Status:    [x]Alert  []Lethargic  []Unresponsive   [] Unable to assess   Oriented to:  [x]Person  [x]Place  [x]Time  [x]Situation      Relationship Status:  []Single  [x]  []Significant Other/Life Partner  []  []  []    Living Circumstances:  []Lives Alone  [x]Family/Significant Other in Household  []Roommates  []Children in the Home  []Paid Caregivers  []Assisted Living Facility/Group Home  []Skilled [address]  []Homeless  []Incarcerated  []Environmental/Care Concerns  []Other:    Employment Status:  []Employed Full-time []Employed Part-time []Homemaker  [x] Retired [] Short-Term Disability [] [facility]  [] Unemployed   [] SSI   [] SSDI  [] Self-Employment    Barriers to Learning:    []Language  []Developmental  []Cognitive  []Altered Mental Status  []Visual/Hearing Impairment  []Unable to Read/Write  []Motivational  [] Challenges Understanding Medical Jargon [x]No Barriers Identified      Financial/Legal Concerns:    []Uninsured  []Limited Income/Resources  []Non-Citizen  []Food Insecurity [x]No Concerns Identified   []Other:    Denominational/Spiritual/Existential:  Does patient have any spiritual or Christian beliefs? [x] Yes [] No  Is the patient involved in a spiritual, maryellen or Christian community?  [] Yes [] No  Patient expressing spiritual/existential angst? [] Yes [x] No  Notes: Per chart review, patient identifies as Mosque.      Support System:    Identified Support Person/Group:  [x]Strong  []Fair  []Limited    Coping with Illness:   [x]  Coping Well  [] Challenges Coping with Serious Illness [] Situational Depression [] Situational Anxiety [] Anticipatory Grief  [] Recent Loss [] Caregiver Quinby            Narrative:   Met with the patient during her office visit today. Patient is being seen for history of breast cancer in 2005. Living Situation - Patient lives with her . She does not use any DME, and is independent with all ADLs and IADLs. Employment Status - Patient worked for [employer], but retired in October 2021. Insurance - [facility] FEDERAL    Social Support - The patient has good support from her  and 4 adult children. Resources provided -  Provided this 's contact information and offered continued support as needed. Plan:   1. Introduced self and role of this  in the [address] [place] [address] Dr. 2.  Informed the patient of the [facility] and available resources there. 3.  Continue to meet with the patient when she returns to the clinic for ongoing assessment of the patients adjustment to her diagnosis and treatment. 4.  Ongoing psychosocial support as desired by patient. Referral/Handouts:   No referrals placed at this time.    Mario Campo LCSW

## 2022-04-08 NOTE — PROGRESS NOTES
[facility] at [address], [address]  Franky Alicia, [address]   [place] [facility]: [phone]  F: [phone]    Reason for Visit:   Jason Molina is a 57 y.o. female who is seen in consultation at the request of Dr. Violetta Crain for evaluation of history of breast cancer . .    Treatment History:   lumpectomy  Dr Patel Terry  No radiation/ no chemo  No hormonal therapy      STAGE: unclear    History of Present Illness:     Pt seen today for office consult for hx breast ? cancer ? In . Pt states had atypical cells but not sure if cancer. No oncology records. No radiation/ no chemo  No hormonal therapy  Has been having mammo at [address] /   Breast MRI at [facility] / last MRI was . GYN has been ordering breast MRI. Healthy overall. Has HTN/ pre DM/ obesity. Feels fine today. No fevers/ chills/ chest pain/ SOB/ nausea/ vomiting/diarrhea/ pain/fatigue  Sister  of breast cancer 29 yo. Past Medical History:   Diagnosis Date    Asthma CHILDHOOD    Breast cancer, left ([address]     followed by gyn md cassius    Chest pain 2017    normal Stress Echo 17.  Dyslipidemia     Gastritis and duodenitis 2019    egd - md mina    GERD (gastroesophageal reflux disease)     Heartburn     Hx of colonoscopy 2019    colitis - md mina    Hyperglycemia 2016    Dr. Moreira Dys Hypertension     Hypothyroid 2018    Microscopic colitis 2019    Neck nodule 05/10/2018    Obesity (BMI 30-39.9)     elham Pearson.  PITER (obstructive sleep apnea)     Postsurgical menopause     Dr. Jay Goodman S/P colonoscopy 2019    egd - md mina neg    Shoulder pain, right     iggy yi md    Spider bite 2016    ? Leg, recurrent.     Statin intolerance 2022    Steatosis of liver 2016    TMJ (temporomandibular joint syndrome)     Tubular adenoma of colon 2017    Dr. Halima Thorne Uterine fibroid 2002    Dr. Karina Cuellar    Vitamin D deficiency 2018    Dr. Jennifer Sanchez      Past Surgical History:   Procedure Laterality Date    COLONOSCOPY N/A 2019    COLONOSCOPY performed by Khoa Castillo MD at [facility] ENDOSCOPY    HX BREAST LUMPECTOMY Left 2005    Atypical Cells. Dr. Braxton Crigler.  HX  SECTION       x 2    HX COLONOSCOPY  2009, 17    with polypectomy. Dr. Ayesha Oh.  HX REFRACTIVE SURGERY Bilateral 2000s    HX MUKUND AND BSO      due to uterine fibroids. Dr. Anam Cuellar      Social History     Tobacco Use    Smoking status: Never Smoker    Smokeless tobacco: Never Used   Substance Use Topics    Alcohol use: Yes     Alcohol/week: 1.0 standard drink     Types: 1 Glasses of wine per week     Comment: ocassional      Family History   Problem Relation Age of Onset    Diabetes Mother     Obesity Mother     Other Father 54         FROM BEE ALLERGY    Obesity Sister         X7    Diabetes Brother     No Known Problems Maternal Grandmother     No Known Problems Maternal Grandfather     No Known Problems Paternal Grandmother     No Known Problems Paternal Grandfather     Sleep Apnea Sister     Diabetes Sister     Hypertension Sister     Thyroid Disease Sister         X 2    Diabetes Sister         X3 MORE SISTERS TAKING INSULIN    Diabetes Brother     Heart Disease Brother     Breast Cancer Sister 43    Schizophrenia Sister     Heart Disease Niece/nephew      Current Outpatient Medications   Medication Sig    ezetimibe (ZETIA) 10 mg tablet Take 1 Tablet by mouth daily.  evolocumab (REPATHA) syringe 1 mL by SubCUTAneous route every fourteen (14) days.  levothyroxine (SYNTHROID) 25 mcg tablet Take 1 Tablet by mouth Daily (before breakfast).  hydroCHLOROthiazide (MICROZIDE) 12.5 mg capsule Take 1 Capsule by mouth daily.     scopolamine (TRANSDERM-SCOP) 1 mg over 3 days pt3d 1 Patch by TransDERmal route every seventy-two (72) hours.  acyclovir (ZOVIRAX) 5 % ointment Apply to affected area 6 times a day    mometasone (NASONEX) 50 mcg/actuation nasal spray USE TWO SPRAY(S) IN EACH NOSTRIL DAILY    albuterol (VENTOLIN HFA) 90 mcg/actuation inhaler Take 2 Puffs by inhalation every four (4) hours as needed for Wheezing.  glucose blood VI test strips (CONTOUR NEXT STRIPS) strip qd     No current facility-administered medications for this visit. Allergies   Allergen Reactions    Contrave [Naltrexone-Bupropion] Other (comments)     1 PILL MORNING, 2 PILLS EVENING  FELT ZOMBIED, \"OUT OF MY HEAD\"    Crestor [Rosuvastatin] Other (comments)     Felt like it worsened her memory    Gadolinium-Containing Contrast Media Nausea and Vomiting    Lipitor [Atorvastatin] Other (comments)     Felt like it worsened memory. A complete review of systems was obtained, negative except as described above and as reported on ROS sheet scanned into system. Physical Exam:     Visit Vitals  /79 (BP 1 Location: Left upper arm, BP Patient Position: Sitting)   Pulse (!) 101   Temp (!) 96.5 °F (35.8 °C) (Temporal)   Ht 5' 4\" (1.626 m)   Wt 205 lb 3.2 oz (93.1 kg)   SpO2 97%   BMI 35.22 kg/m²     ECOG PS: 0  General: No distress  Eyes:  anicteric sclerae  HENT: Atraumatic, wearing mask  Neck: Supple  Respiratory: CTAB, normal respiratory effort  CV: Normal rate, regular rhythm, no murmurs, no peripheral edema  GI: Soft, nontender, nondistended  MS: Normal gait and station. Digits without clubbing or cyanosis. Skin: No rashes, ecchymoses, or petechiae. Normal temperature, turgor, and texture.   Psych: Alert, oriented, appropriate affect, normal judgment/insight  Neuro non focal  Breast no masses palpable b/l     Results:     Lab Results   Component Value Date/Time    WBC 6.4 03/14/2022 03:00 PM    HGB 15.4 03/14/2022 03:00 PM    HCT 44.9 03/14/2022 03:00 PM    PLATELET 088 07/43/3723 03:00 PM    MCV 80.9 03/14/2022 03:00 PM    ABS. NEUTROPHILS 3.0 03/14/2022 03:00 PM     Lab Results   Component Value Date/Time    Sodium 139 01/28/2022 09:03 AM    Potassium 4.4 01/28/2022 09:03 AM    Chloride 101 01/28/2022 09:03 AM    CO2 20 01/28/2022 09:03 AM    Glucose 123 (H) 01/28/2022 09:03 AM    BUN 17 01/28/2022 09:03 AM    Creatinine 1.06 (H) 01/28/2022 09:03 AM    GFR est AA 65 01/28/2022 09:03 AM    GFR est non-AA 56 (L) 01/28/2022 09:03 AM    Calcium 9.8 01/28/2022 09:03 AM     Lab Results   Component Value Date/Time    Bilirubin, total 0.4 01/28/2022 09:03 AM    ALT (SGPT) 19 01/28/2022 09:03 AM    Alk. phosphatase 115 01/28/2022 09:03 AM    Protein, total 7.2 01/28/2022 09:03 AM    Albumin 4.6 01/28/2022 09:03 AM    Globulin 3.7 11/09/2019 07:37 PM     Mammo/ MRI at outside facilities    Records reviewed and summarized above. Pathology report(s) reviewed above. Radiology report(s) reviewed above. Assessment:/PLAN     1)  Hx breast ? Atypical cells in 2006  No prior ONC records. No radiation/ no chemo  No hormonal therapy  Has been having mammo at [facility] /   Breast MRI at [facility] / last MRI was 11/21. GYN has been ordering breast MRI. Breast exam normal today. Recommend continued course of surveillance with mammo/ MRI as pt is doing. Clinically pt is stable and doing well. Labs and routine HM with PCP. 2) HTN/ pre DM/ obesity. Per IM. 3) Psychosocial. Mood good. Coping well. Has good support. Fu here in a year or prn. Call if questions    I appreciate the opportunity to participate in Ms. [facility].     Signed By: Nicole Hazel DO

## 2022-04-08 NOTE — PROGRESS NOTES
Get last breast MRI from [address]  And last mammo from [address]  And any path from [facility]

## 2022-04-08 NOTE — PROGRESS NOTES
Love Mosley is a 57 y.o. female  Chief Complaint   Patient presents with    New Patient    Breast Cancer     1. Have you been to the ER, urgent care clinic since your last visit? Hospitalized since your last visit? No.    2. Have you seen or consulted any other health care providers outside of the [address] [place] [address] since your last visit? Include any pap smears or colon screening. No.

## 2022-04-08 NOTE — PROGRESS NOTES
All records requests faxed with confirmation of receipt received. [facility] - breast MRI  [place] [facility] - mammograms 2021, 2022  RSG - any path from patient prior care under Dr Saeed Blum - this has also been requested from [facility].

## 2022-04-11 ENCOUNTER — DOCUMENTATION ONLY (OUTPATIENT)
Dept: ONCOLOGY | Age: 62
End: 2022-04-11

## 2022-04-11 NOTE — PROGRESS NOTES
Incoming fax received from [facility] regarding patient's prior records as patient of Dr. Irene Nunes in 2005. Per their office no records of prior care are available.

## 2022-04-14 ENCOUNTER — TELEPHONE (OUTPATIENT)
Dept: ONCOLOGY | Age: 62
End: 2022-04-14

## 2022-04-14 NOTE — TELEPHONE ENCOUNTER
PT called in and left VM stating she had imaging call her about needing some additional tests done for her Mammogram - she wanted Dr. Camelia Mondragon advise on if she needs to get it done, it happened last year and everything was fine and she ended up paying double on co-pay- please advise CB# [phone]

## 2022-04-15 NOTE — TELEPHONE ENCOUNTER
LEATHA Verified. Called pt on mobile phone number listed. Patient was made aware per Provider that we agree with whatever recommendations Radiology is giving her to do. Patient verbalized understanding and just wanted to make sure with us due to this happening last year with the same breast and then finding out everything was actually okay. Patient stated she will go ahead and do what they recommend just wanted to make sure with us before proceeding and thinks she also should do it due to it being another Radiologist this time who is working with her. She was very appreciative for calling her back!   Demetria Coleman

## 2022-04-26 ENCOUNTER — TELEPHONE (OUTPATIENT)
Dept: ONCOLOGY | Age: 62
End: 2022-04-26

## 2022-04-26 NOTE — TELEPHONE ENCOUNTER
Patient left voicemail stating, Fabian Atkins had a Mammagram at [facility] and the are asking her back for 3 add'l biopsies on 4/28/22. Patient would like a call back regarding the location to have the biopsies. Please follow up with the patient.

## 2022-04-26 NOTE — TELEPHONE ENCOUNTER
Returned patient call, ID verified X 2. [phone]. Patient states that she had recent mammogram at [address] [facility] and that they have scheduled her for U/S bx of suspicious breast area, as well as a lymph node bx on 4/28/22. Would like to know Provider opinion of pending biopsies. Understands that records have been requested of mammogram results from 2021, 2022 but not received yet and Provider would not be able to comment until reports viewed. 2nd request for records faxed to [facility] - confirmation of receipt received. Update to Provider.

## 2022-04-28 NOTE — TELEPHONE ENCOUNTER
Returned call to patient, [phone]. Left VM to update patient that Provider had reviewed her reports from [facility] and that Provider recommended proceeding with biopsies as scheduled. Supplied RN callback info/hours if questions.

## 2022-05-03 NOTE — TELEPHONE ENCOUNTER
[facility]. Requested Pathology, as well. Confirmation of receipt received.

## 2022-05-04 PROBLEM — D05.11 DUCTAL CARCINOMA IN SITU (DCIS) OF RIGHT BREAST: Status: ACTIVE | Noted: 2022-04-28

## 2022-06-24 ENCOUNTER — TELEPHONE (OUTPATIENT)
Dept: ONCOLOGY | Age: 62
End: 2022-06-24

## 2022-06-24 NOTE — TELEPHONE ENCOUNTER
Patient call in concern about how long she can delay her surgery. She is experiencing delay in getting schedule with Dr. Maria L Negron at [facility]. She is wondering is she is stressing out too much or should she find another surgeon .   Please advises    [phone]

## 2022-06-28 ENCOUNTER — NURSE NAVIGATOR (OUTPATIENT)
Dept: CASE MANAGEMENT | Age: 62
End: 2022-06-28

## 2022-06-28 NOTE — PROGRESS NOTES
[address]  Breast Navigator Encounter    Name:    Shea Amaya  Age:    57 y.o. Diagnosis:    RIGHT DCIS    Interdisciplinary Team:  Med-Onc:    Dr. Hu Watts  Surg-Onc:    Dr. Rubin Hopping:      Plastics:      :      Nurse Navigator:  Abigail Cruz, RN, BSN, CBCN      Encounter type:  []Patient Initiated  []Navigator Follow-up []Pre-op  []Post-op  []Check-in Prior to First Treatment []Treatment Modality Change  [x]Initial Navigator Encounter []Other:       Narrative:    Called patient for initial navigator contact. I explained what happens at the first consultation - an exam by the physician, a possible ultrasound, then complete discussion of surgical options and other treatment that may be considered. I encouraged the patient to bring  someone with her to this appointment since there is a lot of information that will be covered. She may bring her  or come alone. Has already seen Dr. Ger Mallory at [facility] and was scheduled for surgery, but cancelled this due to a vacation, and she has had trouble getting back on her schedule. She reached out to Dr. Hu Watts, who referred the patient to me. She is seeing Dr. Martha Putnam on 2022. Has a history of \"atypical\" cells in the LEFT breast many years ago. Had surgery by Dr. Parviz Helton, but no further treatment was required. Is obviously concerned that it has been two months since diagnosis, but I did explain at this point the cancer is stage 0, so I reassured her that this is probably fine. Would like to arrange surgery for after  when she returns from vacation. Is thinking about a BILATERAL mastectomies, and she has actually already seen Dr. Paula Tapia and liked her very much. She has a FH of breast cancer. Sister  of metastatic breast cancer (9 girls altogether), one niece has stage 0 cancer and the other stage 2. She recently had genetic testing at [facility], and this was normal (2022).        She did not have any further questions for me today. Mentioned resources available here at [employer]. Provided the patient with my contact information and discussed my role in their care. I will continue to follow the patient.           Miriam Ferrer RN, BSN, [facility]  Oncology Breast Navigator     [address] [place] [address]   [address] [place], [place] [address]  [address].  W: [phone]  F: [phone]  [email or web address]  Good Help to Those in [facility]

## 2022-07-03 RX ORDER — ALBUTEROL SULFATE 90 UG/1
2 AEROSOL, METERED RESPIRATORY (INHALATION)
Qty: 1 EACH | Refills: 11 | Status: SHIPPED | OUTPATIENT
Start: 2022-07-03

## 2022-07-03 RX ORDER — CEFUROXIME AXETIL 500 MG/1
500 TABLET ORAL 2 TIMES DAILY
Qty: 20 TABLET | Refills: 0 | Status: SHIPPED | OUTPATIENT
Start: 2022-07-03 | End: 2022-07-13

## 2022-07-03 RX ORDER — GUAIFENESIN 600 MG/1
600 TABLET, EXTENDED RELEASE ORAL 2 TIMES DAILY
Qty: 30 TABLET | Refills: 5 | Status: SHIPPED | OUTPATIENT
Start: 2022-07-03 | End: 2022-10-11

## 2022-07-03 RX ORDER — BUDESONIDE AND FORMOTEROL FUMARATE DIHYDRATE 160; 4.5 UG/1; UG/1
2 AEROSOL RESPIRATORY (INHALATION) 2 TIMES DAILY
Qty: 10.2 G | Refills: 11 | Status: SHIPPED | OUTPATIENT
Start: 2022-07-03 | End: 2022-10-11

## 2022-07-06 ENCOUNTER — DOCUMENTATION ONLY (OUTPATIENT)
Dept: SURGERY | Age: 62
End: 2022-07-06

## 2022-07-06 ENCOUNTER — OFFICE VISIT (OUTPATIENT)
Dept: SURGERY | Age: 62
End: 2022-07-06
Payer: COMMERCIAL

## 2022-07-06 VITALS
WEIGHT: 210 LBS | DIASTOLIC BLOOD PRESSURE: 95 MMHG | BODY MASS INDEX: 34.99 KG/M2 | HEART RATE: 88 BPM | HEIGHT: 65 IN | SYSTOLIC BLOOD PRESSURE: 145 MMHG

## 2022-07-06 DIAGNOSIS — D05.11 DUCTAL CARCINOMA IN SITU (DCIS) OF RIGHT BREAST: Primary | ICD-10-CM

## 2022-07-06 PROCEDURE — 99205 OFFICE O/P NEW HI 60 MIN: CPT | Performed by: SURGERY

## 2022-07-06 NOTE — PROGRESS NOTES
Mammogram films being mailed to our [facility] office from [facility].

## 2022-07-06 NOTE — PATIENT INSTRUCTIONS
Breast Cancer: Care Instructions  Your Care Instructions     Breast cancer occurs when abnormal cells grow out of control in the breast. These cancer cells can spread within the breast, to nearby lymph nodes and other tissues, and to other parts of the body. Being treated for cancer can weaken your body, and you may feel very tired. Get the rest your body needs so you can feel better. Finding out that you have cancer is scary. You may feel many emotions and may need some help coping. Seek out family, friends, and counselors for support. You also can do things at home to make yourself feel better while you go through treatment. Call the [employer] ([phone]) or visit its website at [employer] for more information. Follow-up care is a key part of your treatment and safety. Be sure to make and go to all appointments, and call your doctor if you are having problems. It's also a good idea to know your test results and keep a list of the medicines you take. How can you care for yourself at home? · Take your medicines exactly as prescribed. Call your doctor if you think you are having a problem with your medicine. You may get medicine for nausea and vomiting if you have these side effects. · Follow your doctor's instructions to relieve pain. Pain from cancer and surgery can almost always be controlled. Use pain medicine when you first notice pain, before it becomes severe. · Eat healthy food. If you do not feel like eating, try to eat food that has protein and extra calories to keep up your strength and prevent weight loss. Drink liquid meal replacements for extra calories and protein. Try to eat your main meal early. · Get some physical activity every day, but do not get too tired. Keep doing the hobbies you enjoy as your energy allows. · Do not smoke. Smoking can make your cancer worse. If you need help quitting, talk to your doctor about stop-smoking programs and medicines.  These can increase your chances of quitting for good. · Take steps to control your stress and workload. Learn relaxation techniques. ? Share your feelings. Stress and tension affect our emotions. By expressing your feelings to others, you may be able to understand and cope with them. ? Consider joining a support group. Talking about a problem with your spouse, a good friend, or other people with similar problems is a good way to reduce tension and stress. ? Express yourself through art. Try writing, crafts, dance, or art to relieve stress. Some dance, writing, or art groups may be available just for people who have cancer. ? Be kind to your body and mind. Getting enough sleep, eating a healthy diet, and taking time to do things you enjoy can contribute to an overall feeling of balance in your life and can help reduce stress. ? Get help if you need it. Discuss your concerns with your doctor or counselor. · If you are vomiting or have diarrhea:  ? Drink plenty of fluids to prevent dehydration. Choose water and other clear liquids. If you have kidney, heart, or liver disease and have to limit fluids, talk with your doctor before you increase the amount of fluids you drink. ? When you are able to eat, try clear soups, mild foods, and liquids until all symptoms are gone for 12 to 48 hours. Other good choices include dry toast, crackers, cooked cereal, and gelatin dessert, such as Jell-O.  · If you have not already done so, prepare a list of advance directives. Advance directives are instructions to your doctor and family members about what kind of care you want if you become unable to speak or express yourself. When should you call for help? Call 911 anytime you think you may need emergency care. For example, call if:    · You passed out (lost consciousness).    Call your doctor now or seek immediate medical care if:    · You have a fever.     · You have abnormal bleeding.     · You think you have an infection.     · You have new or worse pain.     · You have new symptoms, such as a cough, belly pain, vomiting, diarrhea, or a rash. Watch closely for changes in your health, and be sure to contact your doctor if:    · You are much more tired than usual.     · You have swollen glands in your armpits, groin, or neck.     · You do not get better as expected. Where can you learn more? Go to http://www.kwok.com/  Enter V321 in the search box to learn more about \"Breast Cancer: Care Instructions. \"  Current as of: September 8, 2021               Content Version: 13.2  © 4079-7995 [employer]. Care instructions adapted under license by [employer] (which disclaims liability or warranty for this information). If you have questions about a medical condition or this instruction, always ask your healthcare professional. [address] any warranty or liability for your use of this information.

## 2022-07-06 NOTE — H&P (VIEW-ONLY)
HISTORY OF PRESENT ILLNESS  Rashmi Dougherty is a 57 y.o. female. HPI NEW patient consult referred by  [facility] for RIGHT breast cancer. Was found on imaging. Patient has no pain. She is here for a second opinion. She has met with Dr. Sandra Conner with plastics as well. 2005- left breast excisional biopsy     Bx done 4 /28/22- #1 @ 12:00 4 cmfn right DCIS : ER- 100%  LA- 100%, 3 LN -  #2 RIGHT br @ 9:00 10cmfn - benign    Family History: sister - breast cancer dx @ age 43. Mammogram, 4/25/22, BIRADS 5 done @ [facility] ; 0.7 x 0.9 x 0.7 cm irregular solid mass    Past Medical History:   Diagnosis Date    Asthma CHILDHOOD    Breast cancer, left ([address] 2005    followed by gyn md cassius    Chest pain 07/11/2017    normal Stress Echo 07/19/17.  Ductal carcinoma in situ (DCIS) of right breast 04/28/2022    Dyslipidemia     Gastritis and duodenitis 11/22/2019    egd - md mina    GERD (gastroesophageal reflux disease)     Heartburn 2017    Hx of colonoscopy 11/22/2019    colitis - md mina    Hyperglycemia 04/16/2016    Dr. Duyen Roberts Hypertension     Hypothyroid 08/2018    Microscopic colitis 11/22/2019    Neck nodule 05/10/2018    Obesity (BMI 30-39.9) 2017    elham Duffy.  PITER (obstructive sleep apnea)     Postsurgical menopause 2002    Dr. Adolfo Capellan S/P colonoscopy 11/22/2019    egd - md mina neg    Shoulder pain, right     iggy yi md    Spider bite 2016    ? Leg, recurrent.  Statin intolerance 03/14/2022    Steatosis of liver 06/19/2016    TMJ (temporomandibular joint syndrome)     Tubular adenoma of colon 02/2017    Dr. Shannan Slater    Uterine fibroid 2002    Dr. Huitron Stable    Vitamin D deficiency 04/18/2018    Dr. Evan Villasenor     Past Surgical History:   Procedure Laterality Date    COLONOSCOPY N/A 11/22/2019    COLONOSCOPY [facility] by [facility]ald [facility] ENDOSCOPY    HX BREAST LUMPECTOMY Left 2005    Atypical Cells.   Dr. Tito Burgos Chang.  HX  SECTION       x 2    HX COLONOSCOPY  2009, 16    with polypectomy. Dr. Katherine Barone.  HX REFRACTIVE SURGERY Bilateral s    HX MUKUND AND BSO      due to uterine fibroids. Dr. Kristal Mas     Social History     Socioeconomic History    Marital status:      Spouse name: Not on file    Number of children: Not on file    Years of education: Not on file    Highest education level: Not on file   Occupational History    Not on file   Tobacco Use    Smoking status: Never Smoker    Smokeless tobacco: Never Used   Vaping Use    Vaping Use: Never used   Substance and Sexual Activity    Alcohol use: Yes     Alcohol/week: 1.0 standard drink     Types: 1 Glasses of wine per week     Comment: ocassional    Drug use: No    Sexual activity: Yes     Partners: Male     Birth control/protection: Surgical     Comment: TL   Other Topics Concern    Not on file   Social History Narrative    Family History: Mother: , Jessica Needs: , Bee stingSister(s):  2010 yrs, Breast Cancer at 42Brother(s):    , Heart Problems1 brother(s) , 7 sister(s) - healthy. 1 son(s) , 1 daughter(s) - healthy. Social History: Alcohol Use Patient does not use alcohol. Smoking Status Patient is a never smoker. Caffeine: coffee, tea. Marital Status:    . Lives w ith: spouse Bob Otero. Occupation/W ork: office w ork. Medical History: Hypertension, Left Breast papilloma. Gyn History: Last mammogram date 10/28/2014. Last pap date 10/22/2012. Hysterectomy Date . OB History: Total pregnancies 2. Full term delivery (>37 w eeks) 2. Live births 2. C section(s) 2. Pregnancy # 1: , Girl, 11. Pregnancy # 2: Repeat C/S, boy, . Surgical History: hysterectomy, total abdominal w ith BSO , Left w rist surgery , colonoscopy Katherine Barone and , left lumpectomy .      Social Determinants of Health     Financial Resource Strain:     Difficulty of Paying Living Expenses: Not on file   Food Insecurity:     Worried About Running Out of Food in the Last Year: Not on file    [place] of Food in the Last Year: Not on file   Transportation Needs:     Lack of Transportation (Medical): Not on file    Lack of Transportation (Non-Medical): Not on file   Physical Activity:     Days of Exercise per Week: Not on file    Minutes of Exercise per Session: Not on file   Stress:     Feeling of Stress : Not on file   Social Connections: Unknown    Frequency of Communication with Friends and Family: More than three times a week    Frequency of Social Gatherings with Friends and Family: More than three times a week    Attends Muslim Services: Not on file   [employer] or Organizations: Not on file    Attends Club or Organization Meetings: Not on file    Marital Status:    Intimate Partner Violence:     Fear of Current or Ex-Partner: Not on file    Emotionally Abused: Not on file    Physically Abused: Not on file    Sexually Abused: Not on file   Housing Stability:     Unable to Pay for Housing in the Last Year: Not on file    Number of [place] in the Last Year: Not on file    Unstable Housing in the Last Year: Not on file     OB History    No obstetric history on file. Obstetric Comments   Menarche 5, LMP in 2002, # of children 2, age of 4st delivery 31, Hysterectomy/oophorectomy yes/one, Breast bx yes, history of breast feeding no, BCP yes, Hormone therapy n/a             Current Outpatient Medications:     budesonide-formoteroL (SYMBICORT) 160-4.5 mcg/actuation HFAA, Take 2 Puffs by inhalation two (2) times a day., Disp: 10.2 g, Rfl: 11    guaiFENesin ER (MUCINEX) 600 mg ER tablet, Take 1 Tablet by mouth two (2) times a day., Disp: 30 Tablet, Rfl: 5    cefUROXime (CEFTIN) 500 mg tablet, Take 1 Tablet by mouth two (2) times a day for 10 days. , Disp: 20 Tablet, Rfl: 0    ezetimibe (ZETIA) 10 mg tablet, Take 1 Tablet by mouth daily. , Disp: 30 Tablet, Rfl: 5    evolocumab (REPATHA) syringe, 1 mL by SubCUTAneous route every fourteen (14) days. , Disp: 2 mL, Rfl: 11    levothyroxine (SYNTHROID) 25 mcg tablet, Take 1 Tablet by mouth Daily (before breakfast). , Disp: 90 Tablet, Rfl: 1    hydroCHLOROthiazide (MICROZIDE) 12.5 mg capsule, Take 1 Capsule by mouth daily. , Disp: 90 Capsule, Rfl: 3    scopolamine (TRANSDERM-SCOP) 1 mg over 3 days pt3d, 1 Patch by TransDERmal route every seventy-two (72) hours. , Disp: 3 Patch, Rfl: 0    glucose blood VI test strips (CONTOUR NEXT STRIPS) strip, qd, Disp: 100 Strip, Rfl: 11    albuterol (PROVENTIL HFA, VENTOLIN HFA, PROAIR HFA) 90 mcg/actuation inhaler, Take 2 Puffs by inhalation every four (4) hours as needed for Wheezing., Disp: 1 Each, Rfl: 11    acyclovir (ZOVIRAX) 5 % ointment, Apply to affected area 6 times a day, Disp: 30 g, Rfl: 1    mometasone (NASONEX) 50 mcg/actuation nasal spray, USE TWO SPRAY(S) IN EACH NOSTRIL DAILY, Disp: 1 Container, Rfl: 11    albuterol (VENTOLIN HFA) 90 mcg/actuation inhaler, Take 2 Puffs by inhalation every four (4) hours as needed for Wheezing., Disp: 1 Inhaler, Rfl: 11  Allergies   Allergen Reactions    Contrave [Naltrexone-Bupropion] Other (comments)     1 PILL MORNING, 2 PILLS EVENING  FELT ZOMBIED, \"OUT OF MY HEAD\"    Crestor [Rosuvastatin] Other (comments)     Felt like it worsened her memory    Gadolinium-Containing Contrast Media Nausea and Vomiting    Lipitor [Atorvastatin] Other (comments)     Felt like it worsened memory. Review of Systems   All other systems reviewed and are negative. Physical Exam  Vitals and nursing note reviewed. Chest:   Breasts: Breasts are symmetrical.      Right: No inverted nipple, mass, nipple discharge, skin change, tenderness, axillary adenopathy or supraclavicular adenopathy.       Left: No inverted nipple, mass, nipple discharge, skin change, tenderness, axillary adenopathy or supraclavicular adenopathy. Lymphadenopathy:      Cervical: No cervical adenopathy. Upper Body:      Right upper body: No supraclavicular, axillary or pectoral adenopathy. Left upper body: No supraclavicular, axillary or pectoral adenopathy. ASSESSMENT and PLAN    ICD-10-CM ICD-9-CM    1. Ductal carcinoma in situ (DCIS) of right breast  D05.11 233.0    56 yo female with dcis stage 0 right breast  She has met with another breast surgeon but would like for me to be her surgeon  I spoke with Dr. Rtia Wynn and we will plan surgery together  First surgery will be bilateral mastectomies, right sln biopsy, tissue expanders. Patient will likely have delayed abril. Discussed recovery time  Discussed procedure and risks  Will meet back with her before surgery. 90 minutes was spent with patient on counseling and coordination of care.

## 2022-07-06 NOTE — PROGRESS NOTES
HISTORY OF PRESENT ILLNESS  Kami Maria is a 57 y.o. female. HPI NEW patient consult referred by  [id number] Kumar for RIGHT breast cancer. Was found on imaging. Patient has no pain. She is here for a second opinion. She has met with Dr. Luiza Nathan with plastics as well. 2005- left breast excisional biopsy     Bx done 4 /28/22- #1 @ 12:00 4 cmfn right DCIS : ER- 100%  [place]- 100%, 3 LN -  #2 RIGHT br @ 9:00 10cmfn - benign    Family History: sister - breast cancer dx @ age 43. Mammogram, 4/25/22, BIRADS 5 done @ [address] ; 0.7 x 0.9 x 0.7 cm irregular solid mass    Past Medical History:   Diagnosis Date    Asthma CHILDHOOD    Breast cancer, left ([address] 2005    followed by gyn md cassius    Chest pain 07/11/2017    normal Stress Echo 07/19/17.  Ductal carcinoma in situ (DCIS) of right breast 04/28/2022    Dyslipidemia     Gastritis and duodenitis 11/22/2019    egd - md mina    GERD (gastroesophageal reflux disease)     Heartburn 2017    Hx of colonoscopy 11/22/2019    colitis - md mina    Hyperglycemia 04/16/2016    Dr. Bernice Carroll Hypertension     Hypothyroid 08/2018    Microscopic colitis 11/22/2019    Neck nodule 05/10/2018    Obesity (BMI 30-39.9) 2017    elham Germain.  PITER (obstructive sleep apnea)     Postsurgical menopause 2002    Dr. Maria A Raygoza S/P colonoscopy 11/22/2019    egd - md mina neg    Shoulder pain, right     iggy yi md    Spider bite 2016    ? Leg, recurrent.  Statin intolerance 03/14/2022    Steatosis of liver 06/19/2016    TMJ (temporomandibular joint syndrome)     Tubular adenoma of colon 02/2017    Dr. Roberto Carlos Shrestha    Uterine fibroid 2002    Dr. Roseanne Gibson    Vitamin D deficiency 04/18/2018    Dr. Lance Goodwin     Past Surgical History:   Procedure Laterality Date    COLONOSCOPY N/A 11/22/2019    COL[facility] Bam José [facility]COPY    HX BREAST LUMPECTOMY Left 2005    Atypical Cells.   Dr. Hamilton Chang.  HX  SECTION       x 2    HX COLONOSCOPY  2009, 16    with polypectomy. Dr. Analia Teran.  HX REFRACTIVE SURGERY Bilateral s    HX MUKUND AND BSO      due to uterine fibroids. Dr. Jackie Ya     Social History     Socioeconomic History    Marital status:      Spouse name: Not on file    Number of children: Not on file    Years of education: Not on file    Highest education level: Not on file   Occupational History    Not on file   Tobacco Use    Smoking status: Never Smoker    Smokeless tobacco: Never Used   Vaping Use    Vaping Use: Never used   Substance and Sexual Activity    Alcohol use: Yes     Alcohol/week: 1.0 standard drink     Types: 1 Glasses of wine per week     Comment: ocassional    Drug use: No    Sexual activity: Yes     Partners: Male     Birth control/protection: Surgical     Comment: TL   Other Topics Concern    Not on file   Social History Narrative    Family History: Mother: , Dino King: , Bee stingSister(s):  2010 yrs, Breast Cancer at 42Brother(s):    , Heart Problems1 brother(s) , 7 sister(s) - healthy. 1 son(s) , 1 daughter(s) - healthy. Social History: Alcohol Use Patient does not use alcohol. Smoking Status Patient is a never smoker. Caffeine: coffee, tea. Marital Status:    . Lives w ith: spouse Ambrosio Boo. Occupation/W ork: office w ork. Medical History: Hypertension, Left Breast papilloma. Gyn History: Last mammogram date 10/28/2014. Last pap date 10/22/2012. Hysterectomy Date . OB History: Total pregnancies 2. Full term delivery (>37 w eeks) 2. Live births 2. C section(s) 2. Pregnancy # 1: , Girl, [place] 1. Pregnancy # 2: Repeat C/S, boy, . Surgical History: hysterectomy, total abdominal w ith BSO , Left w rist surgery , colonoscopy Analia Boy and , left lumpectomy .      Social Determinants of Health     Financial Resource Strain:     Difficulty of Paying Living Expenses: Not on file   Food Insecurity:     Worried About Running Out of Food in the Last Year: Not on file    [place] of Food in the Last Year: Not on file   Transportation Needs:     Lack of Transportation (Medical): Not on file    Lack of Transportation (Non-Medical): Not on file   Physical Activity:     Days of Exercise per Week: Not on file    Minutes of Exercise per Session: Not on file   Stress:     Feeling of Stress : Not on file   Social Connections: Unknown    Frequency of Communication with Friends and Family: More than three times a week    Frequency of Social Gatherings with Friends and Family: More than three times a week    Attends Zoroastrian Services: Not on file   [employer] [facility] or Organizations: Not on file    Attends Club or Organization Meetings: Not on file    Marital Status:    Intimate Partner Violence:     Fear of Current or Ex-Partner: Not on file    Emotionally Abused: Not on file    Physically Abused: Not on file    Sexually Abused: Not on file   Housing Stability:     Unable to Pay for Housing in the Last Year: Not on file    Number of [place] in the Last Year: Not on file    Unstable Housing in the Last Year: Not on file     OB History    No obstetric history on file. Obstetric Comments   Menarche 5, LMP in 2002, # of children 2, age of 4st delivery 31, Hysterectomy/oophorectomy yes/one, Breast bx yes, history of breast feeding no, BCP yes, Hormone therapy n/a             Current Outpatient Medications:     budesonide-formoteroL (SYMBICORT) 160-4.5 mcg/actuation HFAA, Take 2 Puffs by inhalation two (2) times a day., Disp: 10.2 g, Rfl: 11    guaiFENesin ER (MUCINEX) 600 mg ER tablet, Take 1 Tablet by mouth two (2) times a day., Disp: 30 Tablet, Rfl: 5    cefUROXime (CEFTIN) 500 mg tablet, Take 1 Tablet by mouth two (2) times a day for 10 days. , Disp: 20 Tablet, Rfl: 0    ezetimibe (ZETIA) 10 mg tablet, Take 1 Tablet by mouth daily. , Disp: 30 Tablet, Rfl: 5    evolocumab (REPATHA) syringe, 1 mL by SubCUTAneous route every fourteen (14) days. , Disp: 2 mL, Rfl: 11    levothyroxine (SYNTHROID) 25 mcg tablet, Take 1 Tablet by mouth Daily (before breakfast). , Disp: 90 Tablet, Rfl: 1    hydroCHLOROthiazide (MICROZIDE) 12.5 mg capsule, Take 1 Capsule by mouth daily. , Disp: 90 Capsule, Rfl: 3    scopolamine (TRANSDERM-SCOP) 1 mg over 3 days pt3d, 1 Patch by TransDERmal route every seventy-two (72) hours. , Disp: 3 Patch, Rfl: 0    glucose blood VI test strips (CONTOUR NEXT STRIPS) strip, qd, Disp: 100 Strip, Rfl: 11    albuterol (PROVENTIL HFA, VENTOLIN HFA, PROAIR HFA) 90 mcg/actuation inhaler, Take 2 Puffs by inhalation every four (4) hours as needed for Wheezing., Disp: 1 Each, Rfl: 11    acyclovir (ZOVIRAX) 5 % ointment, Apply to affected area 6 times a day, Disp: 30 g, Rfl: 1    mometasone (NASONEX) 50 mcg/actuation nasal spray, USE TWO SPRAY(S) IN EACH NOSTRIL DAILY, Disp: 1 Container, Rfl: 11    albuterol (VENTOLIN HFA) 90 mcg/actuation inhaler, Take 2 Puffs by inhalation every four (4) hours as needed for Wheezing., Disp: 1 Inhaler, Rfl: 11  Allergies   Allergen Reactions    Contrave [Naltrexone-Bupropion] Other (comments)     1 PILL MORNING, 2 PILLS EVENING  FELT ZOMBIED, \"OUT OF MY HEAD\"    Crestor [Rosuvastatin] Other (comments)     Felt like it worsened her memory    Gadolinium-Containing Contrast Media Nausea and Vomiting    Lipitor [Atorvastatin] Other (comments)     Felt like it worsened memory. Review of Systems   All other systems reviewed and are negative. Physical Exam  Vitals and nursing note reviewed. Chest:   Breasts: Breasts are symmetrical.      Right: No inverted nipple, mass, nipple discharge, skin change, tenderness, axillary adenopathy or supraclavicular adenopathy.       Left: No inverted nipple, mass, nipple discharge, skin change, tenderness, axillary adenopathy or supraclavicular adenopathy. Lymphadenopathy:      Cervical: No cervical adenopathy. Upper Body:      Right upper body: No supraclavicular, axillary or pectoral adenopathy. Left upper body: No supraclavicular, axillary or pectoral adenopathy. ASSESSMENT and PLAN    ICD-10-CM ICD-9-CM    1. Ductal carcinoma in situ (DCIS) of right breast  D05.11 233.0    56 yo female with dcis stage 0 right breast  She has met with another breast surgeon but would like for me to be her surgeon  I spoke with Dr. Marjorie Melgar and we will plan surgery together  First surgery will be bilateral mastectomies, right sln biopsy, tissue expanders. Patient will likely have delayed abril. Discussed recovery time  Discussed procedure and risks  Will meet back with her before surgery. 90 minutes was spent with patient on counseling and coordination of care.

## 2022-07-11 ENCOUNTER — TRANSCRIBE ORDER (OUTPATIENT)
Dept: SCHEDULING | Age: 62
End: 2022-07-11

## 2022-07-11 DIAGNOSIS — D05.11 INTRADUCTAL CARCINOMA IN SITU OF RIGHT BREAST: Primary | ICD-10-CM

## 2022-07-18 ENCOUNTER — DOCUMENTATION ONLY (OUTPATIENT)
Dept: SURGERY | Age: 62
End: 2022-07-18

## 2022-07-18 NOTE — PROGRESS NOTES
Type of Film: [x] CD [] FILMS  Type of Test: [] MRI [x] MAMMO  From: [facility]  Given to: [facility]  LOCATION  To be Downloaded into PACS:  YES

## 2022-07-26 ENCOUNTER — HOSPITAL ENCOUNTER (OUTPATIENT)
Dept: PREADMISSION TESTING | Age: 62
Discharge: HOME | End: 2022-07-26
Payer: COMMERCIAL

## 2022-07-26 VITALS
DIASTOLIC BLOOD PRESSURE: 74 MMHG | RESPIRATION RATE: 12 BRPM | TEMPERATURE: 97.8 F | HEART RATE: 91 BPM | WEIGHT: 216.05 LBS | HEIGHT: 65 IN | SYSTOLIC BLOOD PRESSURE: 130 MMHG | BODY MASS INDEX: 36 KG/M2

## 2022-07-26 LAB
ANION GAP SERPL CALC-SCNC: 9 MMOL/L (ref 5–15)
BASOPHILS # BLD: 0 K/UL (ref 0–0.1)
BASOPHILS NFR BLD: 0 % (ref 0–1)
BUN SERPL-MCNC: 13 MG/DL (ref 6–20)
BUN/CREAT SERPL: 13 (ref 12–20)
CALCIUM SERPL-MCNC: 9.1 MG/DL (ref 8.5–10.1)
CHLORIDE SERPL-SCNC: 105 MMOL/L (ref 97–108)
CO2 SERPL-SCNC: 26 MMOL/L (ref 21–32)
CREAT SERPL-MCNC: 0.97 MG/DL (ref 0.55–1.02)
DIFFERENTIAL METHOD BLD: ABNORMAL
EOSINOPHIL # BLD: 0.2 K/UL (ref 0–0.4)
EOSINOPHIL NFR BLD: 2 % (ref 0–7)
ERYTHROCYTE [DISTWIDTH] IN BLOOD BY AUTOMATED COUNT: 13.8 % (ref 11.5–14.5)
GLUCOSE SERPL-MCNC: 139 MG/DL (ref 65–100)
HCT VFR BLD AUTO: 39.1 % (ref 35–47)
HGB BLD-MCNC: 14.2 G/DL (ref 11.5–16)
IMM GRANULOCYTES # BLD AUTO: 0 K/UL (ref 0–0.04)
IMM GRANULOCYTES NFR BLD AUTO: 0 % (ref 0–0.5)
LYMPHOCYTES # BLD: 3.2 K/UL (ref 0.8–3.5)
LYMPHOCYTES NFR BLD: 38 % (ref 12–49)
MCH RBC QN AUTO: 28.3 PG (ref 26–34)
MCHC RBC AUTO-ENTMCNC: 36.3 G/DL (ref 30–36.5)
MCV RBC AUTO: 78 FL (ref 80–99)
MONOCYTES # BLD: 0.5 K/UL (ref 0–1)
MONOCYTES NFR BLD: 5 % (ref 5–13)
NEUTS SEG # BLD: 4.7 K/UL (ref 1.8–8)
NEUTS SEG NFR BLD: 55 % (ref 32–75)
NRBC # BLD: 0 K/UL (ref 0–0.01)
NRBC BLD-RTO: 0 PER 100 WBC
PLATELET # BLD AUTO: 307 K/UL (ref 150–400)
PMV BLD AUTO: 10.4 FL (ref 8.9–12.9)
POTASSIUM SERPL-SCNC: 3.7 MMOL/L (ref 3.5–5.1)
RBC # BLD AUTO: 5.01 M/UL (ref 3.8–5.2)
SODIUM SERPL-SCNC: 140 MMOL/L (ref 136–145)
WBC # BLD AUTO: 8.6 K/UL (ref 3.6–11)

## 2022-07-26 PROCEDURE — 93005 ELECTROCARDIOGRAM TRACING: CPT

## 2022-07-26 PROCEDURE — 85025 COMPLETE CBC W/AUTO DIFF WBC: CPT

## 2022-07-26 PROCEDURE — 80048 BASIC METABOLIC PNL TOTAL CA: CPT

## 2022-07-26 PROCEDURE — 36415 COLL VENOUS BLD VENIPUNCTURE: CPT

## 2022-07-26 NOTE — PERIOP NOTES
Preoperative instructions reviewed with patient. Patient given SSI infection FAQS sheet. Given two bottles of skin prep chlorhexidine soap; given written and verbal instructions on use. Patient was given the opportunity to ask questions on the information provided. Surgical consent obtained and signed by patient. PATIENT DID NOT HAVE COVID VACCINATION CARD TODAY, INSTRUCTED TO BRING DAY OF SURGERY. SHE HAD PHOTO ON HER PHONE, DATES WERE ENTERED INTO EMR BASED ON IMAGES.

## 2022-07-26 NOTE — PERIOP NOTES
[facility] INSTRUCTIONS    Surgery Date:   8/2/22    Your surgeon's office or [facility] staff will call you between 4 PM- 8 PM the day before surgery with your arrival time. If your surgery is on a Monday, you will receive a call the preceding Friday. Please report to [facility] Patient Access/Admitting on the 1st floor. Bring your insurance card, photo identification, and any copayment ( if applicable). If you are going home the same day of your surgery, you must have a responsible adult to drive you home. You need to have a responsible adult to stay with you the first 24 hours after surgery and you should not drive a car for 24 hours following your surgery. Nothing to eat or drink after midnight the night before surgery. This includes no water, gum, mints, coffee, juice, etc.  Please note special instructions, if applicable, below for medications. Do NOT drink alcohol or smoke 24 hours before surgery. STOP smoking for 14 days prior as it helps with breathing and healing after surgery. If you are being admitted to the hospital, please leave personal belongings/luggage in your car until you have an assigned hospital room number. Please wear comfortable clothes. Wear your glasses instead of contacts. We ask that all money, jewelry and valuables be left at home. Wear no make up, particularly mascara, the day of surgery. All body piercings, rings, and jewelry need to be removed and left at home. Please remove any nail polish or artificial nails from your fingernails. Please wear your hair loose or down. Please no pony-tails, buns, or any metal hair accessories. If you shower the morning of surgery, please do not apply any lotions or powders afterwards. You may wear deodorant, unless having breast surgery. Do not shave any body area within 24 hours of your surgery. Please follow all instructions to avoid any potential surgical cancellation.   Should your physical condition change, TRANSFER - OUT REPORT:    Verbal report given to China Queen RN(name) on Katie Azul  being transferred to [facility](unit) for routine progression of care       Report consisted of patients Situation, Background, Assessment and   Recommendations(SBAR). Information from the following report(s) SBAR, Kardex, OR Summary and Recent Results was reviewed with the receiving nurse. Lines:   PICC Single Lumen 43/75/94 Right;Basilic (Active)       Peripheral IV 08/18/21 Left Wrist (Active)   Site Assessment Clean, dry, & intact 08/18/21 1630   Phlebitis Assessment 0 08/18/21 1630   Infiltration Assessment 0 08/18/21 1630   Dressing Status Clean, dry, & intact 08/18/21 1630   Dressing Type Transparent;Tape 08/18/21 1630   Hub Color/Line Status Patent 08/18/21 1630   Alcohol Cap Used No 08/18/21 1630        Opportunity for questions and clarification was provided.       Patient transported with:   Registered Nurse (i.e. fever, cold, flu, etc.) please notify your surgeon as soon as possible. It is important to be on time. If a situation occurs where you may be delayed, please call:  [phone] / [phone] on the day of surgery. The Preadmission Testing staff can be reached at [phone]. Special instructions: BRING ALBUTEROL INHALER DAY OF SURGERY, BRING CPAP. Current Outpatient Medications   Medication Sig    albuterol (PROVENTIL HFA, VENTOLIN HFA, PROAIR HFA) 90 mcg/actuation inhaler Take 2 Puffs by inhalation every four (4) hours as needed for Wheezing. guaiFENesin ER (MUCINEX) 600 mg ER tablet Take 1 Tablet by mouth two (2) times a day. (Patient taking differently: Take 600 mg by mouth two (2) times daily as needed.)    ezetimibe (ZETIA) 10 mg tablet Take 1 Tablet by mouth daily. (Patient taking differently: Take 10 mg by mouth nightly.)    levothyroxine (SYNTHROID) 25 mcg tablet Take 1 Tablet by mouth Daily (before breakfast). hydroCHLOROthiazide (MICROZIDE) 12.5 mg capsule Take 1 Capsule by mouth daily. (Patient taking differently: Take 12.5 mg by mouth daily (with lunch). )    scopolamine (TRANSDERM-SCOP) 1 mg over 3 days pt3d 1 Patch by TransDERmal route every seventy-two (72) hours. (Patient taking differently: 1 Patch by TransDERmal route three (3) times daily as needed.)    acyclovir (ZOVIRAX) 5 % ointment Apply to affected area 6 times a day (Patient taking differently: Apply  to affected area as needed. Apply to affected area 6 times a day)    mometasone (NASONEX) 50 mcg/actuation nasal spray USE TWO SPRAY(S) IN EACH NOSTRIL DAILY (Patient taking differently: 2 Sprays by Both Nostrils route as needed.)    glucose blood VI test strips (CONTOUR NEXT STRIPS) strip qd    budesonide-formoteroL (SYMBICORT) 160-4.5 mcg/actuation HFAA Take 2 Puffs by inhalation two (2) times a day. (Patient not taking: Reported on 7/26/2022)     No current facility-administered medications for this encounter. YOU MUST ONLY TAKE THESE MEDICATIONS THE MORNING OF SURGERY WITH A SIP OF WATER: LEVOTHYROXINE  MEDICATIONS TO TAKE THE MORNING OF SURGERY ONLY IF NEEDED: ALBUTEROL INHALER, [address]  HOLD these prescription medications BEFORE Surgery: DO NOT TAKE HYDROCHLOROTHIAZIDE DAY OF SURGERY. Ask your surgeon/prescribing physician about when/if to STOP taking these medications: N/A  Stop all vitamins, herbal medicines and Aspirin containing products 7 days prior to surgery. Stop any non-steroidal anti-inflammatory drugs (i.e. Ibuprofen, Naproxen, Advil, Aleve) 3 days before surgery. You may take Tylenol. If you are currently taking Plavix, Coumadin, or any other blood-thinning/anticoagulant medication contact your prescribing physician for instructions. Preventing Infections Before and After - Your Surgery    IMPORTANT INSTRUCTIONS      You play an important role in your health and preparation for surgery. To reduce the germs on your skin you will need to shower with CHG soap (Chorhexidine gluconate 4%) two times before surgery. CHG soap (Hibiclens, Hex-A-Clens or store brand)  CHG soap will be provided at your Preadmission Testing (PAT) appointment. If you do not have a PAT appointment before surgery, you may arrange to  CHG soap from our office or purchase CHG soap at a pharmacy, grocery or department store. You need to purchase TWO 4 ounce bottles to use for your 2 showers. Steps to follow:  Scott Pinna your hair with your normal shampoo and your body with regular soap and rinse well to remove shampoo and soap from your skin. Wet a clean washcloth and turn off the shower. Put CHG soap on washcloth and apply to your entire body from the neck down. Do not use on your head, face or private parts(genitals). Do not use CHG soap on open sores, wounds or areas of skin irritation. Wash you body gently for 5 minutes. Do not wash your skin too hard. This soap does not create lather.  Pay special attention to your underarms and from your belly button to your feet. Turn the shower back on and rinse well to get CHG soap off your body. Pat your skin dry with a clean, dry towel. Do not apply lotions or moisturizer. Put on clean clothes and sleep on fresh bed sheets and do not allow pets to sleep with you. Shower with CHG soap 2 times before your surgery  The evening before your surgery  The morning of your surgery      Tips to help prevent infections after your surgery:  Protect your surgical wound from germs:  Hand washing is the most important thing you and your caregivers can do to prevent infections. Keep your bandage clean and dry! Do not touch your surgical wound. Use clean, freshly washed towels and washcloths every time you shower; do not share bath linens with others. Until your surgical wound is healed, wear clothing and sleep on bed linens each day that are clean and freshly washed. Do not allow pets to sleep in your bed with you or touch your surgical wound. Do not smoke - smoking delays wound healing. This may be a good time to stop smoking. If you have diabetes, it is important for you to manage your blood sugar levels properly before your surgery as well as after your surgery. Poorly managed blood sugar levels slow down wound healing and prevent you from healing completely. Patient Information Regarding COVID Restrictions    Day of Procedure    Please park in the parking deck or any designated visitor parking lot. Enter the facility through the Main Entrance of the hospital.  On the day of surgery, please provide the cell phone number of the person who will be waiting for you to the Patient Access representative at the time of registration. Please wear a mask on the day of your procedure. We are now allowing two designated visitors per stay. Pediatric patients may have 2 designated visitors. These two people may come in with you on the day of your procedure.   The designated visitor must also wear a mask. Once your procedure and the immediate recovery period is completed, a nurse in the recovery area will contact your designated visitor to inform them of your room number or to otherwise review other pertinent information regarding your care. Social distancing practices are to be adhered to in waiting areas and the cafeteria. The patient was contacted  in person. She verbalized understanding of all instructions does not  need reinforcement.

## 2022-07-27 LAB
ATRIAL RATE: 89 BPM
CALCULATED P AXIS, ECG09: 72 DEGREES
CALCULATED R AXIS, ECG10: 56 DEGREES
CALCULATED T AXIS, ECG11: 26 DEGREES
DIAGNOSIS, 93000: NORMAL
P-R INTERVAL, ECG05: 142 MS
Q-T INTERVAL, ECG07: 372 MS
QRS DURATION, ECG06: 82 MS
QTC CALCULATION (BEZET), ECG08: 452 MS
VENTRICULAR RATE, ECG03: 89 BPM

## 2022-08-01 ENCOUNTER — ANESTHESIA EVENT (OUTPATIENT)
Dept: MEDSURG UNIT | Age: 62
End: 2022-08-01
Payer: COMMERCIAL

## 2022-08-01 ENCOUNTER — HOSPITAL ENCOUNTER (OUTPATIENT)
Dept: NUCLEAR MEDICINE | Age: 62
Discharge: HOME | End: 2022-08-01
Attending: SURGERY
Payer: COMMERCIAL

## 2022-08-01 DIAGNOSIS — D05.11 INTRADUCTAL CARCINOMA IN SITU OF RIGHT BREAST: ICD-10-CM

## 2022-08-01 PROCEDURE — 78195 LYMPH SYSTEM IMAGING: CPT

## 2022-08-02 ENCOUNTER — ANESTHESIA (OUTPATIENT)
Dept: MEDSURG UNIT | Age: 62
End: 2022-08-02
Payer: COMMERCIAL

## 2022-08-02 ENCOUNTER — HOSPITAL ENCOUNTER (OUTPATIENT)
Age: 62
Setting detail: OBSERVATION
Discharge: HOME | End: 2022-08-03
Attending: SURGERY | Admitting: SURGERY
Payer: COMMERCIAL

## 2022-08-02 DIAGNOSIS — D05.11 INTRADUCTAL CARCINOMA IN SITU OF RIGHT BREAST: ICD-10-CM

## 2022-08-02 DIAGNOSIS — Z90.13 STATUS POST BILATERAL MASTECTOMY: Primary | ICD-10-CM

## 2022-08-02 PROBLEM — Z90.10 S/P MASTECTOMY: Status: ACTIVE | Noted: 2022-08-02

## 2022-08-02 LAB
GLUCOSE BLD STRIP.AUTO-MCNC: 139 MG/DL (ref 65–117)
GLUCOSE BLD STRIP.AUTO-MCNC: 161 MG/DL (ref 65–117)
SERVICE CMNT-IMP: ABNORMAL
SERVICE CMNT-IMP: ABNORMAL

## 2022-08-02 PROCEDURE — 77030040504 HC DRN WND MDII -B: Performed by: SURGERY

## 2022-08-02 PROCEDURE — 77030041680 HC PNCL ELECSURG SMK EVAC CNMD -B: Performed by: SURGERY

## 2022-08-02 PROCEDURE — 77030008684 HC TU ET CUF COVD -B: Performed by: NURSE ANESTHETIST, CERTIFIED REGISTERED

## 2022-08-02 PROCEDURE — 74011250637 HC RX REV CODE- 250/637: Performed by: NURSE ANESTHETIST, CERTIFIED REGISTERED

## 2022-08-02 PROCEDURE — G0378 HOSPITAL OBSERVATION PER HR: HCPCS

## 2022-08-02 PROCEDURE — 74011250636 HC RX REV CODE- 250/636: Performed by: NURSE ANESTHETIST, CERTIFIED REGISTERED

## 2022-08-02 PROCEDURE — 77030011267 HC ELECTRD BLD COVD -A: Performed by: SURGERY

## 2022-08-02 PROCEDURE — 94640 AIRWAY INHALATION TREATMENT: CPT

## 2022-08-02 PROCEDURE — C1789 PROSTHESIS, BREAST, IMP: HCPCS | Performed by: SURGERY

## 2022-08-02 PROCEDURE — 77030033138 HC SUT PGA STRATFX J&J -B: Performed by: SURGERY

## 2022-08-02 PROCEDURE — 74011250636 HC RX REV CODE- 250/636: Performed by: ANESTHESIOLOGY

## 2022-08-02 PROCEDURE — 74011000250 HC RX REV CODE- 250: Performed by: SURGERY

## 2022-08-02 PROCEDURE — 76210000037 HC AMBSU PH I REC 2 TO 2.5 HR: Performed by: SURGERY

## 2022-08-02 PROCEDURE — 82962 GLUCOSE BLOOD TEST: CPT

## 2022-08-02 PROCEDURE — 77030040356 HC CORD BPLR FRCP COVD -A: Performed by: SURGERY

## 2022-08-02 PROCEDURE — 77030034626 HC LIGASURE SM JAW SEAL OPN SURG COVD -E: Performed by: SURGERY

## 2022-08-02 PROCEDURE — C9290 INJ, BUPIVACAINE LIPOSOME: HCPCS | Performed by: SURGERY

## 2022-08-02 PROCEDURE — 77030040922 HC BLNKT HYPOTHRM STRY -A

## 2022-08-02 PROCEDURE — 77030041528 HC RETCTR RADLUX LGHTD MEDT -C: Performed by: SURGERY

## 2022-08-02 PROCEDURE — 74011000250 HC RX REV CODE- 250: Performed by: NURSE ANESTHETIST, CERTIFIED REGISTERED

## 2022-08-02 PROCEDURE — 77030002996 HC SUT SLK J&J -A: Performed by: SURGERY

## 2022-08-02 PROCEDURE — 74011250637 HC RX REV CODE- 250/637: Performed by: SURGERY

## 2022-08-02 PROCEDURE — 76060000068 HC AMB SURG ANES 4 TO 4.5 HR: Performed by: SURGERY

## 2022-08-02 PROCEDURE — 88311 DECALCIFY TISSUE: CPT

## 2022-08-02 PROCEDURE — 77030026438 HC STYL ET INTUB CARD -A: Performed by: ANESTHESIOLOGY

## 2022-08-02 PROCEDURE — 2709999900 HC NON-CHARGEABLE SUPPLY: Performed by: SURGERY

## 2022-08-02 PROCEDURE — 77030013079 HC BLNKT BAIR HGGR 3M -A: Performed by: NURSE ANESTHETIST, CERTIFIED REGISTERED

## 2022-08-02 PROCEDURE — 77030002916 HC SUT ETHLN J&J -A: Performed by: SURGERY

## 2022-08-02 PROCEDURE — 76030000008 HC AMB SURG OR TIME 4 TO 4.5: Performed by: SURGERY

## 2022-08-02 PROCEDURE — 88331 PATH CONSLTJ SURG 1 BLK 1SPC: CPT

## 2022-08-02 PROCEDURE — 74011000250 HC RX REV CODE- 250: Performed by: ANESTHESIOLOGY

## 2022-08-02 PROCEDURE — 74011000272 HC RX REV CODE- 272: Performed by: SURGERY

## 2022-08-02 PROCEDURE — 74011250636 HC RX REV CODE- 250/636: Performed by: SURGERY

## 2022-08-02 PROCEDURE — 19303 MAST SIMPLE COMPLETE: CPT | Performed by: SURGERY

## 2022-08-02 PROCEDURE — 88342 IMHCHEM/IMCYTCHM 1ST ANTB: CPT

## 2022-08-02 PROCEDURE — 77030040361 HC SLV COMPR DVT MDII -B: Performed by: SURGERY

## 2022-08-02 PROCEDURE — 77030002933 HC SUT MCRYL J&J -A: Performed by: SURGERY

## 2022-08-02 PROCEDURE — 88307 TISSUE EXAM BY PATHOLOGIST: CPT

## 2022-08-02 PROCEDURE — 77030040506 HC DRN WND MDII -A: Performed by: SURGERY

## 2022-08-02 PROCEDURE — 77030010507 HC ADH SKN DERMBND J&J -B: Performed by: SURGERY

## 2022-08-02 PROCEDURE — 77030005514 HC CATH URETH FOL14 BARD -A: Performed by: SURGERY

## 2022-08-02 PROCEDURE — 77030008462 HC STPLR SKN PROX J&J -A: Performed by: SURGERY

## 2022-08-02 PROCEDURE — 38525 BIOPSY/REMOVAL LYMPH NODES: CPT | Performed by: SURGERY

## 2022-08-02 PROCEDURE — 77030002966 HC SUT PDS J&J -A: Performed by: SURGERY

## 2022-08-02 PROCEDURE — 74011250637 HC RX REV CODE- 250/637: Performed by: ANESTHESIOLOGY

## 2022-08-02 DEVICE — BREAST TISSUE EXPANDER, SUTURE TABS, INTEGRAL INJECTION DOME, 475CC
Type: IMPLANTABLE DEVICE | Site: BREAST | Status: FUNCTIONAL
Brand: MENTOR ARTOURA PLUS, SMOOTH, HIGH PROFILE

## 2022-08-02 RX ORDER — GENTAMICIN SULFATE 40 MG/ML
80 INJECTION, SOLUTION INTRAMUSCULAR; INTRAVENOUS ONCE
Status: DISCONTINUED | OUTPATIENT
Start: 2022-08-02 | End: 2022-08-02 | Stop reason: HOSPADM

## 2022-08-02 RX ORDER — GUAIFENESIN 600 MG/1
600 TABLET, EXTENDED RELEASE ORAL
Status: DISCONTINUED | OUTPATIENT
Start: 2022-08-02 | End: 2022-08-03 | Stop reason: HOSPADM

## 2022-08-02 RX ORDER — ONDANSETRON 2 MG/ML
4 INJECTION INTRAMUSCULAR; INTRAVENOUS
Status: DISCONTINUED | OUTPATIENT
Start: 2022-08-02 | End: 2022-08-03 | Stop reason: HOSPADM

## 2022-08-02 RX ORDER — IPRATROPIUM BROMIDE AND ALBUTEROL SULFATE 2.5; .5 MG/3ML; MG/3ML
3 SOLUTION RESPIRATORY (INHALATION)
Status: DISCONTINUED | OUTPATIENT
Start: 2022-08-02 | End: 2022-08-02 | Stop reason: HOSPADM

## 2022-08-02 RX ORDER — SODIUM CHLORIDE 9 MG/ML
25 INJECTION, SOLUTION INTRAVENOUS CONTINUOUS
Status: DISCONTINUED | OUTPATIENT
Start: 2022-08-02 | End: 2022-08-02 | Stop reason: HOSPADM

## 2022-08-02 RX ORDER — DEXAMETHASONE SODIUM PHOSPHATE 4 MG/ML
INJECTION, SOLUTION INTRA-ARTICULAR; INTRALESIONAL; INTRAMUSCULAR; INTRAVENOUS; SOFT TISSUE AS NEEDED
Status: DISCONTINUED | OUTPATIENT
Start: 2022-08-02 | End: 2022-08-02 | Stop reason: HOSPADM

## 2022-08-02 RX ORDER — GLYCOPYRROLATE 0.2 MG/ML
0.2 INJECTION INTRAMUSCULAR; INTRAVENOUS
Status: DISCONTINUED | OUTPATIENT
Start: 2022-08-02 | End: 2022-08-02 | Stop reason: HOSPADM

## 2022-08-02 RX ORDER — FLUTICASONE PROPIONATE 50 MCG
2 SPRAY, SUSPENSION (ML) NASAL
Status: DISCONTINUED | OUTPATIENT
Start: 2022-08-02 | End: 2022-08-03 | Stop reason: HOSPADM

## 2022-08-02 RX ORDER — HYDROCHLOROTHIAZIDE 25 MG/1
12.5 TABLET ORAL DAILY
Status: DISCONTINUED | OUTPATIENT
Start: 2022-08-03 | End: 2022-08-03 | Stop reason: HOSPADM

## 2022-08-02 RX ORDER — SODIUM CHLORIDE, SODIUM LACTATE, POTASSIUM CHLORIDE, CALCIUM CHLORIDE 600; 310; 30; 20 MG/100ML; MG/100ML; MG/100ML; MG/100ML
1000 INJECTION, SOLUTION INTRAVENOUS CONTINUOUS
Status: DISCONTINUED | OUTPATIENT
Start: 2022-08-02 | End: 2022-08-02 | Stop reason: HOSPADM

## 2022-08-02 RX ORDER — MIDAZOLAM HYDROCHLORIDE 1 MG/ML
0.5 INJECTION, SOLUTION INTRAMUSCULAR; INTRAVENOUS
Status: DISCONTINUED | OUTPATIENT
Start: 2022-08-02 | End: 2022-08-02 | Stop reason: HOSPADM

## 2022-08-02 RX ORDER — SCOLOPAMINE TRANSDERMAL SYSTEM 1 MG/1
1 PATCH, EXTENDED RELEASE TRANSDERMAL ONCE
Status: DISCONTINUED | OUTPATIENT
Start: 2022-08-02 | End: 2022-08-03 | Stop reason: HOSPADM

## 2022-08-02 RX ORDER — MIDAZOLAM HYDROCHLORIDE 1 MG/ML
1 INJECTION, SOLUTION INTRAMUSCULAR; INTRAVENOUS AS NEEDED
Status: DISCONTINUED | OUTPATIENT
Start: 2022-08-02 | End: 2022-08-02 | Stop reason: HOSPADM

## 2022-08-02 RX ORDER — LIDOCAINE HYDROCHLORIDE 10 MG/ML
0.1 INJECTION, SOLUTION EPIDURAL; INFILTRATION; INTRACAUDAL; PERINEURAL AS NEEDED
Status: DISCONTINUED | OUTPATIENT
Start: 2022-08-02 | End: 2022-08-02 | Stop reason: HOSPADM

## 2022-08-02 RX ORDER — LEVOTHYROXINE SODIUM 25 UG/1
25 TABLET ORAL
Status: DISCONTINUED | OUTPATIENT
Start: 2022-08-03 | End: 2022-08-03 | Stop reason: HOSPADM

## 2022-08-02 RX ORDER — HYDROCODONE BITARTRATE AND ACETAMINOPHEN 5; 325 MG/1; MG/1
1 TABLET ORAL AS NEEDED
Status: DISCONTINUED | OUTPATIENT
Start: 2022-08-02 | End: 2022-08-02 | Stop reason: HOSPADM

## 2022-08-02 RX ORDER — SODIUM CHLORIDE 0.9 % (FLUSH) 0.9 %
5-40 SYRINGE (ML) INJECTION EVERY 8 HOURS
Status: DISCONTINUED | OUTPATIENT
Start: 2022-08-02 | End: 2022-08-03 | Stop reason: HOSPADM

## 2022-08-02 RX ORDER — ONDANSETRON 2 MG/ML
4 INJECTION INTRAMUSCULAR; INTRAVENOUS AS NEEDED
Status: DISCONTINUED | OUTPATIENT
Start: 2022-08-02 | End: 2022-08-02 | Stop reason: HOSPADM

## 2022-08-02 RX ORDER — ALBUTEROL SULFATE 0.83 MG/ML
2.5 SOLUTION RESPIRATORY (INHALATION)
Status: COMPLETED | OUTPATIENT
Start: 2022-08-02 | End: 2022-08-02

## 2022-08-02 RX ORDER — SODIUM CHLORIDE 0.9 % (FLUSH) 0.9 %
5-40 SYRINGE (ML) INJECTION AS NEEDED
Status: DISCONTINUED | OUTPATIENT
Start: 2022-08-02 | End: 2022-08-03 | Stop reason: HOSPADM

## 2022-08-02 RX ORDER — CEFAZOLIN SODIUM 1 G/3ML
INJECTION, POWDER, FOR SOLUTION INTRAMUSCULAR; INTRAVENOUS AS NEEDED
Status: DISCONTINUED | OUTPATIENT
Start: 2022-08-02 | End: 2022-08-02 | Stop reason: HOSPADM

## 2022-08-02 RX ORDER — ACETAMINOPHEN 325 MG/1
650 TABLET ORAL ONCE
Status: COMPLETED | OUTPATIENT
Start: 2022-08-02 | End: 2022-08-02

## 2022-08-02 RX ORDER — MORPHINE SULFATE 2 MG/ML
2 INJECTION, SOLUTION INTRAMUSCULAR; INTRAVENOUS
Status: DISCONTINUED | OUTPATIENT
Start: 2022-08-02 | End: 2022-08-02 | Stop reason: HOSPADM

## 2022-08-02 RX ORDER — SODIUM CHLORIDE, SODIUM LACTATE, POTASSIUM CHLORIDE, CALCIUM CHLORIDE 600; 310; 30; 20 MG/100ML; MG/100ML; MG/100ML; MG/100ML
INJECTION, SOLUTION INTRAVENOUS
Status: DISCONTINUED | OUTPATIENT
Start: 2022-08-02 | End: 2022-08-02 | Stop reason: HOSPADM

## 2022-08-02 RX ORDER — PHENYLEPHRINE HCL IN 0.9% NACL 0.4MG/10ML
SYRINGE (ML) INTRAVENOUS AS NEEDED
Status: DISCONTINUED | OUTPATIENT
Start: 2022-08-02 | End: 2022-08-02

## 2022-08-02 RX ORDER — HYDROMORPHONE HYDROCHLORIDE 1 MG/ML
0.2 INJECTION, SOLUTION INTRAMUSCULAR; INTRAVENOUS; SUBCUTANEOUS
Status: DISCONTINUED | OUTPATIENT
Start: 2022-08-02 | End: 2022-08-02 | Stop reason: HOSPADM

## 2022-08-02 RX ORDER — ONDANSETRON 2 MG/ML
INJECTION INTRAMUSCULAR; INTRAVENOUS AS NEEDED
Status: DISCONTINUED | OUTPATIENT
Start: 2022-08-02 | End: 2022-08-02 | Stop reason: HOSPADM

## 2022-08-02 RX ORDER — LIDOCAINE HYDROCHLORIDE 20 MG/ML
INJECTION, SOLUTION EPIDURAL; INFILTRATION; INTRACAUDAL; PERINEURAL AS NEEDED
Status: DISCONTINUED | OUTPATIENT
Start: 2022-08-02 | End: 2022-08-02 | Stop reason: HOSPADM

## 2022-08-02 RX ORDER — ALBUTEROL SULFATE 0.83 MG/ML
2.5 SOLUTION RESPIRATORY (INHALATION) AS NEEDED
Status: DISCONTINUED | OUTPATIENT
Start: 2022-08-02 | End: 2022-08-02 | Stop reason: HOSPADM

## 2022-08-02 RX ORDER — ENOXAPARIN SODIUM 100 MG/ML
40 INJECTION SUBCUTANEOUS EVERY 24 HOURS
Status: DISCONTINUED | OUTPATIENT
Start: 2022-08-02 | End: 2022-08-03 | Stop reason: HOSPADM

## 2022-08-02 RX ORDER — OXYCODONE HYDROCHLORIDE 5 MG/1
10 TABLET ORAL
Status: DISCONTINUED | OUTPATIENT
Start: 2022-08-02 | End: 2022-08-03 | Stop reason: HOSPADM

## 2022-08-02 RX ORDER — ROCURONIUM BROMIDE 10 MG/ML
INJECTION, SOLUTION INTRAVENOUS AS NEEDED
Status: DISCONTINUED | OUTPATIENT
Start: 2022-08-02 | End: 2022-08-02 | Stop reason: HOSPADM

## 2022-08-02 RX ORDER — DOXYCYCLINE HYCLATE 100 MG
100 TABLET ORAL EVERY 12 HOURS
Status: DISCONTINUED | OUTPATIENT
Start: 2022-08-02 | End: 2022-08-03 | Stop reason: HOSPADM

## 2022-08-02 RX ORDER — MORPHINE SULFATE 2 MG/ML
2 INJECTION, SOLUTION INTRAMUSCULAR; INTRAVENOUS
Status: DISCONTINUED | OUTPATIENT
Start: 2022-08-02 | End: 2022-08-03 | Stop reason: HOSPADM

## 2022-08-02 RX ORDER — ACETAMINOPHEN 500 MG
1000 TABLET ORAL EVERY 6 HOURS
Status: DISCONTINUED | OUTPATIENT
Start: 2022-08-02 | End: 2022-08-03 | Stop reason: HOSPADM

## 2022-08-02 RX ORDER — SUCCINYLCHOLINE CHLORIDE 20 MG/ML
INJECTION INTRAMUSCULAR; INTRAVENOUS AS NEEDED
Status: DISCONTINUED | OUTPATIENT
Start: 2022-08-02 | End: 2022-08-02 | Stop reason: HOSPADM

## 2022-08-02 RX ORDER — OXYCODONE HYDROCHLORIDE 5 MG/1
5 TABLET ORAL
Status: DISCONTINUED | OUTPATIENT
Start: 2022-08-02 | End: 2022-08-03 | Stop reason: HOSPADM

## 2022-08-02 RX ORDER — SCOLOPAMINE TRANSDERMAL SYSTEM 1 MG/1
1 PATCH, EXTENDED RELEASE TRANSDERMAL
Status: DISCONTINUED | OUTPATIENT
Start: 2022-08-02 | End: 2022-08-03 | Stop reason: HOSPADM

## 2022-08-02 RX ORDER — NORETHINDRONE AND ETHINYL ESTRADIOL 0.5-0.035
KIT ORAL AS NEEDED
Status: DISCONTINUED | OUTPATIENT
Start: 2022-08-02 | End: 2022-08-02 | Stop reason: HOSPADM

## 2022-08-02 RX ORDER — DEXTROSE, SODIUM CHLORIDE, SODIUM LACTATE, POTASSIUM CHLORIDE, AND CALCIUM CHLORIDE 5; .6; .31; .03; .02 G/100ML; G/100ML; G/100ML; G/100ML; G/100ML
50 INJECTION, SOLUTION INTRAVENOUS CONTINUOUS
Status: DISCONTINUED | OUTPATIENT
Start: 2022-08-02 | End: 2022-08-03 | Stop reason: HOSPADM

## 2022-08-02 RX ORDER — DIPHENHYDRAMINE HYDROCHLORIDE 50 MG/ML
12.5 INJECTION, SOLUTION INTRAMUSCULAR; INTRAVENOUS AS NEEDED
Status: DISCONTINUED | OUTPATIENT
Start: 2022-08-02 | End: 2022-08-02 | Stop reason: HOSPADM

## 2022-08-02 RX ORDER — MIDAZOLAM HYDROCHLORIDE 1 MG/ML
INJECTION, SOLUTION INTRAMUSCULAR; INTRAVENOUS AS NEEDED
Status: DISCONTINUED | OUTPATIENT
Start: 2022-08-02 | End: 2022-08-02 | Stop reason: HOSPADM

## 2022-08-02 RX ORDER — HYDROMORPHONE HYDROCHLORIDE 2 MG/ML
INJECTION, SOLUTION INTRAMUSCULAR; INTRAVENOUS; SUBCUTANEOUS AS NEEDED
Status: DISCONTINUED | OUTPATIENT
Start: 2022-08-02 | End: 2022-08-02 | Stop reason: HOSPADM

## 2022-08-02 RX ORDER — FENTANYL CITRATE 50 UG/ML
25 INJECTION, SOLUTION INTRAMUSCULAR; INTRAVENOUS
Status: DISCONTINUED | OUTPATIENT
Start: 2022-08-02 | End: 2022-08-02 | Stop reason: HOSPADM

## 2022-08-02 RX ORDER — PROPOFOL 10 MG/ML
INJECTION, EMULSION INTRAVENOUS AS NEEDED
Status: DISCONTINUED | OUTPATIENT
Start: 2022-08-02 | End: 2022-08-02 | Stop reason: HOSPADM

## 2022-08-02 RX ORDER — FENTANYL CITRATE 50 UG/ML
INJECTION, SOLUTION INTRAMUSCULAR; INTRAVENOUS AS NEEDED
Status: DISCONTINUED | OUTPATIENT
Start: 2022-08-02 | End: 2022-08-02 | Stop reason: HOSPADM

## 2022-08-02 RX ORDER — ROPIVACAINE HYDROCHLORIDE 5 MG/ML
30 INJECTION, SOLUTION EPIDURAL; INFILTRATION; PERINEURAL AS NEEDED
Status: DISCONTINUED | OUTPATIENT
Start: 2022-08-02 | End: 2022-08-02 | Stop reason: HOSPADM

## 2022-08-02 RX ORDER — ESMOLOL HYDROCHLORIDE 10 MG/ML
INJECTION INTRAVENOUS AS NEEDED
Status: DISCONTINUED | OUTPATIENT
Start: 2022-08-02 | End: 2022-08-02 | Stop reason: HOSPADM

## 2022-08-02 RX ORDER — FENTANYL CITRATE 50 UG/ML
50 INJECTION, SOLUTION INTRAMUSCULAR; INTRAVENOUS AS NEEDED
Status: DISCONTINUED | OUTPATIENT
Start: 2022-08-02 | End: 2022-08-02 | Stop reason: HOSPADM

## 2022-08-02 RX ORDER — IPRATROPIUM BROMIDE AND ALBUTEROL SULFATE 2.5; .5 MG/3ML; MG/3ML
3 SOLUTION RESPIRATORY (INHALATION)
Status: DISCONTINUED | OUTPATIENT
Start: 2022-08-02 | End: 2022-08-03 | Stop reason: HOSPADM

## 2022-08-02 RX ADMIN — MIDAZOLAM 1 MG: 1 INJECTION INTRAMUSCULAR; INTRAVENOUS at 10:50

## 2022-08-02 RX ADMIN — DEXAMETHASONE SODIUM PHOSPHATE 8 MG: 4 INJECTION, SOLUTION INTRAMUSCULAR; INTRAVENOUS at 07:48

## 2022-08-02 RX ADMIN — SODIUM CHLORIDE, POTASSIUM CHLORIDE, SODIUM LACTATE AND CALCIUM CHLORIDE: 600; 310; 30; 20 INJECTION, SOLUTION INTRAVENOUS at 09:54

## 2022-08-02 RX ADMIN — ACETAMINOPHEN 1000 MG: 500 TABLET ORAL at 20:44

## 2022-08-02 RX ADMIN — LIDOCAINE HYDROCHLORIDE 100 MG: 20 INJECTION, SOLUTION EPIDURAL; INFILTRATION; INTRACAUDAL; PERINEURAL at 07:45

## 2022-08-02 RX ADMIN — HYDROMORPHONE HYDROCHLORIDE 0.25 MG: 2 INJECTION, SOLUTION INTRAMUSCULAR; INTRAVENOUS; SUBCUTANEOUS at 10:18

## 2022-08-02 RX ADMIN — ONDANSETRON HYDROCHLORIDE 4 MG: 2 INJECTION, SOLUTION INTRAMUSCULAR; INTRAVENOUS at 11:17

## 2022-08-02 RX ADMIN — ESMOLOL HYDROCHLORIDE 10 MG: 10 INJECTION, SOLUTION INTRAVENOUS at 09:10

## 2022-08-02 RX ADMIN — ACETAMINOPHEN 1000 MG: 500 TABLET ORAL at 14:56

## 2022-08-02 RX ADMIN — ACETAMINOPHEN 650 MG: 325 TABLET ORAL at 06:39

## 2022-08-02 RX ADMIN — FENTANYL CITRATE 100 MCG: 50 INJECTION, SOLUTION INTRAMUSCULAR; INTRAVENOUS at 07:45

## 2022-08-02 RX ADMIN — EPHEDRINE SULFATE 25 MG: 50 INJECTION INTRAVENOUS at 08:15

## 2022-08-02 RX ADMIN — HYDROMORPHONE HYDROCHLORIDE 0.25 MG: 2 INJECTION, SOLUTION INTRAMUSCULAR; INTRAVENOUS; SUBCUTANEOUS at 09:39

## 2022-08-02 RX ADMIN — ARFORMOTEROL TARTRATE: 15 SOLUTION RESPIRATORY (INHALATION) at 19:07

## 2022-08-02 RX ADMIN — SODIUM CHLORIDE, PRESERVATIVE FREE 10 ML: 5 INJECTION INTRAVENOUS at 14:57

## 2022-08-02 RX ADMIN — PROPOFOL 200 MG: 10 INJECTION, EMULSION INTRAVENOUS at 07:45

## 2022-08-02 RX ADMIN — SODIUM CHLORIDE, POTASSIUM CHLORIDE, SODIUM LACTATE AND CALCIUM CHLORIDE: 600; 310; 30; 20 INJECTION, SOLUTION INTRAVENOUS at 07:29

## 2022-08-02 RX ADMIN — MORPHINE SULFATE 2 MG: 2 INJECTION, SOLUTION INTRAMUSCULAR; INTRAVENOUS at 14:58

## 2022-08-02 RX ADMIN — DOXYCYCLINE HYCLATE 100 MG: 100 TABLET, COATED ORAL at 18:34

## 2022-08-02 RX ADMIN — IPRATROPIUM BROMIDE AND ALBUTEROL SULFATE 3 ML: .5; 3 SOLUTION RESPIRATORY (INHALATION) at 16:09

## 2022-08-02 RX ADMIN — ROCURONIUM BROMIDE 10 MG: 10 SOLUTION INTRAVENOUS at 07:45

## 2022-08-02 RX ADMIN — SUCCINYLCHOLINE CHLORIDE 200 MG: 20 INJECTION, SOLUTION INTRAMUSCULAR; INTRAVENOUS at 07:45

## 2022-08-02 RX ADMIN — MIDAZOLAM 1 MG: 1 INJECTION INTRAMUSCULAR; INTRAVENOUS at 07:28

## 2022-08-02 RX ADMIN — HYDROMORPHONE HYDROCHLORIDE 0.2 MG: 1 INJECTION, SOLUTION INTRAMUSCULAR; INTRAVENOUS; SUBCUTANEOUS at 12:54

## 2022-08-02 RX ADMIN — CEFAZOLIN 2 G: 1 INJECTION, POWDER, FOR SOLUTION INTRAMUSCULAR; INTRAVENOUS at 16:26

## 2022-08-02 RX ADMIN — SODIUM CHLORIDE, SODIUM LACTATE, POTASSIUM CHLORIDE, CALCIUM CHLORIDE AND DEXTROSE MONOHYDRATE 50 ML/HR: 5; 600; 310; 30; 20 INJECTION, SOLUTION INTRAVENOUS at 14:55

## 2022-08-02 RX ADMIN — CEFAZOLIN 2 G: 330 INJECTION, POWDER, FOR SOLUTION INTRAMUSCULAR; INTRAVENOUS at 07:52

## 2022-08-02 RX ADMIN — MIDAZOLAM 1 MG: 1 INJECTION INTRAMUSCULAR; INTRAVENOUS at 07:25

## 2022-08-02 RX ADMIN — ALBUTEROL SULFATE 2.5 MG: 2.5 SOLUTION RESPIRATORY (INHALATION) at 07:20

## 2022-08-02 RX ADMIN — MORPHINE SULFATE INJ 2 MG/ML 50 MG: 2 SOLUTION at 10:50

## 2022-08-02 RX ADMIN — IPRATROPIUM BROMIDE AND ALBUTEROL SULFATE 3 ML: .5; 3 SOLUTION RESPIRATORY (INHALATION) at 19:07

## 2022-08-02 RX ADMIN — MORPHINE SULFATE INJ 2 MG/ML 50 MG: 2 SOLUTION at 08:46

## 2022-08-02 RX ADMIN — ENOXAPARIN SODIUM 40 MG: 100 INJECTION SUBCUTANEOUS at 14:56

## 2022-08-02 RX ADMIN — OXYCODONE 5 MG: 5 TABLET ORAL at 20:44

## 2022-08-02 NOTE — OP NOTES
Date of Surgery: 8/2/2022     Preoperative Diagnosis:  1. Right breast ductal carcinoma in situ  2. Acquired absence of bilateral breasts     Postoperative Diagnosis:  Same     Procedure Performed. 1. Right breast reconstruction with tissue expander   2. Left breast reconstruction with tissue expander     Surgeon: Justin Galvan MD     Surgical Assistant: Slim Alejandre SA     Anesthesia: general     EBL: less than 100 cc     KAREN drains: KAREN x1 R breast, KAREN x1 L breast     Complications: none     Findings:   R breast weight 686 gm  L breast weight 681 gm     Implants:  R breast:   [facility] [place], [place] [id number]     L breast:   Alba Art[employer] Plus [place], [place] [id number]     Indications: This is a 57year-old female with history of right ductal carcinoma in situ. She presents for bilateral mastectomy with reconstruction. She ultimately desires autologous reconstruction and presents today for expander reconstruction. She understands the risks and alternate treatments. Risks include infection, wound healing issues, seroma, hematoma, asymmetry, and need for additional procedures. She understands that there are no guarantees or warrantees. Description of Procedure: The patient was greeted in the preoperative holding area. Informed consent was reviewed and all questions were answered. She was marked in the upright position. She was given preoperative antibiotics. She was brought back to the operating theater and laid supine on the operating table. She was sedated and intubated. Her surgical sites were prepped and draped in the usual sterile fashion. A formal timeout was performed confirming the patient, operative site, operation to be performed, allergies, antibiotic status, medications on the field and all members of the team.     Dr. Trey Mcgee then performed her portion of the surgery. For more details, please refer to her dictated operative report.      The patient was then re-prepped and draped over her initial drapes. New instrument trays and light handles were opened. A new timeout was performed. I then examined both breast pockets. I irrigated the right breast pocket and achieved hemostasis. I then irrigated the left breast pocket and achieved hemostasis. 20 cc of exparel had been expanded with 20 cc of bupivacaine 0.25% and 20 cc of normal saline. 39 cc of this mixture was infiltrated into the pectoralis and chest wall of the right side, and 30 cc was infiltrated into the left. I then re-established the lateral fold of the right breast with a running 2-0 PDS stratafix suture. I turned my attention to the left breast. I re-established the lateral breast fold again with a running 2-0 stratafix PDS suture. I then irrigated each breast pocket with 3 washes of normal saline followed by 3 washes of double antibiotic irrigation (vancomycin, gentamicin). I then placed a #15 KAREN drain in each side and secured it in place with a 2-0 nylon suture. I then placed a splash of betadine in each breast pocket and covered the entire chest wall, including the nipple areolar complexes, with an ioban sheet. At this point, everyone changed their gloves. I then opened a [place] Artoura tissue expander and irrigated it with antibiotic irrigation. I removed 75 cc air from the expander. Using a minimal-touch technique, I placed the expander into the right breast. I tacked the tabs down with 2-0 PDS suture. Once satisfied with the placement of the implant, I closed the capsule with 3-0 monocryl sutures, followed by deep dermal 3-0 monocryl sutures and a running subcuticular 3-0 monocryl stratafix suture. I then opened another [place] Artoura tissue expander and irrigated it with antibiotic irrigation. I removed 75 cc air from the expander. Using a minimal-touch technique, I placed the expander into the left  breast. I tacked the tabs down with 2-0 PDS suture.  Once satisfied with the placement of the implant, I closed the capsule with 3-0 monocryl sutures, followed by deep dermal 3-0 monocryl sutures and a running subcuticular 3-0 monocryl stratafix suture. All sharp, sponge and instrument counts were correct prior to the closure of the wounds. I then applied dermabond glue to the incisions after cleansing them. Patient was extubated without event and transferred to the PACU in stable condition. Patient tolerated procedure well.

## 2022-08-02 NOTE — PROGRESS NOTES
Problem: Falls - Risk of  Goal: *Absence of Falls  Description: Document Eufemia Padilla Fall Risk and appropriate interventions in the flowsheet. Outcome: Progressing Towards Goal  Note: Fall Risk Interventions:  Mobility Interventions: Patient to call before getting OOB         Medication Interventions: Teach patient to arise slowly                   Problem: Patient Education: Go to Patient Education Activity  Goal: Patient/Family Education  Outcome: Progressing Towards Goal     Problem: Pain  Goal: *Control of Pain  Outcome: Progressing Towards Goal     Problem: Patient Education: Go to Patient Education Activity  Goal: Patient/Family Education  Outcome: Progressing Towards Goal     Problem: Pressure Injury - Risk of  Goal: *Prevention of pressure injury  Description: Document Galdino Scale and appropriate interventions in the flowsheet. Outcome: Progressing Towards Goal  Note: Pressure Injury Interventions:             Activity Interventions: Increase time out of bed         Nutrition Interventions: Document food/fluid/supplement intake                     Problem: Patient Education: Go to Patient Education Activity  Goal: Patient/Family Education  Outcome: Progressing Towards Goal     Problem: Infection - Risk of, Surgical Site Infection  Goal: *Absence of surgical site infection signs and symptoms  Outcome: Progressing Towards Goal     Problem: Patient Education: Go to Patient Education Activity  Goal: Patient/Family Education  Outcome: Progressing Towards Goal

## 2022-08-02 NOTE — BRIEF OP NOTE
Brief Postoperative Note    Patient: Kami Maria  YOB: 1960  MRN: [id number]    Date of Procedure: 8/2/2022     Pre-Op Diagnosis: RIGHT BREAST CANCER    Post-Op Diagnosis: Same as preoperative diagnosis. Procedure(s):  BILATERAL SKIN SPARING MASTECTOMIES, RIGHT BREAST SENTINEL NODE BIOPSY  BILATERAL BREAST RECONSTRUCTION WITH TISSUE EXPANDERS    Surgeon(s):   MD Hamzah Figueroa MD    Surgical Assistant: Surg Asst-1: Lester SPARROW    Anesthesia: General     Estimated Blood Loss (mL): less than 50     Complications: None    Specimens:   ID Type Source Tests Collected by Time Destination   1 : LEFT PROPHYLACTIC MASTECTOMY Fresh Breast  Julianne Levin MD 8/2/2022 2755 Pathology   2 : RIGHT AXILLARY SENTINEL NODE #1 Frozen Section Lymph Node  Julianne Levin MD 8/2/2022 0848 Pathology   3 : RIGHT MASTECTOMY Fresh Breast  Julianne Levin MD 8/2/2022 6734 Pathology        Implants: * No implants in log *    Drains: * No LDAs found *    Findings: sln negative on frozen    Electronically Signed by Denice Black MD on 8/2/2022 at 9:16 AM    Dictated stat

## 2022-08-02 NOTE — OP NOTES
[address] [facility]  OPERATIVE REPORT    Name:  Aime Farley  MR#:  [id number]  :  1960  ACCOUNT #:  [de-identified]  DATE OF SERVICE:  2022      PREOPERATIVE DIAGNOSIS:  Right breast ductal carcinoma in situ. POSTOPERATIVE DIAGNOSIS:  Right breast ductal carcinoma in situ. PROCEDURES PERFORMED:  Bilateral skin-sparing mastectomies, right deep axillary sentinel node biopsy. SURGEON:  Chente Paz MD    ASSISTANT:  Lazaro De Leon    ANESTHESIA:  General.    COMPLICATIONS:  None. SPECIMENS REMOVED:  1. Left prophylactic mastectomy. 2.  Right axillary sentinel node #1.  3.  Right mastectomy. IMPLANTS:  None. ESTIMATED BLOOD LOSS:  Less than 50. DRAINS:  None. FINDINGS:  [place] lymph node negative on frozen. INDICATIONS FOR PROCEDURE:  A 69-year-old female with right breast DCIS. She wished to have bilateral mastectomies and right deep axillary sentinel node biopsy and she was having reconstruction by Dr. Grace Duran.    PROCEDURE:  The patient initially went to Nuclear Medicine where technetium-99 was injected in the right breast.  She tolerated this well. She went to the preop holding area where surgical site was marked by surgeon and informed consent was obtained. She was taken to the operating room and laid in supine position where general endotracheal anesthesia was induced. A Amaral catheter was placed without complication. Bilateral breasts were prepped and draped in the usual fashion. A time-out was performed. Attention was turned to the left breast where an elliptical incision was made around the nipple-areolar complex with a 10-blade. Bovie cautery was used to dissect superior medial, inferior and lateral skin flaps. The breast was removed in total from the chest wall in a medial-to-lateral fashion incorporating the pectoral fascia and marked short superior, long stitch lateral.  This was weighed and sent off for permanent pathology.   Next, the right breast in the same manner, an elliptical incision was made with a 10-blade around the nipple-areolar complex. Bovie cautery was used to dissect superior and lateral skin flaps. The axillary fascia was incised with Bovie. One sentinel node was identified using Neoprobe guidance, excised with LigaSure and sent for permanent pathology. Further dissection of medial, inferior skin flaps were carried out with Bovie cautery. The breast was removed from the chest wall incorporating the pectoral fascia in a medial-to-lateral fashion with Bovie cautery and the breast removed, marked short superior, long stitch lateral.  Moist laps were packed in the cavity in preparation for Dr. Sumeet Daniel portion of the procedure. All sponge and instrument counts were correct. The patient went to Recovery in stable condition.         MD BOAZ Biggs/[email or web address]  D:  08/02/2022 9:19  T:  08/02/2022 10:35  JOB #:  [id number]

## 2022-08-02 NOTE — ROUTINE PROCESS
Patient: Natali Taylor MRN: [id number]  SSN: [id number]   YOB: 1960  Age: 57 y.o. Sex: female     Patient is status post Procedure(s):  BILATERAL SKIN SPARING MASTECTOMIES, RIGHT BREAST SENTINEL NODE BIOPSY  BILATERAL BREAST RECONSTRUCTION WITH TISSUE EXPANDERS.     Surgeon(s) and Role:  Panel 1:     * Donato Cox MD - Primary  Panel 2:     * Mina Iraheta MD - Primary    Local/Dose/Irrigation:  SEE MAR                  Peripheral IV 08/02/22 Left;Posterior Wrist (Active)          Geovanni-Lamas Drain 08/02/22 Right Breast (Active)       Geovanni-Lamas Drain 08/02/22 Left Breast (Active)      Airway - Endotracheal Tube 08/02/22 Oral (Active)                   Dressing/Packing:  Incision 08/02/22 Breast-Dressing/Treatment:  (dermabond, abds, biopatches, gauze, tegaderms, xl bra) (08/02/22 1100)    Splint/Cast:  ]    Other:

## 2022-08-02 NOTE — PROGRESS NOTES
Timbo Sheikh provided to patient/representative with verbal explanation of the notice. Time allotted for questions regarding the notice. Patient /representative provided a completed copy of the VOON notice. Copy placed on bedside chart.   Sarah Mahmood, Care Management Assistant

## 2022-08-02 NOTE — PERIOP NOTES
0730:  Mild wheezing noted on assessment. Pt has history of asthma. Also states she had covid 3-4 weeks ago after a cruise. In July. Dr Johanna Veliz notified, Albuterol treatment given. Dr Janet Cormier notified at the bedside. Pt states she has been having no symptoms, no shourtness of breath and has not needed her nebs in a month. Her major symptom with covid was sinus congestion. Sacral pad in place for surgery.

## 2022-08-02 NOTE — PROGRESS NOTES
TRANSFER - IN REPORT:    Verbal report received from LaRN(name) on Juneien 141  being received from Squeeze[facility]M) for routine progression of care      Report consisted of patients Situation, Background, Assessment and   Recommendations(SBAR). Information from the following report(s) SBAR, Kardex, MAR, and Recent Results was reviewed with the receiving nurse. Opportunity for questions and clarification was provided. Assessment completed upon patients arrival to unit and care assumed.

## 2022-08-02 NOTE — INTERVAL H&P NOTE
Update History & Physical    The Patient's History and Physical of July 6,   Bilateral skin sparing mastectomies, right sln biopsy  was reviewed with the patient and I examined the patient. There was no change. The surgical site was confirmed by the patient and me. Plan:  The risk, benefits, expected outcome, and alternative to the recommended procedure have been discussed with the patient. Patient understands and wants to proceed with the procedure.     Electronically signed by Parveen Root MD on 8/2/2022 at 7:27 AM

## 2022-08-02 NOTE — ANESTHESIA POSTPROCEDURE EVALUATION
Post-Anesthesia Evaluation and Assessment    Patient: Be Garcias MRN: [id number]  SSN: [id number]    YOB: 1960  Age: 57 y.o. Sex: female      I have evaluated the patient and they are stable and ready for discharge from the PACU. Obstructive Sleep Apnea education discussed including taking post-op analgesics as prescribed, awareness of how opioid analgesics      affect sleep apnea, having a caregiver stay with you for 24 hrs., sleep with your CPAP machine in use, and follow up with a      physician who can perform sleep studies. Pt understands and agrees. Cardiovascular Function/Vital Signs  Visit Vitals  BP (!) 142/89   Pulse (!) 115   Temp 36.7 °C (98.1 °F)   Resp 14   Ht 5' 5\" (1.651 m)   Wt 98 kg (216 lb)   SpO2 95%   BMI 35.94 kg/m²       Patient is status post General anesthesia for Procedure(s):  BILATERAL SKIN SPARING MASTECTOMIES, RIGHT BREAST SENTINEL NODE BIOPSY  BILATERAL BREAST RECONSTRUCTION WITH TISSUE EXPANDERS. Nausea/Vomiting: None    Postoperative hydration reviewed and adequate. Pain:      Managed    Neurological Status:   Neuro (WDL): Within Defined Limits (08/02/22 0758)   At baseline    Mental Status, Level of Consciousness: Alert and  oriented to person, place, and time    Pulmonary Status:   O2 Device: Nasal cannula (08/02/22 1156)   Adequate oxygenation and airway patent    Complications related to anesthesia: None    Post-anesthesia assessment completed. No concerns    Signed By: Giovanna Diaz MD     August 2, 2022              Procedure(s):  BILATERAL SKIN SPARING MASTECTOMIES, RIGHT BREAST SENTINEL NODE BIOPSY  BILATERAL BREAST RECONSTRUCTION WITH TISSUE EXPANDERS.     total IV anesthesia, general    <BSHSIANPOST>    INITIAL Post-op Vital signs:   Vitals Value Taken Time   /89 08/02/22 1156   Temp 36.7 °C (98.1 °F) 08/02/22 1156   Pulse 115 08/02/22 1156   Resp 14 08/02/22 1156   SpO2 95 % 08/02/22 1156

## 2022-08-02 NOTE — ANESTHESIA PREPROCEDURE EVALUATION
Relevant Problems   RESPIRATORY SYSTEM   (+) Asthma   (+) Gasping for breath   (+) PITER (obstructive sleep apnea)      CARDIOVASCULAR   (+) Hypertension      GASTROINTESTINAL   (+) Steatosis of liver      PERSONAL HX & FAMILY HX OF CANCER   (+) Breast cancer, left (HCC)       Anesthetic History   No history of anesthetic complications            Review of Systems / Medical History  Patient summary reviewed, nursing notes reviewed and pertinent labs reviewed    Pulmonary        Sleep apnea: CPAP    Asthma        Neuro/Psych   Within defined limits           Cardiovascular    Hypertension          Hyperlipidemia    Exercise tolerance: >4 METS     GI/Hepatic/Renal     GERD      Liver disease     Endo/Other      Hypothyroidism  Obesity     Other Findings              Physical Exam    Airway  Mallampati: II  TM Distance: 4 - 6 cm  Neck ROM: normal range of motion   Mouth opening: Normal     Cardiovascular  Regular rate and rhythm,  S1 and S2 normal,  no murmur, click, rub, or gallop             Dental  No notable dental hx       Pulmonary  Breath sounds clear to auscultation               Abdominal  GI exam deferred       Other Findings            Anesthetic Plan    ASA: 3  Anesthesia type: general          Induction: Intravenous  Anesthetic plan and risks discussed with: Patient

## 2022-08-02 NOTE — PERIOP NOTES
TRANSFER - OUT REPORT:    Verbal report given to Maryellen (name) on Natali Taylor  being transferred to room 311 (unit) for routine progression of care       Report consisted of patients Situation, Background, Assessment and   Recommendations(SBAR). Time Pre op antibiotic given:2 grams ancef at 5695  Anesthesia Stop time: 8053  Amaral Present on Transfer to floor:yes  Order for Amaral on Chart- yes  Discharge Prescriptions with Chart: no     Information from the following report(s) SBAR, MAR, Recent Results, and Cardiac Rhythm sinus tach  was reviewed with the receiving nurse. Opportunity for questions and clarification was provided. Is the patient on 02? YES       L/Min 2       Other    Is the patient on a monitor? NO    Is the nurse transporting with the patient? NO    Surgical Waiting Area notified of patient's transfer from PACU?  YES      The following personal items collected during your admission accompanied patient upon transfer:   Dental Appliance: Dental Appliances: None  Vision:    Hearing Aid:    Jewelry:    Clothing:    Other Valuables:    Valuables sent to safe:       Clothing bag x 2 down with pt

## 2022-08-03 VITALS
DIASTOLIC BLOOD PRESSURE: 72 MMHG | TEMPERATURE: 98.1 F | OXYGEN SATURATION: 97 % | SYSTOLIC BLOOD PRESSURE: 122 MMHG | HEIGHT: 65 IN | HEART RATE: 106 BPM | WEIGHT: 216 LBS | RESPIRATION RATE: 18 BRPM | BODY MASS INDEX: 35.99 KG/M2

## 2022-08-03 PROCEDURE — 74011000250 HC RX REV CODE- 250: Performed by: SURGERY

## 2022-08-03 PROCEDURE — 96372 THER/PROPH/DIAG INJ SC/IM: CPT

## 2022-08-03 PROCEDURE — 94640 AIRWAY INHALATION TREATMENT: CPT

## 2022-08-03 PROCEDURE — 74011250636 HC RX REV CODE- 250/636: Performed by: SURGERY

## 2022-08-03 PROCEDURE — G0378 HOSPITAL OBSERVATION PER HR: HCPCS

## 2022-08-03 PROCEDURE — 74011250637 HC RX REV CODE- 250/637: Performed by: SURGERY

## 2022-08-03 RX ORDER — DOXYCYCLINE HYCLATE 100 MG
100 TABLET ORAL EVERY 12 HOURS
Qty: 42 TABLET | Refills: 0 | Status: SHIPPED | OUTPATIENT
Start: 2022-08-03 | End: 2022-09-21

## 2022-08-03 RX ORDER — OXYCODONE HYDROCHLORIDE 5 MG/1
5 TABLET ORAL
Qty: 20 TABLET | Refills: 0 | Status: SHIPPED | OUTPATIENT
Start: 2022-08-03 | End: 2022-08-06

## 2022-08-03 RX ADMIN — ENOXAPARIN SODIUM 40 MG: 100 INJECTION SUBCUTANEOUS at 12:03

## 2022-08-03 RX ADMIN — IPRATROPIUM BROMIDE AND ALBUTEROL SULFATE 3 ML: .5; 3 SOLUTION RESPIRATORY (INHALATION) at 03:31

## 2022-08-03 RX ADMIN — CEFAZOLIN 2 G: 1 INJECTION, POWDER, FOR SOLUTION INTRAMUSCULAR; INTRAVENOUS at 00:21

## 2022-08-03 RX ADMIN — OXYCODONE 10 MG: 5 TABLET ORAL at 06:06

## 2022-08-03 RX ADMIN — ACETAMINOPHEN 1000 MG: 500 TABLET ORAL at 03:02

## 2022-08-03 RX ADMIN — HYDROCHLOROTHIAZIDE 12.5 MG: 25 TABLET ORAL at 09:17

## 2022-08-03 RX ADMIN — ARFORMOTEROL TARTRATE: 15 SOLUTION RESPIRATORY (INHALATION) at 07:42

## 2022-08-03 RX ADMIN — OXYCODONE 10 MG: 5 TABLET ORAL at 00:20

## 2022-08-03 RX ADMIN — IPRATROPIUM BROMIDE AND ALBUTEROL SULFATE 3 ML: .5; 3 SOLUTION RESPIRATORY (INHALATION) at 12:09

## 2022-08-03 RX ADMIN — ACETAMINOPHEN 1000 MG: 500 TABLET ORAL at 09:15

## 2022-08-03 RX ADMIN — OXYCODONE 5 MG: 5 TABLET ORAL at 11:10

## 2022-08-03 RX ADMIN — IPRATROPIUM BROMIDE AND ALBUTEROL SULFATE 3 ML: .5; 3 SOLUTION RESPIRATORY (INHALATION) at 07:42

## 2022-08-03 RX ADMIN — DOXYCYCLINE HYCLATE 100 MG: 100 TABLET, COATED ORAL at 09:16

## 2022-08-03 NOTE — PROGRESS NOTES
Plastic surgery progress note    JULIANNE overnight. Pain controlled    Visit Vitals  /63 (BP 1 Location: Right upper arm, BP Patient Position: At rest)   Pulse 95   Temp 98.2 °F (36.8 °C)   Resp 16   Ht 5' 5\" (1.651 m)   Wt 98 kg (216 lb)   SpO2 96%   BMI 35.94 kg/m²     Gen: NAD, comfortable  Doing respiratory treatment  CV: reg rate and rhythm  Resp: normal resp effort  Breast: incisions c/d/I. No surrounding erythema or underlying fluid collections.  KAREN s/s    POD 1 s/p b/l mastectomy, TE recon    - pain control: tylenol, ibuprofen, oxy  - DVT ppx: lovenox  - breathing treatments  - surgical bra  - incision cares  - drain cares  - doxycycline  - d/c today  - follow up next Wednesday

## 2022-08-03 NOTE — PROGRESS NOTES
Bedside and Verbal shift change report given to Skylar Lugo RN (oncoming nurse) by Jolene Bonilla RN/ ISHAN Mcmillan RN (offgoing nurse). Report included the following information SBAR, Kardex, Intake/Output, MAR, and Recent Results.

## 2022-08-03 NOTE — PROGRESS NOTES
0010: Bedside and Verbal shift change report given to Debora Chow RN (oncoming nurse) by Bakari Corona (offgoing nurse). Report included the following information SBAR, Kardex, Intake/Output, MAR, and Recent Results.

## 2022-08-03 NOTE — PROGRESS NOTES
Bedside and Verbal shift change report given to MIRTHA Patiño RN (oncoming nurse) by Duy Ross RN (offgoing nurse). Report included the following information SBAR and Kardex. Rx Refill Note  Requested Prescriptions     Pending Prescriptions Disp Refills   • ALPRAZolam (XANAX) 0.5 MG tablet 30 tablet 5     Sig: Take 1 tablet by mouth 3 (Three) Times a Day As Needed for Anxiety.   • amphetamine-dextroamphetamine XR (Adderall XR) 10 MG 24 hr capsule 60 capsule 0     Sig: Take 1 capsule by mouth 2 (Two) Times a Day      Last office visit with prescribing clinician: 11/10/2021      Next office visit with prescribing clinician: 5/19/2022            Kandice Dee MA  12/07/21, 08:50 EST

## 2022-08-04 ENCOUNTER — PATIENT OUTREACH (OUTPATIENT)
Dept: CASE MANAGEMENT | Age: 62
End: 2022-08-04

## 2022-08-04 NOTE — Clinical Note
Please see patient outreach dated 8-4-22. Patient does not have KENDAL appointment scheduled at this time.

## 2022-08-04 NOTE — PROGRESS NOTES
Care Transitions Initial Call    Call within 2 business days of discharge: Yes. Patient: Alfa Benavides Patient : 1960 MRN: [id number]    Last Discharge  [address]       Date Complaint Diagnosis Description Type Department Provider    22  Status post bilateral mastectomy . .. Admission (Discharged) Claire Montalvo MD          Was this an external facility discharge? No.     Challenges to be reviewed by the provider   Additional needs identified to be addressed with provider: Yes.  - Patient is requesting a refill on scopolamine transderm patch 1mg over 3 days pt3d to be sent to pharmacy. Method of communication with provider:  chart routing to PCP. Component      Latest Ref Rng & Units 2022 3/14/2022           3:19 PM  3:00 PM   MCV      80.0 - 99.0 FL 78.0 (L) 80.9     Component      Latest Ref Rng & Units 2022          12:02 PM  7:01 AM   GLUCOSE,FAST - POC      65 - 117 mg/dL 161 (H) 139 (H)     Component      Latest Ref Rng & Units 2022           3:19 PM   Glucose      65 - 100 mg/dL 139 (H)     Component      Latest Ref Rng & Units 2022           3:19 PM   GFR est non-AA      >60 ml/min/1.73m2 58 (L)     Discussed COVID-19 related testing which was  not completed during this admission. Advance Care Planning:   Does patient have an Advance Directive: not on file; education provided. Inpatient Readmission Risk score: No data recorded  Was this a readmission? No.   Patient stated reason for the admission: \"I had a double mastectomy. \"     Patients top risk factors for readmission:  Medical condition -  Hypertension, steatosis of liver, history of gastroenteritis, history of gastritis and duodenitis, history of microscopic colitis, asthma, history of left breast cancer, dyslipidemia, history of neck nodule, hyperglycemia, history of herpes simplex infection of genitourinary system, back pain, ductal carcinoma in situ of right breast, and s/p mastectomy per chart. Interventions to address risk factors: Obtained and reviewed discharge summary and/or continuity of care documents and Education of patient/family/caregiver/guardian to support self-management-Education provided regarding signs/symptoms of pain and inflammation, patient verbalized an understanding. Care Transition Nurse (CTN) contacted the patient by telephone to perform post hospital discharge assessment. Verified name and  with patient as identifiers. Provided introduction to self, and explanation of the CTN role. CTN reviewed discharge instructions, medical action plan and red flags with patient who verbalized understanding. Were discharge instructions available to patient? yes. Reviewed appropriate site of care based on symptoms and resources available to patient including: PCP, Specialist, Urgent Care Clinics, and When to call 911. Patient given an opportunity to ask questions and does not have any further questions or concerns at this time. The patient agrees to contact the PCP office for questions related to their healthcare. Medication reconciliation was performed with patient, who verbalizes understanding of administration of home medications. Advised obtaining a 90-day supply of all daily and as-needed medications. Referral to Pharm D needed: no.     Home Health/Outpatient orders at discharge: [address]: n/a  Date of initial visit: [facility] ordered at discharge: None  [address] received: n/a    Was patient discharged with a pulse oximeter? no.     Discussed follow-up appointments. If no appointment was previously scheduled, appointment scheduling offered: yes. Is follow up appointment scheduled within 7 days of discharge? No, patient states she will call her PCP office today to schedule KENDAL appointment.      [facility] follow up appointment(s):   Future Appointments Date Time Provider Kevin Yessenia   8/22/2022  2:30 PM Fauzia Zavaleta MD [facility] BS AMB   4/6/2023 11:30 AM Nyasia Gupta  [facility] BS AMB     [facility] follow up appointment(s): Patient has plastic surgery follow-up appointment scheduled with Dr. Junie Bence. Isa on 8-10-22 and patient is aware of this appointment. Plan for follow-up call in 10-14 days based on severity of symptoms and risk factors. Plan for next call: self management-Review red flags of pain and inflammation, and follow up appointment-Evaluate if patient is attending follow-up appointments as scheduled, offer assistance with scheduling as needed. CTN provided contact information for future needs. Goals        Understands red flags post discharge. 8-4-22: Red flags of pain and inflammation reviewed with patient, and patient verbalized an understanding. Patient denies pain at this time, states she is taking pain medication as ordered. Denies chest pain, denies shortness of breath, denies fever/chills, and denies nausea/vomiting since discharge on 8-3-22. States she is utilizing a regular diet at home, appetite is good. Denies having any falls in the last 12 months. Patient has no red flags to report at this time. Care Transitions Nurse will review red flags again on next phone conversation with patient.  Gay Neff

## 2022-08-19 NOTE — PROGRESS NOTES
HISTORY OF PRESENT ILLNESS  Natali Taylor is a 57 y.o. female. HPI  ESTABLISHED patient here for post op follow up, s/p BILATERAL mastectomies, RIGHT SNbx. 8/2/22 - BILATERAL mastectomies, RIGHT SNBx with recon, Dr. Lopez Pittman.   FINAL PATHOLOGIC DIAGNOSIS    1. Breast, left, prophylactic mastectomy:        Radial scar/complex sclerosing lesion. Features consistent previous procedure changes. Benign nipple/areolar complex. No malignancy identified. 2. [place] lymph node #1, right axilla, excision: next        One lymph node identified, negative for metastatic carcinoma (0/1). 3. Breast, right, mastectomy:   DCIS OF THE BREAST: Resection    SPECIMEN       Procedure:  Total mastectomy       Specimen Laterality: Right    TUMOR       Tumor Site: Central       Histologic Type: Ductal carcinoma in situ       Size (Extent) of DCIS: 15 mm       Architectural Patterns: [place], Solid       Nuclear Grade: Grade II (intermediate)       Necrosis: Present, focal (small foci or single cell necrosis)       Microcalcifications: Present in nonneoplastic tissue      MARGINS       Margins: Uninvolved by DCIS           Distance from Closest Margin (Millimeters): 15 mm           Closest Margin(s): Deep      LYMPH NODES       Regional Lymph Nodes: Uninvolved by tumor cells           Total Number of Lymph Nodes Examined: 1           Number of [place] Nodes Examined: 1      PATHOLOGIC STAGE CLASSIFICATION (pTNM, AJCC 8th Edition)       Primary Tumor (pT): pTis (DCIS)       Regional Lymph Nodes Modifier: (sn): [place] node(s) evaluated       Regional Lymph Nodes (pN): pN0      ADDITIONAL FINDINGS       Additional Findings: Complex sclerosing lesion, intraductal   papillomas, florid usual ductal hyperplasia    SPECIAL STUDIES       Breast Biomarker Testing Performed on Previous Biopsy:           Estrogen Receptor (ER) Status: Positive, 100%           Progesterone Receptor (PgR) Status: Positive, 100% Testing Performed on Case Number: Outside hospital biopsy       Comment    Immunohistochemical stain (p63) was performed on block 3I, 3J, 3K with   appropriately reactive controls. The staining patterns are consistent with   the above diagnosis.         lem/8/4/2022   Electronically Signed Out By Lucho Anderson MD  2005- left breast excisional biopsy      Bx done 4 /28/22- #1 @ 12:00 4 cmfn right DCIS : ER- 100%  AK- 100%, 3 LN -  #2 RIGHT br @ 9:00 10cmfn - benign     Family History: sister - breast cancer dx @ age 43. Review of Systems   All other systems reviewed and are negative. Physical Exam  Vitals and nursing note reviewed. Chest:          Comments: Expanders intact  Healing well       ASSESSMENT and PLAN    ICD-10-CM ICD-9-CM    1.  Ductal carcinoma in situ (DCIS) of right breast  D05.11 233.0 AMB SUPPLY ORDER      - healing well  Rtc with np in 6 months  Since dcis, no endocrine therapy after mastectomies, no xrt

## 2022-08-22 ENCOUNTER — OFFICE VISIT (OUTPATIENT)
Dept: SURGERY | Age: 62
End: 2022-08-22
Payer: COMMERCIAL

## 2022-08-22 VITALS — WEIGHT: 216 LBS | BODY MASS INDEX: 35.94 KG/M2

## 2022-08-22 DIAGNOSIS — D05.11 DUCTAL CARCINOMA IN SITU (DCIS) OF RIGHT BREAST: Primary | ICD-10-CM

## 2022-08-22 PROCEDURE — 99024 POSTOP FOLLOW-UP VISIT: CPT | Performed by: SURGERY

## 2022-08-25 ENCOUNTER — TRANSCRIBE ORDER (OUTPATIENT)
Dept: SCHEDULING | Age: 62
End: 2022-08-25

## 2022-08-25 DIAGNOSIS — C50.911 MALIGNANT NEOPLASM OF RIGHT BREAST (HCC): Primary | ICD-10-CM

## 2022-08-29 NOTE — LETTER
11/13/2018 7:37 AM 
 
Ms. Michele Bradford [address] [facility] [place] [id number] [address]! All lab results are normal.  Please continue your medications and healthy lifestyle. Please find your most recent results below. Resulted Orders URINALYSIS W/ RFLX MICROSCOPIC Result Value Ref Range Specific Gravity 1.010 1.005 - 1.030  
 pH (UA) 6.5 5.0 - 7.5 Color Yellow Yellow Appearance Clear Clear Leukocyte Esterase Negative Negative Protein Negative Negative/Trace Glucose Negative Negative Ketone Negative Negative Blood Negative Negative Bilirubin Negative Negative Urobilinogen 0.2 0.2 - 1.0 mg/dL Nitrites Negative Negative Microscopic Examination Comment Comment:  
   Microscopic not indicated and not performed. Narrative Performed at:  [address]  [id number] : Jesus Manuel Perdue MD, Phone:  [phone] Chong Clark MD, FACP, CMD 
 
 
 
 .

## 2022-09-07 ENCOUNTER — HOSPITAL ENCOUNTER (OUTPATIENT)
Dept: CT IMAGING | Age: 62
Discharge: HOME | End: 2022-09-07
Attending: PLASTIC SURGERY
Payer: COMMERCIAL

## 2022-09-07 DIAGNOSIS — C50.911 MALIGNANT NEOPLASM OF RIGHT BREAST (HCC): ICD-10-CM

## 2022-09-07 PROCEDURE — 74174 CTA ABD&PLVS W/CONTRAST: CPT

## 2022-09-07 PROCEDURE — 74011000636 HC RX REV CODE- 636: Performed by: PLASTIC SURGERY

## 2022-09-07 RX ADMIN — IOPAMIDOL 100 ML: 755 INJECTION, SOLUTION INTRAVENOUS at 13:55

## 2022-09-11 DIAGNOSIS — E03.4 HYPOTHYROIDISM DUE TO ACQUIRED ATROPHY OF THYROID: ICD-10-CM

## 2022-09-11 RX ORDER — LEVOTHYROXINE SODIUM 25 UG/1
TABLET ORAL
Qty: 90 TABLET | Refills: 0 | Status: SHIPPED | OUTPATIENT
Start: 2022-09-11 | End: 2022-10-11

## 2022-09-21 ENCOUNTER — OFFICE VISIT (OUTPATIENT)
Dept: INTERNAL MEDICINE CLINIC | Age: 62
End: 2022-09-21
Payer: COMMERCIAL

## 2022-09-21 VITALS
SYSTOLIC BLOOD PRESSURE: 134 MMHG | BODY MASS INDEX: 36 KG/M2 | TEMPERATURE: 98.4 F | OXYGEN SATURATION: 99 % | RESPIRATION RATE: 18 BRPM | DIASTOLIC BLOOD PRESSURE: 80 MMHG | HEIGHT: 65 IN | WEIGHT: 216.1 LBS | HEART RATE: 81 BPM

## 2022-09-21 DIAGNOSIS — D05.11 DUCTAL CARCINOMA IN SITU (DCIS) OF RIGHT BREAST: ICD-10-CM

## 2022-09-21 DIAGNOSIS — I10 PRIMARY HYPERTENSION: Primary | ICD-10-CM

## 2022-09-21 DIAGNOSIS — Z01.811 PREOP PULMONARY/RESPIRATORY EXAM: ICD-10-CM

## 2022-09-21 DIAGNOSIS — K76.0 STEATOSIS OF LIVER: ICD-10-CM

## 2022-09-21 DIAGNOSIS — C50.912 MALIGNANT NEOPLASM OF LEFT FEMALE BREAST, UNSPECIFIED ESTROGEN RECEPTOR STATUS, UNSPECIFIED SITE OF BREAST (HCC): ICD-10-CM

## 2022-09-21 DIAGNOSIS — E78.5 DYSLIPIDEMIA: ICD-10-CM

## 2022-09-21 DIAGNOSIS — J45.40 MODERATE PERSISTENT ASTHMA WITHOUT COMPLICATION: ICD-10-CM

## 2022-09-21 DIAGNOSIS — R73.02 IGT (IMPAIRED GLUCOSE TOLERANCE): ICD-10-CM

## 2022-09-21 PROBLEM — U07.1 COVID-19 VIRUS INFECTION: Status: ACTIVE | Noted: 2022-07-06

## 2022-09-21 PROCEDURE — 99214 OFFICE O/P EST MOD 30 MIN: CPT | Performed by: INTERNAL MEDICINE

## 2022-09-21 NOTE — PROGRESS NOTES
SPORTS MEDICINE AND PRIMARY CARE  Yareli Cullen MD, [address] [address]  Phone:  [phone]  Fax: [phone]       Chief Complaint   Patient presents with    Hypertension    Cough   . SUBJECTIVE:    Kami Maria is a 57 y.o. female  Dictation on: 09/21/2022  1:23 PM by: Estuardo Copeland [[id number]]          Current Outpatient Medications   Medication Sig Dispense Refill    Euthyrox 25 mcg tablet TAKE 1 TABLET BY MOUTH ONCE DAILY BEFORE BREAKFAST 90 Tablet 0    gabapentin (NEURONTIN) 100 mg capsule Take 3 Capsules by mouth three (3) times daily. Max Daily Amount: 900 mg. 90 Capsule 1    albuterol (PROVENTIL HFA, VENTOLIN HFA, PROAIR HFA) 90 mcg/actuation inhaler Take 2 Puffs by inhalation every four (4) hours as needed for Wheezing. 1 Each 11    budesonide-formoteroL (SYMBICORT) 160-4.5 mcg/actuation HFAA Take 2 Puffs by inhalation two (2) times a day. 10.2 g 11    ezetimibe (ZETIA) 10 mg tablet Take 1 Tablet by mouth daily. 30 Tablet 5    hydroCHLOROthiazide (MICROZIDE) 12.5 mg capsule Take 1 Capsule by mouth daily. 90 Capsule 3    acyclovir (ZOVIRAX) 5 % ointment Apply to affected area 6 times a day 30 g 1    glucose blood VI test strips (CONTOUR NEXT STRIPS) strip qd 100 Strip 11    guaiFENesin ER (MUCINEX) 600 mg ER tablet Take 1 Tablet by mouth two (2) times a day. 30 Tablet 5    scopolamine (TRANSDERM-SCOP) 1 mg over 3 days pt3d 1 Patch by TransDERmal route every seventy-two (72) hours. 3 Patch 0    mometasone (NASONEX) 50 mcg/actuation nasal spray USE TWO SPRAY(S) IN EACH NOSTRIL DAILY (Patient taking differently: 2 Sprays by Both Nostrils route as needed.) 1 Container 11     Past Medical History:   Diagnosis Date    Asthma CHILDHOOD    Breast cancer, left ([facility] [address] 2005    followed by gyn md cassius    Breast cancer, right ([facility] [address] 2022    Chest pain 07/11/2017    normal Stress Echo 07/19/17.     COVID-19 virus infection 07/06/2022    7/10/22    Ductal carcinoma in situ (DCIS) of right breast 04/28/2022    Dyslipidemia     Gastritis and duodenitis 11/22/2019    egd Quin enriquez md    GERD (gastroesophageal reflux disease)     Heartburn 2017    Hx of colonoscopy 11/22/2019    colitis - md mina    Hyperglycemia 04/16/2016    Dr. Lyric Mullen    Hypertension     Hypothyroid 08/2018    Microscopic colitis 11/22/2019    Neck nodule 05/10/2018    Obesity (BMI 30-39.9) 2017    elham Cabrera. PITER (obstructive sleep apnea)     CPAP    Postsurgical menopause 2002    Dr. Brian Mason    S/P colonoscopy 11/22/2019    egquinton enriquez md neg    Shoulder pain, right     iggy yi md    Spider bite 2016    ? Leg, recurrent. Statin intolerance 03/14/2022    Steatosis of liver 06/19/2016    TMJ (temporomandibular joint syndrome)     Tubular adenoma of colon 02/2017    Dr. Eder Strauss    Uterine fibroid 2002    Dr. Georgiana Mccord    Vitamin D deficiency 04/18/2018    Dr. Lyric Mullen     Past Surgical History:   Procedure Laterality Date    COLONOSCOPY N/A 11/22/2019    COLONOSCOPY performed by Ghassan Bone MD at [facility] ENDOSCOPY    HX BREAST LUMPECTOMY Left 2005    Atypical Cells. Dr. Ricardo Bobby. HX BREAST RECONSTRUCTION Bilateral 8/2/2022    BILATERAL BREAST RECONSTRUCTION WITH TISSUE EXPANDERS performed by Mina Iraheta MD at [facility] [address]       x 2    HX COLONOSCOPY  08/11/2009, 02/07/17    with polypectomy. Dr. Eder Strauss. HX HEENT  1997    LASIK    HX MASTECTOMY Bilateral 8/2/2022    BILATERAL SKIN SPARING MASTECTOMIES, RIGHT BREAST SENTINEL NODE BIOPSY performed by Donato Cox MD at [facility] Bilateral 2000s    HX MUKUND AND BSO Right 2002    due to uterine fibroids.   Dr. Quintin Flor [Naltrexone-Bupropion] Other (comments)     1 PILL MORNING, 2 PILLS EVENING  FELT ZOMBIED, \"OUT OF MY HEAD\" Crestor [Rosuvastatin] Other (comments)     Felt like it worsened her memory    Gadolinium-Containing Contrast Media Nausea and Vomiting    Lipitor [Atorvastatin] Other (comments)     Felt like it worsened memory. REVIEW OF SYSTEMS:  General: negative for - chills or fever  ENT: negative for - headaches, nasal congestion or tinnitus  Respiratory: negative for - cough, hemoptysis, shortness of breath or wheezing  Cardiovascular : negative for - chest pain, edema, palpitations or shortness of breath  Gastrointestinal: negative for - abdominal pain, blood in stools, heartburn or nausea/vomiting  Genito-Urinary: no dysuria, trouble voiding, or hematuria  Musculoskeletal: negative for - gait disturbance, joint pain, joint stiffness or joint swelling  Neurological: no TIA or stroke symptoms  Hematologic: no bruises, no bleeding, no swollen glands  Integument: no lumps, mole changes, nail changes or rash  Endocrine: no malaise/lethargy or unexpected weight changes      Social History     Socioeconomic History    Marital status:    Tobacco Use    Smoking status: Never    Smokeless tobacco: Never   Vaping Use    Vaping Use: Never used   Substance and Sexual Activity    Alcohol use: Yes     Alcohol/week: 1.0 standard drink     Types: 1 Glasses of wine per week     Comment: ocassional    Drug use: No    Sexual activity: Yes     Partners: Male     Birth control/protection: Surgical     Comment: TL   Social History Narrative    Family History: Mother: , Dino King: , Bee stingSister(s):  2010 yrs, Breast Cancer at 42Brother(s):    , Heart Problems1 brother(s) , 7 sister(s) - healthy. 1 son(s) , 1 daughter(s) - healthy. Social History: Alcohol Use Patient does not use alcohol. Smoking Status Patient is a never smoker. Caffeine: coffee, tea. Marital Status:    . Lives w ith: spouse Ambrosio Boo. Occupation/W ork: office w ork.         Medical History: Hypertension, Left Breast papilloma. Gyn History: Last mammogram date 10/28/2014. Last pap date 10/22/2012. Hysterectomy Date . OB History: Total pregnancies 2. Full term delivery (>37 w eeks) 2. Live births 2. C section(s) 2. Pregnancy # 1: , Girl, 11. Pregnancy # 2: Repeat C/S, boy, . Surgical History: hysterectomy, total abdominal w ith BSO , Left w rist surgery , colonoscopy [facility] and 2009, left lumpectomy . Social Determinants of Health     Social Connections: Unknown    Frequency of Communication with Friends and Family: More than three times a week    Frequency of Social Gatherings with Friends and Family: More than three times a week    Marital Status:      Family History   Problem Relation Age of Onset    Diabetes Mother     Obesity Mother     OSTEOARTHRITIS Mother     Other Father 54         FROM BEE ALLERGY    Asthma Father     Obesity Sister         X7    Sleep Apnea Sister     Diabetes Sister     Hypertension Sister     Thyroid Disease Sister         X 2    Diabetes Sister         X3 MORE SISTERS TAKING INSULIN    Breast Cancer Sister 43    Schizophrenia Sister     Cancer Sister         Breast    Obesity Sister     Diabetes Brother     Diabetes Brother     Heart Disease Brother     No Known Problems Maternal Grandmother     No Known Problems Maternal Grandfather     No Known Problems Paternal Grandmother     No Known Problems Paternal Grandfather     Heart Disease Niece/nephew     Anesth Problems Neg Hx        OBJECTIVE:    Visit Vitals  /80 (BP 1 Location: Left upper arm, BP Patient Position: Sitting)   Pulse 81   Temp 98.4 °F (36.9 °C) (Oral)   Resp 18   Ht 5' 5\" (1.651 m)   Wt 216 lb 1.6 oz (98 kg)   SpO2 99%   BMI 35.96 kg/m²     CONSTITUTIONAL: well , well nourished, appears age appropriate  EYES: perrla, eom intact  ENMT:moist mucous membranes, pharynx clear  NECK: supple.  Thyroid normal  RESPIRATORY: Chest: clear bilaterally   CARDIOVASCULAR: Heart: regular rate and rhythm  GASTROINTESTINAL: Abdomen: soft, bowel sounds active  HEMATOLOGIC: no pathological lymph nodes palpated  MUSCULOSKELETAL: Extremities: no edema, pulse 1+   INTEGUMENT: No unusual rashes or suspicious skin lesions noted. Nails appear normal.  NEUROLOGIC: non-focal exam   MENTAL STATUS: alert and oriented, appropriate affect           ASSESSMENT:  1. Primary hypertension    2. Malignant neoplasm of left female breast, unspecified estrogen receptor status, unspecified site of breast ([address]    3. Steatosis of liver    4. Dyslipidemia    5. Ductal carcinoma in situ (DCIS) of right breast    6. IGT (impaired glucose tolerance)    7. Moderate persistent asthma without complication    8. Preop pulmonary/respiratory exam      The patient's medical status is stable for the planned surgical procedure. She has had a stress test in 2017, which has been negative. Blood pressure control is at goal.      The mastectomy has resulted with good results. She is doing well postoperatively. History of steatosis of the liver and dyslipidemia for which she is following a diet. History of impaired glucose tolerance with hemoglobin A1C at 6.1 when it was last checked in January, was completely normal.  She has a history of childhood asthma. She is not wheezing currently. We have asked for a pulmonary evaluation. She is concerned that she had COVID and wants to be sure her lungs will tolerate surgery. We have referred her to Dr. Sd Nuñez for his opinion. The exam today is reassuring for her and expect good results from the procedure that she is going to have done and certainly her medical status is stable for the procedure. She will come back to see me in three or four months or sooner if she needs to. She has an appointment for a preoperative evaluation through her surgeon at [facility].        I have discussed the diagnosis with the patient and the intended plan as seen in the  Orders.   The patient understands and agees with the plan. The patient has   received an after visit summary and questions were answered concerning  future plans  Patient labs and/or xrays were reviewed  Past records were reviewed. PLAN:  .  Orders Placed This Encounter    [facility] Pulmonary Disease [facility] [place]       Follow-up and Dispositions    Return in about 2 months (around 11/21/2022). ATTENTION:   This medical record was transcribed using an electronic medical records system. Although proofread, it may and can contain electronic and spelling errors. Other human spelling and other errors may be present. Corrections may be executed at a later time. Please feel free to contact us for any clarifications as needed.

## 2022-09-21 NOTE — PROGRESS NOTES
Zion Barragan is a 57 y.o. female    Chief Complaint   Patient presents with    Hypertension    Cough     1. Have you been to the ER, urgent care clinic since your last visit? Hospitalized since your last visit? No    2. Have you seen or consulted any other health care providers outside of the [address] [place] [address] since your last visit? Include any pap smears or colon screening.  No

## 2022-09-27 NOTE — PROGRESS NOTES
The patient returns today after a visit with Yaakov Aguilar MD and on 8/2/22 underwent bilateral skin-sparing mastectomies, right breast sentinel node biopsy and bilateral breast reconstruction with tissue expanders by Yaakov Aguilar MD and Clem Parker MD.  The latter physician performed the right and left breast reconstruction with tissue expanders. She tolerated the procedures well, was seen in follow-up by Dr. Mack Harada on 8/22 with a final pathological diagnosis on the left breast for the prophylactic biopsy, no malignancy identified. On the right breast, the lymph node was negative for metastatic disease and the histologic finding was that of ductal carcinoma in situ. The margins were uninvolved by DCIS. She notes that she is feeling well and is going to return to see the nurse practitioner in six months. No immunotherapy therapy after mastectomies, no radiation therapy since it was DCIS. The patient is going to have reconstructive surgery by two plastic surgeons and she is concerned about her pulmonary status particularly in view of the Covid vaccine although she denies any coughing,          [entire phrase inaudible]. No symptoms to suggest long-term Covid, no persistent shortness of breath or cough. The patient is seen for evaluation.

## 2022-10-05 DIAGNOSIS — T75.3XXS MOTION SICKNESS, SEQUELA: ICD-10-CM

## 2022-10-06 RX ORDER — SCOLOPAMINE TRANSDERMAL SYSTEM 1 MG/1
PATCH, EXTENDED RELEASE TRANSDERMAL
Qty: 3 PATCH | Refills: 0 | Status: SHIPPED | OUTPATIENT
Start: 2022-10-06

## 2022-10-11 ENCOUNTER — HOSPITAL ENCOUNTER (OUTPATIENT)
Dept: PREADMISSION TESTING | Age: 62
Discharge: HOME | End: 2022-10-11
Payer: COMMERCIAL

## 2022-10-11 VITALS
HEART RATE: 84 BPM | OXYGEN SATURATION: 97 % | RESPIRATION RATE: 16 BRPM | SYSTOLIC BLOOD PRESSURE: 146 MMHG | WEIGHT: 212.7 LBS | HEIGHT: 65 IN | BODY MASS INDEX: 35.44 KG/M2 | DIASTOLIC BLOOD PRESSURE: 83 MMHG | TEMPERATURE: 97.7 F

## 2022-10-11 LAB
ABO + RH BLD: NORMAL
ANION GAP SERPL CALC-SCNC: 7 MMOL/L (ref 5–15)
BASOPHILS # BLD: 0 K/UL (ref 0–0.1)
BASOPHILS NFR BLD: 0 % (ref 0–1)
BLOOD GROUP ANTIBODIES SERPL: NORMAL
BUN SERPL-MCNC: 20 MG/DL (ref 6–20)
BUN/CREAT SERPL: 20 (ref 12–20)
CALCIUM SERPL-MCNC: 9.7 MG/DL (ref 8.5–10.1)
CHLORIDE SERPL-SCNC: 102 MMOL/L (ref 97–108)
CO2 SERPL-SCNC: 29 MMOL/L (ref 21–32)
CREAT SERPL-MCNC: 0.99 MG/DL (ref 0.55–1.02)
DIFFERENTIAL METHOD BLD: ABNORMAL
EOSINOPHIL # BLD: 0.2 K/UL (ref 0–0.4)
EOSINOPHIL NFR BLD: 2 % (ref 0–7)
ERYTHROCYTE [DISTWIDTH] IN BLOOD BY AUTOMATED COUNT: 13.2 % (ref 11.5–14.5)
GLUCOSE SERPL-MCNC: 86 MG/DL (ref 65–100)
HCT VFR BLD AUTO: 41 % (ref 35–47)
HGB BLD-MCNC: 14.8 G/DL (ref 11.5–16)
IMM GRANULOCYTES # BLD AUTO: 0 K/UL (ref 0–0.04)
IMM GRANULOCYTES NFR BLD AUTO: 0 % (ref 0–0.5)
LYMPHOCYTES # BLD: 2.8 K/UL (ref 0.8–3.5)
LYMPHOCYTES NFR BLD: 28 % (ref 12–49)
MCH RBC QN AUTO: 28.1 PG (ref 26–34)
MCHC RBC AUTO-ENTMCNC: 36.1 G/DL (ref 30–36.5)
MCV RBC AUTO: 77.8 FL (ref 80–99)
MONOCYTES # BLD: 0.6 K/UL (ref 0–1)
MONOCYTES NFR BLD: 6 % (ref 5–13)
NEUTS SEG # BLD: 6.3 K/UL (ref 1.8–8)
NEUTS SEG NFR BLD: 64 % (ref 32–75)
NRBC # BLD: 0 K/UL (ref 0–0.01)
NRBC BLD-RTO: 0 PER 100 WBC
PLATELET # BLD AUTO: 336 K/UL (ref 150–400)
PMV BLD AUTO: 10 FL (ref 8.9–12.9)
POTASSIUM SERPL-SCNC: 4 MMOL/L (ref 3.5–5.1)
RBC # BLD AUTO: 5.27 M/UL (ref 3.8–5.2)
SODIUM SERPL-SCNC: 138 MMOL/L (ref 136–145)
SPECIMEN EXP DATE BLD: NORMAL
WBC # BLD AUTO: 9.9 K/UL (ref 3.6–11)

## 2022-10-11 PROCEDURE — 36415 COLL VENOUS BLD VENIPUNCTURE: CPT

## 2022-10-11 PROCEDURE — 80048 BASIC METABOLIC PNL TOTAL CA: CPT

## 2022-10-11 PROCEDURE — 86900 BLOOD TYPING SEROLOGIC ABO: CPT

## 2022-10-11 PROCEDURE — 93005 ELECTROCARDIOGRAM TRACING: CPT

## 2022-10-11 PROCEDURE — 85025 COMPLETE CBC W/AUTO DIFF WBC: CPT

## 2022-10-11 RX ORDER — LEVOTHYROXINE SODIUM 25 UG/1
25 TABLET ORAL
COMMUNITY

## 2022-10-11 RX ORDER — HYDROCHLOROTHIAZIDE 12.5 MG/1
12.5 TABLET ORAL
COMMUNITY

## 2022-10-11 RX ORDER — EZETIMIBE 10 MG/1
10 TABLET ORAL
COMMUNITY

## 2022-10-11 NOTE — PERIOP NOTES
[address] [facility] [address], [address] [facility]   MAIN OR                                  [phone]   MAIN PRE OP                          [phone]                                                                                AMBULATORY PRE OP          [phone]  PRE-ADMISSION TESTING    [phone]   Surgery Date:  Tuesday 10/25/22       Is surgery arrival time given by surgeon? NO  If NO, [facility] staff will call you between 3 and 7pm the day before your surgery with your arrival time. (If your surgery is on a Monday, we will call you the Friday before.)    Call [phone] after 7pm Monday-Friday if you did not receive this call. INSTRUCTIONS BEFORE YOUR SURGERY   When You  Arrive Arrive at the [address] [facility] on the day of your surgery  Have your insurance card, photo ID, and any copayment (if needed)   Food   and   Drink NO food or drink after midnight the night before surgery    This means NO water, gum, mints, coffee, juice, etc.  No alcohol (beer, wine, liquor) 24 hours before and after surgery   Medications to   TAKE   Morning of Surgery MEDICATIONS TO TAKE THE MORNING OF SURGERY WITH A SIP OF WATER:   Albuterol Inhaler if needed (bring with you day of surgery)  Euthyrox  Ezetimibe   Medications  To  STOP      7 days before surgery Non-Steroidal anti-inflammatory Drugs (NSAID's): for example, Ibuprofen (Advil, Motrin), Naproxen (Aleve)  Aspirin, if taking for pain   Herbal supplements, vitamins, and fish oil  Other:  (Pain medications not listed above, including Tylenol may be taken)   Blood  Thinners N/a   Bathing Clothing  Jewelry  Valuables     If you shower the morning of surgery, please do not apply anything to your skin (lotions, powders, deodorant, or makeup, especially mascara)  Follow Chlorhexidine [employer] body wash instructions provided to you during PAT appointment. Begin 3 days prior to surgery.   Do not shave or trim anywhere 24 hours before surgery  Wear your hair loose or down; no pony-tails, buns, or metal hair clips  Wear loose, comfortable, clean clothes  Wear glasses instead of contacts  Leave money, valuables, and jewelry, including body piercings, at home     Going Home - or Spending the Night SAME-DAY SURGERY: You must have a responsible adult drive you home and stay with you 24 hours after surgery  ADMITS: If your doctor is keeping you in the hospital after surgery, leave personal belongings/luggage in your car until you have a hospital room number. Hospital discharge time is 12 noon  Drivers must be here before 12 noon unless you are told differently   Special Instructions Bring your CPAP machine with you day of surgery       Follow all instructions so your surgery wont be cancelled. Please, be on time. If a situation occurs and you are delayed the day of surgery, call [phone]    If your physical condition changes (like a fever, cold, flu, etc.) call your surgeon. Home medication(s) reviewed and verified via  LIST   VERBAL   during PAT appointment. The patient was contacted by   IN-PERSON  The patient verbalizes understanding of all instructions and    DOES NOT   need reinforcement.

## 2022-10-11 NOTE — PERIOP NOTES
Patient stated that when she recently had surgery, she became SOB after anesthesia. She was given a breathing treatment and her \"breathing issues\" resolved. She was referred to a pulmonologist for follow up. She has seen Dr Francisco Tompkins for pulmonary and has a LFT scheduled on 10/21/22. Called Dr Felipe Alcala office and requested most recent office visit note to be faxed to [facility]

## 2022-10-12 LAB
ATRIAL RATE: 79 BPM
CALCULATED P AXIS, ECG09: 69 DEGREES
CALCULATED R AXIS, ECG10: 52 DEGREES
CALCULATED T AXIS, ECG11: 26 DEGREES
DIAGNOSIS, 93000: NORMAL
P-R INTERVAL, ECG05: 148 MS
Q-T INTERVAL, ECG07: 370 MS
QRS DURATION, ECG06: 80 MS
QTC CALCULATION (BEZET), ECG08: 424 MS
VENTRICULAR RATE, ECG03: 79 BPM

## 2022-10-15 NOTE — PROGRESS NOTES
[facility]  Metabolic Weight Loss Program        Patient's Name: Leslie Phillips  : 1960    This patient is enrolled in [address] [place] [address] Weight Loss Program and attended the required weekly virtual nutrition class hosted via [employer] today.       Lacey Boyer, MS, RD, LDN no

## 2022-10-19 ENCOUNTER — OFFICE VISIT (OUTPATIENT)
Dept: INTERNAL MEDICINE CLINIC | Age: 62
End: 2022-10-19
Payer: COMMERCIAL

## 2022-10-19 VITALS
OXYGEN SATURATION: 98 % | HEIGHT: 65 IN | SYSTOLIC BLOOD PRESSURE: 140 MMHG | TEMPERATURE: 97.9 F | WEIGHT: 212 LBS | DIASTOLIC BLOOD PRESSURE: 84 MMHG | HEART RATE: 79 BPM | RESPIRATION RATE: 18 BRPM | BODY MASS INDEX: 35.32 KG/M2

## 2022-10-19 DIAGNOSIS — G47.33 OSA (OBSTRUCTIVE SLEEP APNEA): ICD-10-CM

## 2022-10-19 DIAGNOSIS — E03.9 HYPOTHYROIDISM, UNSPECIFIED TYPE: ICD-10-CM

## 2022-10-19 DIAGNOSIS — K76.0 STEATOSIS OF LIVER: ICD-10-CM

## 2022-10-19 DIAGNOSIS — E78.5 DYSLIPIDEMIA: ICD-10-CM

## 2022-10-19 DIAGNOSIS — I10 PRIMARY HYPERTENSION: Primary | ICD-10-CM

## 2022-10-19 DIAGNOSIS — D05.11 DUCTAL CARCINOMA IN SITU (DCIS) OF RIGHT BREAST: ICD-10-CM

## 2022-10-19 DIAGNOSIS — J45.40 MODERATE PERSISTENT ASTHMA WITHOUT COMPLICATION: ICD-10-CM

## 2022-10-19 DIAGNOSIS — R73.02 IGT (IMPAIRED GLUCOSE TOLERANCE): ICD-10-CM

## 2022-10-19 PROCEDURE — 99214 OFFICE O/P EST MOD 30 MIN: CPT | Performed by: INTERNAL MEDICINE

## 2022-10-19 RX ORDER — BECLOMETHASONE DIPROPIONATE HFA 80 UG/1
2 AEROSOL, METERED RESPIRATORY (INHALATION) 2 TIMES DAILY
COMMUNITY
Start: 2022-10-05

## 2022-10-19 RX ORDER — CETIRIZINE HCL 10 MG
10 TABLET ORAL
Qty: 30 TABLET | Refills: 11 | Status: SHIPPED | OUTPATIENT
Start: 2022-10-19

## 2022-10-19 RX ORDER — FLUTICASONE PROPIONATE 50 MCG
2 SPRAY, SUSPENSION (ML) NASAL DAILY
Qty: 1 EACH | Refills: 11 | Status: SHIPPED | OUTPATIENT
Start: 2022-10-19

## 2022-10-19 NOTE — PROGRESS NOTES
SPORTS MEDICINE AND PRIMARY CARE  Nick Echevarria MD, [address] [address]  Phone:  [phone]  Fax: [phone]       Chief Complaint   Patient presents with    Pre-op Exam   .      SUBJECTIVE:    Baltazar Meng is a 60 y.o. female Patient returns today for a preop evaluation before reconstructive surgery. She has a known history of ductal carcinoma in situ of the right breast, S/P bilateral mastectomies, lymph node on the right negative for cancer, and a prophylactic mastectomy on the left with no malignancy identified. Sleep apnea and moderate persistent asthma. Since we last saw her, she saw Dr. Jennifer Singletary with [facility], who gave her a steroid inhaler. He plans to do PFTs on her on Friday. As I recall, there is a note indicating she is medically stable for the planned surgical procedure. She is going to have the procedure done on the 25th of October. She currently denies complaints. She has no chest pain. She has no shortness of breath. She can go up a flight of steps without chest pain. Current Outpatient Medications   Medication Sig Dispense Refill    fluticasone propionate (FLONASE) 50 mcg/actuation nasal spray 2 Sprays by Both Nostrils route daily. 1 Each 11    cetirizine (ZYRTEC) 10 mg tablet Take 1 Tablet by mouth daily as needed for Allergies. 30 Tablet 11    OTHER,NON-FORMULARY, Take 1 Tablet by mouth nightly. Cutrelle-probiotics-prebiotics      OTHER,NON-FORMULARY, Take 1 Tablet by mouth every morning. Prevagen-D3-Apoaequorin      levothyroxine (SYNTHROID) 25 mcg tablet Take 25 mcg by mouth Daily (before breakfast). ezetimibe (ZETIA) 10 mg tablet Take 10 mg by mouth every morning.       hydroCHLOROthiazide (HYDRODIURIL) 12.5 mg tablet Take 12.5 mg by mouth every morning.      scopolamine (TRANSDERM-SCOP) 1 mg over 3 days pt3d APPLY 1 PATCH BY TRANSDERMAL ROUTE EVERY 72 HOURS 3 Patch 0    albuterol (PROVENTIL HFA, VENTOLIN HFA, PROAIR HFA) 90 mcg/actuation inhaler Take 2 Puffs by inhalation every four (4) hours as needed for Wheezing. 1 Each 11    acyclovir (ZOVIRAX) 5 % ointment Apply to affected area 6 times a day 30 g 1    Qvar RediHaler 80 mcg/actuation HFAb inhaler Take 2 Puffs by inhalation two (2) times a day. mometasone (NASONEX) 50 mcg/actuation nasal spray USE TWO SPRAY(S) IN EACH NOSTRIL DAILY 1 Container 11     Past Medical History:   Diagnosis Date    Asthma CHILDHOOD    Chest pain 07/11/2017    normal Stress Echo 07/19/17. COVID-19 virus infection 07/06/2022    7/10/22    Ductal carcinoma in situ (DCIS) of right breast 04/28/2022    Dyslipidemia     Gastritis and duodenitis 11/22/2019    egd - md mina    GERD (gastroesophageal reflux disease)     Heartburn 2017    Hyperglycemia 04/16/2016    pre-diabetic    Hypertension     Hypothyroid 08/2018    Microscopic colitis 11/22/2019    Neck nodule 05/10/2018    Obesity (BMI 30-39.9) 2017    Dr. Roxi Johnson, endo. PITER (obstructive sleep apnea)     Patient does not wear CPAP    Statin intolerance 03/14/2022    Steatosis of liver 06/19/2016    TMJ (temporomandibular joint syndrome)     Tubular adenoma of colon 02/2017    Dr. Bonita Herrmann    Uterine fibroid 2002    Dr. Ethan Gongora    Vitamin D deficiency 04/18/2018    Dr. Roxi Johnson     Past Surgical History:   Procedure Laterality Date    COLONOSCOPY N/A 11/22/2019    COLONOSCOPY performed by Cl Williamson MD at [facility] ENDOSCOPY    HX BREAST LUMPECTOMY Left 2005    Atypical Cells. Dr. Renetta Rivas.     HX BREAST RECONSTRUCTION Bilateral 08/02/2022    BILATERAL BREAST RECONSTRUCTION WITH TISSUE EXPANDERS performed by Rob Kessler MD at [facility] 630       x 2    HX HEENT  1997    LASIK    HX MASTECTOMY Bilateral 08/02/2022    BILATERAL SKIN SPARING MASTECTOMIES, RIGHT BREAST SENTINEL NODE BIOPSY performed by Naz Coe MD at [facility] 11    HX MUKUND AND BSO Right 2002    due to uterine fibroids. Dr. Chase Schreiber [Naltrexone-Bupropion] Other (comments)     1 PILL MORNING, 2 PILLS EVENING  FELT ZOMBIED, \"OUT OF MY HEAD\"    Crestor [Rosuvastatin] Other (comments)     Felt like it worsened her memory    Gadolinium-Containing Contrast Media Nausea and Vomiting    Lipitor [Atorvastatin] Other (comments)     Felt like it worsened memory. REVIEW OF SYSTEMS:  General: negative for - chills or fever  ENT: negative for - headaches, nasal congestion or tinnitus  Respiratory: negative for - cough, hemoptysis, shortness of breath or wheezing  Cardiovascular : negative for - chest pain, edema, palpitations or shortness of breath  Gastrointestinal: negative for - abdominal pain, blood in stools, heartburn or nausea/vomiting  Genito-Urinary: no dysuria, trouble voiding, or hematuria  Musculoskeletal: negative for - gait disturbance, joint pain, joint stiffness or joint swelling  Neurological: no TIA or stroke symptoms  Hematologic: no bruises, no bleeding, no swollen glands  Integument: no lumps, mole changes, nail changes or rash  Endocrine: no malaise/lethargy or unexpected weight changes      Social History     Socioeconomic History    Marital status:    Tobacco Use    Smoking status: Never    Smokeless tobacco: Never   Vaping Use    Vaping Use: Never used   Substance and Sexual Activity    Alcohol use: Yes     Alcohol/week: 1.0 standard drink     Types: 1 Glasses of wine per week     Comment: ocassional    Drug use: No    Sexual activity: Yes     Partners: Male     Birth control/protection: Surgical     Comment: TL   Social History Narrative    Family History: Mother: , Quinteros Record: , Bee stingSister(s):  2010 yrs, Breast Cancer at 42Brother(s):    , Heart Problems1 brother(s) , 7 sister(s) - healthy. 1 son(s) , 1 daughter(s) - healthy.     Social History: Alcohol Use Patient does not use alcohol. Smoking Status Patient is a never smoker. Caffeine: coffee, tea. Marital Status:    . Lives w ith: spouse Andrés Braxton. Occupation/W ork: office w ork. Medical History: Hypertension, Left Breast papilloma. Gyn History: Last mammogram date 10/28/2014. Last pap date 10/22/2012. Hysterectomy Date . OB History: Total pregnancies 2. Full term delivery (>37 w eeks) 2. Live births 2. C section(s) 2. Pregnancy # 1: , Girl, 11. Pregnancy # 2: Repeat C/S, boy, . Surgical History: hysterectomy, total abdominal w ith BSO , Left w rist surgery , colonoscopy Meg Miracle and 2009, left lumpectomy .      Social Determinants of Health     Social Connections: Unknown    Frequency of Communication with Friends and Family: More than three times a week    Frequency of Social Gatherings with Friends and Family: More than three times a week    Marital Status:      Family History   Problem Relation Age of Onset    Diabetes Mother     Obesity Mother     OSTEOARTHRITIS Mother     Other Father 54         FROM BEE ALLERGY    Asthma Father     Obesity Sister         X7    Sleep Apnea Sister     Diabetes Sister     Hypertension Sister     Thyroid Disease Sister         X 2    Diabetes Sister         X3 MORE SISTERS TAKING INSULIN    Breast Cancer Sister 43    Schizophrenia Sister     Cancer Sister         Breast    Obesity Sister     Diabetes Brother     Diabetes Brother     Heart Disease Brother     No Known Problems Maternal Grandmother     No Known Problems Maternal Grandfather     No Known Problems Paternal Grandmother     No Known Problems Paternal Grandfather     Heart Disease Niece/nephew     Anesth Problems Neg Hx        OBJECTIVE:    Visit Vitals  BP (!) 150/85 (BP 1 Location: Left upper arm, BP Patient Position: Sitting)   Pulse 79   Temp 97.9 °F (36.6 °C) (Oral)   Resp 18   Ht 5' 5\" (1.651 m)   Wt 212 lb (96.2 kg)   SpO2 98%   BMI 35.28 kg/m²     CONSTITUTIONAL: well , well nourished, appears age appropriate  EYES: perrla, eom intact  ENMT:moist mucous membranes, pharynx clear  NECK: supple. Thyroid normal  RESPIRATORY: Chest: clear bilaterally   CARDIOVASCULAR: Heart: regular rate and rhythm  GASTROINTESTINAL: Abdomen: soft, bowel sounds active  HEMATOLOGIC: no pathological lymph nodes palpated  MUSCULOSKELETAL: Extremities: no edema, pulse 1+   INTEGUMENT: No unusual rashes or suspicious skin lesions noted. Nails appear normal.  NEUROLOGIC: non-focal exam   MENTAL STATUS: alert and oriented, appropriate affect           ASSESSMENT:  1. Primary hypertension    2. Steatosis of liver    3. Dyslipidemia    4. IGT (impaired glucose tolerance)    5. Ductal carcinoma in situ (DCIS) of right breast    6. PITER (obstructive sleep apnea)    7. Moderate persistent asthma without complication    8. Hypothyroidism, unspecified type           Repeat blood pressure is acceptable at 140/84, although I think it is higher than baseline as she took pseudoephedrine for sinus congestion. Steatosis of the liver has been stable. She has dyslipidemia, started on Zetia. She is curious about how her cholesterol is doing and we will check that today. She has a history of impaired glucose tolerance and we will check hemoglobin A1c. DCIS is now resolved with the mastectomy with a fortunately negative lymph node. PITER, on observation. No evidence of bronchospasm on exam today. She has hypothyroidism and we will check a TSH. She is followed by [facility] now for pulmonary disease and is having PFTs this coming Friday. Patient's medical status is stable for the planned surgical procedure. She is at low cardiovascular risk. She will be back to see me in four to six months or as needed. I have discussed the diagnosis with the patient and the intended plan as seen in the  Orders. The patient understands and agees with the plan.   The patient has   received an after visit summary and questions were answered concerning  future plans  Patient labs and/or xrays were reviewed  Past records were reviewed. PLAN:  .  Orders Placed This Encounter    LIPID PANEL    TSH 3RD GENERATION    HEMOGLOBIN A1C WITH EAG    Qvar RediHaler 80 mcg/actuation HFAb inhaler    fluticasone propionate (FLONASE) 50 mcg/actuation nasal spray    cetirizine (ZYRTEC) 10 mg tablet       Follow-up and Dispositions    Return in about 6 months (around 4/19/2023). ATTENTION:   This medical record was transcribed using an electronic medical records system. Although proofread, it may and can contain electronic and spelling errors. Other human spelling and other errors may be present. Corrections may be executed at a later time. Please feel free to contact us for any clarifications as needed.

## 2022-10-19 NOTE — PROGRESS NOTES
Oliver Wilson is a 60 y.o. female    Chief Complaint   Patient presents with    Pre-op Exam     1. Have you been to the ER, urgent care clinic since your last visit? Hospitalized since your last visit? No    2. Have you seen or consulted any other health care providers outside of the [address] [place] [address] since your last visit? Include any pap smears or colon screening.  No

## 2022-10-20 LAB
CHOLEST SERPL-MCNC: 184 MG/DL
EST. AVERAGE GLUCOSE BLD GHB EST-MCNC: 131 MG/DL
HBA1C MFR BLD: 6.2 % (ref 4–5.6)
HDLC SERPL-MCNC: 56 MG/DL
HDLC SERPL: 3.3 {RATIO} (ref 0–5)
LDLC SERPL CALC-MCNC: 105.6 MG/DL (ref 0–100)
TRIGL SERPL-MCNC: 112 MG/DL (ref ?–150)
TSH SERPL DL<=0.05 MIU/L-ACNC: 1.89 UIU/ML (ref 0.36–3.74)
VLDLC SERPL CALC-MCNC: 22.4 MG/DL

## 2022-10-24 ENCOUNTER — ANESTHESIA EVENT (OUTPATIENT)
Dept: SURGERY | Age: 62
DRG: 585 | End: 2022-10-24
Payer: COMMERCIAL

## 2022-10-24 NOTE — ANESTHESIA PREPROCEDURE EVALUATION
Relevant Problems   RESPIRATORY SYSTEM   (+) Asthma   (+) Gasping for breath   (+) PITER (obstructive sleep apnea)      CARDIOVASCULAR   (+) Hypertension      GASTROINTESTINAL   (+) Steatosis of liver      ENDOCRINE   (+) Hypothyroid       Anesthetic History   No history of anesthetic complications            Review of Systems / Medical History  Patient summary reviewed, nursing notes reviewed and pertinent labs reviewed    Pulmonary        Sleep apnea: CPAP    Asthma        Neuro/Psych   Within defined limits           Cardiovascular    Hypertension          Hyperlipidemia    Exercise tolerance: >4 METS     GI/Hepatic/Renal     GERD      Liver disease     Endo/Other      Hypothyroidism  Obesity    Comments: prediabetic Other Findings            Physical Exam    Airway  Mallampati: III  TM Distance: 4 - 6 cm  Neck ROM: normal range of motion   Mouth opening: Normal     Cardiovascular  Regular rate and rhythm,  S1 and S2 normal,  no murmur, click, rub, or gallop             Dental  No notable dental hx       Pulmonary  Breath sounds clear to auscultation               Abdominal  GI exam deferred       Other Findings            Anesthetic Plan    ASA: 3  Anesthesia type: general          Induction: Intravenous  Anesthetic plan and risks discussed with: Patient

## 2022-10-25 ENCOUNTER — ANESTHESIA (OUTPATIENT)
Dept: SURGERY | Age: 62
DRG: 585 | End: 2022-10-25
Payer: COMMERCIAL

## 2022-10-25 ENCOUNTER — HOSPITAL ENCOUNTER (INPATIENT)
Age: 62
LOS: 4 days | Discharge: HOME | DRG: 585 | End: 2022-10-29
Attending: SURGERY | Admitting: SURGERY
Payer: COMMERCIAL

## 2022-10-25 DIAGNOSIS — Z85.3 PERSONAL HISTORY OF BREAST CANCER: Primary | ICD-10-CM

## 2022-10-25 PROBLEM — Z98.890: Status: ACTIVE | Noted: 2022-10-25

## 2022-10-25 LAB
GLUCOSE BLD STRIP.AUTO-MCNC: 149 MG/DL (ref 65–117)
SERVICE CMNT-IMP: ABNORMAL

## 2022-10-25 PROCEDURE — 77030027413 HC ADH SKN AESC -B: Performed by: SURGERY

## 2022-10-25 PROCEDURE — 77030035236 HC SUT PDS STRATFX BARB J&J -B: Performed by: SURGERY

## 2022-10-25 PROCEDURE — 74011250636 HC RX REV CODE- 250/636: Performed by: NURSE ANESTHETIST, CERTIFIED REGISTERED

## 2022-10-25 PROCEDURE — 77030003028 HC SUT VCRL J&J -A: Performed by: SURGERY

## 2022-10-25 PROCEDURE — 77030013079 HC BLNKT BAIR HGGR 3M -A: Performed by: NURSE ANESTHETIST, CERTIFIED REGISTERED

## 2022-10-25 PROCEDURE — 77030025874 HC CLP HMSTAT MIC SUPFN TI SYNO -B: Performed by: SURGERY

## 2022-10-25 PROCEDURE — P9045 ALBUMIN (HUMAN), 5%, 250 ML: HCPCS | Performed by: NURSE ANESTHETIST, CERTIFIED REGISTERED

## 2022-10-25 PROCEDURE — 0HRV077 REPLACEMENT OF BILATERAL BREAST USING DEEP INFERIOR EPIGASTRIC ARTERY PERFORATOR FLAP, OPEN APPROACH: ICD-10-PCS | Performed by: PLASTIC SURGERY

## 2022-10-25 PROCEDURE — 74011000250 HC RX REV CODE- 250: Performed by: NURSE ANESTHETIST, CERTIFIED REGISTERED

## 2022-10-25 PROCEDURE — 77030008684 HC TU ET CUF COVD -B: Performed by: NURSE ANESTHETIST, CERTIFIED REGISTERED

## 2022-10-25 PROCEDURE — 0HPU0NZ REMOVAL OF TISSUE EXPANDER FROM LEFT BREAST, OPEN APPROACH: ICD-10-PCS | Performed by: PLASTIC SURGERY

## 2022-10-25 PROCEDURE — 82962 GLUCOSE BLOOD TEST: CPT

## 2022-10-25 PROCEDURE — 74011250637 HC RX REV CODE- 250/637: Performed by: ANESTHESIOLOGY

## 2022-10-25 PROCEDURE — 74011250636 HC RX REV CODE- 250/636: Performed by: ANESTHESIOLOGY

## 2022-10-25 PROCEDURE — C9290 INJ, BUPIVACAINE LIPOSOME: HCPCS | Performed by: SURGERY

## 2022-10-25 PROCEDURE — 77030002933 HC SUT MCRYL J&J -A: Performed by: SURGERY

## 2022-10-25 PROCEDURE — 77030014336 HC CLP LIG TI SYNO -B: Performed by: SURGERY

## 2022-10-25 PROCEDURE — 74011000250 HC RX REV CODE- 250: Performed by: SURGERY

## 2022-10-25 PROCEDURE — 77030016441 HC APPL CLP LIG1 J&J -B: Performed by: SURGERY

## 2022-10-25 PROCEDURE — 77030037651: Performed by: SURGERY

## 2022-10-25 PROCEDURE — 77030040506 HC DRN WND MDII -A: Performed by: SURGERY

## 2022-10-25 PROCEDURE — 77030019940 HC BLNKT HYPOTHRM STRY -B: Performed by: SURGERY

## 2022-10-25 PROCEDURE — 77030040504 HC DRN WND MDII -B: Performed by: SURGERY

## 2022-10-25 PROCEDURE — 77030025876 HC CLMP VSL SGL DISP SYNO -D: Performed by: SURGERY

## 2022-10-25 PROCEDURE — 74011000272 HC RX REV CODE- 272: Performed by: SURGERY

## 2022-10-25 PROCEDURE — 2709999900 HC NON-CHARGEABLE SUPPLY: Performed by: SURGERY

## 2022-10-25 PROCEDURE — 74011636637 HC RX REV CODE- 636/637: Performed by: SURGERY

## 2022-10-25 PROCEDURE — 76010000168: Performed by: SURGERY

## 2022-10-25 PROCEDURE — 77030002917 HC SUT ETHLN J&J -B: Performed by: SURGERY

## 2022-10-25 PROCEDURE — 74011250637 HC RX REV CODE- 250/637: Performed by: SURGERY

## 2022-10-25 PROCEDURE — 77030031139 HC SUT VCRL2 J&J -A: Performed by: SURGERY

## 2022-10-25 PROCEDURE — 88305 TISSUE EXAM BY PATHOLOGIST: CPT

## 2022-10-25 PROCEDURE — 77030002916 HC SUT ETHLN J&J -A: Performed by: SURGERY

## 2022-10-25 PROCEDURE — 74011250636 HC RX REV CODE- 250/636: Performed by: SURGERY

## 2022-10-25 PROCEDURE — 94761 N-INVAS EAR/PLS OXIMETRY MLT: CPT

## 2022-10-25 PROCEDURE — 77030005513 HC CATH URETH FOL11 MDII -B: Performed by: SURGERY

## 2022-10-25 PROCEDURE — 77030029194 HC CPLR ANAST MICVAS DEV SYNO -D: Performed by: SURGERY

## 2022-10-25 PROCEDURE — 74011000258 HC RX REV CODE- 258: Performed by: SURGERY

## 2022-10-25 PROCEDURE — 77030008463 HC STPLR SKN PROX J&J -B: Performed by: SURGERY

## 2022-10-25 PROCEDURE — 76210000016 HC OR PH I REC 1 TO 1.5 HR: Performed by: SURGERY

## 2022-10-25 PROCEDURE — 65270000029 HC RM PRIVATE

## 2022-10-25 PROCEDURE — 77030026438 HC STYL ET INTUB CARD -A: Performed by: NURSE ANESTHETIST, CERTIFIED REGISTERED

## 2022-10-25 PROCEDURE — 76060000056 HC ANESTHESIA 12.5 TO 13 HR: Performed by: SURGERY

## 2022-10-25 PROCEDURE — 77030030275 HC CLMP VSL DBL DISP SYNO -F: Performed by: SURGERY

## 2022-10-25 PROCEDURE — 77030029262 HC BKGRN VISBLTY MAT SYNO -B: Performed by: SURGERY

## 2022-10-25 PROCEDURE — 74011000258 HC RX REV CODE- 258: Performed by: NURSE ANESTHETIST, CERTIFIED REGISTERED

## 2022-10-25 PROCEDURE — 0HPT0NZ REMOVAL OF TISSUE EXPANDER FROM RIGHT BREAST, OPEN APPROACH: ICD-10-PCS | Performed by: PLASTIC SURGERY

## 2022-10-25 PROCEDURE — 77030010512 HC APPL CLP LIG J&J -C: Performed by: SURGERY

## 2022-10-25 PROCEDURE — 77030013705 HC HK ELAS STAY COOP -B: Performed by: SURGERY

## 2022-10-25 PROCEDURE — 77030013474 HC CRD BPLR DISP ADLR -A: Performed by: SURGERY

## 2022-10-25 PROCEDURE — 77030020061 HC IV BLD WRMR ADMIN SET 3M -B: Performed by: NURSE ANESTHETIST, CERTIFIED REGISTERED

## 2022-10-25 DEVICE — THE GEM MICROVASCULAR ANASTOMOTIC COUPLER DEVICE RINGS ARE MADE OF POLYETHYLENE AND SURGICAL GRADE STAINLESS STEEL PINS. A PROTECTIVE COVER AND JAW ASSEMBLY PROTECT THE RINGS AND ALLOW FOR EASY LOADING ONTO THE ANASTOMOTIC INSTRUMENT. BOTH THE PROTECTIVE COVER AND JAW ASSEMBLY ARE DISPOSABLE. THE GEM MICROVASCULAR ANASTOMOTIC COUPLER DEVICE IS SINGLE-USE AND AVAILABLE IN VARIOUS SIZES
Type: IMPLANTABLE DEVICE | Site: BREAST | Status: FUNCTIONAL
Brand: GEM MICROVASCULAR ANASTOMOTIC COUPLER

## 2022-10-25 RX ORDER — IPRATROPIUM BROMIDE AND ALBUTEROL SULFATE 2.5; .5 MG/3ML; MG/3ML
3 SOLUTION RESPIRATORY (INHALATION)
Status: DISCONTINUED | OUTPATIENT
Start: 2022-10-25 | End: 2022-10-29 | Stop reason: HOSPADM

## 2022-10-25 RX ORDER — HYDROMORPHONE HYDROCHLORIDE 1 MG/ML
.25-1 INJECTION, SOLUTION INTRAMUSCULAR; INTRAVENOUS; SUBCUTANEOUS
Status: DISCONTINUED | OUTPATIENT
Start: 2022-10-25 | End: 2022-10-25 | Stop reason: HOSPADM

## 2022-10-25 RX ORDER — SODIUM CHLORIDE 0.9 % (FLUSH) 0.9 %
5-40 SYRINGE (ML) INJECTION EVERY 8 HOURS
Status: DISCONTINUED | OUTPATIENT
Start: 2022-10-25 | End: 2022-10-25 | Stop reason: HOSPADM

## 2022-10-25 RX ORDER — SODIUM CHLORIDE, SODIUM LACTATE, POTASSIUM CHLORIDE, CALCIUM CHLORIDE 600; 310; 30; 20 MG/100ML; MG/100ML; MG/100ML; MG/100ML
100 INJECTION, SOLUTION INTRAVENOUS CONTINUOUS
Status: DISCONTINUED | OUTPATIENT
Start: 2022-10-25 | End: 2022-10-25

## 2022-10-25 RX ORDER — HYDROMORPHONE HYDROCHLORIDE 2 MG/ML
INJECTION, SOLUTION INTRAMUSCULAR; INTRAVENOUS; SUBCUTANEOUS AS NEEDED
Status: DISCONTINUED | OUTPATIENT
Start: 2022-10-25 | End: 2022-10-25 | Stop reason: HOSPADM

## 2022-10-25 RX ORDER — SODIUM CHLORIDE 0.9 % (FLUSH) 0.9 %
5-40 SYRINGE (ML) INJECTION EVERY 8 HOURS
Status: DISCONTINUED | OUTPATIENT
Start: 2022-10-25 | End: 2022-10-25

## 2022-10-25 RX ORDER — ENOXAPARIN SODIUM 100 MG/ML
40 INJECTION SUBCUTANEOUS ONCE
Status: DISCONTINUED | OUTPATIENT
Start: 2022-10-25 | End: 2022-10-25 | Stop reason: HOSPADM

## 2022-10-25 RX ORDER — FAMOTIDINE 10 MG/ML
INJECTION INTRAVENOUS AS NEEDED
Status: DISCONTINUED | OUTPATIENT
Start: 2022-10-25 | End: 2022-10-25 | Stop reason: HOSPADM

## 2022-10-25 RX ORDER — SODIUM CHLORIDE, SODIUM LACTATE, POTASSIUM CHLORIDE, CALCIUM CHLORIDE 600; 310; 30; 20 MG/100ML; MG/100ML; MG/100ML; MG/100ML
125 INJECTION, SOLUTION INTRAVENOUS CONTINUOUS
Status: DISCONTINUED | OUTPATIENT
Start: 2022-10-25 | End: 2022-10-25 | Stop reason: HOSPADM

## 2022-10-25 RX ORDER — FENTANYL CITRATE 50 UG/ML
INJECTION, SOLUTION INTRAMUSCULAR; INTRAVENOUS AS NEEDED
Status: DISCONTINUED | OUTPATIENT
Start: 2022-10-25 | End: 2022-10-25 | Stop reason: HOSPADM

## 2022-10-25 RX ORDER — HYDROMORPHONE HYDROCHLORIDE 1 MG/ML
.25-1 INJECTION, SOLUTION INTRAMUSCULAR; INTRAVENOUS; SUBCUTANEOUS
Status: DISCONTINUED | OUTPATIENT
Start: 2022-10-25 | End: 2022-10-25

## 2022-10-25 RX ORDER — DIPHENHYDRAMINE HYDROCHLORIDE 50 MG/ML
12.5 INJECTION, SOLUTION INTRAMUSCULAR; INTRAVENOUS AS NEEDED
Status: DISCONTINUED | OUTPATIENT
Start: 2022-10-25 | End: 2022-10-25 | Stop reason: HOSPADM

## 2022-10-25 RX ORDER — ROCURONIUM BROMIDE 10 MG/ML
INJECTION, SOLUTION INTRAVENOUS AS NEEDED
Status: DISCONTINUED | OUTPATIENT
Start: 2022-10-25 | End: 2022-10-25 | Stop reason: HOSPADM

## 2022-10-25 RX ORDER — ENOXAPARIN SODIUM 100 MG/ML
40 INJECTION SUBCUTANEOUS EVERY 24 HOURS
Status: DISCONTINUED | OUTPATIENT
Start: 2022-10-26 | End: 2022-10-25 | Stop reason: HOSPADM

## 2022-10-25 RX ORDER — EPHEDRINE SULFATE/0.9% NACL/PF 50 MG/5 ML
SYRINGE (ML) INTRAVENOUS AS NEEDED
Status: DISCONTINUED | OUTPATIENT
Start: 2022-10-25 | End: 2022-10-25 | Stop reason: HOSPADM

## 2022-10-25 RX ORDER — HEPARIN SODIUM 10000 [USP'U]/100ML
500 INJECTION, SOLUTION INTRAVENOUS CONTINUOUS
Status: CANCELLED | OUTPATIENT
Start: 2022-10-25

## 2022-10-25 RX ORDER — HEPARIN SODIUM 1000 [USP'U]/ML
INJECTION, SOLUTION INTRAVENOUS; SUBCUTANEOUS AS NEEDED
Status: DISCONTINUED | OUTPATIENT
Start: 2022-10-25 | End: 2022-10-25 | Stop reason: HOSPADM

## 2022-10-25 RX ORDER — DEXTROSE, SODIUM CHLORIDE, SODIUM LACTATE, POTASSIUM CHLORIDE, AND CALCIUM CHLORIDE 5; .6; .31; .03; .02 G/100ML; G/100ML; G/100ML; G/100ML; G/100ML
100 INJECTION, SOLUTION INTRAVENOUS CONTINUOUS
Status: DISCONTINUED | OUTPATIENT
Start: 2022-10-25 | End: 2022-10-26

## 2022-10-25 RX ORDER — SODIUM CHLORIDE 0.9 % (FLUSH) 0.9 %
5-40 SYRINGE (ML) INJECTION AS NEEDED
Status: DISCONTINUED | OUTPATIENT
Start: 2022-10-25 | End: 2022-10-25

## 2022-10-25 RX ORDER — SODIUM CHLORIDE 0.9 % (FLUSH) 0.9 %
5-40 SYRINGE (ML) INJECTION AS NEEDED
Status: DISCONTINUED | OUTPATIENT
Start: 2022-10-25 | End: 2022-10-25 | Stop reason: HOSPADM

## 2022-10-25 RX ORDER — ONDANSETRON 2 MG/ML
4 INJECTION INTRAMUSCULAR; INTRAVENOUS AS NEEDED
Status: DISCONTINUED | OUTPATIENT
Start: 2022-10-25 | End: 2022-10-25 | Stop reason: HOSPADM

## 2022-10-25 RX ORDER — NALOXONE HYDROCHLORIDE 0.4 MG/ML
0.2 INJECTION, SOLUTION INTRAMUSCULAR; INTRAVENOUS; SUBCUTANEOUS
Status: DISCONTINUED | OUTPATIENT
Start: 2022-10-25 | End: 2022-10-25 | Stop reason: HOSPADM

## 2022-10-25 RX ORDER — SODIUM CHLORIDE 0.9 % (FLUSH) 0.9 %
5-40 SYRINGE (ML) INJECTION AS NEEDED
Status: DISCONTINUED | OUTPATIENT
Start: 2022-10-25 | End: 2022-10-29 | Stop reason: HOSPADM

## 2022-10-25 RX ORDER — MIDAZOLAM HYDROCHLORIDE 1 MG/ML
INJECTION, SOLUTION INTRAMUSCULAR; INTRAVENOUS AS NEEDED
Status: DISCONTINUED | OUTPATIENT
Start: 2022-10-25 | End: 2022-10-25 | Stop reason: HOSPADM

## 2022-10-25 RX ORDER — SCOLOPAMINE TRANSDERMAL SYSTEM 1 MG/1
1 PATCH, EXTENDED RELEASE TRANSDERMAL
Status: DISCONTINUED | OUTPATIENT
Start: 2022-10-25 | End: 2022-10-25

## 2022-10-25 RX ORDER — FLUTICASONE PROPIONATE 50 MCG
2 SPRAY, SUSPENSION (ML) NASAL DAILY
Status: DISCONTINUED | OUTPATIENT
Start: 2022-10-26 | End: 2022-10-29 | Stop reason: HOSPADM

## 2022-10-25 RX ORDER — PROPOFOL 10 MG/ML
INJECTION, EMULSION INTRAVENOUS
Status: DISCONTINUED | OUTPATIENT
Start: 2022-10-25 | End: 2022-10-25 | Stop reason: HOSPADM

## 2022-10-25 RX ORDER — OXYCODONE HYDROCHLORIDE 5 MG/1
10 TABLET ORAL
Status: DISCONTINUED | OUTPATIENT
Start: 2022-10-25 | End: 2022-10-29 | Stop reason: HOSPADM

## 2022-10-25 RX ORDER — ONDANSETRON 2 MG/ML
INJECTION INTRAMUSCULAR; INTRAVENOUS AS NEEDED
Status: DISCONTINUED | OUTPATIENT
Start: 2022-10-25 | End: 2022-10-25 | Stop reason: HOSPADM

## 2022-10-25 RX ORDER — ALBUTEROL SULFATE 0.83 MG/ML
2.5 SOLUTION RESPIRATORY (INHALATION) AS NEEDED
Status: DISCONTINUED | OUTPATIENT
Start: 2022-10-25 | End: 2022-10-25 | Stop reason: HOSPADM

## 2022-10-25 RX ORDER — SODIUM CHLORIDE 9 MG/ML
INJECTION, SOLUTION INTRAVENOUS
Status: DISCONTINUED | OUTPATIENT
Start: 2022-10-25 | End: 2022-10-25 | Stop reason: HOSPADM

## 2022-10-25 RX ORDER — SUCCINYLCHOLINE CHLORIDE 20 MG/ML
INJECTION INTRAMUSCULAR; INTRAVENOUS AS NEEDED
Status: DISCONTINUED | OUTPATIENT
Start: 2022-10-25 | End: 2022-10-25 | Stop reason: HOSPADM

## 2022-10-25 RX ORDER — OXYCODONE HYDROCHLORIDE 5 MG/1
5 TABLET ORAL
Status: DISCONTINUED | OUTPATIENT
Start: 2022-10-25 | End: 2022-10-29 | Stop reason: HOSPADM

## 2022-10-25 RX ORDER — SENNOSIDES 8.6 MG/1
2 TABLET ORAL DAILY
Status: DISCONTINUED | OUTPATIENT
Start: 2022-10-26 | End: 2022-10-29 | Stop reason: HOSPADM

## 2022-10-25 RX ORDER — LIDOCAINE HYDROCHLORIDE 10 MG/ML
0.1 INJECTION, SOLUTION EPIDURAL; INFILTRATION; INTRACAUDAL; PERINEURAL AS NEEDED
Status: DISCONTINUED | OUTPATIENT
Start: 2022-10-25 | End: 2022-10-25 | Stop reason: HOSPADM

## 2022-10-25 RX ORDER — ONDANSETRON 2 MG/ML
4 INJECTION INTRAMUSCULAR; INTRAVENOUS AS NEEDED
Status: DISCONTINUED | OUTPATIENT
Start: 2022-10-25 | End: 2022-10-25

## 2022-10-25 RX ORDER — DEXAMETHASONE SODIUM PHOSPHATE 4 MG/ML
INJECTION, SOLUTION INTRA-ARTICULAR; INTRALESIONAL; INTRAMUSCULAR; INTRAVENOUS; SOFT TISSUE AS NEEDED
Status: DISCONTINUED | OUTPATIENT
Start: 2022-10-25 | End: 2022-10-25 | Stop reason: HOSPADM

## 2022-10-25 RX ORDER — LIDOCAINE HYDROCHLORIDE 20 MG/ML
INJECTION, SOLUTION EPIDURAL; INFILTRATION; INTRACAUDAL; PERINEURAL AS NEEDED
Status: DISCONTINUED | OUTPATIENT
Start: 2022-10-25 | End: 2022-10-25 | Stop reason: HOSPADM

## 2022-10-25 RX ORDER — DIPHENHYDRAMINE HYDROCHLORIDE 50 MG/ML
12.5 INJECTION, SOLUTION INTRAMUSCULAR; INTRAVENOUS AS NEEDED
Status: DISCONTINUED | OUTPATIENT
Start: 2022-10-25 | End: 2022-10-25

## 2022-10-25 RX ORDER — OXYCODONE HYDROCHLORIDE 5 MG/1
5 TABLET ORAL AS NEEDED
Status: DISCONTINUED | OUTPATIENT
Start: 2022-10-25 | End: 2022-10-25

## 2022-10-25 RX ORDER — FENTANYL CITRATE 50 UG/ML
25 INJECTION, SOLUTION INTRAMUSCULAR; INTRAVENOUS
Status: DISCONTINUED | OUTPATIENT
Start: 2022-10-25 | End: 2022-10-25 | Stop reason: HOSPADM

## 2022-10-25 RX ORDER — FLUMAZENIL 0.1 MG/ML
0.2 INJECTION INTRAVENOUS
Status: DISCONTINUED | OUTPATIENT
Start: 2022-10-25 | End: 2022-10-25 | Stop reason: HOSPADM

## 2022-10-25 RX ORDER — SODIUM CHLORIDE 0.9 % (FLUSH) 0.9 %
5-40 SYRINGE (ML) INJECTION EVERY 8 HOURS
Status: DISCONTINUED | OUTPATIENT
Start: 2022-10-25 | End: 2022-10-29 | Stop reason: HOSPADM

## 2022-10-25 RX ORDER — SCOLOPAMINE TRANSDERMAL SYSTEM 1 MG/1
1 PATCH, EXTENDED RELEASE TRANSDERMAL
Status: COMPLETED | OUTPATIENT
Start: 2022-10-25 | End: 2022-10-28

## 2022-10-25 RX ORDER — ACETAMINOPHEN 500 MG
1000 TABLET ORAL EVERY 6 HOURS
Status: DISCONTINUED | OUTPATIENT
Start: 2022-10-26 | End: 2022-10-29 | Stop reason: HOSPADM

## 2022-10-25 RX ORDER — EZETIMIBE 10 MG/1
10 TABLET ORAL
Status: DISCONTINUED | OUTPATIENT
Start: 2022-10-26 | End: 2022-10-29 | Stop reason: HOSPADM

## 2022-10-25 RX ORDER — MORPHINE SULFATE 2 MG/ML
2 INJECTION, SOLUTION INTRAMUSCULAR; INTRAVENOUS
Status: DISCONTINUED | OUTPATIENT
Start: 2022-10-25 | End: 2022-10-29 | Stop reason: HOSPADM

## 2022-10-25 RX ORDER — ONDANSETRON 2 MG/ML
4 INJECTION INTRAMUSCULAR; INTRAVENOUS
Status: DISCONTINUED | OUTPATIENT
Start: 2022-10-25 | End: 2022-10-29 | Stop reason: HOSPADM

## 2022-10-25 RX ORDER — PHENYLEPHRINE HCL IN 0.9% NACL 0.4MG/10ML
SYRINGE (ML) INTRAVENOUS AS NEEDED
Status: DISCONTINUED | OUTPATIENT
Start: 2022-10-25 | End: 2022-10-25 | Stop reason: HOSPADM

## 2022-10-25 RX ORDER — ALBUMIN HUMAN 50 G/1000ML
SOLUTION INTRAVENOUS AS NEEDED
Status: DISCONTINUED | OUTPATIENT
Start: 2022-10-25 | End: 2022-10-25 | Stop reason: HOSPADM

## 2022-10-25 RX ADMIN — ROCURONIUM BROMIDE 40 MG: 10 INJECTION INTRAVENOUS at 08:02

## 2022-10-25 RX ADMIN — LIDOCAINE HYDROCHLORIDE 100 MG: 20 INJECTION, SOLUTION EPIDURAL; INFILTRATION; INTRACAUDAL; PERINEURAL at 07:45

## 2022-10-25 RX ADMIN — Medication 10 MG: at 11:34

## 2022-10-25 RX ADMIN — Medication 100 MCG: at 08:06

## 2022-10-25 RX ADMIN — FENTANYL CITRATE 100 MCG: 50 INJECTION, SOLUTION INTRAMUSCULAR; INTRAVENOUS at 11:50

## 2022-10-25 RX ADMIN — ROCURONIUM BROMIDE 20 MG: 10 INJECTION INTRAVENOUS at 11:44

## 2022-10-25 RX ADMIN — SODIUM CHLORIDE, POTASSIUM CHLORIDE, SODIUM LACTATE AND CALCIUM CHLORIDE 125 ML/HR: 600; 310; 30; 20 INJECTION, SOLUTION INTRAVENOUS at 06:29

## 2022-10-25 RX ADMIN — ALBUMIN (HUMAN) 12.5 G: 12.5 SOLUTION INTRAVENOUS at 14:26

## 2022-10-25 RX ADMIN — OXYCODONE 5 MG: 5 TABLET ORAL at 23:53

## 2022-10-25 RX ADMIN — ROCURONIUM BROMIDE 10 MG: 10 INJECTION INTRAVENOUS at 09:48

## 2022-10-25 RX ADMIN — PROPOFOL 100 MCG/KG/MIN: 10 INJECTION, EMULSION INTRAVENOUS at 08:10

## 2022-10-25 RX ADMIN — HEPARIN SODIUM 5000 UNITS: 1000 INJECTION, SOLUTION INTRAVENOUS; SUBCUTANEOUS at 14:58

## 2022-10-25 RX ADMIN — Medication 100 MCG: at 11:14

## 2022-10-25 RX ADMIN — SODIUM CHLORIDE, SODIUM LACTATE, POTASSIUM CHLORIDE, CALCIUM CHLORIDE AND DEXTROSE MONOHYDRATE 100 ML/HR: 5; 600; 310; 30; 20 INJECTION, SOLUTION INTRAVENOUS at 22:30

## 2022-10-25 RX ADMIN — Medication 100 MCG: at 11:38

## 2022-10-25 RX ADMIN — FAMOTIDINE 20 MG: 10 INJECTION INTRAVENOUS at 08:00

## 2022-10-25 RX ADMIN — FENTANYL CITRATE 50 MCG: 50 INJECTION, SOLUTION INTRAMUSCULAR; INTRAVENOUS at 08:54

## 2022-10-25 RX ADMIN — ROCURONIUM BROMIDE 20 MG: 10 INJECTION INTRAVENOUS at 18:38

## 2022-10-25 RX ADMIN — FENTANYL CITRATE 50 MCG: 50 INJECTION, SOLUTION INTRAMUSCULAR; INTRAVENOUS at 19:17

## 2022-10-25 RX ADMIN — Medication 10 ML: at 23:30

## 2022-10-25 RX ADMIN — Medication 100 MCG: at 17:18

## 2022-10-25 RX ADMIN — Medication 100 MCG: at 09:23

## 2022-10-25 RX ADMIN — ALBUMIN (HUMAN) 12.5 G: 12.5 SOLUTION INTRAVENOUS at 18:10

## 2022-10-25 RX ADMIN — ROCURONIUM BROMIDE 10 MG: 10 INJECTION INTRAVENOUS at 16:50

## 2022-10-25 RX ADMIN — DEXAMETHASONE SODIUM PHOSPHATE 8 MG: 4 INJECTION, SOLUTION INTRAMUSCULAR; INTRAVENOUS at 08:00

## 2022-10-25 RX ADMIN — ROCURONIUM BROMIDE 10 MG: 10 INJECTION INTRAVENOUS at 11:15

## 2022-10-25 RX ADMIN — ROCURONIUM BROMIDE 10 MG: 10 INJECTION INTRAVENOUS at 15:24

## 2022-10-25 RX ADMIN — ROCURONIUM BROMIDE 20 MG: 10 INJECTION INTRAVENOUS at 12:16

## 2022-10-25 RX ADMIN — REMIFENTANIL HYDROCHLORIDE 0.2 MCG/KG/MIN: 1 INJECTION, POWDER, LYOPHILIZED, FOR SOLUTION INTRAVENOUS at 08:10

## 2022-10-25 RX ADMIN — ROCURONIUM BROMIDE 20 MG: 10 INJECTION INTRAVENOUS at 12:21

## 2022-10-25 RX ADMIN — ROCURONIUM BROMIDE 20 MG: 10 INJECTION INTRAVENOUS at 10:12

## 2022-10-25 RX ADMIN — HYDROMORPHONE HYDROCHLORIDE 0.4 MG: 2 INJECTION, SOLUTION INTRAMUSCULAR; INTRAVENOUS; SUBCUTANEOUS at 17:35

## 2022-10-25 RX ADMIN — Medication 100 MCG: at 09:13

## 2022-10-25 RX ADMIN — ALBUMIN (HUMAN) 12.5 G: 12.5 SOLUTION INTRAVENOUS at 09:50

## 2022-10-25 RX ADMIN — CEFAZOLIN SODIUM 2 G: 1 POWDER, FOR SOLUTION INTRAMUSCULAR; INTRAVENOUS at 08:16

## 2022-10-25 RX ADMIN — ROCURONIUM BROMIDE 20 MG: 10 INJECTION INTRAVENOUS at 09:59

## 2022-10-25 RX ADMIN — HYDROMORPHONE HYDROCHLORIDE 0.4 MG: 1 INJECTION, SOLUTION INTRAMUSCULAR; INTRAVENOUS; SUBCUTANEOUS at 21:18

## 2022-10-25 RX ADMIN — Medication 100 MCG: at 08:18

## 2022-10-25 RX ADMIN — Medication 100 MCG: at 11:02

## 2022-10-25 RX ADMIN — SUGAMMADEX 200 MG: 100 INJECTION, SOLUTION INTRAVENOUS at 20:01

## 2022-10-25 RX ADMIN — CEFAZOLIN SODIUM 2 G: 1 POWDER, FOR SOLUTION INTRAMUSCULAR; INTRAVENOUS at 11:41

## 2022-10-25 RX ADMIN — ROCURONIUM BROMIDE 20 MG: 10 INJECTION INTRAVENOUS at 14:36

## 2022-10-25 RX ADMIN — Medication 100 MCG: at 18:07

## 2022-10-25 RX ADMIN — WATER 2 G: 1 INJECTION INTRAMUSCULAR; INTRAVENOUS; SUBCUTANEOUS at 23:30

## 2022-10-25 RX ADMIN — ONDANSETRON HYDROCHLORIDE 4 MG: 2 SOLUTION INTRAMUSCULAR; INTRAVENOUS at 19:50

## 2022-10-25 RX ADMIN — CEFAZOLIN SODIUM 2 G: 1 POWDER, FOR SOLUTION INTRAMUSCULAR; INTRAVENOUS at 19:48

## 2022-10-25 RX ADMIN — ROCURONIUM BROMIDE 20 MG: 10 INJECTION INTRAVENOUS at 17:43

## 2022-10-25 RX ADMIN — SODIUM CHLORIDE: 9 INJECTION, SOLUTION INTRAVENOUS at 18:31

## 2022-10-25 RX ADMIN — ACETAMINOPHEN 1000 MG: 500 TABLET ORAL at 23:30

## 2022-10-25 RX ADMIN — ROCURONIUM BROMIDE 10 MG: 10 INJECTION INTRAVENOUS at 13:32

## 2022-10-25 RX ADMIN — MIDAZOLAM HYDROCHLORIDE 2 MG: 1 INJECTION, SOLUTION INTRAMUSCULAR; INTRAVENOUS at 07:38

## 2022-10-25 RX ADMIN — ROCURONIUM BROMIDE 10 MG: 10 INJECTION INTRAVENOUS at 17:26

## 2022-10-25 RX ADMIN — Medication 100 MCG: at 09:11

## 2022-10-25 RX ADMIN — ROCURONIUM BROMIDE 10 MG: 10 INJECTION INTRAVENOUS at 16:21

## 2022-10-25 RX ADMIN — SUCCINYLCHOLINE CHLORIDE 120 MG: 20 INJECTION, SOLUTION INTRAMUSCULAR; INTRAVENOUS at 07:45

## 2022-10-25 RX ADMIN — CEFAZOLIN SODIUM 2 G: 1 POWDER, FOR SOLUTION INTRAMUSCULAR; INTRAVENOUS at 15:41

## 2022-10-25 RX ADMIN — ROCURONIUM BROMIDE 10 MG: 10 INJECTION INTRAVENOUS at 07:45

## 2022-10-25 RX ADMIN — FENTANYL CITRATE 100 MCG: 50 INJECTION, SOLUTION INTRAMUSCULAR; INTRAVENOUS at 17:58

## 2022-10-25 RX ADMIN — FENTANYL CITRATE 100 MCG: 50 INJECTION, SOLUTION INTRAMUSCULAR; INTRAVENOUS at 07:44

## 2022-10-25 RX ADMIN — SODIUM CHLORIDE, POTASSIUM CHLORIDE, SODIUM LACTATE AND CALCIUM CHLORIDE: 600; 310; 30; 20 INJECTION, SOLUTION INTRAVENOUS at 14:38

## 2022-10-25 RX ADMIN — ROCURONIUM BROMIDE 20 MG: 10 INJECTION INTRAVENOUS at 10:05

## 2022-10-25 NOTE — PERIOP NOTES
10:45 AM  Patient family updated on surgical progress and patient well being. 1:23 PM   Patient family updated on surgical progress and patient well being.

## 2022-10-25 NOTE — H&P
Pre-op History and Physical    CC: Personal history of breast cancer [Z85.3]  S/P bilateral mastectomy [Z90.13]   HPI: 60y.o. year old female with Personal history of breast cancer [Z85.3]  S/P bilateral mastectomy [Z90.13] for Procedure(s):  REMOVAL BILATERAL TISSUE EXPANDERS AND RECONSTRUCTION WITH CRYSTAL MICROSURGICAL FREE FLAP. Past medical history:   Past Medical History:   Diagnosis Date    Asthma CHILDHOOD    Chest pain 07/11/2017    normal Stress Echo 07/19/17. COVID-19 virus infection 07/06/2022    7/10/22    Ductal carcinoma in situ (DCIS) of right breast 04/28/2022    Dyslipidemia     Gastritis and duodenitis 11/22/2019    egd - md mina    GERD (gastroesophageal reflux disease)     Heartburn 2017    Hyperglycemia 04/16/2016    pre-diabetic    Hypertension     Hypothyroid 08/2018    Microscopic colitis 11/22/2019    Neck nodule 05/10/2018    Obesity (BMI 30-39.9) 2017    elham Gupta. PITER (obstructive sleep apnea)     Patient does not wear CPAP    Statin intolerance 03/14/2022    Steatosis of liver 06/19/2016    TMJ (temporomandibular joint syndrome)     Tubular adenoma of colon 02/2017    Dr. Estephania High    Uterine fibroid 2002    Dr. Melissa Subramanian    Vitamin D deficiency 04/18/2018    Dr. Zaynab Black      Past surgical history:   Past Surgical History:   Procedure Laterality Date    COLONOSCOPY N/A 11/22/2019    COLONOSCOPY performed by Hari York MD at [facility] ENDOSCOPY    HX BREAST LUMPECTOMY Left 2005    Atypical Cells. Dr. Nori Burgess.     HX BREAST RECONSTRUCTION Bilateral 08/02/2022    BILATERAL BREAST RECONSTRUCTION WITH TISSUE EXPANDERS performed by Melquiades Yusuf MD at [facility] 630       x 2    HX HEENT  1997    LASIK    HX MASTECTOMY Bilateral 08/02/2022    BILATERAL SKIN SPARING MASTECTOMIES, RIGHT BREAST SENTINEL NODE BIOPSY performed by Kadeem Miller MD at [facility] 11    HX MUKUND AND BSO Right 2002    due to uterine fibroids. Dr. Remington Morales    HX Mark Morales      Family history:   Family History   Problem Relation Age of Onset    Diabetes Mother     Obesity Mother     OSTEOARTHRITIS Mother     Other Father 54         FROM BEE ALLERGY    Asthma Father     Obesity Sister         X7    Sleep Apnea Sister     Diabetes Sister     Hypertension Sister     Thyroid Disease Sister         X 2    Diabetes Sister         X3 MORE SISTERS TAKING INSULIN    Breast Cancer Sister 43    Schizophrenia Sister     Cancer Sister         Breast    Obesity Sister     Diabetes Brother     Diabetes Brother     Heart Disease Brother     No Known Problems Maternal Grandmother     No Known Problems Maternal Grandfather     No Known Problems Paternal Grandmother     No Known Problems Paternal Grandfather     Heart Disease Niece/nephew     Anesth Problems Neg Hx       Social history:   Social History     Socioeconomic History    Marital status:      Spouse name: Not on file    Number of children: Not on file    Years of education: Not on file    Highest education level: Not on file   Occupational History    Not on file   Tobacco Use    Smoking status: Never    Smokeless tobacco: Never   Vaping Use    Vaping Use: Never used   Substance and Sexual Activity    Alcohol use: Yes     Alcohol/week: 1.0 standard drink     Types: 1 Glasses of wine per week     Comment: ocassional    Drug use: No    Sexual activity: Yes     Partners: Male     Birth control/protection: Surgical     Comment: TL   Other Topics Concern    Not on file   Social History Narrative    Family History: Mother: , Mirella Living: , Bee stingSister(s):  2010 yrs, Breast Cancer at 42Brother(s):    , Heart Problems1 brother(s) , 7 sister(s) - healthy. 1 son(s) , 1 daughter(s) - healthy. Social History: Alcohol Use Patient does not use alcohol. Smoking Status Patient is a never smoker. Caffeine: coffee, tea.  Marital Status:    . Lives w ith: spouse Joyce Garcia. Occupation/W ork: office w ork. Medical History: Hypertension, Left Breast papilloma. Gyn History: Last mammogram date 10/28/2014. Last pap date 10/22/2012. Hysterectomy Date . OB History: Total pregnancies 2. Full term delivery (>37 w eeks) 2. Live births 2. C section(s) 2. Pregnancy # 1: , Girl, 11. Pregnancy # 2: Repeat C/S, boy, . Surgical History: hysterectomy, total abdominal w ith BSO , Left w rist surgery , colonoscopy Zuleika Josefz and 2009, left lumpectomy . Social Determinants of Health     Financial Resource Strain: Not on file   Food Insecurity: Not on file   Transportation Needs: Not on file   Physical Activity: Not on file   Stress: Not on file   Social Connections: Unknown    Frequency of Communication with Friends and Family: More than three times a week    Frequency of Social Gatherings with Friends and Family: More than three times a week    Attends Islam Services: Not on file    Active Member of Clubs or Organizations: Not on file    Attends Club or Organization Meetings: Not on file    Marital Status:    Intimate Partner Violence: Not on file   Housing Stability: Not on file      Home Medications:   Prior to Admission medications    Medication Sig Start Date End Date Taking? Authorizing Provider   OTHER    Yes Provider, Historical   Qvar RediHaler 80 mcg/actuation HFAb inhaler Take 2 Puffs by inhalation two (2) times a day. 10/5/22  Yes Provider, Historical   fluticasone propionate (FLONASE) 50 mcg/actuation nasal spray 2 Sprays by Both Nostrils route daily. 10/19/22  Yes Kayla Peralta MD   cetirizine (ZYRTEC) 10 mg tablet Take 1 Tablet by mouth daily as needed for Allergies. 10/19/22  Yes Kayla Peralta MD   levothyroxine (SYNTHROID) 25 mcg tablet Take 25 mcg by mouth Daily (before breakfast).    Yes Provider, Historical   ezetimibe (ZETIA) 10 mg tablet Take 10 mg by mouth every morning. Yes Provider, Historical   OTHER,NON-FORMULARY, Take 1 Tablet by mouth nightly. Cutrelle-probiotics-prebiotics    Provider, Historical   hydroCHLOROthiazide (HYDRODIURIL) 12.5 mg tablet Take 12.5 mg by mouth every morning. Provider, Historical   scopolamine (TRANSDERM-SCOP) 1 mg over 3 days pt3d APPLY 1 PATCH BY TRANSDERMAL ROUTE EVERY 72 HOURS 10/6/22   Andrez Casillas MD   albuterol (PROVENTIL HFA, VENTOLIN HFA, PROAIR HFA) 90 mcg/actuation inhaler Take 2 Puffs by inhalation every four (4) hours as needed for Wheezing. 7/3/22   Andrez Casillas MD   acyclovir (ZOVIRAX) 5 % ointment Apply to affected area 6 times a day 7/29/21   Alissa Minor MD   mometasone (NASONEX) 50 mcg/actuation nasal spray USE TWO SPRAY(S) IN EACH NOSTRIL DAILY 1/31/19   Andrez Casillas MD      Allergies: Allergies   Allergen Reactions    Contrave [Naltrexone-Bupropion] Other (comments)     1 PILL MORNING, 2 PILLS EVENING  FELT ZOMBIED, \"OUT OF MY HEAD\"    Crestor [Rosuvastatin] Other (comments)     Felt like it worsened her memory    Gadolinium-Containing Contrast Media Nausea and Vomiting    Lipitor [Atorvastatin] Other (comments)     Felt like it worsened memory. Review of systems:  Denies headache, fever, chills, weight change, congestion, sore throat, chest pain, shortness of breath, nausea, vomiting, diarrhea, constipation, abdominal pain, generalized weakness, muscle or joint pain, and rash. Physical Exam:  Vitals: There were no vitals taken for this visit.    General: awake and alert, NAD  Neck: supple  Cor: RRR  Lungs: clear  Abdomen: soft, non-tender, non-distended  Extremities: no edema  Skin: no rash    Impression: Personal history of breast cancer [Z85.3]  S/P bilateral mastectomy [Z90.13]    Plan:  Procedure(s):  REMOVAL BILATERAL TISSUE EXPANDERS AND RECONSTRUCTION WITH CRYSTAL MICROSURGICAL FREE FLAP

## 2022-10-25 NOTE — PERIOP NOTES
160  Patients  updated of surgical progress as requested by Dr. Sameer Sellers Patients  updated of surgical progress as requested by Suri Macias

## 2022-10-26 LAB
ANION GAP SERPL CALC-SCNC: 6 MMOL/L (ref 5–15)
BUN SERPL-MCNC: 10 MG/DL (ref 6–20)
BUN/CREAT SERPL: 11 (ref 12–20)
CALCIUM SERPL-MCNC: 8.4 MG/DL (ref 8.5–10.1)
CHLORIDE SERPL-SCNC: 113 MMOL/L (ref 97–108)
CO2 SERPL-SCNC: 25 MMOL/L (ref 21–32)
CREAT SERPL-MCNC: 0.95 MG/DL (ref 0.55–1.02)
ERYTHROCYTE [DISTWIDTH] IN BLOOD BY AUTOMATED COUNT: 13.3 % (ref 11.5–14.5)
GLUCOSE SERPL-MCNC: 129 MG/DL (ref 65–100)
HCT VFR BLD AUTO: 29.3 % (ref 35–47)
HGB BLD-MCNC: 10.6 G/DL (ref 11.5–16)
MCH RBC QN AUTO: 28.3 PG (ref 26–34)
MCHC RBC AUTO-ENTMCNC: 36.2 G/DL (ref 30–36.5)
MCV RBC AUTO: 78.1 FL (ref 80–99)
NRBC # BLD: 0 K/UL (ref 0–0.01)
NRBC BLD-RTO: 0 PER 100 WBC
PLATELET # BLD AUTO: 218 K/UL (ref 150–400)
PMV BLD AUTO: 10 FL (ref 8.9–12.9)
POTASSIUM SERPL-SCNC: 3.6 MMOL/L (ref 3.5–5.1)
RBC # BLD AUTO: 3.75 M/UL (ref 3.8–5.2)
SODIUM SERPL-SCNC: 144 MMOL/L (ref 136–145)
WBC # BLD AUTO: 12.8 K/UL (ref 3.6–11)

## 2022-10-26 PROCEDURE — 65270000029 HC RM PRIVATE

## 2022-10-26 PROCEDURE — 85027 COMPLETE CBC AUTOMATED: CPT

## 2022-10-26 PROCEDURE — 74011000250 HC RX REV CODE- 250: Performed by: SURGERY

## 2022-10-26 PROCEDURE — 94761 N-INVAS EAR/PLS OXIMETRY MLT: CPT

## 2022-10-26 PROCEDURE — 74011250636 HC RX REV CODE- 250/636: Performed by: SURGERY

## 2022-10-26 PROCEDURE — 77010033678 HC OXYGEN DAILY

## 2022-10-26 PROCEDURE — 80048 BASIC METABOLIC PNL TOTAL CA: CPT

## 2022-10-26 PROCEDURE — 36415 COLL VENOUS BLD VENIPUNCTURE: CPT

## 2022-10-26 PROCEDURE — 74011250637 HC RX REV CODE- 250/637: Performed by: SURGERY

## 2022-10-26 RX ORDER — LIDOCAINE HYDROCHLORIDE AND EPINEPHRINE 20; 10 MG/ML; UG/ML
INJECTION, SOLUTION INFILTRATION; PERINEURAL AS NEEDED
Status: DISCONTINUED | OUTPATIENT
Start: 2022-10-25 | End: 2022-10-29 | Stop reason: HOSPADM

## 2022-10-26 RX ORDER — ENOXAPARIN SODIUM 100 MG/ML
40 INJECTION SUBCUTANEOUS EVERY 24 HOURS
Status: DISCONTINUED | OUTPATIENT
Start: 2022-10-26 | End: 2022-10-29 | Stop reason: HOSPADM

## 2022-10-26 RX ORDER — PAPAVERINE HYDROCHLORIDE 30 MG/ML
INJECTION INTRAMUSCULAR; INTRAVENOUS AS NEEDED
Status: DISCONTINUED | OUTPATIENT
Start: 2022-10-25 | End: 2022-10-29 | Stop reason: HOSPADM

## 2022-10-26 RX ORDER — HYDROCHLOROTHIAZIDE 25 MG/1
12.5 TABLET ORAL DAILY
Status: DISCONTINUED | OUTPATIENT
Start: 2022-10-27 | End: 2022-10-29 | Stop reason: HOSPADM

## 2022-10-26 RX ADMIN — ENOXAPARIN SODIUM 40 MG: 100 INJECTION SUBCUTANEOUS at 09:08

## 2022-10-26 RX ADMIN — Medication 10 ML: at 13:54

## 2022-10-26 RX ADMIN — Medication 5 ML: at 22:00

## 2022-10-26 RX ADMIN — OXYCODONE 10 MG: 5 TABLET ORAL at 22:07

## 2022-10-26 RX ADMIN — Medication 10 ML: at 06:00

## 2022-10-26 RX ADMIN — ACETAMINOPHEN 1000 MG: 500 TABLET ORAL at 11:51

## 2022-10-26 RX ADMIN — WATER 2 G: 1 INJECTION INTRAMUSCULAR; INTRAVENOUS; SUBCUTANEOUS at 15:19

## 2022-10-26 RX ADMIN — OXYCODONE 10 MG: 5 TABLET ORAL at 13:53

## 2022-10-26 RX ADMIN — FLUTICASONE PROPIONATE 2 SPRAY: 50 SPRAY, METERED NASAL at 08:41

## 2022-10-26 RX ADMIN — OXYCODONE 10 MG: 5 TABLET ORAL at 07:40

## 2022-10-26 RX ADMIN — ACETAMINOPHEN 1000 MG: 500 TABLET ORAL at 23:58

## 2022-10-26 RX ADMIN — ACETAMINOPHEN 1000 MG: 500 TABLET ORAL at 17:08

## 2022-10-26 RX ADMIN — EZETIMIBE 10 MG: 10 TABLET ORAL at 07:40

## 2022-10-26 RX ADMIN — ACETAMINOPHEN 1000 MG: 500 TABLET ORAL at 06:00

## 2022-10-26 RX ADMIN — WATER 2 G: 1 INJECTION INTRAMUSCULAR; INTRAVENOUS; SUBCUTANEOUS at 07:40

## 2022-10-26 RX ADMIN — OXYCODONE 5 MG: 5 TABLET ORAL at 03:20

## 2022-10-26 NOTE — ANESTHESIA POSTPROCEDURE EVALUATION
Procedure(s):  REMOVAL BILATERAL TISSUE EXPANDERS AND RECONSTRUCTION WITH CRYSTAL MICROSURGICAL FREE FLAP. general    Anesthesia Post Evaluation      Multimodal analgesia: multimodal analgesia not used between 6 hours prior to anesthesia start to PACU discharge  Patient location during evaluation: PACU  Patient participation: complete - patient participated  Level of consciousness: awake  Pain score: 0  Pain management: adequate  Airway patency: patent  Anesthetic complications: no  Cardiovascular status: acceptable, blood pressure returned to baseline and hemodynamically stable  Respiratory status: acceptable  Hydration status: acceptable  Post anesthesia nausea and vomiting:  controlled  Final Post Anesthesia Temperature Assessment:  Normothermia (36.0-37.5 degrees C)      INITIAL Post-op Vital signs:   Vitals Value Taken Time   /80 10/25/22 2115   Temp 36.4 °C (97.6 °F) 10/25/22 2040   Pulse 103 10/25/22 2116   Resp 19 10/25/22 2116   SpO2 97 % 10/25/22 2116   Vitals shown include unvalidated device data.

## 2022-10-26 NOTE — PROGRESS NOTES
0700 Bedside and Verbal shift change report given to Levy Lazar RN (oncoming nurse) by Demetria Hastings (offgoing nurse). Report included the following information SBAR, Kardex, ED Summary, Procedure Summary, Intake/Output, MAR, Recent Results, Med Rec Status, Cardiac Rhythm NSR sinus tach, Alarm Parameters , Quality Measures, and Dual Neuro Assessment. Primary Nurse Nickie Wells RN and Demetria Hastings RN performed a dual skin assessment on this patient Impairment noted- see wound doc flow sheet  Galdino score, see flowsheet. 0800 Pt assessed, see flowsheet. 4352 Dr. Rhett Flannery at bedside. Updated regarding pt assessment, labs, vs, and medications. Plans to stay in bed for today due to complicated surgery. Amaral to stay in place. PT refusing turns. Diet orders. Placed. 1118 Call to Dr. Rhett Flannery regarding diet order placed. Pt suppose to be on CL for today - order changed per Dr. Rhett Flannery.    called Dr. Rhett Flannery regarding  - not on home BP meds. Verbal orders to place Hydrochlorothiazide 12.5 PO daily AM to start tomorrow. Orders placed. 1900 Bedside and Verbal shift change report given to Adolfo (oncoming nurse) by Levy Lazar RN (offgoing nurse). Report included the following information SBAR, Kardex, OR Summary, Procedure Summary, Intake/Output, MAR, Recent Results, Med Rec Status, Cardiac Rhythm Nsr sinus tach, Alarm Parameters , Quality Measures, and Dual Neuro Assessment.

## 2022-10-26 NOTE — PERIOP NOTES
TRANSFER - OUT REPORT:    Verbal report given to Carmelo Dang (name) on La Coste Lencho  being transferred to ICU 4(unit) for routine post - op       Report consisted of patients Situation, Background, Assessment and   Recommendations(SBAR). Information from the following report(s) SBAR, Kardex, Intake/Output, MAR, Recent Results, and Cardiac Rhythm NSR  was reviewed with the receiving nurse. Lines:   Peripheral IV 10/25/22 Posterior;Right Hand (Active)   Site Assessment Clean, dry, & intact 10/25/22 2130   Phlebitis Assessment 0 10/25/22 2130   Infiltration Assessment 0 10/25/22 2130   Dressing Status Clean, dry, & intact 10/25/22 2130   Dressing Type Transparent 10/25/22 2130   Hub Color/Line Status Purple 10/25/22 2130   Action Taken Open ports on tubing capped 10/25/22 2130   Alcohol Cap Used Yes 10/25/22 2130        Opportunity for questions and clarification was provided.       Patient transported with:   Monitor  O2 @ 2 liters  Registered Nurse

## 2022-10-26 NOTE — PROGRESS NOTES
Progress Note    No acute events overnight  Pain controlled    Visit Vitals  BP (!) 143/74   Pulse 88   Temp 99.2 °F (37.3 °C)   Resp 14   SpO2 94%     Gen; NAD, comfortable  HEENT: NCAT  CV: reg rate and rhythm  Resp: normal resp efffort RA  Breast: Vioptix: R 47%, L 61%  B/l flap warm, well perfused  Incisions c/d/I  KAREN drains serosanguinous    Date 10/25/22 0700 - 10/26/22 0659 10/26/22 0700 - 10/27/22 0659   Shift 3329-9221 6184-9579 24 Hour Total 0311-8784 6188-2184 24 Hour Total   INTAKE   I.V. 5810 2270 8080        Volume (lactated Ringers infusion) 5000  5000        Volume (0.9% sodium chloride infusion)  1500 1500        Volume (dextrose 5% lactated ringers infusion)  750 750        Volume (ceFAZolin (ANCEF) 2 g in sterile water (preservative free) 20 mL IV syringe) 60 20 80        Volume (albumin human 5% (BUMINATE) solution) 750  750      Shift Total 5810 2270 8080      OUTPUT   Urine 4800 900 5700        Urine Output 4800 200 5000        Urine Output (mL) (Urinary Catheter 10/25/22 2- way; [place] - Temperature)  700 700      Drains  247.5 247.5        Output (ml) (Geovanni-Lamas Drain 10/25/22 Left Breast)  47.5 47.5        Output (ml) (Geovanni-Lamas Drain 10/25/22 Right Breast)  70 70        Output (ml) (Geovanni-Lamas Drain 10/25/22 Right Breast)  60 60        Output (ml) (Geovanni-Lamas Drain 10/25/22 Right Abdomen)  45 45        Output (ml) (Geovanni-Lamas Drain 10/25/22 Left Abdomen)  25 25      Blood  150 150        Estimated Blood Loss  150 150      Shift Total 4800 1297.5 6097.5      NET 1010 972.5 1982.5      Weight (kg)               Lab Results   Component Value Date/Time    WBC 12.8 (H) 10/26/2022 04:46 AM    HGB 10.6 (L) 10/26/2022 04:46 AM    HCT 29.3 (L) 10/26/2022 04:46 AM    PLATELET 560 85/80/3657 04:46 AM    MCV 78.1 (L) 10/26/2022 04:46 AM     Lab Results   Component Value Date/Time    Sodium 144 10/26/2022 04:46 AM    Potassium 3.6 10/26/2022 04:46 AM    Chloride 113 (H) 10/26/2022 04:46 AM    CO2 25 10/26/2022 04:46 AM    Anion gap 6 10/26/2022 04:46 AM    Glucose 129 (H) 10/26/2022 04:46 AM    BUN 10 10/26/2022 04:46 AM    Creatinine 0.95 10/26/2022 04:46 AM    BUN/Creatinine ratio 11 (L) 10/26/2022 04:46 AM    GFR est AA >60 07/26/2022 03:19 PM    GFR est non-AA 58 (L) 07/26/2022 03:19 PM    Calcium 8.4 (L) 10/26/2022 04:46 AM     POD 1 s/p b/l CRYSTAL flap reconstruction    - continue q1hr flap check  - drain cares  - advance diet to clear liquids  - keep bedrest today  - continue ontiveros today  - pain control: morphine, oxycodone, tylenol  - start toradol today  - dvt ppx: lovenox  - incentive spirometry  - plan for d/c Saturday vs Sunday

## 2022-10-26 NOTE — PROGRESS NOTES
Reason for Admission:  bilateral CRYSTAL flaps                     RUR Score:          7%           Plan for utilizing home health:      to be determined todischarge but not likely    PCP: First and Last name:  Aziza Gaytan MD     Name of Practice:    Are you a current patient: Yes/No: yes   Approximate date of last visit:    Can you participate in a virtual visit with your PCP:                     Current Advanced Directive/Advance Care Plan: Full Code      Healthcare Decision Maker:   Gasper Balderrama @ [phone] or [phone]                             Transition of Care Plan:                   Pt is POD @ 1 s/p bilateral CRYSTAL flap reconstruction. Per surgeon's note,projected discharge is one day this weekend. Pt told me she thought she was being discharged on Friday. Pt has great family support and does not anticipate any home health or rehab needs when discharged.     Oziel Gamez

## 2022-10-26 NOTE — OP NOTES
Name: Claudine Kelly  Surgeon: Zca Lyon MD, MD   Account #: [de-identified]   Surgery Date: 10/25/2022   : 1960  Age: 60 y.o. Location: [place]    OPERATIVE REPORT     PREOPERATIVE DIAGNOSES:   1. Right breast cancer. 2.  Acquired absence of bilateral breasts. POSTOPERATIVE DIAGNOSES:   1. Right breast cancer. 2.  Acquired absence of bilateral breasts. OPERATIVE PROCEDURE:   1. Excision of bilateral mastectomy scars, 13 cm each (Dr. Josefina Corado on the right, Dr. Nataly Estes on the left). 2.  Removal of intact bilateral breast tissue expanders (Dr. Josefina Corado on the right, Dr. Nataly Estes on the left). 3.  Bilateral breast capsulectomies (Dr. Josefina Corado on the right, Dr. Nataly Estes on the left). 4.  Bilateral extrapleural rib resection (Dr. Josefina Corado on the right, Dr. Nataly Estes on the left). 5.  Delayed bilateral breast reconstruction with CRYSTAL (deep inferior epigastric artery ) flaps (both as Co-Surgeons). 6.  Intraoperative bilateral TAP (transversus abdominis plane) regional block (injection) with ultrasound guidance (Dr. Josefina Corado on the right, Dr. Nataly Estes on the left). SURGEON:  Zac Lyon MD    CO-SURGEONMarkell Henry MD     ANESTHESIA: General endotracheal.     INDICATIONS: The patient is a 60 y.o. female who was previously diagnosed with breast cancer. She had bilateral mastectomies and tissue expander. She desired and was a candidate for autologous tissue breast reconstruction. Bilateral microsurgical  flap reconstruction was recommended. The procedure, as well as the alternatives, possible complications, and anticipated scars were outlined with the patient and she agrees to proceed having given her informed and written consent. CT angiography was performed pre-operatively to map out the courses of the perforating blood vessels to the flap tissue of the lower abdomen and to guide intraoperative dissection.  I noted that the course of the bilateral deep inferior epigastric arteries was tortuous on the CT scan. PROCEDURE IN DETAIL: Please note that Dr. Eliseo Vences is a co-surgeon for this operation due to the length and complexity of the microsurgical case. A co-surgeon is required for the operation because there is no other qualified assistant, fellow, or resident otherwise available. The complexity of the  flaps involves dissection of the perforating blood vessels through the fibers of the rectus abdominis muscle during flap harvest and the extra time required for this (at least 50% longer), which is significantly more technically difficult than a standard free TRAM flap. The -22 modifier is also used due to this increased complexity and time requirement, as well as that associated with dissection of vessels in a previously operated field (previous Caesarean section). Dr. Eliseo Vences was assigned preparation of the right chest and harvest of the right hemiabdominal CRYSTAL flap. Dr. Jake Rubio was assigned preparation of the left chest and harvest of the left hemiabdominal CRYSTAL flap. Both worked independently and concomitantly during these portions of the case. They then assisted each other under the microscope during the microsurgical portion of the procedure, then closed their respective sides. The patient was marked in the upright position in the holding area. She received preoperative intravenous antibiotics. She was taken to the operating room and placed in the supine position with all pressure points padded. Pneumatic compression stockings were applied to bilateral lower extremities. Following successful general endotracheal anesthesia, a Amaral catheter was placed. The arms were tucked to her sides with appropriate padding. The chest and abdomen were prepped and draped in the usual sterile fashion. Dr. Jake Rubio began in the left chest with preparation of the recipient vessels, specifically the internal mammary vessels.   The 13 cm mastectomy scar was excised and sent for permanent section. The capsule was entered with the bovie and an intact tissue expander removed. The capsulectomy was performed by removing the entire anterior and posterior portions of the breast capsule with the bovie. It was sent for permanent section. Next, the breast pocket was dissected to the extent of the pre-operative markings. The breast pocket was irrigated and hemostasis assured. A segment of muscle was removed over the fourth rib. The fourth costal cartilage was resected in extrapleural fashion to expose the internal mammary vessels. Under loupe magnification, they were dissected circumferentially, dividing branches between hemostatic clips. The vessels were bathed in papaverine and covered with saline-moistened gauze until ready for use. At the same time, Dr. Mary Marc harvested the right hemiabdominal CRYSTAL flap. The upper incision was made with a scalpel. Dissection continued with the Bovie down to the abdominal wall fascia. Next, dissection continued in a cephalad direction to elevate the upper abdominal flap to facilitate closure. Next, the midline incision was made with a scalpel and completed with the bovie down to fascia. The umbilicus was circumscribed. The lower abdominal incision was then made with a scalpel. Under loupe magnification, the superficial inferior epigastric vein was identified and dissected inferiorly. It was divided between clips and preserved for possible later use for additional venous drainage for the flap. Dissection in the lower incision continued down to the abdominal wall with the Bovie. Next, starting laterally, the CRYSTAL flap was elevated on the right side, based on a lateral row  with palpable pulse. Next, the anterior fascia was split and reflected to expose the rectus abdominis muscle.  The course of the perforators was traced through the fibers of the rectus abdominis, to the source vessel on the posterior aspect of the muscle, dividing branches between hemostatic clips. The vascular pedicle was then dissected down into the pelvis at its origin off the iliac vessels. This dissection was difficult due to the extensive scar tissue in the abdomen compounded with the tortuous course of the vessels. The pedicle vessels were bathed in papaverine, and the flap was temporarily secured in position using skin walt. Next, Dr. Sandra Maddox moved to preparation of the right chest and Dr. Shadi Cole to harvest of the left hemiabdominal CRYSTAL flap. In the left hemiabdomen, a two lateral row  CRYSTAL flap was elevated, dissecting the perforating blood vessels through the rectus abdominis muscle fibers, dividing branches between hemostatic clips. This dissection was difficult due to the extensive scar tissue in the abdomen compounded with the tortuous course of the vessels. The vessels of the vascular pedicle of the flap were dissected circumferentially at their origins off the iliac vessels and bathed in papaverine. The flap was stapled into position until ready for use. In the right chest, the 13 cm mastectomy scar was excised with a scalpel. Dissection continued with the bovie and the capsule was entered. An intact tissue expander was removed. The capsulectomy was performed by removing the entire anterior and posterior portions of the breast capsule with the bovie. It was sent for permanent section. Next, the breast pocket was dissected to the extent of the pre-operative markings. The breast pocket was irrigated and hemostasis assured. A segment of muscle was removed over the fourth rib. The fourth costal cartilage was resected in extrapleural fashion to expose the internal mammary vessels. Under loupe magnification, they were dissected circumferentially, dividing branches between hemostatic clips. The vessels were bathed in papaverine and covered with saline-moistened gauze until ready for use.   The patient received 5000 units subcutaneous heparin. Next, the right hemiabdominal CRYSTAL flap was harvested by ligating the origin of the vessels with hemostatic clips. It was brought to the left chest and placed in the pocket. The skin island was marked as an oval.  The remainder was de-epithelialized with scissors. The flap was positioned and the operating microscope brought in. The flap vessels were prepared under the microscope and irrigated with heparin saline solution. The venous anastomosis was performed first.  The internal mammary vein was clipped inferiorly and clamped superiorly. It was divided, prepared, and irrigated. Next an end-to-end anastomosis was performed with a 3.0 mm venous  in antegrade fashion. The clamp was taken down and excellent backfill noted. Next the arterial anastomosis was performed. The internal mammary artery was clipped inferiorly and clamped superiorly. It was divided, prepared, and irrigated. Then the anastomosis was performed with interrupted #9-0 nylon sutures in hand sewn fashion. The clamps were taken down and excellent perfusion of the flap noted. Exparel had been expanded with 40 mL injectable saline and 40 mL 0.25% plain marcaine to 100 mL total.  Twenty mL was infiltrated laterally in the breast pocket along the intercostal nerves. A 19 Fr. Sherrilee Langton drain was placed in the breast pocket, brought out through a #15 blade stab incision in the lateral inframammary fold and secured with a #2-0 nylon suture. The flap was anchored to the chest wall superomedially with a #2-0 vicryl suture. The flap was stapled in the breast pocket. Next the left hemiabdominal CRYSTAL flap was harvested by ligating the origin of the vessels with hemostatic clips. It was brought to the right chest and placed in the pocket. The skin island was marked as an oval.  The remainder was de-epithelialized with scissors.   The flap was positioned, the vessels were prepared under the microscope, and they were irrigated with heparin saline solution. During preparation, there was an injury noted to the deep inferior epigastric vein at the bifurcation of the vein at a weak point. At the level of the injury, the vein was transected. The vein was then irrigated, prepared, and an intra-vein anastomosis was performed to restore the integrity of the vein in an end-to-end fashion using a 2.5 mm venous  in antegrade fashion. We then examined the internal mammary vessels. The venous anastomosis was performed first.  The internal mammary vein was clipped inferiorly and clamped superiorly. It was divided, prepared, and irrigated. Next an end-to-end anastomosis was performed with a 2.5 mm venous  in antegrade fashion. The clamp was taken down and adequate backfill was noted. Next the arterial anastomosis was performed. The internal mammary artery was clipped inferiorly and clamped superiorly. It was divided, prepared, and irrigated. Then the anastomosis was performed with interrupted #9-0 nylon sutures in hand sewn fashion. The clamps were taken down and excellent perfusion of the flap noted. We did note that at the level of the injury of the vein, there was an additional hole in the vein which was repaired with a combination of hemoclip and suture ligation. Twenty mL of exparel was infiltrated laterally in the breast pocket along the intercostal nerves. A 19 Fr. Natalia Shoe drain was placed in the breast pocket, brought out through a #15 blade stab incision in the lateral inframammary fold and secured with a #2-0 nylon suture. The flap was anchored to the chest wall superomedially with a #2-0 vicryl suture. The flap was stapled in the breast pocket. Both CRYSTAL flaps were then inset with buried #3-0 monocryl sutures, followed by #2-0/3-0 monoderm barbed suture in running subcuticular fashion. The abdomen was then closed after placing the patient in semi-Beckford position.  No fascia nor muscle had been removed from the abdomen. The anterior rectus sheath was closed primarily on each side with a #2-0 stratafix PDS suture in running whipstitch fashion. No mesh was used. Two 19 Fr Michael drains were placed in the abdomen and brought out through the ends of the abdominal incision and secured with #2-0 nylon sutures. Next, bilateral transversus abdominis plane (TAP) regional blocks were performed through the open wound and exposed abdominal wall. With ultrasound guidance, the plane between the internal oblique and transversus abdominis muscles was identified on each side, and 20 mL of the Exparel mixture was injected on each respective side with a hypodermic needle. The remainder of the Exparel was injected directly into both rectus abdominis muscles. Leny's fascia was then closed with interrupted #2-0 vicryl sutures. Skin closure was completed with #2-0/3-0 monoderm barbed suture in running subcuticular fashion. A core of skin and adipose tissue was removed with a scalpel and bovie so as to accept the umbilicus. The umbilicus was inset with interrupted buried dermal #4-0 monocryl sutures. All of the wounds were cleaned. Drains were hooked to bulb suction. Skin glue was applied to the incisions. Vioptix probes were placed on each flap skin island and appropriate readings noted. They were secured with tegaderm dressings. Instrument, sponge, and needle counts were reported to be correct. The patient was allowed to awaken from anesthesia. She was extubated without apparent complication. She was transferred to the bed in semi-Beckford position and was then taken to the recovery room in excellent condition. COMPLICATIONS: None. EBL: 150 mL     IV Fluid: 6500 mL crystalloid, 750 mL albumin. UOP:  5000 mL    DRAINS: Michael x4. SPECIMENS: right and left mastectomy scars, right and left breast capsules, additional right and left breast skin.        Blessing Branch MD

## 2022-10-26 NOTE — OP NOTES
Name: Danelle Downing  Surgeon: Margot Oliveros MD   Account #: [de-identified] Surgery Date: 10/25/2022   : 1960  Age: 60 y.o. Location: [facility]    OPERATIVE REPORT     PREOPERATIVE DIAGNOSES:   1. Right breast cancer. 2.  Acquired absence of bilateral breasts. 3.  History of left lumpectomy (remote). POSTOPERATIVE DIAGNOSES:   1. Right breast cancer. 2.  Acquired absence of bilateral breasts. 3.   History of left lumpectomy (remote). OPERATIVE PROCEDURE:   1. Excision of bilateral mastectomy scars, 13 cm each (Dr. Sandra Maddox on the right, Dr. Shadi Cole on the left). 2.  Removal of intact bilateral breast tissue expanders (Dr. Sandra Maddox on the right, Dr. Shadi Cole on the left). 3.  Bilateral breast capsulectomies (Dr. Sandra Maddox on the right, Dr. Shadi Cole on the left). 4.  Bilateral extrapleural rib resection (Dr. Sandra Maddox on the right, Dr. Shadi Cole on the left). 5.  Delayed bilateral breast reconstruction with CRYSTAL (deep inferior epigastric artery ) flaps (both as Co-Surgeons). 6.  Intraoperative bilateral TAP (transversus abdominis plane) regional block (injection) with ultrasound guidance (Dr. Sandra Maddox on the right, Dr. Shadi Cole on the left). SURGEON:  Sylvester Maddox MD    CO-SURGEON:  Jeanie Dee MD     ANESTHESIA: General endotracheal.     INDICATIONS: The patient is a 60 y.o. female who was previously diagnosed with breast cancer. She had bilateral mastectomies and tissue expander reconstruction. She did not require radiation therapy. She did not want implants and was a candidate for autologous tissue breast reconstruction. Bilateral microsurgical  flap reconstruction was recommended. The procedure, as well as the alternatives, possible complications, and anticipated scars were outlined with the patient and she agrees to proceed having given her informed and written consent.  CT angiography was performed pre-operatively to map out the courses of the perforating blood vessels to the flap tissue of the lower abdomen and to guide intraoperative dissection. PROCEDURE IN DETAIL: Please note that Dr. Moi Acosta is a co-surgeon for this operation due to the length and complexity of the microsurgical case. A co-surgeon is required for the operation because there is no other qualified assistant, fellow, or resident otherwise available. The complexity of the  flaps involves dissection of the perforating blood vessels through the fibers of the rectus abdominis muscle during flap harvest and the extra time required for this (at least 50% longer), which is significantly more technically difficult than a standard free TRAM flap. The -22 modifier is also used due to this increased complexity and time requirement, as well as that associated with preparation of recipient chest vessels in light of her history of radiation therapy in that region. Dr. Rey Dejesus was assigned preparation of the right chest and harvest of the right hemiabdominal CRYSTAL flap. Dr. Moi Acosta was assigned preparation of the left chest and harvest of the left hemiabdominal CRYSTAL flap. Both worked independently and concomitantly during these portions of the case. They then assisted each other under the microscope during the microsurgical portion of the procedure, then closed their respective sides. The patient was marked in the upright position in the holding area. She received preoperative intravenous antibiotics. She was taken to the operating room and placed in the supine position with all pressure points padded. Pneumatic compression stockings were applied to bilateral lower extremities. Following successful general endotracheal anesthesia, a Amaral catheter was placed. The arms were tucked to her sides with appropriate padding. The chest and abdomen were prepped and draped in the usual sterile fashion.      Dr. Moi Acosta began in the left chest with preparation of the recipient vessels, specifically the internal mammary vessels. The 13 cm mastectomy scar was excised and sent for permanent section. The capsule was entered with the bovie and an intact tissue expander removed. A complete capsulectomy was performed. It was sent for permanent section. Next, the breast pocket was dissected to the extent of the pre-operative markings. The breast pocket was irrigated and hemostasis assured. A segment of muscle was removed over the fourth rib. The fourth costal cartilage was resected in extrapleural fashion to expose the internal mammary vessels. Under loupe magnification, they were dissected circumferentially, dividing branches between hemostatic clips. The vessels were bathed in papaverine and covered with saline-moistened gauze until ready for use. At the same time, Dr. Gomes No harvested the right hemiabdominal CRYSTAL flap. The upper incision was made with a scalpel. Dissection continued with the Bovie down to the abdominal wall fascia. Next, dissection continued in a cephalad direction to elevate the upper abdominal flap to facilitate closure. Next, the midline incision was made with a scalpel and completed with the bovie down to fascia. The umbilicus was circumscribed. The lower abdominal incision was then made with a scalpel. Under loupe magnification, search for the superficial inferior epigastric vein revealed its absence. Dissection in the lower incision continued down to the abdominal wall with the Bovie. Next, starting laterally, the CRYSTAL flap was elevated on the right side, based on a large lateral row  with palpable pulse. Next, the anterior fascia was split and reflected to expose the rectus abdominis muscle. The course of the perforators was traced through the fibers of the rectus abdominis, to the source vessel on the posterior aspect of the muscle, dividing branches between hemostatic clips.   The course of the  was tortuous, doubling back on itself at one point, which had been apparent on the preoperative CTA. There was scar tissue from a pfannenstiel incision in the remote past which complicated the dissection as well. The vascular pedicle was then dissected down into the pelvis at its origin off the iliac vessels. The pedicle vessels were bathed in papaverine, and the flap was temporarily secured in position using skin staples. Next, Dr. Candance Sharp moved to preparation of the right chest and Dr. Matt Persaud to harvest of the left hemiabdominal CRYSTAL flap. In the left hemiabdomen, a two lateral-row  CRYSTAL flap was elevated, dissecting the perforating blood vessels through the rectus abdominis muscle fibers, dividing branches between hemostatic clips. The vessels of the vascular pedicle of the flap were dissected circumferentially at their origins off the iliac vessels and bathed in papaverine. The flap was stapled into position until ready for use. In the right chest, the 13 cm mastectomy scar was excised with a scalpel. Dissection continued with the bovie and the capsule was entered. An intact tissue expander was removed. A total capsulectomy was performed with the bovie. It was sent for permanent section. Next, the breast pocket was dissected to the extent of the pre-operative markings. The breast pocket was irrigated and hemostasis assured. A segment of muscle was removed over the fourth rib. The fourth costal cartilage was resected in extrapleural fashion to expose the internal mammary vessels. Under loupe magnification, they were dissected circumferentially, dividing branches between hemostatic clips. The vessels were bathed in papaverine and covered with saline-moistened gauze until ready for use. The patient received 5000 units subcutaneous heparin. Next, the right hemiabdominal CRYSTAL flap was harvested by ligating the origin of the vessels with hemostatic clips. It was brought to the left chest and placed in the pocket.   Additional breast skin was removed so that the closure would not be too tight. The skin island was marked as a large oval.  The remainder was de-epithelialized with scissors. The flap was positioned and the operating microscope brought in. The flap vessels were prepared under the microscope and irrigated with heparin saline solution. The venous anastomosis was performed first.  The internal mammary vein was clipped inferiorly and clamped superiorly. It was divided, prepared, and irrigated. Next an end-to-end anastomosis was performed with a 3.0 mm venous  in antegrade fashion. The clamp was taken down and excellent backfill noted. Next the arterial anastomosis was performed. The internal mammary artery was clipped inferiorly and clamped superiorly. It was divided, prepared, and irrigated. Then the anastomosis was performed with interrupted #9-0 nylon sutures in hand sewn fashion. The clamps were taken down and excellent perfusion of the flap noted. Exparel had been expanded with 40 mL injectable saline and 40 mL 0.25% plain marcaine to 100 mL total.  Twenty mL was infiltrated laterally in the breast pocket along the intercostal nerves. A 19 Fr. Oral Pion drain was placed in the breast pocket, brought out through a #15 blade stab incision in the lateral inframammary fold and secured with a #2-0 nylon suture. The flap was stapled in the breast pocket. Next the left hemiabdominal CRYSTAL flap was harvested by ligating the origin of the vessels with hemostatic clips. It was brought to the right chest and placed in the pocket. Additional breast skin was removed to match the contralateral side. The skin island was marked as a large oval.  The remainder was de-epithelialized with scissors. The flap was positioned, the vessels were prepared under the microscope, and they were irrigated with heparin saline solution. During this preparation, the vein was damaged.   This was recognized and the decision was made to transect the vein at that site of perforation. It was then anastomosed to itself with a 2.5 mm venous , so as not to shorten the pedicle length significantly. Next, the venous anastomosis to the chest vessel was performed. The internal mammary vein was clipped inferiorly and clamped superiorly. It was divided, prepared, and irrigated. Next an end-to-end anastomosis was performed with a 2.5 mm venous  in antegrade fashion. The clamp was taken down and excellent backfill noted. Next the arterial anastomosis was performed. The internal mammary artery was clipped inferiorly and clamped superiorly. It was divided, prepared, and irrigated. Then the anastomosis was performed with interrupted #9-0 nylon sutures in hand sewn fashion. The clamps were taken down and excellent perfusion of the flap noted. A leak from the first venous anastomosis was noted. It was repaired with #10-0 nylon sutures with excellent effect. The vein had a patent appearance throughout its course. Twentyt mL of exparel was infiltrated laterally in the breast pocket along the intercostal nerves. A 19 Fr. Anusha Borrow drain was placed in the breast pocket, brought out through a #15 blade stab incision in the lateral inframammary fold and secured with a #2-0 nylon suture. The flap was anchored to the chest wall superomedially with a #2-0 vicryl suture. The flap was stapled in the breast pocket. Both CRYSTAL flaps were then inset with buried #3-0 monocryl sutures, followed by #2-0/3-0 monoderm barbed suture in running subcuticular fashion. The abdomen was then closed after placing the patient in semi-Beckford position. No fascia nor muscle had been removed from the abdomen. The anterior rectus sheath was closed primarily on each side with double layer, barbed #2-0 PDS suture in running whipstitch fashion. No mesh was used. Two 19 Fr Michael drains were placed in the abdomen and brought out through the lateral ends of the incision.  Next, bilateral transversus abdominis plane (TAP) regional blocks were performed through the open wound and exposed abdominal wall. With ultrasound guidance, the plane between the internal oblique and transversus abdominis muscles was identified on each side, and 20 mL of the Exparel mixture was injected on each respective side with a hypodermic needle. The remainder of the Exparel was injected directly into both rectus abdominis muscles. Leny's fascia was then closed with interrupted #2-0 vicryl sutures. Dermis was approximated with buried #3-0 monocryl sutures. Skin closure was completed with #2-0/3-0 monoderm barbed suture in running subcuticular fashion. A core of skin and adipose tissue was removed with a scalpel and bovie so as to accept the umbilicus. The umbilicus was inset with interrupted buried dermal #4-0 monocryl sutures. All of the wounds were cleaned. Drains were hooked to bulb suction. Skin glue was applied to the incisions. Vioptix probes were placed on each flap skin island and appropriate readings noted. They were secured with tegaderm dressings. Instrument, sponge, and needle counts were reported to be correct. The patient was allowed to awaken from anesthesia. She was extubated without apparent complication. She was transferred to the bed in semi-Beckford position and was then taken to the recovery room in excellent condition. COMPLICATIONS: None. EBL: 150 mL     IV Fluid: 6500 mL crystalloid, 750 mL albumin. UOP:  5000 mL    DRAINS: Michael x4. SPECIMENS: right and left mastectomy scars, right and left breast capsules, additional right and left skin.          Vale Sharp MD    CC: MD Beth Macario MD

## 2022-10-27 PROCEDURE — 94761 N-INVAS EAR/PLS OXIMETRY MLT: CPT

## 2022-10-27 PROCEDURE — 74011000250 HC RX REV CODE- 250: Performed by: SURGERY

## 2022-10-27 PROCEDURE — 74011250637 HC RX REV CODE- 250/637: Performed by: SURGERY

## 2022-10-27 PROCEDURE — 74011250636 HC RX REV CODE- 250/636: Performed by: SURGERY

## 2022-10-27 PROCEDURE — 65270000029 HC RM PRIVATE

## 2022-10-27 RX ORDER — AMLODIPINE BESYLATE 5 MG/1
5 TABLET ORAL DAILY
Status: DISCONTINUED | OUTPATIENT
Start: 2022-10-27 | End: 2022-10-29 | Stop reason: HOSPADM

## 2022-10-27 RX ORDER — HYDRALAZINE HYDROCHLORIDE 20 MG/ML
10 INJECTION INTRAMUSCULAR; INTRAVENOUS
Status: DISCONTINUED | OUTPATIENT
Start: 2022-10-27 | End: 2022-10-29 | Stop reason: HOSPADM

## 2022-10-27 RX ADMIN — HYDRALAZINE HYDROCHLORIDE 10 MG: 20 INJECTION INTRAMUSCULAR; INTRAVENOUS at 14:09

## 2022-10-27 RX ADMIN — ACETAMINOPHEN 1000 MG: 500 TABLET ORAL at 23:53

## 2022-10-27 RX ADMIN — Medication 10 ML: at 14:00

## 2022-10-27 RX ADMIN — AMLODIPINE BESYLATE 5 MG: 5 TABLET ORAL at 12:05

## 2022-10-27 RX ADMIN — Medication 10 ML: at 06:14

## 2022-10-27 RX ADMIN — Medication 10 ML: at 23:53

## 2022-10-27 RX ADMIN — HYDROCHLOROTHIAZIDE 12.5 MG: 25 TABLET ORAL at 07:30

## 2022-10-27 RX ADMIN — ACETAMINOPHEN 1000 MG: 500 TABLET ORAL at 12:05

## 2022-10-27 RX ADMIN — EZETIMIBE 10 MG: 10 TABLET ORAL at 06:14

## 2022-10-27 RX ADMIN — OXYCODONE 5 MG: 5 TABLET ORAL at 14:54

## 2022-10-27 RX ADMIN — SENNOSIDES 17.2 MG: 8.6 TABLET, COATED ORAL at 08:02

## 2022-10-27 RX ADMIN — ACETAMINOPHEN 1000 MG: 500 TABLET ORAL at 06:14

## 2022-10-27 RX ADMIN — ACETAMINOPHEN 1000 MG: 500 TABLET ORAL at 17:29

## 2022-10-27 RX ADMIN — ENOXAPARIN SODIUM 40 MG: 100 INJECTION SUBCUTANEOUS at 08:02

## 2022-10-27 NOTE — PROGRESS NOTES
Spiritual Care Assessment/Progress Note  [address]      NAME: Faina Liang      MRN: [id number]  AGE: 60 y.o.  SEX: female  Evangelical Affiliation: Amish   Language: English     10/27/2022     Total Time (in minutes): 17     Spiritual Assessment begun in [facility] through conversation with:         [x]Patient        [] Family    [] Friend(s)        Reason for Consult: Initial/Spiritual assessment, patient floor     Spiritual beliefs: (Please include comment if needed)     [] Identifies with a maryellen tradition:         [] Supported by a maryellen community:            [] Claims no spiritual orientation:           [] Seeking spiritual identity:                [x] Adheres to an individual form of spirituality:           [] Not able to assess:                           Identified resources for coping:      [] Prayer                               [] Music                  [] Guided Imagery     [x] Family/friends                 [] Pet visits     [] Devotional reading                         [] Unknown     [] Other:                                               Interventions offered during this visit: (See comments for more details)    Patient Interventions: Affirmation of emotions/emotional suffering, Coping skills reviewed/reinforced, Normalization of emotional/spiritual concerns           Plan of Care:     [x] Support spiritual and/or cultural needs    [] Support AMD and/or advance care planning process      [] Support grieving process   [] Coordinate Rites and/or Rituals    [] Coordination with community clergy   [] No spiritual needs identified at this time   [] Detailed Plan of Care below (See Comments)  [] Make referral to Music Therapy  [] Make referral to Pet Therapy     [] Make referral to Addiction services  [] Make referral to [facility]  [] Make referral to Spiritual Care Partner  [] No future visits requested        [] Contact Spiritual Care for further referrals     Comments: Visit for spiritual assessment: Ms Samreen Odell was sitting up in her bed watching TV with her spouse Wilda Jett. Chart reviewed. Provided ministry of presence, empathic listening and introduced spiritual care - no spiritual needs noted. Advised of continued spiritual support. Visited by: Kerri Graham.  Marc Sams, [address] [place] [address] paging Service [phone])

## 2022-10-27 NOTE — PROGRESS NOTES
1900: Bedside and Verbal shift change report given to RAMIRO Mata (oncoming nurse) by Jannet More RN (offgoing nurse). Report included the following information SBAR, Kardex, Intake/Output, MAR, Recent Results, Cardiac Rhythm NSR, and Quality Measures. Primary Nurse Uzma Kilgore RN and Jannet More RN performed a dual skin assessment on this patient Impairment noted- see wound doc flow sheet  Galdino score: see flowsheet    2000: Shift assessment completed, see flowsheet    0000: Reassessment completed; see flowsheet    0400: Reassessment completed; see flowsheet    0700: Bedside and Verbal shift change report given to Jannet More RN (oncoming nurse) by Jaymie Atkinson RN (offgoing nurse). Report included the following information SBAR, Kardex, Intake/Output, MAR, Recent Results, Cardiac Rhythm NSR, and Quality Measures.

## 2022-10-27 NOTE — PROGRESS NOTES
0700 Bedside and Verbal shift change report given to [address] [place] [address] Drive, RN (oncoming nurse) by China Barajas RN (offgoing nurse). Report included the following information SBAR, Kardex, OR Summary, Procedure Summary, Intake/Output, MAR, Recent Results, Med Rec Status, Cardiac Rhythm NSR sinus tach, Alarm Parameters , Quality Measures, and Dual Neuro Assessment. Primary Nurse Lor Amaya RN and RAMIRO Mata performed a dual skin assessment on this patient Impairment noted- see wound doc flow sheet  Galdino score, see flowsheet. 0730 Pt assessed, see flowsheet. All assessments, flowsheet documentation and medication administration will be performed by Student RN, Sherry Salgado under the guidance and supervision of this RN.     6580 Call to Dr. Shadi Cole regarding . Placing orders for amlodipine. 1357 Call to Dr. Shadi Cole. Verbal order for 10 mg IV hydralazine Q4H PRN for SBP greater than 160.     1452 Called to Dr. Shadi Cole  after amlodipine and hydralazine. Orders to increase dose of hydralazine and given with due and to give oral pain meds to see if pain is driving BP.     6919 /70.    1900 Bedside and Verbal shift change report given to Adolfo (oncoming nurse) by [address] [place] [address] Jay (offgoing nurse). Report included the following information SBAR, Kardex, OR Summary, Procedure Summary, Intake/Output, MAR, Recent Results, Med Rec Status, Cardiac Rhythm NSR, sinus tach, Alarm Parameters , Quality Measures, and Dual Neuro Assessment.

## 2022-10-27 NOTE — PROGRESS NOTES
Progress Note    No acute events overnight  Pain controlled    Visit Vitals  BP (!) 159/71   Pulse 85   Temp 98.1 °F (36.7 °C)   Resp 19   SpO2 94%     Gen; NAD, comfortable  HEENT: NCAT  CV: reg rate and rhythm  Resp: normal resp efffort RA  Breast: Vioptix: R 50%, L 57%  B/l flap warm, well perfused  Incisions c/d/I  KAREN drains serosanguinous    Date 10/26/22 0700 - 10/27/22 0659 10/27/22 0700 - 10/28/22 0659   Shift [id number] 1900-0659 24 Hour Total 0700-1859 1900-0659 24 Hour Total   INTAKE   I.V. 368.3  368.3        Volume (dextrose 5% lactated ringers infusion) 368.3  368.3      Shift Total 368.3  368.3      OUTPUT   Urine  625  625     Urine Output (mL) (Urinary Catheter 10/25/22 2- way; Amaral - Temperature)  625  625   Drains 165 60 225        Output (ml) (Geovanni-Lamas Drain 10/25/22 Left Breast) 30 5 35        Output (ml) (Geovanni-Lamas Drain 10/25/22 Right Breast) 30 10 40        Output (ml) (Geovanni-Lamas Drain 10/25/22 Right Breast) 20 10 30        Output (ml) (Geovanni-Lamas Drain 10/25/22 Right Abdomen) 60 15 75        Output (ml) (Geovanni-Lamas Drain 10/25/22 Left Abdomen) 25 20 45      Shift Total 640 1035 1675 625  625   NET -271.7 -1035 -1306.7 -625  -625   Weight (kg)               Lab Results   Component Value Date/Time    WBC 12.8 (H) 10/26/2022 04:46 AM    HGB 10.6 (L) 10/26/2022 04:46 AM    HCT 29.3 (L) 10/26/2022 04:46 AM    PLATELET 234 81/79/6477 04:46 AM    MCV 78.1 (L) 10/26/2022 04:46 AM     Lab Results   Component Value Date/Time    Sodium 144 10/26/2022 04:46 AM    Potassium 3.6 10/26/2022 04:46 AM    Chloride 113 (H) 10/26/2022 04:46 AM    CO2 25 10/26/2022 04:46 AM    Anion gap 6 10/26/2022 04:46 AM    Glucose 129 (H) 10/26/2022 04:46 AM    BUN 10 10/26/2022 04:46 AM    Creatinine 0.95 10/26/2022 04:46 AM    BUN/Creatinine ratio 11 (L) 10/26/2022 04:46 AM    GFR est AA >60 07/26/2022 03:19 PM    GFR est non-AA 58 (L) 07/26/2022 03:19 PM    Calcium 8.4 (L) 10/26/2022 04:46 AM     POD 2 s/p b/l CRYSTAL flap reconstruction    - q2hr flap check  - drain cares  - advance diet to gen diet  - up in chair this AM  - out of bed and ambulate this afternoon  - d/c ontiveros   - pain control: morphine, oxycodone, tylenol  - continue toradol  - dvt ppx: lovenox  - incentive spirometry  - plan for d/c Saturday vs Sunday

## 2022-10-27 NOTE — PROGRESS NOTES
KENDAL:   1) Home with family and follow up appointments   2) Pt's family will drive pt home at time of discharge and as needed  3) Risk of readmission- 7% [place] [facility] Day 2:   5:22 PM- Pt remains in ICU- still has KAREN drain, advancing diet and ambulating. Pt will d/c home with family and follow up appointments. Pt's family will drive pt home at time of discharge and as needed. No CM needs identified at this time but CM will continue to follow and assist as needed.      Therese Bagley, MSSHANNAN, [address]

## 2022-10-27 NOTE — PROGRESS NOTES
1900: Bedside and Verbal shift change report given to RAMIRO Mata (oncoming nurse) by Lisy Vaughn RN (offgoing nurse). Report included the following information SBAR, Kardex, Intake/Output, MAR, Recent Results, Cardiac Rhythm NSR, and Quality Measures. Primary Nurse Sheila Bianchi RN and Lisy Vaughn RN performed a dual skin assessment on this patient Impairment noted- see wound doc flow sheet  Galdino score: see flowsheet    2000: Shift assessment completed; see flowsheet. Assist pt to bathroom and back into bed. 0000: Reassessment completed; see flowsheet    0400 Reassessment completed; see flowsheet    0700: Bedside and Verbal shift change report given to Lisy Vaughn RN (oncoming nurse) by Jenna Conley RN (offgoing nurse). Report included the following information SBAR, Kardex, Intake/Output, MAR, Recent Results, Cardiac Rhythm NSR, and Quality Measures.

## 2022-10-28 PROCEDURE — 94761 N-INVAS EAR/PLS OXIMETRY MLT: CPT

## 2022-10-28 PROCEDURE — 65270000029 HC RM PRIVATE

## 2022-10-28 PROCEDURE — 74011250636 HC RX REV CODE- 250/636: Performed by: SURGERY

## 2022-10-28 PROCEDURE — 74011250637 HC RX REV CODE- 250/637: Performed by: SURGERY

## 2022-10-28 PROCEDURE — 74011000250 HC RX REV CODE- 250: Performed by: SURGERY

## 2022-10-28 RX ORDER — FACIAL-BODY WIPES
10 EACH TOPICAL DAILY PRN
Status: DISCONTINUED | OUTPATIENT
Start: 2022-10-28 | End: 2022-10-29 | Stop reason: HOSPADM

## 2022-10-28 RX ORDER — SCOLOPAMINE TRANSDERMAL SYSTEM 1 MG/1
1 PATCH, EXTENDED RELEASE TRANSDERMAL
Status: DISCONTINUED | OUTPATIENT
Start: 2022-10-28 | End: 2022-10-29 | Stop reason: HOSPADM

## 2022-10-28 RX ADMIN — FLUTICASONE PROPIONATE 2 SPRAY: 50 SPRAY, METERED NASAL at 09:08

## 2022-10-28 RX ADMIN — Medication 10 ML: at 16:24

## 2022-10-28 RX ADMIN — ENOXAPARIN SODIUM 40 MG: 100 INJECTION SUBCUTANEOUS at 09:01

## 2022-10-28 RX ADMIN — ACETAMINOPHEN 1000 MG: 500 TABLET ORAL at 06:12

## 2022-10-28 RX ADMIN — EZETIMIBE 10 MG: 10 TABLET ORAL at 06:12

## 2022-10-28 RX ADMIN — HYDROCHLOROTHIAZIDE 12.5 MG: 25 TABLET ORAL at 09:00

## 2022-10-28 RX ADMIN — OXYCODONE 10 MG: 5 TABLET ORAL at 21:07

## 2022-10-28 RX ADMIN — AMLODIPINE BESYLATE 5 MG: 5 TABLET ORAL at 09:01

## 2022-10-28 RX ADMIN — OXYCODONE 5 MG: 5 TABLET ORAL at 09:01

## 2022-10-28 RX ADMIN — Medication 10 ML: at 06:17

## 2022-10-28 RX ADMIN — OXYCODONE 5 MG: 5 TABLET ORAL at 16:24

## 2022-10-28 RX ADMIN — SENNOSIDES 17.2 MG: 8.6 TABLET, COATED ORAL at 09:01

## 2022-10-28 RX ADMIN — BISACODYL 10 MG: 10 SUPPOSITORY RECTAL at 16:24

## 2022-10-28 RX ADMIN — OXYCODONE 5 MG: 5 TABLET ORAL at 13:35

## 2022-10-28 RX ADMIN — ACETAMINOPHEN 1000 MG: 500 TABLET ORAL at 12:19

## 2022-10-28 RX ADMIN — ACETAMINOPHEN 1000 MG: 500 TABLET ORAL at 17:57

## 2022-10-28 NOTE — PROGRESS NOTES
0700 Bedside and Verbal shift change report given to [address] Jay RN (oncoming nurse) by Veronica Magaña (offgoing nurse). Report included the following information SBAR, Kardex, OR Summary, Procedure Summary, Intake/Output, MAR, Recent Results, Med Rec Status, Cardiac Rhythm NSR, Alarm Parameters , Quality Measures, and Dual Neuro Assessment. Primary Nurse Forrest Flannery RN and Adolfo RN performed a dual skin assessment on this patient Impairment noted- see wound doc flow sheet  Galdino score see flowsheet. 0700 All assessments, flowsheet documentation and  medication administration will be performed by Student RN, Nedra Simon under the guidance and supervision of this RN.     1225 Attempted to call report. 1251 TRANSFER - OUT REPORT:    Verbal report given to 4th floor RN([facility]  [facility] transferred to 4th floor(unit) for routine progression of care       Report consisted of patients Situation, Background, Assessment and   Recommendations(SBAR). Information from the following report(s) SBAR, Kardex, OR Summary, Procedure Summary, Intake/Output, MAR, Recent Results, Med Rec Status, Cardiac Rhythm NSR sinus tach, Alarm Parameters , Quality Measures, and Dual Neuro Assessment was reviewed with the receiving nurse. Lines:   Peripheral IV 10/25/22 Posterior;Right Hand (Active)   Site Assessment Clean, dry, & intact 10/28/22 1200   Phlebitis Assessment 0 10/28/22 1200   Infiltration Assessment 0 10/28/22 1200   Dressing Status Clean, dry, & intact 10/28/22 1200   Dressing Type Tape;Transparent 10/28/22 1200   Hub Color/Line Status Pink;Flushed;Capped 10/28/22 1200   Action Taken Open ports on tubing capped 10/28/22 1200   Alcohol Cap Used Yes 10/28/22 1200        Opportunity for questions and clarification was provided.       Patient transported with:   [facility]

## 2022-10-28 NOTE — PROGRESS NOTES
10/28/2022  4:04 PM    Care Management Progress Note    No diagnosis found. RUR:  5%  Risk Level: [x]Low []Moderate []High  Value-based purchasing: [x] Yes [] No  Bundle patient: [] Yes [x] No   Specify:     Transition of care plan:  POD3 CRYSTAL flap reconstruction; awaiting medical clearance  Home with family assistance  Outpatient follow-up.   Pt's family to transport  CM to continue to follow    Aftab Gupta RN

## 2022-10-28 NOTE — PROGRESS NOTES
Bedside shift change report given to [facility] (oncoming nurse) by Jenny Fall (offgoing nurse). Report included the following information SBAR, Kardex, Procedure Summary, Intake/Output, MAR, and Recent Results.

## 2022-10-28 NOTE — PROGRESS NOTES
Progress Note    No acute events overnight  Pain controlled  Hypertensie yesterday, improved with hydralazine. Did not require hydralazine overnight    Visit Vitals  /73   Pulse 84   Temp 98 °F (36.7 °C)   Resp 17   SpO2 96%     Gen; NAD, comfortable  HEENT: NCAT  CV: reg rate and rhythm  Resp: normal resp efffort RA  Breast: Vioptix: R 49%, L 59%  B/l flap warm, well perfused  Incisions c/d/I  KAREN drains serosanguinous    Date 10/27/22 0700 - 10/28/22 0659 10/28/22 0700 - 10/29/22 0659   Shift 5010-26811859 1900-0659 24 Hour Total 0700-1859 1900-0659 24 Hour Total   INTAKE   Shift Total         OUTPUT   Urine 1000  1000        Urine Occurrence(s) 3 x 2 x 5 x        Urine Output (mL) ([REMOVED] Urinary Catheter 10/25/22 2- way; [place] - Temperature) 1000  1000      Emesis/NG output 200  200        Emesis 200  200      Drains 135 65 200        Output (ml) (Geovanni-Lamas Drain 10/25/22 Left Breast) 10 5 15        Output (ml) (Geovanni-Lamas Drain 10/25/22 Right Breast) 20 5 25        Output (ml) (Geovanni-Lamas Drain 10/25/22 Right Breast) 25 15 40        Output (ml) (Geovanni-Lamas Drain 10/25/22 Right Abdomen) 55 35 90        Output (ml) (Geovanni-Lamas Drain 10/25/22 Left Abdomen) 25 5 30      Shift Total 1335 65 1400      NET -1335 -65 -1400      Weight (kg)               Lab Results   Component Value Date/Time    WBC 12.8 (H) 10/26/2022 04:46 AM    HGB 10.6 (L) 10/26/2022 04:46 AM    HCT 29.3 (L) 10/26/2022 04:46 AM    PLATELET 663 99/29/2816 04:46 AM    MCV 78.1 (L) 10/26/2022 04:46 AM     Lab Results   Component Value Date/Time    Sodium 144 10/26/2022 04:46 AM    Potassium 3.6 10/26/2022 04:46 AM    Chloride 113 (H) 10/26/2022 04:46 AM    CO2 25 10/26/2022 04:46 AM    Anion gap 6 10/26/2022 04:46 AM    Glucose 129 (H) 10/26/2022 04:46 AM    BUN 10 10/26/2022 04:46 AM    Creatinine 0.95 10/26/2022 04:46 AM    BUN/Creatinine ratio 11 (L) 10/26/2022 04:46 AM    GFR est AA >60 07/26/2022 03:19 PM    GFR est non-AA 58 (L) 07/26/2022 03:19 PM    Calcium 8.4 (L) 10/26/2022 04:46 AM     POD 3 s/p b/l CRYSTAL flap reconstruction    - q4hr flap check  - drain cares  - gen diet  - ambulate QID  - pain control: morphine, oxycodone, tylenol  - continue toradol  - senokot  - suppository PRN for BM  - dvt ppx: lovenox  - incentive spirometry  - transfer to floor  - plan for d/c Saturday

## 2022-10-28 NOTE — PROGRESS NOTES
Problem: Falls - Risk of  Goal: *Absence of Falls  Description: Document [place] Fail Fall Risk and appropriate interventions in the flowsheet. Outcome: Progressing Towards Goal  Note: Fall Risk Interventions:  Mobility Interventions: Bed/chair exit alarm, OT consult for ADLs, Patient to call before getting OOB, PT Consult for mobility concerns, PT Consult for assist device competence, Utilize walker, cane, or other assistive device, Utilize gait belt for transfers/ambulation         Medication Interventions: Bed/chair exit alarm, Patient to call before getting OOB, Teach patient to arise slowly, Utilize gait belt for transfers/ambulation    Elimination Interventions: Call light in reach, Bed/chair exit alarm, Patient to call for help with toileting needs, Toileting schedule/hourly rounds              Problem: Patient Education: Go to Patient Education Activity  Goal: Patient/Family Education  Outcome: Progressing Towards Goal     Problem: Pressure Injury - Risk of  Goal: *Prevention of pressure injury  Description: Document Galdino Scale and appropriate interventions in the flowsheet. Outcome: Progressing Towards Goal  Note: Pressure Injury Interventions:  Sensory Interventions: Assess changes in LOC, Maintain/enhance activity level, Minimize linen layers, Monitor skin under medical devices         Activity Interventions: Assess need for specialty bed, Pressure redistribution bed/mattress(bed type), Increase time out of bed, PT/OT evaluation    Mobility Interventions: Turn and reposition approx.  every two hours(pillow and wedges), Assess need for specialty bed, HOB 30 degrees or less    Nutrition Interventions: Document food/fluid/supplement intake    Friction and Shear Interventions: Foam dressings/transparent film/skin sealants, Lift sheet, Minimize layers, Apply protective barrier, creams and emollients                Problem: Patient Education: Go to Patient Education Activity  Goal: Patient/Family Education  Outcome: Progressing Towards Goal

## 2022-10-29 VITALS
TEMPERATURE: 98.7 F | OXYGEN SATURATION: 96 % | HEART RATE: 94 BPM | RESPIRATION RATE: 16 BRPM | DIASTOLIC BLOOD PRESSURE: 65 MMHG | SYSTOLIC BLOOD PRESSURE: 118 MMHG

## 2022-10-29 PROCEDURE — 94761 N-INVAS EAR/PLS OXIMETRY MLT: CPT

## 2022-10-29 PROCEDURE — 74011000250 HC RX REV CODE- 250: Performed by: SURGERY

## 2022-10-29 PROCEDURE — 74011250637 HC RX REV CODE- 250/637: Performed by: SURGERY

## 2022-10-29 PROCEDURE — 74011250636 HC RX REV CODE- 250/636: Performed by: SURGERY

## 2022-10-29 RX ORDER — SENNOSIDES 8.6 MG/1
2 TABLET ORAL DAILY
Qty: 15 TABLET | Refills: 0 | Status: SHIPPED | OUTPATIENT
Start: 2022-10-30

## 2022-10-29 RX ORDER — ONDANSETRON 4 MG/1
4 TABLET, ORALLY DISINTEGRATING ORAL
Qty: 15 TABLET | Refills: 1 | Status: SHIPPED | OUTPATIENT
Start: 2022-10-29

## 2022-10-29 RX ORDER — ASPIRIN 325 MG
325 TABLET ORAL DAILY
Qty: 30 TABLET | Refills: 0 | Status: SHIPPED | OUTPATIENT
Start: 2022-10-29

## 2022-10-29 RX ORDER — OXYCODONE HYDROCHLORIDE 5 MG/1
5 TABLET ORAL
Qty: 25 TABLET | Refills: 0 | Status: SHIPPED | OUTPATIENT
Start: 2022-10-29 | End: 2022-11-01

## 2022-10-29 RX ADMIN — HYDROCHLOROTHIAZIDE 12.5 MG: 25 TABLET ORAL at 08:10

## 2022-10-29 RX ADMIN — ENOXAPARIN SODIUM 40 MG: 100 INJECTION SUBCUTANEOUS at 08:11

## 2022-10-29 RX ADMIN — SENNOSIDES 17.2 MG: 8.6 TABLET, COATED ORAL at 08:11

## 2022-10-29 RX ADMIN — OXYCODONE 10 MG: 5 TABLET ORAL at 08:10

## 2022-10-29 RX ADMIN — EZETIMIBE 10 MG: 10 TABLET ORAL at 06:17

## 2022-10-29 RX ADMIN — ACETAMINOPHEN 1000 MG: 500 TABLET ORAL at 06:17

## 2022-10-29 RX ADMIN — Medication 10 ML: at 00:23

## 2022-10-29 RX ADMIN — Medication 10 ML: at 06:19

## 2022-10-29 RX ADMIN — OXYCODONE 10 MG: 5 TABLET ORAL at 03:28

## 2022-10-29 RX ADMIN — ACETAMINOPHEN 1000 MG: 500 TABLET ORAL at 00:22

## 2022-10-29 NOTE — PROGRESS NOTES
Problem: Falls - Risk of  Goal: *Absence of Falls  Description: Document Romo Blades Fall Risk and appropriate interventions in the flowsheet. Outcome: Resolved/Met  Note: Fall Risk Interventions:  Mobility Interventions: Bed/chair exit alarm         Medication Interventions: Patient to call before getting OOB    Elimination Interventions: Call light in reach, Patient to call for help with toileting needs              Problem: Patient Education: Go to Patient Education Activity  Goal: Patient/Family Education  Outcome: Resolved/Met     Problem: Pressure Injury - Risk of  Goal: *Prevention of pressure injury  Description: Document Galdino Scale and appropriate interventions in the flowsheet. Outcome: Resolved/Met  Note: Pressure Injury Interventions:  Sensory Interventions: Monitor skin under medical devices         Activity Interventions: Assess need for specialty bed    Mobility Interventions: Turn and reposition approx.  every two hours(pillow and wedges)    Nutrition Interventions: Document food/fluid/supplement intake    Friction and Shear Interventions: Foam dressings/transparent film/skin sealants, Lift sheet, Minimize layers, Apply protective barrier, creams and emollients                Problem: Patient Education: Go to Patient Education Activity  Goal: Patient/Family Education  Outcome: Resolved/Met

## 2022-10-29 NOTE — DISCHARGE INSTRUCTIONS
MICROSURGICAL BREAST RECONSTRUCTION POST-OPERATIVE INSTRUCTIONS      BRA:  You should not wear a brassiere for two weeks following your procedure. This is to prevent excessive compression on the blood supply to the flap. After the first two weeks, you may wear a loosely-fitting soft bra for the next month. Avoid underwire bras for one month. SURGICAL DRAINS:  Please refer to the KAREN Drain Instructions section below for details. ACTIVITY:  Take it easy for the first several days. No cleaning, housework, or strenuous activity. Do not lift anything over 10 pounds, including children. No running, aerobics, or other strenuous activities until four weeks. However, do not remain constantly in bed. You should walk at least three times daily, with assistance if needed. It is normal to feel tight when standing for the first several weeks, so you may want to hunch over slightly when walking. BATHING:  You may shower after you go home. Use a long piece of string or yarn to make a necklace to loop through the KAREN drain tabs, so they are not dangling in the shower. NO tub baths, hot tubs, swimming, or any submersion in water for one week after removal of all KAREN drains. Skin glue covering your incisions will fall off on its own. If it is still present at two weeks, you may peel or scrub it off. MEDICATION:  Your prescriptions will be sent electronically to your pharmacy. You will receive prescriptions for a narcotic pain medication, a stool softener, and full strength aspirin (325 mg). Take ibuprofen and acetaminophen around the clock. Take ant-acid medications while you are taking aspirin. Take the narcotic pain medication as needed, the stool softener while you are on prescription narcotic pain medicine, and the aspirin for one month. Do not drive while taking narcotic pain medication. Keep drinking plenty of fluids.   If you are unable to have a bowel movement, you may take an over the counter laxative pill or suppository such as Dulcolax. EXERCISE:  You may resume non-strenuous activities as tolerated two weeks after surgery. Normal activity can be instituted one month after surgery, starting slowly, and increasing as your body allows. Weight training, cross-fit, sexual activity, and other vigorous activity should not be started until six weeks following surgery. THINGS TO WATCH FOR:  If the CRYSTAL flap skin turns purple, black, or cold, or if you experience excessive or sudden swelling, spreading or increasing redness, increasing pain, foul-smelling drainage, separation of any incisions, fever, shaking chills, or any other concerns, please call the office immediately ([phone]). FOLLOW-UP APPOINTMENT: Your follow-up appointment with Dr. Chelo Bill is scheduled for Wednesday          KAREN Drain Instructions    PURPOSE:  You have had surgery during which a Geovanni-Lamas drain, or KAREN drain, has been placed by your surgeon. A KAREN drain is a rubber tube which goes under the skin and drains excess fluid during the healing process so that this fluid does not accumulate. There is a one-way valve which allows the fluid to collect in the bulb on the end of the drain. The drain is usually held in place by a single stitch. The color of the drainage can range from reddish to pink to straw-colored. CARE OF THE DRAIN:  Caring for the KAREN drain is fairly easy. First, protect the drain so that it does not get pulled inadvertently. You may fasten them to your clothing with a safety pin through the floppy tag on the bulb. DO NOT place a pin through either the drain tubing or the bulb itself. The drain works by maintaining a constant low pressure of suction when the bulb is in the collapsed (squeezed in) position. If the bulb will not maintain this collapsed position when it is recapped, please call the office, as the drain may not be working properly.       MEASURING THE DRAINAGE:  Grasp the bulb in one hand and remove the cap with the other hand. This will cause the bulb to relax into a round shape. Holding the open end over a specimen cup, squirt the fluid into the cup by squeezing the bulb. Once the bulb is empty, squeeze it in (collapse it) with one hand, and replace the cap with your other hand. Then holding the measuring cup level, note how much fluid there is (in milliliters, mL), and record this number on the chart below. Discard the fluid in the toilet and rinse out the specimen cup. Note: your specimen cup may be in cubic centimeters (cc). 1 cc = 1 mL. REMOVAL:  The KAREN drain will be removed in the office when the daily drainage is at a low enough level. You may be asked to call the office with your measuring totals to determine if the drain(s) is (are) ready to be removed. Removal involves minimal discomfort without the need for anesthesia. The drain site in the skin heals on its own once the drain is removed without any further stitches. SHOWERING:  Your surgeon may give you permission to shower while you have one or more KAREN drains in. Just let the water run over the drain sites and then pat them dry when you are done. Some patients like to place a long string necklace around their necks during showers to which they can safety pin the tag on the bulb to prevent it from dangling. DO NOT take a tub bath, go swimming (pool or lake/ocean), or otherwise submerge your body in water before all KAREN drains have been removed and you are permitted by your surgeon.     THINGS TO WATCH FOR:  Please call the office immediately ([phone]) if you notice:  Sudden bright or dark red bleeding into the bulb or around the drain site in the skin  Dislodgment of the KAREN drain or if the drain falls out  Spreading redness around the drain site in the skin  Cloudy or foul-smelling drainage in the bulb or around the drain site  Fever, shaking chills, excessive swelling, pain or discomfort  Any other concerns or questions         Date           Right Breast (AM)           Right Breast (PM)           Daily Total   (AM+PM)           Left Breast (AM)           Left Breast (PM)           Daily  Total  (AM+PM)           Right Abdomen (AM)           Right Abdomen (PM)           Daily  Total  (AM+PM)           Left Abdomen (AM)           Left Abdomen (PM)           Daily Total   (AM+PM)

## 2022-10-29 NOTE — PROGRESS NOTES
This tech offered to empty KAREN drains. Patient refused at this time. RN [facility] notified.

## 2022-10-29 NOTE — PROGRESS NOTES
This tech offered to empty KAREN drains this morning. Patient refused at this time. RN [id number] [facility] notified.

## 2022-10-29 NOTE — PROGRESS NOTES
Problem: Falls - Risk of  Goal: *Absence of Falls  Description: Document Ron Hendricks Fall Risk and appropriate interventions in the flowsheet. Outcome: Progressing Towards Goal  Note: Fall Risk Interventions:  Mobility Interventions: Bed/chair exit alarm         Medication Interventions: Patient to call before getting OOB    Elimination Interventions: Call light in reach, Patient to call for help with toileting needs              Problem: Patient Education: Go to Patient Education Activity  Goal: Patient/Family Education  Outcome: Progressing Towards Goal     Problem: Pressure Injury - Risk of  Goal: *Prevention of pressure injury  Description: Document Galdino Scale and appropriate interventions in the flowsheet. Outcome: Progressing Towards Goal  Note: Pressure Injury Interventions:  Sensory Interventions: Monitor skin under medical devices         Activity Interventions: Assess need for specialty bed    Mobility Interventions: Turn and reposition approx.  every two hours(pillow and wedges)    Nutrition Interventions: Document food/fluid/supplement intake    Friction and Shear Interventions: Foam dressings/transparent film/skin sealants, Lift sheet, Minimize layers, Apply protective barrier, creams and emollients                Problem: Patient Education: Go to Patient Education Activity  Goal: Patient/Family Education  Outcome: Progressing Towards Goal

## 2022-10-29 NOTE — PROGRESS NOTES
I have reviewed discharge instructions with the patient. The patient verbalized understanding. Discharge packet, extra copies of KAREN drain measurement sheets, and 5 measuring cups provided to patient; offered to answer questions. VSS, no IV to remove.

## 2022-10-29 NOTE — PROGRESS NOTES
10/29/2022  8:43 AM    Care Management Progress Note      ICD-10-CM ICD-9-CM    1. Personal history of breast cancer  Z85.3 V10.3 oxyCODONE IR (ROXICODONE) 5 mg immediate release tablet          RUR:    Risk Level: []Low []Moderate []High  Value-based purchasing: [] Yes [] No  Bundle patient: [] Yes [] No   Specify:     Transition of care plan:  POD4 CRYSTAL flap reconstruction; discharge ordered  Home with family assistance  Outpatient follow-up.   Pt's family to transport  No further CM needs identified    Care Management Interventions  Support Systems: Spouse/Significant Other  Discharge Location  Patient Expects to be Discharged to[de-identified] Home with family assistance    Charity Srinivasan RN

## 2022-10-29 NOTE — PROGRESS NOTES
Progress Note    No acute events overnight. Had BM yesterday    Visit Vitals  /65 (BP 1 Location: Right upper arm, BP Patient Position: Sitting)   Pulse 94   Temp 98.7 °F (37.1 °C)   Resp 16   SpO2 96%     Gen; NAD, comfortable  HEENT: NCAT  CV: reg rate and rhythm  Resp: normal resp efffort RA  Breast:   B/l flap warm, well perfused  Incisions c/d/I  KAREN drains serosanguinous  Abdomen: umbilicus viable, incisions c/d/I. KAREN drains s/s    Date 10/28/22 0700 - 10/29/22 0659 10/29/22 0700 - 10/30/22 0659   Shift 0700-1859 1900-0659 24 Hour Total 3238-0409 1256-7644 24 Hour Total   INTAKE   P.O.  180 180        P. O.  180 180      Shift Total  180 180      OUTPUT   Drains 105 80 185        Output (ml) (Geovanni-Lamas Drain 10/25/22 Left Breast) 5 10 15        Output (ml) (Geovanni-Lamas Drain 10/25/22 Right Breast) 20 5 25        Output (ml) (Geovanni-Lamas Drain 10/25/22 Right Breast) 10 15 25        Output (ml) (Geovanni-Lamas Drain 10/25/22 Right Abdomen) 50 40 90        Output (ml) (Geovanni-Lamas Drain 10/25/22 Left Abdomen) 20 10 30      Shift Total 105 80 185      NET -105 100 -5      Weight (kg)               Lab Results   Component Value Date/Time    WBC 12.8 (H) 10/26/2022 04:46 AM    HGB 10.6 (L) 10/26/2022 04:46 AM    HCT 29.3 (L) 10/26/2022 04:46 AM    PLATELET 579 02/36/0110 04:46 AM    MCV 78.1 (L) 10/26/2022 04:46 AM     Lab Results   Component Value Date/Time    Sodium 144 10/26/2022 04:46 AM    Potassium 3.6 10/26/2022 04:46 AM    Chloride 113 (H) 10/26/2022 04:46 AM    CO2 25 10/26/2022 04:46 AM    Anion gap 6 10/26/2022 04:46 AM    Glucose 129 (H) 10/26/2022 04:46 AM    BUN 10 10/26/2022 04:46 AM    Creatinine 0.95 10/26/2022 04:46 AM    BUN/Creatinine ratio 11 (L) 10/26/2022 04:46 AM    GFR est AA >60 07/26/2022 03:19 PM    GFR est non-AA 58 (L) 07/26/2022 03:19 PM    Calcium 8.4 (L) 10/26/2022 04:46 AM     POD 4 s/p b/l CRYSTAL flap reconstruction    - q4hr flap check  - drain cares  - gen diet  - ambulate QID  - pain control: morphine, oxycodone, tylenol  - continue toradol  - senokot  - suppository PRN for BM  - dvt ppx: lovenox  - incentive spirometry  - d/c Saturday

## 2022-11-01 NOTE — PROGRESS NOTES
Physician Progress Note      PATIENT:               Saray Nieves  CSN #:                  [id number]  :                       1960  ADMIT DATE:       10/25/2022 5:37 AM  DISCH DATE:        10/29/2022 10:50 AM  RESPONDING  PROVIDER #:        Ana Pastrana MD          [facility]    This patient admitted for planned Breast reconstruction surgery s/p mastectomies for breast cancer. If possible, please document in the progress notes and discharge summary if you are evaluating and/or treating any of the following: The medical record reflects the following:  Risk Factors:  Breast cancer, s/p mastectomies, here now for shaji. breast reconstruction, on post op lovenox  Clinical Indicators: Pre op hgb 14.8, and post op on 10/26 hgb @ 10.6 , Tachycardia, with HR 90-100s , EBL @ 150cc, per operative report pt had 5000cc UOP. Treatment: Postop hgb assessment, received 6500cc cyrstalloid in or and 750cc albumin, nursing monitoring dressing sites drainage output    Thank you  HOMERO GrafN, RN, CPHQ, CCDS, SMART  Options provided:  -- Acute blood loss anemia  -- Chronic blood loss anemia  -- Acute on chronic blood loss anemia  -- Iron deficiency anemia  -- Postoperative acute blood loss anemia  -- Anemia of chronic disease due to breast cancer  -- Dilutional anemia  -- Precipitous drop in Hemoglobin and Hematocrit  -- Other - I will add my own diagnosis  -- Disagree - Not applicable / Not valid  -- Disagree - Clinically unable to determine / Unknown  -- Refer to Clinical Documentation Reviewer    PROVIDER RESPONSE TEXT:    Provider is clinically unable to determine a response to this query.     Query created by: Armida Sahu on 10/28/2022 3:04 PM      Electronically signed by:  Ana Pastrana MD 2022 7:56 AM

## 2022-11-01 NOTE — DISCHARGE SUMMARY
Admit date: 10/25/2022   Admitting Provider: Williams Underwood MD    Discharge date: 11/1/2022  Discharging Provider: Williams Underwood MD      * Admission Diagnoses: Personal history of breast cancer [Z85.3]  S/P bilateral mastectomy [Z90.13]  History of augmentation of right breast [Z98.890]    * Discharge Diagnoses:    Hospital Problems as of 10/29/2022 Date Reviewed: 8/24/2022            Codes Class Noted - Resolved POA    Personal history of breast cancer ICD-10-CM: Z85.3  ICD-9-CM: V10.3  10/25/2022 - Present Unknown        History of augmentation of right breast ICD-10-CM: Z98.890  ICD-9-CM: V45.89  10/25/2022 - Present Unknown           * Hospital Course:  Patient was taken to the OR on 10/25/22 where she underwent removal of her bilateral tissue expanders and reconstruction of her bilateral breasts with CRYSTAL free flaps. Surgery was more complex than usual and took longer than normal. There was concern with the right anastomosis, specifically. She was transferred to the ICU for surgical site monitoring. Her flaps were monitored by the nursing staff every hour and also with a Vioptix probe. On postoperative day 1, she was maintained on bedrest but her diet was advanced to clear liquids. This activity restriction was due to the concern in her anastomosis. On postoperative day 2, she was advanced to a general diet and her ontiveros catheter was removed. She tolerated her diet and her pain was controlled with oral pain medications. She got out of her chair and began ambulating. On postoperative day 3, she was transferred to the surgical floor. On day of discharge, she was tolerating a diet, voiding appropriately, having bowel movements, and her pain was controlled. There were no concerns at her surgical sites and her flaps were noted to be well perfused.      * Procedures:   Procedure(s):  REMOVAL BILATERAL TISSUE EXPANDERS AND RECONSTRUCTION WITH CRYSTAL MICROSURGICAL FREE FLAP      Consults: None    Significant Diagnostic Studies: labs: CBC, BMP (WNL)    Discharge Exam:  Gen; NAD, comfortable  HEENT: NCAT  CV: reg rate and rhythm  Resp: normal resp efffort RA  Breast:   B/l flap warm, well perfused  Incisions c/d/I  KAREN drains serosanguinous  Abdomen: umbilicus viable, incisions c/d/I. KAREN drains s/s    * Discharge Condition: good  * Disposition: Home    Discharge Medications:  Discharge Medication List as of 10/29/2022  8:54 AM        START taking these medications    Details   oxyCODONE IR (ROXICODONE) 5 mg immediate release tablet Take 1 Tablet by mouth every six (6) hours as needed for Pain for up to 3 days. Max Daily Amount: 20 mg., Normal, Disp-25 Tablet, R-0      senna (SENOKOT) 8.6 mg tablet Take 2 Tablets by mouth daily. , Normal, Disp-15 Tablet, R-0      aspirin (ASPIRIN) 325 mg tablet Take 1 Tablet by mouth daily. , Normal, Disp-30 Tablet, R-0      ondansetron (ZOFRAN ODT) 4 mg disintegrating tablet Take 1 Tablet by mouth every eight (8) hours as needed for Nausea or Vomiting., Normal, Disp-15 Tablet, R-1           CONTINUE these medications which have NOT CHANGED    Details   OTHER Historical Med      Qvar RediHaler 80 mcg/actuation HFAb inhaler Take 2 Puffs by inhalation two (2) times a day., Historical Med, LARRY      fluticasone propionate (FLONASE) 50 mcg/actuation nasal spray 2 Sprays by Both Nostrils route daily. , Normal, Disp-1 Each, R-11      cetirizine (ZYRTEC) 10 mg tablet Take 1 Tablet by mouth daily as needed for Allergies. , Normal, Disp-30 Tablet, R-11      OTHER,NON-FORMULARY, Take 1 Tablet by mouth nightly. Cutrelle-probiotics-prebiotics, Historical Med      levothyroxine (SYNTHROID) 25 mcg tablet Take 25 mcg by mouth Daily (before breakfast). , Historical Med      ezetimibe (ZETIA) 10 mg tablet Take 10 mg by mouth every morning., Historical Med      hydroCHLOROthiazide (HYDRODIURIL) 12.5 mg tablet Take 12.5 mg by mouth every morning., Historical Med      scopolamine (TRANSDERM-SCOP) 1 mg over 3 days pt3d APPLY 1 PATCH BY TRANSDERMAL ROUTE EVERY 72 HOURS, Normal, Disp-3 Patch, R-0      albuterol (PROVENTIL HFA, VENTOLIN HFA, PROAIR HFA) 90 mcg/actuation inhaler Take 2 Puffs by inhalation every four (4) hours as needed for Wheezing., Normal, Disp-1 Each, R-11      acyclovir (ZOVIRAX) 5 % ointment Apply to affected area 6 times a day, Normal, Disp-30 g, R-1      mometasone (NASONEX) 50 mcg/actuation nasal spray USE TWO SPRAY(S) IN EACH NOSTRIL DAILY, NormalPlease consider 90 day supplies to promote better adherenceDisp-1 Container, R-11             * Follow-up Care/Patient Instructions:   Activity: Ambulate in house  Diet: Regular Diet  Wound Care: Keep wound clean and dry    Follow-up Information       Follow up With Specialties Details Why Contact Info    Ivis Cabral MD Internal Medicine Physician   [address] [address],[address] [place] [address]              Signed:  Ayesha Paulson MD  11/1/2022  7:50 AM

## 2022-11-10 ENCOUNTER — TELEPHONE (OUTPATIENT)
Dept: SURGERY | Age: 62
End: 2022-11-10

## 2022-12-06 ENCOUNTER — HOSPITAL ENCOUNTER (OUTPATIENT)
Dept: PHYSICAL THERAPY | Age: 62
Discharge: HOME | End: 2022-12-06
Payer: COMMERCIAL

## 2022-12-06 PROCEDURE — 97162 PT EVAL MOD COMPLEX 30 MIN: CPT

## 2022-12-06 PROCEDURE — 97110 THERAPEUTIC EXERCISES: CPT

## 2022-12-06 NOTE — PROGRESS NOTES
PT INITIAL EVALUATION NOTE - [facility] 2-15    Patient Name: Lavelle Diamond  Date:2022  : 1960  [x]  Patient  Verified  Payor: Joyce Mix / Plan:  [facility] / Product Type: PPO /    In time: 1:05  Out time: 2:15  Total Treatment Time (min): 70  Total Timed Codes (min): 24  1:1 Treatment Time ( only): 60   Visit #: 1     Treatment Area: Low back pain, unspecified [M54.50]  Personal history of malignant neoplasm of breast [Z85.3]    SUBJECTIVE  Pain Level (0-10 scale): 2/10  Any medication changes, allergies to medications, adverse drug reactions, diagnosis change, or new procedure performed?: [] No    [x] Yes (see summary sheet for update)  Subjective:  Patient reports she had double mastectomy in 2022. Then,  patient underwent removal of her bilateral tissue expanders and reconstruction of her bilateral breasts with CRYSTAL free flaps. Patient reports the surgery lasted ~12 hours. Patient is 6 weeks post-op as of today. After ~3 weeks, she began to become more active again and began to notice onset of new and worsening back pain. She reports she did not have any hx of back pain prior to the most recent surgery. Patient was previously walking ~30 minutes 4-5x/week. Since surgery, patient is unable to stand >10 min without increase in pain. Describes pain as sharp shooting pain through mid-back, R side > L side. Patient reports no numbness or tingling or radicular pain into legs. Patient has extreme difficulty getting in and out of bed and notes abdominals are still weak from surgery. Pt has not been able to get back to her walking. Patient has been wearing abdominal brace and has noticed it helps control pain some. Patient had FU with surgeon last [address] and reports she was told everything is healing as expected. She had 5 surgical drains all together. 4/5 were removed the first week post-op but one was left for an additional week.  This one had difficulty fully closing, but pt reports it is almost fully healed now. Abdominal incision is also closed up and almost fully healed. Note some remaining scabbing on left side of abdomen. Current functional limitations include: prolonged walking, prolonged standing, bending/squatting, lifting.  Patient goals are \"reduce the pain to lose weight and get back to walking\"    PLOF: independent  Mechanism of Injury: s/p surgery   Previous Treatment/Compliance: n/a  PMHx/Surgical Hx: double mastectomy august 2022, bilateral breast revision with CRYSTAL free flaps October 25th  Work Hx: n/a  Living Situation: , single story  Pt Goals: \"reduce the pain to lose weight and get back to walking\"   Barriers: none  Motivation: good    OBJECTIVE/EXAMINATION    OBJECTIVE  Posture:  excess thoracic kyphosis with rounded shoulders and forward flexed head position  Other Observations:  Note some remaining scabbing along left side of abdominal incision   *Patient unable to tolerate prone or sidelying positions*    Gait and Functional Mobility:  Pt ambulated into clinic (I) / transfers (I)    Palpation: TTP along R thoracic paraspinals between levels T5-T10 spinous processes        Lumbar AROM:          R  L    Flexion    Fingertips to toes      Extension   unable to extend past neutral      Side Bending   [facility]  WFL    Rotation   WFL, P!   [facility]      UPPER QUARTER   MUSCLE STRENGTH  KEY       R  L  0 - No Contraction   Flexion  4-/5  4-/5  1 - Trace    Extension nt  nt  2 - Poor    Abduction 5/5  4-/5  3 - Fair     IR  5/5  5/5  4 - Good    ER  5/5  4-/5  5 - Normal       LOWER QUARTER   MUSCLE STRENGTH  KEY        R  L  0 - No Contraction  Hip flexion   5/5  5/5   1- Trace   Hip Abduction   5/5  5/5  2 - Poor   Hip Adduction   5/5  5/5  3 - Fair    Hip Extension   nt  nt     4 - Good   Knee flexion   5/5  5/5  5 - Normal   Knee extension  5/5  5/5      Ankle Dorsiflexion  5/5  5/5      Ankle Plantarflexion  5/5  5/5      Great toe extension  5/5  5/5    Flexibility: 90-90 HS test: R (+) L (+)  Obers Test: R (nt) L (nt)  Williams Test: R (nt) L (nt)       24 min Therapeutic Exercise:  [x] See flow sheet :   Rationale: increase ROM and increase strength to improve the patients ability to perform functional daily tasks with minimal to no pain.           With   [] TE   [] TA   [] Neuro   [] SC   [] other: Patient Education: [x] Review HEP    [] Progressed/Changed HEP based on:   [] positioning   [] body mechanics   [] transfers   [] heat/ice application    [] other:      Other Objective/Functional Measures: FOTO Functional Measure: 51/100                       Pain Level (0-10 scale) post treatment: 2/10    ASSESSMENT/Changes in Function:     [x]  See [address], PT 12/6/2022

## 2022-12-06 NOTE — THERAPY EVALUATION
[address] Physical Therapy  [address] [place]  Phone: [phone] Fax: [phone]    Plan of Care/Statement of Necessity for Physical Therapy Services  2-15    Patient name: Vince Bennett  : 1960  Provider#: [id number]  Referral source: Jose Iraheta MD      Medical/Treatment Diagnosis: Low back pain, unspecified [M54.50]  Personal history of malignant neoplasm of breast [Z85.3]     Prior Hospitalization: see medical history     Comorbidities: allergies, arthritis, asthma, back pain, BMI >30, cancer, high blood pressure   Prior Level of Function: independent, active  Medications: Verified on Patient Summary List    Start of Care: 2022      Onset Date: s/p breast reconstruction on bilateral breasts with CRYSTAL free flaps 10/25/2022       The Plan of Care and following information is based on the information from the initial evaluation. Assessment/ key information:   Patient is a 60year old female who presents to physical therapy services due to new onset of mid-thoracic back pain s/p bilateral tissue expanders and reconstruction of her bilateral breasts with CRYSTAL free flaps. Patient presents today with functional mobility, muscle endurance, muscle power, and movement impairments. Patient demonstrated decreased upper and lower extremity strength during MMT assessment, limited pain-igor\ee thoracolumbar range of motion, tenderness to palpation along R thoracic paraspinals between levels T5-T10 spinous processes, and pain limiting daily functional tasks. Patient pain responded favorable to manual intervention and strengthening exercises in todays session, with report of diminished symptoms post-treatment.  Patient would benefit from continued skilled physical therapy services to address the impairments listed above to allow for full participation in daily functional tasks such as cooking, cleaning, bending/lifting safely and with minimal to no pain. Evaluation Complexity History HIGH Complexity :3+ comorbidities / personal factors will impact the outcome/ POC ; Examination HIGH Complexity : 4+ Standardized tests and measures addressing body structure, function, activity limitation and / or participation in recreation  ;Presentation MEDIUM Complexity : Evolving with changing characteristics  ; Clinical Decision Making MEDIUM Complexity : FOTO score of 26-74  Overall Complexity Rating: MEDIUM    Problem List: pain affecting function, decrease ROM, decrease strength, decrease ADL/ functional abilitiies, decrease activity tolerance, and decrease flexibility/ joint mobility   Treatment Plan may include any combination of the following: Therapeutic exercise, Neuromuscular reeducation, Manual therapy, Therapeutic activity, Self care/home management, Electric stim unattended , Vasopneumatic device, Gait training, Ultrasound, Mechanical traction, Electric stim attended, Needle insertion w/o injection (1 or 2 muscles), and Needle insertion w/o injection (3+ muscles)  Patient / Family readiness to learn indicated by: asking questions, trying to perform skills, and interest  Persons(s) to be included in education: patient (P)  Barriers to Learning/Limitations: None  Patient Goal (s): reduce the pain to lose weight and get back to walking  Patient Self Reported Health Status: good  Rehabilitation Potential: good    Short Term Goals: To be accomplished in 6-8 treatments:   Patient will demonstrate understanding of and compliance with HEP showing full participation in physical therapy. Patient will report reduced familiar symptoms by >/= 25% allowing for improving participation in daily tasks. Patient will demonstrate understanding/application of postural principles/recommendations to daily activities toward improved symptom management. Long Term Goals:  To be accomplished in 16-18 treatments:     Patient will report minimal to no pain during prolonged standing >15 to show improving functional mobility and participation in daily tasks. Patient will improve FOTO score by the [address] of 3 to >/= 54 showing significant improvement in their self-reported level of function. Patient will return to walking routine of 30 minutes a day 3-4x/week with minimal to no pain showing improving functional mobility. Frequency / Duration: Patient to be seen 2 times per week for 18 treatments. Patient/ Caregiver education and instruction: self care, activity modification, and exercises    [x]  Plan of care has been reviewed with JOSE RAMON Small, PT 12/6/2022     ________________________________________________________________________    I certify that the above Therapy Services are being furnished while the patient is under my care. I agree with the treatment plan and certify that this therapy is necessary.     Physician's Signature:____________________  Date:____________Time: _________      Duke Bee MD

## 2022-12-08 ENCOUNTER — HOSPITAL ENCOUNTER (OUTPATIENT)
Dept: PHYSICAL THERAPY | Age: 62
Discharge: HOME | End: 2022-12-08
Payer: COMMERCIAL

## 2022-12-08 PROCEDURE — 97110 THERAPEUTIC EXERCISES: CPT

## 2022-12-08 NOTE — PROGRESS NOTES
PT DAILY TREATMENT NOTE - [facility] 2-15    Patient Name: Nidia Sotelo  Date:2022  : 1960  [x]  Patient  Verified  Payor: BLUE CROSS / Plan:  [facility] / Product Type: PPO /    In time: 7:32  Out time: 8:14  Total Treatment Time (min): 42  Total Timed Codes (min): 42  1:1 Treatment Time ( only): 38  Visit #:  2    Treatment Area: Low back pain, unspecified [M54.50]  Personal history of malignant neoplasm of breast [Z85.3]    SUBJECTIVE  Pain Level (0-10 scale): 1/10  Any medication changes, allergies to medications, adverse drug reactions, diagnosis change, or new procedure performed?: [x] No    [] Yes (see summary sheet for update)  Subjective functional status/changes:   [] No changes reported  Pt reports she performed HEP 1x since evaluation earlier this week. Pt reports she noted some mild increase in abdominal muscle soreness after performing, but went away quickly/    OBJECTIVE      42 min Therapeutic Exercise:  [] See flow sheet :   Rationale: increase ROM and increase strength to improve the patients ability to perform functional daily tasks with minimal to no pain. With   [] TE   [] TA   [] Neuro   [] SC   [] other: Patient Education: [x] Review HEP    [] Progressed/Changed HEP based on:   [] positioning   [] body mechanics   [] transfers   [] heat/ice application    [] other:      Other Objective/Functional Measures:      Pain Level (0-10 scale) post treatment: 1/10    ASSESSMENT/Changes in Function:   Pt tolerates treatment session well today. Reviewed HEP and pt requires mod verbal cues to perform exercises properly. Pt requires tactile cues to perform posterior pelvic tilt and improve TA activation. Noted difficulty performing sidelying clamshells due to glute weakness, provide tactile cues to avoid compensatory upper trunk rotation.  Patient will continue to benefit from skilled PT services to modify and progress therapeutic interventions, address functional mobility deficits, address ROM deficits, address strength deficits, analyze and address soft tissue restrictions, analyze and cue movement patterns, and analyze and modify body mechanics/ergonomics to attain remaining goals. [x]  See Plan of Care  []  See progress note/recertification  []  See Discharge Summary         Progress towards goals / Updated goals:  Short Term Goals: To be accomplished in 6-8 treatments:               Patient will demonstrate understanding of and compliance with HEP showing full participation in physical therapy. Patient will report reduced familiar symptoms by >/= 25% allowing for improving participation in daily tasks. Patient will demonstrate understanding/application of postural principles/recommendations to daily activities toward improved symptom management. Long Term Goals: To be accomplished in 16-18 treatments:                 Patient will report minimal to no pain during prolonged standing >15 to show improving functional mobility and participation in daily tasks. Patient will improve FOTO score by the [address] of 3 to >/= 54 showing significant improvement in their self-reported level of function. Patient will return to walking routine of 30 minutes a day 3-4x/week with minimal to no pain showing improving functional mobility.     PLAN  [x]  Upgrade activities as tolerated     [x]  Continue plan of care  [x]  Update interventions per flow sheet       []  Discharge due to:_  []  Other:_      Chaotтатьяна, PT 12/8/2022

## 2022-12-13 ENCOUNTER — HOSPITAL ENCOUNTER (OUTPATIENT)
Dept: PHYSICAL THERAPY | Age: 62
Discharge: HOME | End: 2022-12-13
Payer: COMMERCIAL

## 2022-12-13 PROCEDURE — 97110 THERAPEUTIC EXERCISES: CPT

## 2022-12-13 NOTE — PROGRESS NOTES
PT DAILY TREATMENT NOTE - [facility] 2-15    Patient Name: Leydi Jaime  Date:2022  : 1960  [x]  Patient  Verified  Payor: BLUE CROSS / Plan:  [facility] / Product Type: PPO /    In time: 2:05  Out time: 2:46  Total Treatment Time (min): 41  Total Timed Codes (min): 41   1:1 Treatment Time ( only): 38  Visit #:  3    Treatment Area: Low back pain, unspecified [M54.50]  Personal history of malignant neoplasm of breast [Z85.3]    SUBJECTIVE  Pain Level (0-10 scale): 0/10  Any medication changes, allergies to medications, adverse drug reactions, diagnosis change, or new procedure performed?: [x] No    [] Yes (see summary sheet for update)  Subjective functional status/changes:   [] No changes reported    Pt reports she feels like her pain has moved down into lower back. Also reports she feels like she pulled a muscle in her right UE, unsure what she did but it started bothering her on Saturday. Pt reports experiencing onset of abdominal cramps after performing HEP 1x. Reports prior to surgery she would experience abdominal cramping when working out for first time in awhile. OBJECTIVE    41 min Therapeutic Exercise:  [] See flow sheet :   Rationale: increase ROM and increase strength to improve the patients ability to perform functional daily tasks with minimal to no pain. With   [x] TE   [] TA   [] Neuro   [] SC   [] other: Patient Education: [x] Review HEP    [] Progressed/Changed HEP based on:   [] positioning   [] body mechanics   [] transfers   [] heat/ice application    [] other:      Other Objective/Functional Measures:      Pain Level (0-10 scale) post treatment: 0/10    ASSESSMENT/Changes in Function:   Pt tolerates treatment session well today. Patient demonstrates improving UE strength performing resisted rows > extension, requiring minimal cuing for proper muscle activation.  Pt exhibits improving bilateral active shoulder flexion and abduction, but notes mild onset of discomfort with repetitive motion. Patient will continue to benefit from skilled PT services to modify and progress therapeutic interventions, address functional mobility deficits, address ROM deficits, address strength deficits, analyze and address soft tissue restrictions, analyze and cue movement patterns, and analyze and modify body mechanics/ergonomics to attain remaining goals. [x]  See Plan of Care  []  See progress note/recertification  []  See Discharge Summary         Progress towards goals / Updated goals:  Short Term Goals: To be accomplished in 6-8 treatments:               Patient will demonstrate understanding of and compliance with HEP showing full participation in physical therapy. Patient will report reduced familiar symptoms by >/= 25% allowing for improving participation in daily tasks. Patient will demonstrate understanding/application of postural principles/recommendations to daily activities toward improved symptom management. Long Term Goals: To be accomplished in 16-18 treatments:                 Patient will report minimal to no pain during prolonged standing >15 to show improving functional mobility and participation in daily tasks. Patient will improve FOTO score by the [address] of 3 to >/= 54 showing significant improvement in their self-reported level of function. Patient will return to walking routine of 30 minutes a day 3-4x/week with minimal to no pain showing improving functional mobility.     PLAN  [x]  Upgrade activities as tolerated     [x]  Continue plan of care  [x]  Update interventions per flow sheet       []  Discharge due to:_  []  Other:_      Jeronimo, PT 12/13/2022

## 2022-12-15 ENCOUNTER — APPOINTMENT (OUTPATIENT)
Dept: PHYSICAL THERAPY | Age: 62
End: 2022-12-15
Payer: COMMERCIAL

## 2022-12-20 ENCOUNTER — DOCUMENTATION ONLY (OUTPATIENT)
Dept: SLEEP MEDICINE | Age: 62
End: 2022-12-20

## 2022-12-20 ENCOUNTER — OFFICE VISIT (OUTPATIENT)
Dept: SLEEP MEDICINE | Age: 62
End: 2022-12-20
Payer: COMMERCIAL

## 2022-12-20 ENCOUNTER — APPOINTMENT (OUTPATIENT)
Dept: PHYSICAL THERAPY | Age: 62
End: 2022-12-20
Payer: COMMERCIAL

## 2022-12-20 VITALS
WEIGHT: 218 LBS | DIASTOLIC BLOOD PRESSURE: 81 MMHG | BODY MASS INDEX: 36.32 KG/M2 | TEMPERATURE: 98.1 F | HEIGHT: 65 IN | HEART RATE: 86 BPM | OXYGEN SATURATION: 91 % | SYSTOLIC BLOOD PRESSURE: 132 MMHG

## 2022-12-20 DIAGNOSIS — G47.33 OBSTRUCTIVE SLEEP APNEA (ADULT) (PEDIATRIC): Primary | ICD-10-CM

## 2022-12-20 DIAGNOSIS — E66.9 OBESITY, CLASS II, BMI 35-39.9: ICD-10-CM

## 2022-12-20 PROCEDURE — 99213 OFFICE O/P EST LOW 20 MIN: CPT | Performed by: INTERNAL MEDICINE

## 2022-12-20 PROCEDURE — 3074F SYST BP LT 130 MM HG: CPT | Performed by: INTERNAL MEDICINE

## 2022-12-20 PROCEDURE — 3078F DIAST BP <80 MM HG: CPT | Performed by: INTERNAL MEDICINE

## 2022-12-20 NOTE — PROGRESS NOTES
217 [facility] [place]. [place], [address]  Tel.  [phone]  Fax. 100 [facility] 60  [facility], [address]  Tel.  [phone]  Fax. [phone] 9[address] [facility] Mone [place]  Tel.  [phone]  Fax. [phone]     S>Teagan Diaz is a 60 y.o. female seen for a positive airway pressure follow-up. She reports no problems using the device. The following problems are identified:    Drowsiness no Problems exhaling no   Snoring no Forget to put on no   Mask Comfortable yes Can't fall asleep no   Dry Mouth no Mask falls off no   Air Leaking no Frequent awakenings no     Download reviewed. She admits that her sleep has improved. Therapy Apnea Index averaged over PAP use: 2.4 /hr which reflects improved sleep breathing condition. Allergies   Allergen Reactions    Contrave [Naltrexone-Bupropion] Other (comments)     1 PILL MORNING, 2 PILLS EVENING  FELT ZOMBIED, \"OUT OF MY HEAD\"    Crestor [Rosuvastatin] Other (comments)     Felt like it worsened her memory    Gadolinium-Containing Contrast Media Nausea and Vomiting    Lipitor [Atorvastatin] Other (comments)     Felt like it worsened memory. She has a current medication list which includes the following prescription(s): OTHER, qvar redihaler, fluticasone propionate, cetirizine, OTHER,NON-FORMULARY,, levothyroxine, ezetimibe, hydrochlorothiazide, scopolamine, albuterol, and acyclovir. .      She  has a past medical history of Asthma (CHILDHOOD), Chest pain (07/11/2017), COVID-19 virus infection (07/06/2022), Ductal carcinoma in situ (DCIS) of right breast (04/28/2022), Dyslipidemia, Gastritis and duodenitis (11/22/2019), GERD (gastroesophageal reflux disease), Heartburn (2017), Hyperglycemia (04/16/2016), Hypertension, Hypothyroid (08/2018), Microscopic colitis (11/22/2019), Neck nodule (05/10/2018), Obesity (BMI 30-39.9) (2017), PITER (obstructive sleep apnea), Statin intolerance (03/14/2022), Steatosis of liver (06/19/2016), TMJ (temporomandibular joint syndrome), Tubular adenoma of colon (02/2017), Uterine fibroid (2002), and Vitamin D deficiency (04/18/2018). [place] Sleepiness Score: 5   and Modified F.O.S.Q. Score Total / 2: 19   which reflect improved sleep quality over therapy time. O>    Visit Vitals  /81 (BP 1 Location: Right upper arm, BP Patient Position: Sitting, BP Cuff Size: Adult long)   Pulse 86   Temp 98.1 °F (36.7 °C) (Oral)   Ht 5' 5\" (1.651 m)   Wt 218 lb (98.9 kg)   SpO2 91%   BMI 36.28 kg/m²           General:   Alert, oriented, not in distress   Neck:   No JVD    Chest/Lungs:  symetrical lung expansion , no accessory muscle use    Extremities:  no obvious rashes , negative edema    Neuro:  No focal deficits ; No obvious tremor    Psych:  Normal affect ,  Normal countenance ;         A>    ICD-10-CM ICD-9-CM    1. Obstructive sleep apnea (adult) (pediatric)  G47.33 327.23 AMB SUPPLY ORDER      2. Obesity, Class II, BMI 35-39.9  E66.9 278.00         AHI = 9(2020). On CPAP, Respironics, DS2  :  5-10 cmH2O. Compliant:      yes    Therapeutic Response:  Positive    P>      *   Follow-up and Dispositions    Return in about 1 year (around 12/20/2023). she is compliant with PAP therapy and PAP continues to benefit patient and remains necessary for control of her sleep apnea. she will continue on her current pressure settings. I have ordered replacement supplies      * She was asked to contact our office for any problems regarding PAP therapy. * Counseling was provided regarding the importance of regular PAP use and on proper sleep hygiene and safe driving. * Re-enforced proper and regular cleaning for the device. 2. Obesity - weight has been going up. I have discussed the relationship of weight to obstructive sleep apnea.  I have encouraged her to start a weight loss plan when recovered and cleared by her doctors (she recently had a double mastectomy and reconstructive surgery). she understands that weight loss can reduce severity of sleep apnea and snoring.      Electronically signed by    Praveen Ortiz MD  Diplomate in Sleep Medicine  [facility]  12/20/2022

## 2022-12-20 NOTE — PATIENT INSTRUCTIONS
217 [facility] [place]. [place], [address]  Tel.  [phone]  Fax. 100 [facility] 60  [place], [address]  Tel.  [phone]  Fax. [phone] [address]  Tel.  [phone]  Fax. [phone]     PROPER SLEEP HYGIENE    What to avoid  Do not have drinks with caffeine, such as coffee or black tea, for 8 hours before bed. Do not smoke or use other types of tobacco near bedtime. Nicotine is a stimulant and can keep you awake. Avoid drinking alcohol late in the evening, because it can cause you to wake in the middle of the night. Do not eat a big meal close to bedtime. If you are hungry, eat a light snack. Do not drink a lot of water close to bedtime, because the need to urinate may wake you up during the night. Do not read or watch TV in bed. Use the bed only for sleeping and sexual activity. What to try  Go to bed at the same time every night, and wake up at the same time every morning. Do not take naps during the day. Keep your bedroom quiet, dark, and cool. Get regular exercise, but not within 3 to 4 hours of your bedtime. .  Sleep on a comfortable pillow and mattress. If watching the clock makes you anxious, turn it facing away from you so you cannot see the time. If you worry when you lie down, start a worry book. Well before bedtime, write down your worries, and then set the book and your concerns aside. Try meditation or other relaxation techniques before you go to bed. If you cannot fall asleep, get up and go to another room until you feel sleepy. Do something relaxing. Repeat your bedtime routine before you go to bed again. Make your house quiet and calm about an hour before bedtime. Turn down the lights, turn off the TV, log off the computer, and turn down the volume on music. This can help you relax after a busy day.     Drowsy Driving  The [address] [place] [address] Road Traffic Safety Administration cites drowsiness as a causing factor in more than 826,111 police reported crashes annually, resulting in 76,000 injuries and 1,500 deaths. Other surveys suggest 55% of people polled have driven while drowsy in the past year, 23% had fallen asleep but not crashed, 3% crashed, and 2% had and accident due to drowsy driving. Who is at risk? Young Drivers: One study of drowsy driving accidents states that 55% of the drivers were under 25 years. Of those, 75% were male. Shift Workers and Travelers: People who work overnight or travel across time zones frequently are at higher risk of experiencing Circadian Rhythm Disorders. They are trying to work and function when their body is programed to sleep. Sleep Deprived: Lack of sleep has a serious impact on your ability to pay attention or focus on a task. Consistently getting less than the average of 8 hours your body needs creates partial or cumulative sleep deprivation. Untreated Sleep Disorders: Sleep Apnea, Narcolepsy, R.L.S., and other sleep disorders (untreated) prevent a person from getting enough restful sleep. This leads to excessive daytime sleepiness and increases the risk for drowsy driving accidents by up to 7 times. Medications / Alcohol: Even over the counter medications can cause drowsiness. Medications that impair a drivers attention should have a warning label. Alcohol naturally makes you sleepy and on its own can cause accidents. Combined with excessive drowsiness its effects are amplified. Signs of Drowsy Driving:   * You don't remember driving the last few miles   * You may drift out of your art   * You are unable to focus and your thoughts wander   * You may yawn more often than normal   * You have difficulty keeping your eyes open / nodding off   * Missing traffic signs, speeding, or tailgating  Prevention-   Good sleep hygiene, lifestyle and behavioral choices have the most impact on drowsy driving.  There is no substitute for sleep and the average person requires 8 hours nightly. If you find yourself driving drowsy, stop and sleep. Consider the sleep hygiene tips provided during your visit as well. Medication Refill Policy: Refills for all medications require 1 week advance notice. Please have your pharmacy fax a refill request. We are unable to fax, or call in \"controled substance\" medications and you will need to pick these prescriptions up from our office. Smarty Ring Activation    Thank you for requesting access to Smart[employer] Ring. Please follow the instructions below to securely access and download your online medical record. Smarty Ring allows you to send messages to your doctor, view your test results, renew your prescriptions, schedule appointments, and more. How Do I Sign Up? In your internet browser, go to [email or web address] (formerly [email or web address]). [email or web address] ([email or web address]). Click on the First Time User? Click Here link in the Sign In box. You will see the New Member Sign Up page. Enter your Smarty Ring Access Code exactly as it appears below. You will not need to use this code after youve completed the sign-up process. If you do not sign up before the expiration date, you must request a new code. Smarty Ring Access Code: Activation code not generated  Current Smarty Ring Status: Active (This is the date your Smarty Ring access code will )    Enter the last four digits of your Social Security Number (xxxx) and Date of Birth (mm/dd/yyyy) as indicated and click Submit. You will be taken to the next sign-up page. Create a Smarty Ring ID. This will be your Smarty Ring login ID and cannot be changed, so think of one that is secure and easy to remember. Create a Smarty Ring password. You can change your password at any time. Enter your Password Reset Question and Answer. This can be used at a later time if you forget your password. Enter your e-mail address. You will receive e-mail notification when new information is available in [address] Click Sign Up.  You can now view and download portions of your medical record. Click the Hoard link to download a portable copy of your medical information. Additional Information    If you have questions, please call [phone]. Remember, i[employer]Text is NOT to be used for urgent needs. For medical emergencies, dial 911.

## 2022-12-21 ENCOUNTER — HOSPITAL ENCOUNTER (OUTPATIENT)
Dept: PHYSICAL THERAPY | Age: 62
Discharge: HOME | End: 2022-12-21
Payer: COMMERCIAL

## 2022-12-21 PROCEDURE — 97110 THERAPEUTIC EXERCISES: CPT

## 2022-12-21 NOTE — PROGRESS NOTES
PT DAILY TREATMENT NOTE - [facility] 2-15    Patient Name: Leydi Jaime  Date:2022  : 1960  [x]  Patient  Verified  Payor: BLUE CROSS / Plan:  [facility] [employer] / Product Type: PPO /    In time: 12:07  Out time: 12:53   Total Treatment Time (min): 46   Total Timed Codes (min): 46   1:1 Treatment Time ( only): 43   Visit #:  4    Treatment Area: Low back pain, unspecified [M54.50]  Personal history of malignant neoplasm of breast [Z85.3]    SUBJECTIVE  Pain Level (0-10 scale): 5/10 abdominal pain 2/10 low back pain  Any medication changes, allergies to medications, adverse drug reactions, diagnosis change, or new procedure performed?: [x] No    [] Yes (see summary sheet for update)  Subjective functional status/changes:   [] No changes reported    Pt reports her chief complaint is abdominal soreness/pain when laying down and during transitional movements. Patient reports back pain gets worst  with standing. Patient reports she tried to do more walking yesterday due to wanting to become more active and ose weight. OBJECTIVE    46  min Therapeutic Exercise:  [] See flow sheet :   Rationale: increase ROM and increase strength to improve the patients ability to perform functional daily tasks with minimal to no pain. With   [x] TE   [] TA   [] Neuro   [] SC   [] other: Patient Education: [x] Review HEP    [] Progressed/Changed HEP based on:   [] positioning   [] body mechanics   [] transfers   [] heat/ice application    [] other:      Other Objective/Functional Measures:      Pain Level (0-10 scale) post treatment: 4/10 abdominal pain 1/10 low back pain    ASSESSMENT/Changes in Function:   Pt tolerates treatment session well today, however reports she is very fatigued today and feels her tolerance to exercise will be low today. Patient demonstrates limited thoracic mobility performing open book exercise, L >R.  Patient demonstrates decreased glute strength bilaterally performing clamshells with inc thoracic muscle involvement. Provides tactile cues to improve proper muscle activation and pt improves slightly. Patient will continue to benefit from skilled PT services to modify and progress therapeutic interventions, address functional mobility deficits, address ROM deficits, address strength deficits, analyze and address soft tissue restrictions, analyze and cue movement patterns, and analyze and modify body mechanics/ergonomics to attain remaining goals. [x]  See Plan of Care  []  See progress note/recertification  []  See Discharge Summary         Progress towards goals / Updated goals:  Short Term Goals: To be accomplished in 6-8 treatments:               Patient will demonstrate understanding of and compliance with HEP showing full participation in physical therapy. Patient will report reduced familiar symptoms by >/= 25% allowing for improving participation in daily tasks. Patient will demonstrate understanding/application of postural principles/recommendations to daily activities toward improved symptom management. Long Term Goals: To be accomplished in 16-18 treatments:                 Patient will report minimal to no pain during prolonged standing >15 to show improving functional mobility and participation in daily tasks. Patient will improve FOTO score by the [address] of 3 to >/= 54 showing significant improvement in their self-reported level of function. Patient will return to walking routine of 30 minutes a day 3-4x/week with minimal to no pain showing improving functional mobility.     PLAN  [x]  Upgrade activities as tolerated     [x]  Continue plan of care  [x]  Update interventions per flow sheet       []  Discharge due to:_  []  Other:_      Jeronimo, PT 12/21/2022

## 2022-12-28 ENCOUNTER — HOSPITAL ENCOUNTER (OUTPATIENT)
Dept: PHYSICAL THERAPY | Age: 62
Discharge: HOME | End: 2022-12-28
Payer: COMMERCIAL

## 2022-12-28 PROCEDURE — 97110 THERAPEUTIC EXERCISES: CPT

## 2022-12-28 NOTE — PROGRESS NOTES
PT DAILY TREATMENT NOTE - [facility] 2-15    Patient Name: Eduarda Casey  Date:2022  : 1960  [x]  Patient  Verified  Payor: BLUE CROSS / Plan: [facility] / Product Type: PPO /    In time: 12:01  Out time: 12:55  Total Treatment Time (min): 54  Total Timed Codes (min): 54  1:1 Treatment Time ( only): 47   Visit #:  5    Treatment Area: Low back pain, unspecified [M54.50]  Personal history of malignant neoplasm of breast [Z85.3]    SUBJECTIVE  Pain Level (0-10 scale): 1/10 low back pain  Any medication changes, allergies to medications, adverse drug reactions, diagnosis change, or new procedure performed?: [x] No    [] Yes (see summary sheet for update)  Subjective functional status/changes:   [] No changes reported    Pt reports she has not performed any of her HEP since her previous session. Notes her pain in lower back and across abdomen is about the same. Back pain is the worst with prolonged standing. OBJECTIVE    54 min Therapeutic Exercise:  [] See flow sheet :   Rationale: increase ROM and increase strength to improve the patients ability to perform functional daily tasks with minimal to no pain. With   [x] TE   [] TA   [] Neuro   [] SC   [] other: Patient Education: [x] Review HEP    [] Progressed/Changed HEP based on:   [] positioning   [] body mechanics   [] transfers   [] heat/ice application    [] other:      Other Objective/Functional Measures:      Pain Level (0-10 scale) post treatment:  1/10 low back pain    ASSESSMENT/Changes in Function:   Pt tolerates treatment session well today. Noted improved overall tolerance to exercise compared to previous session. Patient demonstrates decreased glute and hamstring strength, R > L, performing standing hip abduction and extensions exercises. Pt requires tactile cues to minimize compensatory muscle activation and trunk motion.  Noted difficulty to maintain single limb stance on RLE due to decreased muscular strength and endurance without onset of pain. Patient will continue to benefit from skilled PT services to modify and progress therapeutic interventions, address functional mobility deficits, address ROM deficits, address strength deficits, analyze and address soft tissue restrictions, analyze and cue movement patterns, and analyze and modify body mechanics/ergonomics to attain remaining goals. [x]  See Plan of Care  []  See progress note/recertification  []  See Discharge Summary         Progress towards goals / Updated goals:  Short Term Goals: To be accomplished in 6-8 treatments:               Patient will demonstrate understanding of and compliance with HEP showing full participation in physical therapy. Patient will report reduced familiar symptoms by >/= 25% allowing for improving participation in daily tasks. Patient will demonstrate understanding/application of postural principles/recommendations to daily activities toward improved symptom management. Long Term Goals: To be accomplished in 16-18 treatments:                 Patient will report minimal to no pain during prolonged standing >15 to show improving functional mobility and participation in daily tasks. Patient will improve FOTO score by the [address] [employer] of 3 to >/= 54 showing significant improvement in their self-reported level of function. Patient will return to walking routine of 30 minutes a day 3-4x/week with minimal to no pain showing improving functional mobility.     PLAN  [x]  Upgrade activities as tolerated     [x]  Continue plan of care  [x]  Update interventions per flow sheet       []  Discharge due to:_  []  Other:_      Jeronimo, PT 12/28/2022

## 2023-01-04 ENCOUNTER — HOSPITAL ENCOUNTER (OUTPATIENT)
Dept: PHYSICAL THERAPY | Age: 63
Discharge: HOME | End: 2023-01-04
Payer: COMMERCIAL

## 2023-01-04 PROCEDURE — 97110 THERAPEUTIC EXERCISES: CPT

## 2023-01-04 NOTE — PROGRESS NOTES
[address] Physical Therapy  [address] [facility] [address] [address] [place]o  Phone: [phone] Fax: [phone]    Progress Note    Name: Giovanni Watkins   : 1960   MD: Betty Marmolejo MD       Treatment Diagnosis: Low back pain, unspecified [M54.50]  Personal history of malignant neoplasm of breast [Z85.3]  Start of Care: 2022    Visits from Start of Care: 6  Missed Visits: 0    Summary of Care: Skilled physical therapy has consisted of therapeutic exercise, manual intervention, and modalities focused on improving overall upper and lower extremity strength, static and dynamic balance, pain-free range of motion, and pain modulation. Assessment / Recommendations:    Patient is an 60year old female who has been seen in skilled physical therapy for 6 visits since initial evaluation on 2022 due to sub-acute onset of mid-thoracic back pain s/p bilateral tissue expanders and reconstruction of her bilateral breasts with CRYSTAL free flaps. Patient also has chief complaint of abdominal pain since surgery. Patient has made consistent progress towards all goals. Patient has met 2/3 short term goals and progressing towards all long term goals. Patient demonstrates improving pain-free thoracolumbar multiplanar range of motion compared to initial evaluation. Patient also demonstrates improving upper and lower extremity strength bilaterally. However, continues to demonstrate some upper extremity muscular weakness, L > R. Patient has subjective report of 50% improvement in low back pain/symptoms compared to initial evaluation. Patient has subjective report of improving overall activity tolerance to prolonged standing. Patient has not yet attempted to return to walking routine. Patient reports compliance with HEP and has demonstrated proper technique of all exercises in the clinic.  Patient will continue to benefit from skilled PT services to modify and progress therapeutic interventions, address functional mobility deficits, address ROM deficits, address strength deficits, analyze and address soft tissue restrictions, analyze and cue movement patterns, and analyze and modify body mechanics/ergonomics to attain remaining goals. Other Objective/Functional Measures: Today vs (EVAL):  Lumbar AROM:                                                                                               R                      L                        Flexion                                   fingertips to toes  (Fingertips to toes)                                              Extension                              WFL, notes pain relief!; (unable to extend past neutral)                                      Side Bending                         WFL (WFL)             WFL  (WFL)                   Rotation                                 WFL  (WFL, P!)       WFL  ( WFL)                      UPPER QUARTER                             MUSCLE STRENGTH  KEY                                                                             R                      L  0 - No Contraction                               Flexion            4/5 (4-/5)           4/5  (4-/5)  1 - Trace                                              Extension        nt                     nt  2 - Poor                                               Abduction        5/5                  4+/5 (4-/5)  3 - Fair                                                 IR                    5/5                   5/5  4 - Good                                              ER                   5/5                  4/5  (4-/5)  5 - Normal                                          Progress towards goals / Updated goals:  Short Term Goals: To be accomplished in 6-8 treatments:               Patient will demonstrate understanding of and compliance with HEP showing full participation in physical therapy.  Progressing  Patient will report reduced familiar symptoms by >/= 25% allowing for improving participation in daily tasks. MET (Subjective report: 50%)  Patient will demonstrate understanding/application of postural principles/recommendations to daily activities toward improved symptom management. Met     Long Term Goals: To be accomplished in 16-18 treatments:                 Patient will report minimal to no pain during prolonged standing >15 min to show improving functional mobility and participation in daily tasks. Nearly met  Patient will improve [facility] to >/= 54 showing significant improvement in their self-reported level of function. Progressing  Patient will return to walking routine of 30 minutes a day 3-4x/week with minimal to no pain showing improving functional mobility.  Progressing    Other: Continue per POC: 2x/week for 18 treatments      Jeronimo, PT 1/4/2023

## 2023-01-04 NOTE — PROGRESS NOTES
PT DAILY TREATMENT NOTE - [facility] 2-15    Patient Name: Stephanie Regan  Date:2023  : 1960  [x]  Patient  Verified  Payor: BLUE CROSS / Plan:  [facility] [place] / Product Type: PPO /    In time: 12:06 PM Out time: 1:03 PM   Total Treatment Time (min): 57 minutes   Total Timed Codes (min): 46 minutes   1:1 Treatment Time ( only):  40 minutes  Visit #:  6    Treatment Area: Low back pain, unspecified [M54.50]  Personal history of malignant neoplasm of breast [Z85.3]    SUBJECTIVE  Pain Level (0-10 scale): 1 /10 low back pain  Any medication changes, allergies to medications, adverse drug reactions, diagnosis change, or new procedure performed?: [x] No    [] Yes (see summary sheet for update)  Subjective functional status/changes:   [] No changes reported    Pt reports her low back pain has improved, notes she is able to stand prolonged periods without increase in pain (~15 min). Reports she feels abdominal pain may be getting worse and feels it is in correlation to increase in weight. OBJECTIVE    46  min Therapeutic Exercise:  [] See flow sheet :   Rationale: increase ROM and increase strength to improve the patients ability to perform functional daily tasks with minimal to no pain. With   [x] TE   [] TA   [] Neuro   [] SC   [] other: Patient Education: [x] Review HEP    [] Progressed/Changed HEP based on:   [] positioning   [] body mechanics   [] transfers   [] heat/ice application    [] other:      Other Objective/Functional Measures:    Today vs (EVAL):  Lumbar AROM:                                                                                               R                      L                        Flexion                                   fingertips to toes  (Fingertips to toes)                                              Extension                              WFL, notes pain relief!; (unable to extend past neutral)                                      Side Bending WFL (WFL)             WFL  (WFL)                   Rotation                                 WFL  (WFL, P!)       WFL  ( WFL)                      UPPER QUARTER                             MUSCLE STRENGTH  KEY                                                                             R                      L  0 - No Contraction                               Flexion            4/5 (4-/5)           4/5  (4-/5)  1 - Trace                                              Extension        nt                     nt  2 - Poor                                               Abduction        5/5                  4+/5 (4-/5)  3 - Fair                                                 IR                     5/5                   5/5  4 - Good                                              ER                   5/5                 4/5  (4-/5)  5 - Normal                                    Pain Level (0-10 scale) post treatment:  1 /10 low back pain    ASSESSMENT/Changes in Function:   Pt tolerates treatment session well today. Patient is an 60year old female who has been seen in skilled physical therapy for 6 visits since initial evaluation on 12/06/2022 due to sub-acute onset of mid-thoracic back pain s/p bilateral tissue expanders and reconstruction of her bilateral breasts with CRYSTAL free flaps. Patient also has chief complaint of abdominal pain since surgery. Patient has made consistent progress towards all goals. Patient has met 2/3 short term goals and progressing towards all long term goals. Patient demonstrates improving pain-free thoracolumbar multiplanar range of motion compared to initial evaluation. Patient also demonstrates improving upper and lower extremity strength bilaterally. However, continues to demonstrate some upper extremity muscular weakness, L > R. Patient has subjective report of 50% improvement in low back pain/symptoms compared to initial evaluation.  Patient has subjective report of improving overall activity tolerance to prolonged standing. Patient has not yet attempted to return to walking routine. Patient reports compliance with HEP and has demonstrated proper technique of all exercises in the clinic. Patient will continue to benefit from skilled PT services to modify and progress therapeutic interventions, address functional mobility deficits, address ROM deficits, address strength deficits, analyze and address soft tissue restrictions, analyze and cue movement patterns, and analyze and modify body mechanics/ergonomics to attain remaining goals. [x]  See Plan of Care  [x]  See progress note/recertification  []  See Discharge Summary         Progress towards goals / Updated goals:  Short Term Goals: To be accomplished in 6-8 treatments:               Patient will demonstrate understanding of and compliance with HEP showing full participation in physical therapy. Progressing  Patient will report reduced familiar symptoms by >/= 25% allowing for improving participation in daily tasks. MET (Subjective report: 50%)  Patient will demonstrate understanding/application of postural principles/recommendations to daily activities toward improved symptom management. Met     Long Term Goals: To be accomplished in 16-18 treatments:                 Patient will report minimal to no pain during prolonged standing >15 min to show improving functional mobility and participation in daily tasks. Nearly met  Patient will improve FOTO score by the [employer] [employer] of 3 to >/= 54 showing significant improvement in their self-reported level of function. Progressing  Patient will return to walking routine of 30 minutes a day 3-4x/week with minimal to no pain showing improving functional mobility.  Progressing    PLAN  [x]  Upgrade activities as tolerated     [x]  Continue plan of care  [x]  Update interventions per flow sheet       []  Discharge due to:_  []  Other:_      Jeronimo, PT 1/4/2023

## 2023-01-11 ENCOUNTER — HOSPITAL ENCOUNTER (OUTPATIENT)
Dept: PHYSICAL THERAPY | Age: 63
Discharge: HOME | End: 2023-01-11
Payer: COMMERCIAL

## 2023-01-11 PROCEDURE — 97110 THERAPEUTIC EXERCISES: CPT

## 2023-01-11 NOTE — PROGRESS NOTES
PT DAILY TREATMENT NOTE - [facility] 2-15    Patient Name: Saida Chiu  Date:2023  : 1960  [x]  Patient  Verified  Payor: BLUE CROSS / Plan: [facility] [place] / Product Type: PPO /    In time: 12:03  PM Out time: 1:00 PM   Total Treatment Time (min): 57  minutes   Total Timed Codes (min): 57  minutes   1:1 Treatment Time ( only):  57 minutes  Visit #:  7    Treatment Area: Low back pain, unspecified [M54.50]  Personal history of malignant neoplasm of breast [Z85.3]    SUBJECTIVE  Pain Level (0-10 scale): 0 /10 low back pain  Any medication changes, allergies to medications, adverse drug reactions, diagnosis change, or new procedure performed?: [x] No    [] Yes (see summary sheet for update)  Subjective functional status/changes:   [] No changes reported    Pt reports her back pain has been feeling much better. Notes she was able to walk around [email or web address] yesterday for ~30 min without onset of back pain. Pt admits she has not been extremely consistent with HEP due to busy schedule. Patient reports she has began a diet through [employer] loss program.    OBJECTIVE    57   min Therapeutic Exercise:  [] See flow sheet :   Rationale: increase ROM and increase strength to improve the patients ability to perform functional daily tasks with minimal to no pain. With   [x] TE   [] TA   [] Neuro   [] SC   [] other: Patient Education: [x] Review HEP    [] Progressed/Changed HEP based on:   [] positioning   [] body mechanics   [] transfers   [] heat/ice application    [] other:      Other Objective/Functional Measures:                                    Pain Level (0-10 scale) post treatment:  0 /10 low back pain    ASSESSMENT/Changes in Function:   Pt tolerates treatment session well today. Patient continues to require frequent cues to avoid trunk involvement performing clamshells due to decrease glute strength bilaterally.  Pt exhibits bilateral genu valgus performing sit to stands, added tactile cues from resistance band and noted slight improvement. Patient exhibits decrease trunk/core muscular endurance during weightbearing exercises, requiring frequent rest breaks. Discussed importance of compliance with HEP to maintain progress and maximize benefits of therapy. Patient will continue to benefit from skilled PT services to modify and progress therapeutic interventions, address functional mobility deficits, address ROM deficits, address strength deficits, analyze and address soft tissue restrictions, analyze and cue movement patterns, and analyze and modify body mechanics/ergonomics to attain remaining goals. [x]  See Plan of Care  [x]  See progress note/recertification  []  See Discharge Summary         Progress towards goals / Updated goals:  Short Term Goals: To be accomplished in 6-8 treatments:               Patient will demonstrate understanding of and compliance with HEP showing full participation in physical therapy. Progressing  Patient will report reduced familiar symptoms by >/= 25% allowing for improving participation in daily tasks. MET (Subjective report: 50%)  Patient will demonstrate understanding/application of postural principles/recommendations to daily activities toward improved symptom management. Met     Long Term Goals: To be accomplished in 16-18 treatments:                 Patient will report minimal to no pain during prolonged standing >15 min to show improving functional mobility and participation in daily tasks. Nearly met  Patient will improve FOTO score by the [employer] [employer] [address] of 3 to >/= 54 showing significant improvement in their self-reported level of function. Progressing  Patient will return to walking routine of 30 minutes a day 3-4x/week with minimal to no pain showing improving functional mobility.  Progressing    PLAN  [x]  Upgrade activities as tolerated     [x]  Continue plan of care  [x]  Update interventions per flow sheet       []  Discharge due to:_  []  Other:_ [employer], PT 1/11/2023

## 2023-02-03 ENCOUNTER — HOSPITAL ENCOUNTER (OUTPATIENT)
Dept: PHYSICAL THERAPY | Age: 63
Discharge: HOME | End: 2023-02-03
Payer: COMMERCIAL

## 2023-02-03 PROCEDURE — 97110 THERAPEUTIC EXERCISES: CPT

## 2023-02-03 NOTE — PROGRESS NOTES
PT DAILY TREATMENT NOTE - [facility] 2-15    Patient Name: Shannon Mcgregor  Date:2/3/2023  : 1960  [x]  Patient  Verified  Payor: BLUE CROSS / Plan:  [facility] / Product Type: PPO /    In time: 8:32 PM Out time:  9:26 PM   Total Treatment Time (min): 57  minutes   Total Timed Codes (min):  57  minutes   1:1 Treatment Time ( only):  38  minutes  Visit #:  8    Treatment Area: Low back pain, unspecified [M54.50]  Personal history of malignant neoplasm of breast [Z85.3]    SUBJECTIVE  Pain Level (0-10 scale): 0 /10 low back pain  Any medication changes, allergies to medications, adverse drug reactions, diagnosis change, or new procedure performed?: [x] No    [] Yes (see summary sheet for update)  Subjective functional status/changes:   [] No changes reported    Pt reports she was walking ~20-30 min everyday on her cruise, unable to perform exercises on cruise. Reports she has been experiencing minimal to no back pain throughout all daily activities. Patient is interested in going back to the gym soon. OBJECTIVE     57  min Therapeutic Exercise:  [] See flow sheet :   Rationale: increase ROM and increase strength to improve the patients ability to perform functional daily tasks with minimal to no pain. With   [x] TE   [] TA   [] Neuro   [] SC   [] other: Patient Education: [x] Review HEP    [] Progressed/Changed HEP based on:   [] positioning   [] body mechanics   [] transfers   [] heat/ice application    [] other:      Other Objective/Functional Measures:                             Pain Level (0-10 scale) post treatment:   0/10 low back pain    ASSESSMENT/Changes in Function:   Pt tolerates treatment session well today. Progressed patient with inc weightbearing exercises and decrease rest breaks between exercises. Patient able to better maintain partial squat position throughout resisted side steps, but continues to require BUE support on table for balance.  Patient continues to demonstrate decrease glute strength bilaterally while performing standing hip abduction. Will continue to progress per patient tolerance. Patient will continue to benefit from skilled PT services to modify and progress therapeutic interventions, address functional mobility deficits, address ROM deficits, address strength deficits, analyze and address soft tissue restrictions, analyze and cue movement patterns, and analyze and modify body mechanics/ergonomics to attain remaining goals. [x]  See Plan of Care  [x]  See progress note/recertification  []  See Discharge Summary         Progress towards goals / Updated goals:  Short Term Goals: To be accomplished in 6-8 treatments:               Patient will demonstrate understanding of and compliance with HEP showing full participation in physical therapy. Progressing  Patient will report reduced familiar symptoms by >/= 25% allowing for improving participation in daily tasks. MET (Subjective report: 50%)  Patient will demonstrate understanding/application of postural principles/recommendations to daily activities toward improved symptom management. Met     Long Term Goals: To be accomplished in 16-18 treatments:                 Patient will report minimal to no pain during prolonged standing >15 min to show improving functional mobility and participation in daily tasks. Nearly met  Patient will improve FOTO score by the [employer] [employer] of 3 to >/= 54 showing significant improvement in their self-reported level of function. Progressing  Patient will return to walking routine of 30 minutes a day 3-4x/week with minimal to no pain showing improving functional mobility.  Progressing    PLAN  [x]  Upgrade activities as tolerated     [x]  Continue plan of care  [x]  Update interventions per flow sheet       []  Discharge due to:_  []  Other:_      Jeronimo, PT 2/3/2023

## 2023-02-09 NOTE — PROGRESS NOTES
PT DAILY TREATMENT NOTE - [facility] 2-15    Patient Name: Alonzo Rosas  Date:2023  : 1960  [x]  Patient  Verified  Payor: BLUE CROSS / Plan:  [facility] [place] / Product Type: PPO /    In time: 8:33 AM Out time:  9:25 AM   Total Treatment Time (min): 52  minutes   Total Timed Codes (min):  52  minutes   1:1 Treatment Time ( only):  38 minutes  Visit #:  9    Treatment Area: Low back pain, unspecified [M54.50]  Personal history of malignant neoplasm of breast [Z85.3]    SUBJECTIVE  Pain Level (0-10 scale): 0 /10 low back pain  Any medication changes, allergies to medications, adverse drug reactions, diagnosis change, or new procedure performed?: [x] No    [] Yes (see summary sheet for update)  Subjective functional status/changes:   [] No changes reported    Pt reports she experienced some inc in back pain when cleaning her kitchen last night. Reports pain went away as soon as she sat down. Patient notes prior to this one episode of pain, she was having minimal to no pain. OBJECTIVE     52   min Therapeutic Exercise:  [] See flow sheet :   Rationale: increase ROM and increase strength to improve the patients ability to perform functional daily tasks with minimal to no pain. With   [x] TE   [] TA   [] Neuro   [] SC   [] other: Patient Education: [x] Review HEP    [] Progressed/Changed HEP based on:   [] positioning   [] body mechanics   [] transfers   [] heat/ice application    [] other:      Other Objective/Functional Measures:      Today vs (EVAL):  Lumbar AROM:   All ROM within normal limits, minimal to no report of pain                                                                                                    UPPER QUARTER                             MUSCLE STRENGTH  KEY                                                                             R                      L  0 - No Contraction                               Flexion            5/5 (4-/5)           4+/5  (4-/5)  1 - Trace                                              Extension        nt                     nt  2 - Poor                                               Abduction        5/5                  5/5 (4-/5)  3 - Fair                                                 IR                     5/5                   5/5  4 - Good                                              ER                   5/5                 5/5  (4-/5)  5 - Normal                                                                   Pain Level (0-10 scale) post treatment:   0 /10 low back pain    ASSESSMENT/Changes in Function:   Pt tolerates treatment session well today. Patient is an 60year old female who has been seen in skilled physical therapy for 10 visits since initial evaluation on 12/6/2022 due to new onset of mid-thoracic back pain s/p bilateral tissue expanders and reconstruction of her bilateral breasts with CRYSTAL free flaps. Patient has continued to make consistent progress towards all goals. However, recent progress since previous progress note has been limited due to pause in treatment due to patient on vacation for >2 weeks. Patient has been seen in clinic for 3 sessions since previous progress note. Patient has met all short term goals and 2/3 long term goals. Patient has subjective report of minimal to no pain performing all daily tasks, however notes onset of low back pain last night while cleaning. Reports overall, she feels she has improved by >/= 75% since initial evaluation. However, onset of low back pain last night has caused some apprehension. Patient reports on average she is able to perform all cleaning, cooking, errands she needs to do with minimal to no difficulty. She reports she has returned to walking a couple days a week, ~20-30 minutes 2x/week. In today's session, patient demonstrates full thoracolumbar range of motion with minimal to no report of back pain and improving lower extremity's strength bilaterally via MMT.  Patient continues to note some continued abdominal tenderness/tightness, which increases with coughing, bending/lifting exercises, or stretching. Discussed reaching out to surgeon for further treatment options if this pain does not improve. Patient reports she has FU appt with surgeon next month. Patient back pain has responded favorably to conservative treatment and will continue to benefit from skilled physical therapy to continue to improve symptom-management. Updated HEP with comprehensive list of PRE to maximize progress and benefit of PT at home. Discussed importance of compliance with HEP. Patient demonstrates proper technique of all exercises in the clinic this session. Patient will continue to benefit from skilled PT services to modify and progress therapeutic interventions, address functional mobility deficits, address ROM deficits, address strength deficits, analyze and address soft tissue restrictions, analyze and cue movement patterns, and analyze and modify body mechanics/ergonomics to attain remaining goals. [x]  See Plan of Care  [x]  See progress note/recertification  []  See Discharge Summary         Progress towards goals / Updated goals:  Short Term Goals: To be accomplished in 6-8 treatments:               Patient will demonstrate understanding of and compliance with HEP showing full participation in physical therapy. Met  Patient will report reduced familiar symptoms by >/= 25% allowing for improving participation in daily tasks. MET (Subjective report: 75%)   Patient will demonstrate understanding/application of postural principles/recommendations to daily activities toward improved symptom management. Met      Long Term Goals: To be accomplished in 16-18 treatments:                 Patient will report minimal to no pain during prolonged standing >15 min to show improving functional mobility and participation in daily tasks.  Met (subjective report: >/= 1 hour)  Patient will improve FOTO score by the [employer] of 3 to >/= 54 showing significant improvement in their self-reported level of function. Met (56/100)  Patient will return to walking routine of 30 minutes a day 3-4x/week with minimal to no pain showing improving functional mobility.  Progressing    PLAN  [x]  Upgrade activities as tolerated     [x]  Continue plan of care  [x]  Update interventions per flow sheet       []  Discharge due to:_  []  Other:_      Bhavesh Soto, PT, DPT 2/9/2023

## 2023-02-10 ENCOUNTER — HOSPITAL ENCOUNTER (OUTPATIENT)
Dept: PHYSICAL THERAPY | Age: 63
Discharge: HOME | End: 2023-02-10
Payer: COMMERCIAL

## 2023-02-10 PROCEDURE — 97110 THERAPEUTIC EXERCISES: CPT

## 2023-02-10 NOTE — PROGRESS NOTES
[address] Physical Therapy  [address] [facility] ([facility]), [address] [place]  Phone: [phone] Fax: [phone]    Progress Note    Name: Svitlana Carbone   : 1960   MD: Lucian Thakkar MD       Treatment Diagnosis: Low back pain, unspecified [M54.50]  Personal history of malignant neoplasm of breast [Z85.3]  Start of Care: 2022    Visits from Start of Care: 9  Missed Visits: 0    Summary of Care: S  killed physical therapy has consisted of therapeutic exercise, manual intervention, and modalities focused on improving overall upper and lower extremity strength, static and dynamic balance, pain-free range of motion, and pain modulation. Assessment / Recommendations:    Patient is an 60year old female who has been seen in skilled physical therapy for 10 visits since initial evaluation on 2022 due to new onset of mid-thoracic back pain s/p bilateral tissue expanders and reconstruction of her bilateral breasts with CRYSTAL free flaps. Patient has continued to make consistent progress towards all goals. However, recent progress since previous progress note has been limited due to pause in treatment due to patient on vacation for >2 weeks. Patient has been seen in clinic for 3 sessions since previous progress note. Patient has met all short term goals and 2/3 long term goals. Patient has subjective report of minimal to no pain performing all daily tasks, however notes onset of low back pain last night while cleaning. Reports overall, she feels she has improved by >/= 75% since initial evaluation. However, onset of low back pain last night has caused some apprehension. Patient reports on average she is able to perform all cleaning, cooking, errands she needs to do with minimal to no difficulty. She reports she has returned to walking a couple days a week, ~20-30 minutes 2x/week.  In today's session, patient demonstrates full thoracolumbar range of motion with minimal to no report of back pain and improving lower extremity's strength bilaterally via MMT. Patient continues to note some continued abdominal tenderness/tightness, which increases with coughing, bending/lifting exercises, or stretching. Discussed reaching out to surgeon for further treatment options if this pain does not improve. Patient reports she has FU appt with surgeon next month. Patient back pain has responded favorably to conservative treatment and will continue to benefit from skilled physical therapy to continue to improve symptom-management. Updated HEP with comprehensive list of PRE to maximize progress and benefit of PT at home. Discussed importance of compliance with HEP. Patient demonstrates proper technique of all exercises in the clinic this session. Patient will continue to benefit from skilled PT services to modify and progress therapeutic interventions, address functional mobility deficits, address ROM deficits, address strength deficits, analyze and address soft tissue restrictions, analyze and cue movement patterns, and analyze and modify body mechanics/ergonomics to attain remaining goals. Progress towards goals / Updated goals:  Short Term Goals: To be accomplished in 6-8 treatments:               Patient will demonstrate understanding of and compliance with HEP showing full participation in physical therapy. Met  Patient will report reduced familiar symptoms by >/= 25% allowing for improving participation in daily tasks. MET (Subjective report: 75%)   Patient will demonstrate understanding/application of postural principles/recommendations to daily activities toward improved symptom management. Met      Long Term Goals: To be accomplished in 16-18 treatments:                 Patient will report minimal to no pain during prolonged standing >15 min to show improving functional mobility and participation in daily tasks.  Met (subjective report: >/= 1 hour)  Patient will improve FOTO score by the [address] of 3 to >/= 54 showing significant improvement in their self-reported level of function. Met (56/100)  Patient will return to walking routine of 30 minutes a day 3-4x/week with minimal to no pain showing improving functional mobility.  Progressing       Other: Continue per POC: 2x/week for 18 treatments      Jeronimo, PT 2/10/2023

## 2023-02-15 ENCOUNTER — HOSPITAL ENCOUNTER (OUTPATIENT)
Dept: PHYSICAL THERAPY | Age: 63
Discharge: HOME | End: 2023-02-15
Payer: COMMERCIAL

## 2023-02-15 PROCEDURE — 97110 THERAPEUTIC EXERCISES: CPT

## 2023-02-15 NOTE — PROGRESS NOTES
PT DAILY TREATMENT NOTE - [facility] 2-15    Patient Name: Stephen Sarkar  Date:2/15/2023  : 1960  [x]  Patient  Verified  Payor: BLUE CROSS / Plan:  [facility] / Product Type: PPO /    In time: 10:02 AM Out time: 10:58 AM   Total Treatment Time (min): 56 minutes   Total Timed Codes (min):  56 minutes   1:1 Treatment Time ( only): 56 minutes  Visit #:  10    Treatment Area: Low back pain, unspecified [M54.50]  Personal history of malignant neoplasm of breast [Z85.3]    SUBJECTIVE  Pain Level (0-10 scale): 0 /10 low back pain  Any medication changes, allergies to medications, adverse drug reactions, diagnosis change, or new procedure performed?: [x] No    [] Yes (see summary sheet for update)  Subjective functional status/changes:   [] No changes reported    Pt reports no episodes of back pain since previous visit. Patients admits she has not performed her exercises at home since previous visit, but has report of increase walking and standing due to work related activities. Patient notes minimal to no onset of pain or difficulty. OBJECTIVE     56  min Therapeutic Exercise:  [] See flow sheet :   Rationale: increase ROM and increase strength to improve the patients ability to perform functional daily tasks with minimal to no pain. With   [x] TE   [] TA   [] Neuro   [] SC   [] other: Patient Education: [x] Review HEP    [] Progressed/Changed HEP based on:   [] positioning   [] body mechanics   [] transfers   [] heat/ice application    [] other:      Other Objective/Functional Measures:       Pain Level (0-10 scale) post treatment:  2 /10 low back pain    ASSESSMENT/Changes in Function:   Patient tolerates treatment session well this visit, with appropriate response to all exercises and progressions. Patient has report of difficulty ascending/descending stairs due to bilateral knee and back pain.  Utilized 6\" step in clinic to simulate stair climbing and provided verbal cues to improve LE alignment and muscle activation. Patient demonstrates poor eccentric control during descent, but able to improve following cues. Patient also requires frequent verbal cues to improve body mechanics performing resisted side steps at cable column. Noted tendency to perform with inc lumbar lordosis, but able to improve slightly with neutral spine position following cues. Continue to progress PRE per patient tolerance. Patient will continue to benefit from skilled PT services to modify and progress therapeutic interventions, address functional mobility deficits, address ROM deficits, address strength deficits, analyze and address soft tissue restrictions, analyze and cue movement patterns, and analyze and modify body mechanics/ergonomics to attain remaining goals. [x]  See Plan of Care  [x]  See progress note/recertification  []  See Discharge Summary         Progress towards goals / Updated goals:  Short Term Goals: To be accomplished in 6-8 treatments:               Patient will demonstrate understanding of and compliance with HEP showing full participation in physical therapy. Met  Patient will report reduced familiar symptoms by >/= 25% allowing for improving participation in daily tasks. MET (Subjective report: 75%)   Patient will demonstrate understanding/application of postural principles/recommendations to daily activities toward improved symptom management. Met      Long Term Goals: To be accomplished in 16-18 treatments:                 Patient will report minimal to no pain during prolonged standing >15 min to show improving functional mobility and participation in daily tasks. Met (subjective report: >/= 1 hour)  Patient will improve FOTO score by the [address] of 3 to >/= 54 showing significant improvement in their self-reported level of function. Met (56/100)  Patient will return to walking routine of 30 minutes a day 3-4x/week with minimal to no pain showing improving functional mobility. Progressing    PLAN  [x]  Upgrade activities as tolerated     [x]  Continue plan of care  [x]  Update interventions per flow sheet       []  Discharge due to:_  []  Other:_      Vee Barker, PT, DPT 2/15/2023

## 2023-02-23 ENCOUNTER — HOSPITAL ENCOUNTER (OUTPATIENT)
Dept: PHYSICAL THERAPY | Age: 63
Discharge: HOME | End: 2023-02-23
Payer: COMMERCIAL

## 2023-02-23 PROCEDURE — 97110 THERAPEUTIC EXERCISES: CPT

## 2023-02-23 NOTE — PROGRESS NOTES
PT DAILY TREATMENT NOTE - [facility] 2-15    Patient Name: Shahida Geiger  Date:2023  : 1960  [x]  Patient  Verified  Payor: BLUE CROSS / Plan:  [facility] / Product Type: PPO /    In time: 10:35 AM Out time: 11:00  AM   Total Treatment Time (min): 25  minutes   Total Timed Codes (min):  25  minutes   1:1 Treatment Time ( only): 23 minutes  Visit #:  11    *Shortened session this visit due to patient request to be done by 63RN due to conflicting appointment times*    Treatment Area: Low back pain, unspecified [M54.50]  Personal history of malignant neoplasm of breast [Z85.3]    SUBJECTIVE  Pain Level (0-10 scale): 0 /10 low back pain  Any medication changes, allergies to medications, adverse drug reactions, diagnosis change, or new procedure performed?: [x] No    [] Yes (see summary sheet for update)  Subjective functional status/changes:   [] No changes reported    Pt reports all of her siblings were in town this weekend, so was doing a lot more cooking, cleaning, and lifting for them because she is the youngest sibling. Notes even with increase in overall activity, she experienced minimal to no back pain. OBJECTIVE    25  min Therapeutic Exercise:  [] See flow sheet :   Rationale: increase ROM and increase strength to improve the patients ability to perform functional daily tasks with minimal to no pain. With   [x] TE   [] TA   [] Neuro   [] SC   [] other: Patient Education: [x] Review HEP    [] Progressed/Changed HEP based on:   [] positioning   [] body mechanics   [] transfers   [] heat/ice application    [] other:      Other Objective/Functional Measures:       Pain Level (0-10 scale) post treatment:  0 /10 low back pain    ASSESSMENT/Changes in Function:   Patient tolerates treatment session well this visit, with appropriate response to all exercises and progressions. Shortened session this visit due to patient conflicting appointment times.  Patient notes onset of hip flexor tightness when waking up this morning. Due to complaint, demonstrated hip flexor stretch to patient. Patient notes decrease in muscle tightness/soreness following stretch. Patient continues to note improving overall tolerance to activity with minimal onset of back pain, discussed possible discharge in future sessions if patient continues to progress as expected. Patient will continue to benefit from skilled PT services to modify and progress therapeutic interventions, address functional mobility deficits, address ROM deficits, address strength deficits, analyze and address soft tissue restrictions, analyze and cue movement patterns, and analyze and modify body mechanics/ergonomics to attain remaining goals. [x]  See Plan of Care  [x]  See progress note/recertification  []  See Discharge Summary         Progress towards goals / Updated goals:  Short Term Goals: To be accomplished in 6-8 treatments:               Patient will demonstrate understanding of and compliance with HEP showing full participation in physical therapy. Met  Patient will report reduced familiar symptoms by >/= 25% allowing for improving participation in daily tasks. MET (Subjective report: 75%)   Patient will demonstrate understanding/application of postural principles/recommendations to daily activities toward improved symptom management. Met      Long Term Goals: To be accomplished in 16-18 treatments:                 Patient will report minimal to no pain during prolonged standing >15 min to show improving functional mobility and participation in daily tasks. Met (subjective report: >/= 1 hour)  Patient will improve FOTO score by the [address] of 3 to >/= 54 showing significant improvement in their self-reported level of function. Met (56/100)  Patient will return to walking routine of 30 minutes a day 3-4x/week with minimal to no pain showing improving functional mobility.  Progressing    PLAN  [x]  Upgrade activities as tolerated     [x] Continue plan of care  [x]  Update interventions per flow sheet       []  Discharge due to:_  []  Other:_      Azra Huerta, PT, DPT 2/23/2023

## 2023-03-01 ENCOUNTER — HOSPITAL ENCOUNTER (OUTPATIENT)
Dept: PHYSICAL THERAPY | Age: 63
End: 2023-03-01

## 2023-04-19 ENCOUNTER — OFFICE VISIT (OUTPATIENT)
Dept: INTERNAL MEDICINE CLINIC | Age: 63
End: 2023-04-19
Payer: COMMERCIAL

## 2023-04-19 VITALS
WEIGHT: 213.1 LBS | TEMPERATURE: 98.6 F | RESPIRATION RATE: 18 BRPM | SYSTOLIC BLOOD PRESSURE: 118 MMHG | OXYGEN SATURATION: 97 % | DIASTOLIC BLOOD PRESSURE: 82 MMHG | BODY MASS INDEX: 35.5 KG/M2 | HEIGHT: 65 IN | HEART RATE: 82 BPM

## 2023-04-19 DIAGNOSIS — T46.6X5A STATIN-INDUCED MYOSITIS: ICD-10-CM

## 2023-04-19 DIAGNOSIS — K76.0 STEATOSIS OF LIVER: ICD-10-CM

## 2023-04-19 DIAGNOSIS — E78.01 HETEROZYGOUS FAMILIAL HYPERCHOLESTEROLEMIA: ICD-10-CM

## 2023-04-19 DIAGNOSIS — E78.5 DYSLIPIDEMIA: ICD-10-CM

## 2023-04-19 DIAGNOSIS — E11.9 TYPE 2 DIABETES MELLITUS WITHOUT COMPLICATION, WITHOUT LONG-TERM CURRENT USE OF INSULIN (HCC): ICD-10-CM

## 2023-04-19 DIAGNOSIS — I10 PRIMARY HYPERTENSION: Primary | ICD-10-CM

## 2023-04-19 DIAGNOSIS — M60.9 STATIN-INDUCED MYOSITIS: ICD-10-CM

## 2023-04-19 DIAGNOSIS — D05.11 DUCTAL CARCINOMA IN SITU (DCIS) OF RIGHT BREAST: ICD-10-CM

## 2023-04-19 PROBLEM — Z12.11 ENCOUNTER FOR HEMOCCULT SCREENING: Status: RESOLVED | Noted: 2017-06-02 | Resolved: 2023-04-19

## 2023-04-19 PROBLEM — R06.89 GASPING FOR BREATH: Status: RESOLVED | Noted: 2018-08-15 | Resolved: 2023-04-19

## 2023-04-19 PROBLEM — E79.0 HYPERURICEMIA W/O SIGNS OF INFLAM ARTHRIT AND TOPHACEOUS DIS: Status: RESOLVED | Noted: 2018-08-15 | Resolved: 2023-04-19

## 2023-04-19 PROBLEM — R22.1 NECK NODULE: Status: RESOLVED | Noted: 2018-05-10 | Resolved: 2023-04-19

## 2023-04-19 PROBLEM — R12 HEARTBURN: Status: RESOLVED | Noted: 2017-01-01 | Resolved: 2023-04-19

## 2023-04-19 PROBLEM — R79.89 ELEVATED SERUM CREATININE: Status: RESOLVED | Noted: 2018-08-15 | Resolved: 2023-04-19

## 2023-04-19 PROCEDURE — 99214 OFFICE O/P EST MOD 30 MIN: CPT | Performed by: INTERNAL MEDICINE

## 2023-04-19 PROCEDURE — 3074F SYST BP LT 130 MM HG: CPT | Performed by: INTERNAL MEDICINE

## 2023-04-19 PROCEDURE — 3079F DIAST BP 80-89 MM HG: CPT | Performed by: INTERNAL MEDICINE

## 2023-04-19 RX ORDER — LEVOTHYROXINE SODIUM 25 UG/1
25 TABLET ORAL
Qty: 90 TABLET | Refills: 2 | Status: SHIPPED | OUTPATIENT
Start: 2023-04-19

## 2023-04-19 RX ORDER — EVOLOCUMAB 140 MG/ML
140 INJECTION, SOLUTION SUBCUTANEOUS
Qty: 2 PEN | Refills: 11 | Status: SHIPPED | OUTPATIENT
Start: 2023-04-19

## 2023-04-19 RX ORDER — EZETIMIBE 10 MG/1
10 TABLET ORAL DAILY
Qty: 30 TABLET | Refills: 11 | Status: SHIPPED | OUTPATIENT
Start: 2023-04-19

## 2023-04-19 NOTE — PROGRESS NOTES
Stephen Sarkar is a 60 y.o. female    Chief Complaint   Patient presents with    Hypertension     1. Have you been to the ER, urgent care clinic since your last visit? Hospitalized since your last visit? No    2. Have you seen or consulted any other health care providers outside of the [address] [place] [address] since your last visit? Include any pap smears or colon screening.  No

## 2023-04-19 NOTE — PROGRESS NOTES
SPORTS MEDICINE AND PRIMARY CARE  Ana Mendez MD, [address] [address]  Phone:  [phone]  Fax: [phone]       Chief Complaint   Patient presents with    Hypertension   . SUBJECTIVE:    Tiffanie Tristan is a 60 y.o. female Since we last saw her, she had an admission by Carlos Pearl on 10/25/22 at [facility] and discharged on 10/29/22, where she was taken to the OR, where she underwent removal of bilateral tissue expanders and reconstruction of bilateral breasts with CRYSTAL flaps. She is followed by Dr. Ama Zaragoza for obstructive sleep apnea, she is followed by Jacob for physical therapy for her back and was seen by Taylor Ignacio for hypertrophic disorders of her skin. She met with [facility] last Friday and this is the first week, which is a protein type diet. Patient has had further surgery for the lateral aspects of her abdomen and is satisfied with the results. The [facility] clinic rechecked her hemoglobin, which I was concerned about, and it is up to 14. What is bothersome and new, was a hemoglobin A1c of 6.5. Patient denies other new complaints and is seen for evaluation. Current Outpatient Medications   Medication Sig Dispense Refill    levothyroxine (SYNTHROID) 25 mcg tablet Take 1 Tablet by mouth Daily (before breakfast). 90 Tablet 2    ezetimibe (ZETIA) 10 mg tablet Take 1 Tablet by mouth daily. 30 Tablet 11    evolocumab (Repatha SureClick) pen injection 1 mL by SubCUTAneous route every fourteen (14) days. Indications: heterozygous inherited high blood cholesterol, stroke prevention, treatment to prevent a heart attack 2 Pen 11    OTHER       Qvar RediHaler 80 mcg/actuation HFAb inhaler Take 2 Puffs by inhalation two (2) times a day. fluticasone propionate (FLONASE) 50 mcg/actuation nasal spray 2 Sprays by Both Nostrils route daily.  1 Each 11    cetirizine (ZYRTEC) 10 mg tablet Take 1 Tablet by mouth daily as needed for Allergies. 30 Tablet 11    OTHER,NON-FORMULARY, Take 1 Tablet by mouth nightly. Cutrelle-probiotics-prebiotics      hydroCHLOROthiazide (HYDRODIURIL) 12.5 mg tablet Take 1 Tablet by mouth every morning. albuterol (PROVENTIL HFA, VENTOLIN HFA, PROAIR HFA) 90 mcg/actuation inhaler Take 2 Puffs by inhalation every four (4) hours as needed for Wheezing. 1 Each 11    acyclovir (ZOVIRAX) 5 % ointment Apply to affected area 6 times a day 30 g 1     Past Medical History:   Diagnosis Date    Asthma CHILDHOOD    Chest pain 2017    normal Stress Echo 17. COVID-19 virus infection 2022    7/10/22    [facility]    Ductal carcinoma in situ (DCIS) of right breast 2022    Gastritis and duodenitis 2019    egd - md mina    GERD (gastroesophageal reflux disease)     Heartburn     Heterozygous familial hypercholesterolemia 2019    Hyperglycemia 2016    pre-diabetic    Hypertension     Hypothyroid 2018    Microscopic colitis 2019    Neck nodule 05/10/2018    Obesity (BMI 30-39.9) 2017    Dr. Reece Diaz, endo. PITER (obstructive sleep apnea)     Patient does not wear CPAP    Statin intolerance 2022    Statin-induced myositis     Steatosis of liver 2016    TMJ (temporomandibular joint syndrome)     Tubular adenoma of colon 2017    Dr. Mary Hu    Uterine fibroid     Dr. Lisa Martin    Vitamin D deficiency 2018    Dr. Reece Diaz     Past Surgical History:   Procedure Laterality Date    COLONOSCOPY N/A 2019    COLONOSCOPY performed by Toya Willams MD at [facility]    HX BREAST LUMPECTOMY Left     Atypical Cells. Dr. Vadim Diamond.     HX BREAST RECONSTRUCTION Bilateral 2022    BILATERAL BREAST RECONSTRUCTION WITH TISSUE EXPANDERS performed by Kathe Barthel, MD at [facility] 11    HX  SECTION       x 2    HX HEENT      LASIK    HX MASTECTOMY Bilateral 2022    BILATERAL SKIN SPARING MASTECTOMIES, RIGHT BREAST SENTINEL NODE BIOPSY performed by Lane Penny MD at [facility] 11    HX MUKUND AND BSO Right     due to uterine fibroids. Dr. Lee Pepper [Naltrexone-Bupropion] Other (comments)     1 PILL MORNING, 2 PILLS EVENING  FELT ZOMBIED, \"OUT OF MY HEAD\"    Crestor [Rosuvastatin] Other (comments)     Felt like it worsened her memory    Gadolinium-Containing Contrast Media Nausea and Vomiting    Lipitor [Atorvastatin] Other (comments)     Felt like it worsened memory. REVIEW OF SYSTEMS:  General: negative for - chills or fever  ENT: negative for - headaches, nasal congestion or tinnitus  Respiratory: negative for - cough, hemoptysis, shortness of breath or wheezing  Cardiovascular : negative for - chest pain, edema, palpitations or shortness of breath  Gastrointestinal: negative for - abdominal pain, blood in stools, heartburn or nausea/vomiting  Genito-Urinary: no dysuria, trouble voiding, or hematuria  Musculoskeletal: negative for - gait disturbance, joint pain, joint stiffness or joint swelling  Neurological: no TIA or stroke symptoms  Hematologic: no bruises, no bleeding, no swollen glands  Integument: no lumps, mole changes, nail changes or rash  Endocrine: no malaise/lethargy or unexpected weight changes      Social History     Socioeconomic History    Marital status:    Tobacco Use    Smoking status: Never    Smokeless tobacco: Never   Vaping Use    Vaping Use: Never used   Substance and Sexual Activity    Alcohol use: Yes     Alcohol/week: 1.0 standard drink     Types: 1 Glasses of wine per week     Comment: ocassional    Drug use: No    Sexual activity: Yes     Partners: Male     Birth control/protection: Surgical     Comment: TL   Social History Narrative    Family History:  Mother: , Rhiannon Plants: , Bee stingSister(s):  2010 yrs, Breast Cancer at 42Brother(s):    , Heart Problems1 brother(s) , 7 sister(s) - healthy. 1 son(s) , 1 daughter(s) - healthy. Social History: Alcohol Use Patient does not use alcohol. Smoking Status Patient is a never smoker. Caffeine: coffee, tea. Marital Status:    . Lives w ith: spouse Nathen Browning. Occupation/W ork: office w ork. Medical History: Hypertension, Left Breast papilloma. Gyn History: Last mammogram date 10/28/2014. Last pap date 10/22/2012. Hysterectomy Date . OB History: Total pregnancies 2. Full term delivery (>37 w eeks) 2. Live births 2. C section(s) 2. Pregnancy # 1: , Girl, 11. Pregnancy # 2: Repeat C/S, boy, . Surgical History: hysterectomy, total abdominal w ith BSO , Left w rist surgery , colonoscopy Christina Rebcaitlin and 2009, left lumpectomy .      Social Determinants of Health     Financial Resource Strain: Low Risk     Difficulty of Paying Living Expenses: Not hard at all   Food Insecurity: No Food Insecurity    Worried About Running Out of Food in the Last Year: Never true    Ran Out of Food in the Last Year: Never true   Transportation Needs: No Transportation Needs    Lack of Transportation (Medical): No    Lack of Transportation (Non-Medical): No   Housing Stability: Unknown    Unable to Pay for Housing in the Last Year: No    Unstable Housing in the Last Year: No     Family History   Problem Relation Age of Onset    Diabetes Mother     Obesity Mother     OSTEOARTHRITIS Mother     Other Father 54         FROM BEE ALLERGY    Asthma Father     Obesity Sister         X7    Sleep Apnea Sister     Diabetes Sister     Hypertension Sister     Thyroid Disease Sister         X 2    Diabetes Sister         X3 MORE SISTERS TAKING INSULIN    Breast Cancer Sister 43    Schizophrenia Sister     Cancer Sister         Breast    Obesity Sister     Diabetes Brother     Diabetes Brother     Heart Disease Brother No Known Problems Maternal Grandmother     No Known Problems Maternal Grandfather     No Known Problems Paternal Grandmother     No Known Problems Paternal Grandfather     Heart Disease Niece/nephew     Anesth Problems Neg Hx        OBJECTIVE:    Visit Vitals  /82 (BP 1 Location: Left upper arm, BP Patient Position: Sitting)   Pulse 82   Temp 98.6 °F (37 °C) (Oral)   Resp 18   Ht 5' 5\" (1.651 m)   Wt 213 lb 1.6 oz (96.7 kg)   SpO2 97%   BMI 35.46 kg/m²     CONSTITUTIONAL: well , well nourished, appears age appropriate  EYES: perrla, eom intact  ENMT:moist mucous membranes, pharynx clear  NECK: supple. Thyroid normal  RESPIRATORY: Chest: clear bilaterally   CARDIOVASCULAR: Heart: regular rate and rhythm  GASTROINTESTINAL: Abdomen: soft, bowel sounds active  HEMATOLOGIC: no pathological lymph nodes palpated  MUSCULOSKELETAL: Extremities: no edema, pulse 1+   INTEGUMENT: No unusual rashes or suspicious skin lesions noted. Nails appear normal.  NEUROLOGIC: non-focal exam   MENTAL STATUS: alert and oriented, appropriate affect           ASSESSMENT:  1. Primary hypertension    2. Steatosis of liver    3. Dyslipidemia    4. Ductal carcinoma in situ (DCIS) of right breast    5. Heterozygous familial hypercholesterolemia    6. Type 2 diabetes mellitus without complication, without long-term current use of insulin (HCC)    7. Statin-induced myositis      Repeat blood pressure is still acceptable, no titration is needed. She has steatosis of the liver. I think with the [facility] and a weight loss of 20% of her current weight, the steatosis of the liver should resolve. She has heterozygous familial hypercholesterolemia and we will again attempt to get Repatha approved. It was not approved on the previous visit. She has type 2 diabetes as of the 14th with a hemoglobin A1c of 6.5, and she is so advised.  I think with the [facility] clinic diet, I suspect the diabetes should melt away back to her previous diagnosis of impaired glucose tolerance. She has statin intolerance and statin induced myositis, which is the reason she is on Zetia, which is not bringing her cholesterol to goal.  As a diabetic, we want the LDL to be 70 or less. She will be back to see us in four months, sooner if she has any problems. I have discussed the diagnosis with the patient and the intended plan as seen in the  Orders. The patient understands and agees with the plan. The patient has   received an after visit summary and questions were answered concerning  future plans  Patient labs and/or xrays were reviewed  Past records were reviewed. PLAN:  .  Orders Placed This Encounter    levothyroxine (SYNTHROID) 25 mcg tablet    ezetimibe (ZETIA) 10 mg tablet    evolocumab (Repatha SureClick) pen injection       Follow-up and Dispositions    Return in about 4 months (around 8/19/2023). ATTENTION:   This medical record was transcribed using an electronic medical records system. Although proofread, it may and can contain electronic and spelling errors. Other human spelling and other errors may be present. Corrections may be executed at a later time. Please feel free to contact us for any clarifications as needed.

## 2023-04-30 RX ORDER — HYDROCHLOROTHIAZIDE 12.5 MG/1
12.5 CAPSULE ORAL
Qty: 90 CAPSULE | Refills: 3 | Status: SHIPPED | OUTPATIENT
Start: 2023-04-30

## 2023-08-31 ENCOUNTER — OFFICE VISIT (OUTPATIENT)
Facility: CLINIC | Age: 63
End: 2023-08-31
Payer: COMMERCIAL

## 2023-08-31 VITALS
HEIGHT: 65 IN | DIASTOLIC BLOOD PRESSURE: 76 MMHG | HEART RATE: 78 BPM | RESPIRATION RATE: 18 BRPM | SYSTOLIC BLOOD PRESSURE: 109 MMHG | BODY MASS INDEX: 32.55 KG/M2 | WEIGHT: 195.4 LBS | OXYGEN SATURATION: 98 % | TEMPERATURE: 98.1 F

## 2023-08-31 DIAGNOSIS — K76.0 STEATOSIS OF LIVER: ICD-10-CM

## 2023-08-31 DIAGNOSIS — M60.9 STATIN-INDUCED MYOSITIS: ICD-10-CM

## 2023-08-31 DIAGNOSIS — M54.2 NECK PAIN: ICD-10-CM

## 2023-08-31 DIAGNOSIS — R73.02 IGT (IMPAIRED GLUCOSE TOLERANCE): ICD-10-CM

## 2023-08-31 DIAGNOSIS — J45.20 MILD INTERMITTENT ASTHMA, UNSPECIFIED WHETHER COMPLICATED: ICD-10-CM

## 2023-08-31 DIAGNOSIS — E78.01 HETEROZYGOUS FAMILIAL HYPERCHOLESTEROLEMIA: ICD-10-CM

## 2023-08-31 DIAGNOSIS — G47.33 OSA (OBSTRUCTIVE SLEEP APNEA): ICD-10-CM

## 2023-08-31 DIAGNOSIS — D05.11 DUCTAL CARCINOMA IN SITU (DCIS) OF RIGHT BREAST: ICD-10-CM

## 2023-08-31 DIAGNOSIS — I10 PRIMARY HYPERTENSION: ICD-10-CM

## 2023-08-31 DIAGNOSIS — Z78.9 STATIN INTOLERANCE: ICD-10-CM

## 2023-08-31 DIAGNOSIS — E11.9 TYPE 2 DIABETES MELLITUS WITHOUT COMPLICATION, WITHOUT LONG-TERM CURRENT USE OF INSULIN (HCC): Primary | ICD-10-CM

## 2023-08-31 DIAGNOSIS — T46.6X5A STATIN-INDUCED MYOSITIS: ICD-10-CM

## 2023-08-31 DIAGNOSIS — E78.5 DYSLIPIDEMIA: ICD-10-CM

## 2023-08-31 LAB
ANION GAP SERPL CALC-SCNC: 6 MMOL/L (ref 5–15)
BUN SERPL-MCNC: 19 MG/DL (ref 6–20)
BUN/CREAT SERPL: 18 (ref 12–20)
CALCIUM SERPL-MCNC: 9.9 MG/DL (ref 8.5–10.1)
CHLORIDE SERPL-SCNC: 105 MMOL/L (ref 97–108)
CHOLEST SERPL-MCNC: 221 MG/DL
CO2 SERPL-SCNC: 27 MMOL/L (ref 21–32)
CREAT SERPL-MCNC: 1.08 MG/DL (ref 0.55–1.02)
CREAT UR-MCNC: 216 MG/DL
EST. AVERAGE GLUCOSE BLD GHB EST-MCNC: 111 MG/DL
GLUCOSE SERPL-MCNC: 90 MG/DL (ref 65–100)
HBA1C MFR BLD: 5.5 % (ref 4–5.6)
HCV AB SER IA-ACNC: 0.07 INDEX
HCV AB SERPL QL IA: NONREACTIVE
HDLC SERPL-MCNC: 59 MG/DL
HDLC SERPL: 3.7 (ref 0–5)
HIV 1+2 AB+HIV1 P24 AG SERPL QL IA: NONREACTIVE
HIV 1/2 RESULT COMMENT: NORMAL
LDLC SERPL CALC-MCNC: 143.2 MG/DL (ref 0–100)
MICROALBUMIN UR-MCNC: <0.5 MG/DL
MICROALBUMIN/CREAT UR-RTO: <2 MG/G (ref 0–30)
POTASSIUM SERPL-SCNC: 4.1 MMOL/L (ref 3.5–5.1)
SODIUM SERPL-SCNC: 138 MMOL/L (ref 136–145)
TRIGL SERPL-MCNC: 94 MG/DL
VLDLC SERPL CALC-MCNC: 18.8 MG/DL

## 2023-08-31 PROCEDURE — 3074F SYST BP LT 130 MM HG: CPT | Performed by: INTERNAL MEDICINE

## 2023-08-31 PROCEDURE — 99214 OFFICE O/P EST MOD 30 MIN: CPT | Performed by: INTERNAL MEDICINE

## 2023-08-31 PROCEDURE — 3078F DIAST BP <80 MM HG: CPT | Performed by: INTERNAL MEDICINE

## 2023-08-31 RX ORDER — PANTOPRAZOLE SODIUM 40 MG/1
40 TABLET, DELAYED RELEASE ORAL
Qty: 90 TABLET | Refills: 1 | Status: SHIPPED | OUTPATIENT
Start: 2023-08-31

## 2023-08-31 RX ORDER — EZETIMIBE 10 MG/1
10 TABLET ORAL DAILY
Qty: 90 TABLET | Refills: 3 | Status: SHIPPED | OUTPATIENT
Start: 2023-08-31

## 2023-08-31 RX ORDER — HYDROCHLOROTHIAZIDE 25 MG/1
25 TABLET ORAL EVERY MORNING
Qty: 90 TABLET | Refills: 3 | Status: SHIPPED | OUTPATIENT
Start: 2023-08-31

## 2023-08-31 RX ORDER — SEMAGLUTIDE 0.25 MG/.5ML
INJECTION, SOLUTION SUBCUTANEOUS
COMMUNITY
Start: 2023-06-19

## 2023-08-31 ASSESSMENT — PATIENT HEALTH QUESTIONNAIRE - PHQ9
SUM OF ALL RESPONSES TO PHQ9 QUESTIONS 1 & 2: 0
SUM OF ALL RESPONSES TO PHQ QUESTIONS 1-9: 0
2. FEELING DOWN, DEPRESSED OR HOPELESS: 0
SUM OF ALL RESPONSES TO PHQ QUESTIONS 1-9: 0
SUM OF ALL RESPONSES TO PHQ QUESTIONS 1-9: 0
1. LITTLE INTEREST OR PLEASURE IN DOING THINGS: 0
SUM OF ALL RESPONSES TO PHQ QUESTIONS 1-9: 0

## 2023-08-31 ASSESSMENT — ANXIETY QUESTIONNAIRES
3. WORRYING TOO MUCH ABOUT DIFFERENT THINGS: 0
GAD7 TOTAL SCORE: 0
4. TROUBLE RELAXING: 0
2. NOT BEING ABLE TO STOP OR CONTROL WORRYING: 0
5. BEING SO RESTLESS THAT IT IS HARD TO SIT STILL: 0
6. BECOMING EASILY ANNOYED OR IRRITABLE: 0
1. FEELING NERVOUS, ANXIOUS, OR ON EDGE: 0
IF YOU CHECKED OFF ANY PROBLEMS ON THIS QUESTIONNAIRE, HOW DIFFICULT HAVE THESE PROBLEMS MADE IT FOR YOU TO DO YOUR WORK, TAKE CARE OF THINGS AT HOME, OR GET ALONG WITH OTHER PEOPLE: NOT DIFFICULT AT ALL
7. FEELING AFRAID AS IF SOMETHING AWFUL MIGHT HAPPEN: 0

## 2023-08-31 NOTE — PROGRESS NOTES
Erica Salmon is a 60 y.o. female    Chief Complaint   Patient presents with    3 Month Follow-Up     1. Have you been to the ER, urgent care clinic since your last visit? Hospitalized since your last visit? No    2. Have you seen or consulted any other health care providers outside of the [address] [place] [address] since your last visit? Include any pap smears or colon screening.  No
Expiration Date:   8/31/2024    Hemoglobin A1C     Standing Status:   Future     Number of Occurrences:   1     Standing Expiration Date:   8/31/2024    Lipid Panel     Standing Status:   Future     Number of Occurrences:   1     Standing Expiration Date:   8/31/2024    KS COLLECTION VENOUS BLOOD VENIPUNCTURE    HM DIABETES FOOT EXAM        Follow-up and Dispositions    Return in about 4 months (around 12/31/2023). ATTENTION:   This medical record was transcribed using an electronic medical records system. Although proofread, it may and can contain electronic and spelling errors. Other human spelling and other errors may be present. Corrections may be executed at a later time. Please feel free to contact us for any clarifications as needed.

## 2023-09-11 ENCOUNTER — TELEPHONE (OUTPATIENT)
Age: 63
End: 2023-09-11

## 2023-09-11 NOTE — TELEPHONE ENCOUNTER
Patient phoned the office stating that she is having trouble sleeping with her mask. She is also getting high AHI readings of 9.7, 13, and 15. Return call requested at [phone].

## 2023-09-13 ENCOUNTER — OFFICE VISIT (OUTPATIENT)
Age: 63
End: 2023-09-13
Payer: COMMERCIAL

## 2023-09-13 ENCOUNTER — TELEPHONE (OUTPATIENT)
Age: 63
End: 2023-09-13

## 2023-09-13 VITALS
DIASTOLIC BLOOD PRESSURE: 84 MMHG | SYSTOLIC BLOOD PRESSURE: 128 MMHG | HEART RATE: 85 BPM | TEMPERATURE: 98.2 F | OXYGEN SATURATION: 98 % | BODY MASS INDEX: 32.62 KG/M2 | HEIGHT: 65 IN | WEIGHT: 195.8 LBS

## 2023-09-13 DIAGNOSIS — I10 ESSENTIAL (PRIMARY) HYPERTENSION: ICD-10-CM

## 2023-09-13 DIAGNOSIS — G47.33 OBSTRUCTIVE SLEEP APNEA (ADULT) (PEDIATRIC): Primary | ICD-10-CM

## 2023-09-13 PROCEDURE — 3074F SYST BP LT 130 MM HG: CPT | Performed by: INTERNAL MEDICINE

## 2023-09-13 PROCEDURE — 99213 OFFICE O/P EST LOW 20 MIN: CPT | Performed by: INTERNAL MEDICINE

## 2023-09-13 PROCEDURE — 3079F DIAST BP 80-89 MM HG: CPT | Performed by: INTERNAL MEDICINE

## 2023-09-13 ASSESSMENT — SLEEP AND FATIGUE QUESTIONNAIRES
DO YOU HAVE DIFFICULTY OPERATING A MOTOR VEHICLE FOR LONG DISTANCES (GREATER THAN 100 MILES) BECAUSE YOU BECOME SLEEPY: NO
HOW LIKELY ARE YOU TO NOD OFF OR FALL ASLEEP WHILE SITTING INACTIVE IN A PUBLIC PLACE: WOULD NEVER DOZE
DO YOU GENERALLY HAVE DIFFICULTY REMEMBERING THINGS BECAUSE YOU ARE SLEEPY OR TIRED: NO
DO YOU HAVE DIFFICULTY CONCENTRATING ON THE THINGS YOU DO BECAUSE YOU ARE SLEEPY OR TIRED: NO
HAS YOUR MOOD BEEN AFFECTED BECAUSE YOU ARE SLEEPY OR TIRED: NO
HOW LIKELY ARE YOU TO NOD OFF OR FALL ASLEEP WHILE WATCHING TV: 3
HOW LIKELY ARE YOU TO NOD OFF OR FALL ASLEEP WHILE WATCHING TV: HIGH CHANCE OF DOZING
HOW LIKELY ARE YOU TO NOD OFF OR FALL ASLEEP WHILE LYING DOWN TO REST IN THE AFTERNOON WHEN CIRCUMSTANCES PERMIT: SLIGHT CHANCE OF DOZING
DO YOU HAVE DIFFICULTY WATCHING A MOVIE OR VIDEO BECAUSE YOU BECOME SLEEPY OR TIRED: YES, MODERATE
FOSQ SCORE: 19
HOW LIKELY ARE YOU TO NOD OFF OR FALL ASLEEP WHILE SITTING AND READING: 0
HOW LIKELY ARE YOU TO NOD OFF OR FALL ASLEEP WHILE SITTING QUIETLY AFTER LUNCH WITHOUT ALCOHOL: 0
HOW LIKELY ARE YOU TO NOD OFF OR FALL ASLEEP IN A CAR, WHILE STOPPED FOR A FEW MINUTES IN TRAFFIC: WOULD NEVER DOZE
HOW LIKELY ARE YOU TO NOD OFF OR FALL ASLEEP WHILE SITTING AND TALKING TO SOMEONE: 0
HOW LIKELY ARE YOU TO NOD OFF OR FALL ASLEEP WHEN YOU ARE A PASSENGER IN A CAR FOR AN HOUR WITHOUT A BREAK: WOULD NEVER DOZE
DO YOU HAVE DIFFICULTY BEING AS ACTIVE AS YOU WANT TO BE IN THE MORNING BECAUSE YOU ARE SLEEPY OR TIRED: NO
HOW LIKELY ARE YOU TO NOD OFF OR FALL ASLEEP WHILE SITTING AND TALKING TO SOMEONE: WOULD NEVER DOZE
HOW LIKELY ARE YOU TO NOD OFF OR FALL ASLEEP WHILE LYING DOWN TO REST IN THE AFTERNOON WHEN CIRCUMSTANCES PERMIT: 1
ESS TOTAL SCORE: 4
DO YOU HAVE DIFFICULTY VISITING YOUR FAMILY OR FRIENDS IN THEIR HOME BECAUSE YOU BECOME SLEEPY OR TIRED: NO
HOW LIKELY ARE YOU TO NOD OFF OR FALL ASLEEP WHILE SITTING QUIETLY AFTER LUNCH WITHOUT ALCOHOL: WOULD NEVER DOZE
HAS YOUR RELATIONSHIP WITH FAMILY, FRIENDS OR WORK COLLEAGUES BEEN AFFECTED BECAUSE YOU ARE SLEEPY OR TIRED: NO
HOW LIKELY ARE YOU TO NOD OFF OR FALL ASLEEP WHILE SITTING AND READING: WOULD NEVER DOZE
HOW LIKELY ARE YOU TO NOD OFF OR FALL ASLEEP WHEN YOU ARE A PASSENGER IN A CAR FOR AN HOUR WITHOUT A BREAK: 0
DO YOU HAVE DIFFICULTY BEING AS ACTIVE AS YOU WANT TO BE IN THE EVENING BECAUSE YOU ARE SLEEPY OR TIRED: NO
HOW LIKELY ARE YOU TO NOD OFF OR FALL ASLEEP WHILE SITTING INACTIVE IN A PUBLIC PLACE: 0
DO YOU HAVE DIFFICULTY OPERATING A MOTOR VEHICLE FOR SHORT DISTANCES (LESS THAN 100 MILES) BECAUSE YOU BECOME SLEEPY: NO
HOW LIKELY ARE YOU TO NOD OFF OR FALL ASLEEP IN A CAR, WHILE STOPPED FOR A FEW MINUTES IN TRAFFIC: 0

## 2023-09-13 NOTE — PROGRESS NOTES
-controlled on current regimen. she will continue her current regimen. she will continue to monitor at home and with her PMD for further adjustments as needed. I have reviewed the relationship between hypertension as it relates to sleep-disordered breathing. Electronically signed by    Constance Moncada MD  Diplomate in Sleep Medicine  [facility]  9/13/2023  (Parts of this dictation were completed with voice recognition software. Quite often unanticipated grammatical, syntax, homophones, and other interpretive errors are inadvertently transcribed by the computer software. Please excuse any errors that have escaped final proofreading.)

## 2023-09-13 NOTE — PATIENT INSTRUCTIONS
[address] [facility].[place], [address]  Tel.  [phone]  Fax. [address] [facility], [address] [facility]  Tel.  [phone]  Fax. [phone] [facility], [address] [facility]  Tel.  [phone]  Fax. [phone]     PROPER SLEEP HYGIENE    What to avoid  Do not have drinks with caffeine, such as coffee or black tea, for 8 hours before bed. Do not smoke or use other types of tobacco near bedtime. Nicotine is a stimulant and can keep you awake. Avoid drinking alcohol late in the evening, because it can cause you to wake in the middle of the night. Do not eat a big meal close to bedtime. If you are hungry, eat a light snack. Do not drink a lot of water close to bedtime, because the need to urinate may wake you up during the night. Do not read or watch TV in bed. Use the bed only for sleeping and sexual activity. What to try  Go to bed at the same time every night, and wake up at the same time every morning. Do not take naps during the day. Keep your bedroom quiet, dark, and cool. Get regular exercise, but not within 3 to 4 hours of your bedtime. .  Sleep on a comfortable pillow and mattress. If watching the clock makes you anxious, turn it facing away from you so you cannot see the time. If you worry when you lie down, start a worry book. Well before bedtime, write down your worries, and then set the book and your concerns aside. Try meditation or other relaxation techniques before you go to bed. If you cannot fall asleep, get up and go to another room until you feel sleepy. Do something relaxing. Repeat your bedtime routine before you go to bed again. Make your house quiet and calm about an hour before bedtime. Turn down the lights, turn off the TV, log off the computer, and turn down the volume on music. This can help you relax after a busy day.     Drowsy Driving  The [address] [address] Traffic Safety Administration cites drowsiness as a

## 2023-09-13 NOTE — TELEPHONE ENCOUNTER
Patient needs a pap supply order   Can be faxed to [facility] per patient request  Fax# [phone]  Phone # [phone] or [phone]

## 2023-09-15 ENCOUNTER — CLINICAL DOCUMENTATION (OUTPATIENT)
Age: 63
End: 2023-09-15

## 2023-09-18 RX ORDER — MECLIZINE HYDROCHLORIDE 25 MG/1
25 TABLET ORAL 3 TIMES DAILY PRN
Qty: 60 TABLET | Refills: 1 | Status: SHIPPED | OUTPATIENT
Start: 2023-09-18 | End: 2023-10-18

## 2023-09-19 RX ORDER — BEMPEDOIC ACID 180 MG/1
180 TABLET, FILM COATED ORAL DAILY
Qty: 30 TABLET | Refills: 11 | Status: SHIPPED | OUTPATIENT
Start: 2023-09-19

## 2023-09-27 ENCOUNTER — OFFICE VISIT (OUTPATIENT)
Age: 63
End: 2023-09-27
Payer: COMMERCIAL

## 2023-09-27 DIAGNOSIS — D05.11 DUCTAL CARCINOMA IN SITU (DCIS) OF RIGHT BREAST: Primary | ICD-10-CM

## 2023-09-27 PROCEDURE — 99213 OFFICE O/P EST LOW 20 MIN: CPT | Performed by: SURGERY

## 2023-09-27 RX ORDER — PSYLLIUM HUSK (WITH SUGAR) 3 G/12 G
POWDER (GRAM) ORAL
COMMUNITY

## 2023-09-27 RX ORDER — CALCIUM CARBONATE 500(1250)
500 TABLET ORAL DAILY
COMMUNITY

## 2023-09-27 RX ORDER — CHLORAL HYDRATE 500 MG
CAPSULE ORAL
COMMUNITY

## 2023-09-27 NOTE — PROGRESS NOTES
HISTORY OF PRESENT ILLNESS  Anival Avendaño is a 60 y.o. female     HPI ESTABLISHED patient here for 6 month follow up. Denies any breast changes. 2005- left breast excisional biopsy        4/28/22 Bx done #1 @ 12:00 4 cmfn right DCIS : ER- 100%   MT- 100%, 3 LN -   #2 RIGHT br @ 9:00 10cmfn - benign     8/2/22 - BILATERAL mastectomies, RIGHT SNBx with recon, Dr. Martina Wade. DCIS, radial scar. pTisN0     11/2022- ale flaps        Review of Systems   All other systems reviewed and are negative. Physical Exam  Vitals and nursing note reviewed. Chest:   Breasts:     Right: No swelling, bleeding, inverted nipple, mass, nipple discharge, skin change or tenderness. Left: No swelling, bleeding, inverted nipple, mass, nipple discharge, skin change or tenderness. Comments: Bilateral dieps intact   Lymphadenopathy:      Upper Body:      Right upper body: No axillary adenopathy. Left upper body: No axillary adenopathy. ASSESSMENT and PLAN   Diagnosis Orders   1. Ductal carcinoma in situ (DCIS) of right breast        - no evidence local recurrence  - rtc in 6 months with np  20 minutes was spent with patient on counseling and coordination of care.

## 2024-02-12 RX ORDER — LEVOTHYROXINE SODIUM 0.03 MG/1
25 TABLET ORAL
Qty: 90 TABLET | Refills: 3 | Status: SHIPPED | OUTPATIENT
Start: 2024-02-12

## 2024-02-12 RX ORDER — PANTOPRAZOLE SODIUM 40 MG/1
40 TABLET, DELAYED RELEASE ORAL
Qty: 90 TABLET | Refills: 3 | Status: SHIPPED | OUTPATIENT
Start: 2024-02-12

## 2024-02-28 ENCOUNTER — OFFICE VISIT (OUTPATIENT)
Facility: CLINIC | Age: 64
End: 2024-02-28
Payer: COMMERCIAL

## 2024-02-28 VITALS
BODY MASS INDEX: 31.86 KG/M2 | TEMPERATURE: 97.9 F | HEART RATE: 89 BPM | OXYGEN SATURATION: 97 % | HEIGHT: 65 IN | WEIGHT: 191.2 LBS | SYSTOLIC BLOOD PRESSURE: 108 MMHG | RESPIRATION RATE: 20 BRPM | DIASTOLIC BLOOD PRESSURE: 74 MMHG

## 2024-02-28 DIAGNOSIS — Z98.890 S/P COLONOSCOPY: ICD-10-CM

## 2024-02-28 DIAGNOSIS — D05.11 DUCTAL CARCINOMA IN SITU (DCIS) OF RIGHT BREAST: ICD-10-CM

## 2024-02-28 DIAGNOSIS — T46.6X5A STATIN-INDUCED MYOSITIS: ICD-10-CM

## 2024-02-28 DIAGNOSIS — K76.0 STEATOSIS OF LIVER: ICD-10-CM

## 2024-02-28 DIAGNOSIS — E11.9 TYPE 2 DIABETES MELLITUS WITHOUT COMPLICATION, WITHOUT LONG-TERM CURRENT USE OF INSULIN (HCC): Primary | ICD-10-CM

## 2024-02-28 DIAGNOSIS — R22.1 MASS OF LEFT SIDE OF NECK: ICD-10-CM

## 2024-02-28 DIAGNOSIS — R73.02 IGT (IMPAIRED GLUCOSE TOLERANCE): ICD-10-CM

## 2024-02-28 DIAGNOSIS — M60.9 STATIN-INDUCED MYOSITIS: ICD-10-CM

## 2024-02-28 DIAGNOSIS — Z78.9 STATIN INTOLERANCE: ICD-10-CM

## 2024-02-28 DIAGNOSIS — E78.01 HETEROZYGOUS FAMILIAL HYPERCHOLESTEROLEMIA: ICD-10-CM

## 2024-02-28 DIAGNOSIS — E03.9 HYPOTHYROIDISM, UNSPECIFIED TYPE: ICD-10-CM

## 2024-02-28 DIAGNOSIS — M47.892 OTHER OSTEOARTHRITIS OF SPINE, CERVICAL REGION: ICD-10-CM

## 2024-02-28 DIAGNOSIS — Z12.11 SCREEN FOR COLON CANCER: ICD-10-CM

## 2024-02-28 DIAGNOSIS — I10 PRIMARY HYPERTENSION: ICD-10-CM

## 2024-02-28 PROBLEM — R73.9 HYPERGLYCEMIA: Status: RESOLVED | Noted: 2018-09-12 | Resolved: 2024-02-28

## 2024-02-28 PROBLEM — M47.812 DJD (DEGENERATIVE JOINT DISEASE) OF CERVICAL SPINE: Status: ACTIVE | Noted: 2023-12-28

## 2024-02-28 PROCEDURE — 3074F SYST BP LT 130 MM HG: CPT | Performed by: INTERNAL MEDICINE

## 2024-02-28 PROCEDURE — 3078F DIAST BP <80 MM HG: CPT | Performed by: INTERNAL MEDICINE

## 2024-02-28 PROCEDURE — 99214 OFFICE O/P EST MOD 30 MIN: CPT | Performed by: INTERNAL MEDICINE

## 2024-02-28 RX ORDER — TERBINAFINE HYDROCHLORIDE 250 MG/1
250 TABLET ORAL DAILY
COMMUNITY
Start: 2023-09-27

## 2024-02-28 RX ORDER — PANTOPRAZOLE SODIUM 40 MG/1
40 TABLET, DELAYED RELEASE ORAL
Qty: 90 TABLET | Refills: 3 | Status: SHIPPED | OUTPATIENT
Start: 2024-02-28

## 2024-02-28 ASSESSMENT — PATIENT HEALTH QUESTIONNAIRE - PHQ9
SUM OF ALL RESPONSES TO PHQ9 QUESTIONS 1 & 2: 0
2. FEELING DOWN, DEPRESSED OR HOPELESS: 0
SUM OF ALL RESPONSES TO PHQ QUESTIONS 1-9: 0
1. LITTLE INTEREST OR PLEASURE IN DOING THINGS: 0
SUM OF ALL RESPONSES TO PHQ QUESTIONS 1-9: 0

## 2024-02-28 NOTE — PROGRESS NOTES
Chief Complaint   Patient presents with    Diabetes    Hypertension    Neck Pain     States it's 2/10 but has gotten to 10/10     \"Have you been to the ER, urgent care clinic since your last visit?  Hospitalized since your last visit?\"    YES - When: approximately 1 months ago.  Where and Why: Patient 'First neck pain and flu.    “Have you seen or consulted any other health care providers outside of [facility] since your last visit?”    NO         
Social Gatherings with Friends and Family: More than three times a week     Marital Status:    Housing Stability: Unknown (2023)    Housing Stability Vital Sign     Unable to Pay for Housing in the Last Year: No     Unstable Housing in the Last Year: No     Family History   Problem Relation Age of Onset    Obesity Sister         X7    Heart Disease Niece/Nephew     No Known Problems Paternal Grandfather     No Known Problems Paternal Grandmother     No Known Problems Maternal Grandfather     No Known Problems Maternal Grandmother     Heart Disease Brother     Diabetes Brother     Diabetes Brother     Obesity Sister     Cancer Sister         Breast    Schizophrenia Sister     Breast Cancer Sister 42    Diabetes Sister         X3 MORE SISTERS TAKING INSULIN    Thyroid Disease Sister         X 2    Hypertension Sister     Diabetes Sister     Sleep Apnea Sister     Asthma Father     Other Father 55         FROM BEE ALLERGY    Anesth Problems Neg Hx     Diabetes Mother     Obesity Mother     Osteoarthritis Mother        OBJECTIVE:    /74 (Site: Left Upper Arm, Position: Sitting)   Pulse 89   Temp 97.9 °F (36.6 °C) (Oral)   Resp 20   Ht 1.651 m (5' 5\")   Wt 86.7 kg (191 lb 3.2 oz)   SpO2 97%   BMI 31.82 kg/m²   CONSTITUTIONAL: well , well nourished, appears age appropriate  EYES: perrla, eom intact  ENMT:moist mucous membranes, pharynx clear  NECK: supple. Thyroid normal  RESPIRATORY: Chest: clear bilaterally   CARDIOVASCULAR: Heart: regular rate and rhythm  GASTROINTESTINAL: Abdomen: soft, bowel sounds active  HEMATOLOGIC: no pathological lymph nodes palpated  MUSCULOSKELETAL: Extremities: no edema, pulse 1+   INTEGUMENT: No unusual rashes or suspicious skin lesions noted. Nails appear normal.  NEUROLOGIC: non-focal exam   MENTAL STATUS: alert and oriented, appropriate affect           ASSESSMENT:  1. Type 2 diabetes mellitus without complication, without long-term current use of insulin

## 2024-02-29 LAB
ALBUMIN SERPL-MCNC: 4.1 G/DL (ref 3.5–5)
ALBUMIN/GLOB SERPL: 1.1 (ref 1.1–2.2)
ALP SERPL-CCNC: 97 U/L (ref 45–117)
ALT SERPL-CCNC: 29 U/L (ref 12–78)
ANION GAP SERPL CALC-SCNC: 3 MMOL/L (ref 5–15)
APPEARANCE UR: CLEAR
AST SERPL-CCNC: 22 U/L (ref 15–37)
BACTERIA URNS QL MICRO: NEGATIVE /HPF
BASOPHILS # BLD: 0 K/UL (ref 0–0.1)
BASOPHILS NFR BLD: 0 % (ref 0–1)
BILIRUB SERPL-MCNC: 0.4 MG/DL (ref 0.2–1)
BILIRUB UR QL: NEGATIVE
BUN SERPL-MCNC: 18 MG/DL (ref 6–20)
BUN/CREAT SERPL: 17 (ref 12–20)
CALCIUM SERPL-MCNC: 10.1 MG/DL (ref 8.5–10.1)
CHLORIDE SERPL-SCNC: 106 MMOL/L (ref 97–108)
CHOLEST SERPL-MCNC: 214 MG/DL
CO2 SERPL-SCNC: 31 MMOL/L (ref 21–32)
COLOR UR: ABNORMAL
CREAT SERPL-MCNC: 1.05 MG/DL (ref 0.55–1.02)
CREAT UR-MCNC: 206 MG/DL
DIFFERENTIAL METHOD BLD: ABNORMAL
EOSINOPHIL # BLD: 0.1 K/UL (ref 0–0.4)
EOSINOPHIL NFR BLD: 1 % (ref 0–7)
EPITH CASTS URNS QL MICRO: ABNORMAL /LPF
ERYTHROCYTE [DISTWIDTH] IN BLOOD BY AUTOMATED COUNT: 13.5 % (ref 11.5–14.5)
EST. AVERAGE GLUCOSE BLD GHB EST-MCNC: 103 MG/DL
GLOBULIN SER CALC-MCNC: 3.7 G/DL (ref 2–4)
GLUCOSE SERPL-MCNC: 87 MG/DL (ref 65–100)
GLUCOSE UR STRIP.AUTO-MCNC: NEGATIVE MG/DL
HBA1C MFR BLD: 5.2 % (ref 4–5.6)
HCT VFR BLD AUTO: 43.6 % (ref 35–47)
HDLC SERPL-MCNC: 66 MG/DL
HDLC SERPL: 3.2 (ref 0–5)
HGB BLD-MCNC: 15.8 G/DL (ref 11.5–16)
HGB UR QL STRIP: NEGATIVE
HYALINE CASTS URNS QL MICRO: ABNORMAL /LPF (ref 0–5)
IMM GRANULOCYTES # BLD AUTO: 0 K/UL (ref 0–0.04)
IMM GRANULOCYTES NFR BLD AUTO: 0 % (ref 0–0.5)
KETONES UR QL STRIP.AUTO: NEGATIVE MG/DL
LDLC SERPL CALC-MCNC: 133.2 MG/DL (ref 0–100)
LEUKOCYTE ESTERASE UR QL STRIP.AUTO: NEGATIVE
LYMPHOCYTES # BLD: 3.1 K/UL (ref 0.8–3.5)
LYMPHOCYTES NFR BLD: 46 % (ref 12–49)
MCH RBC QN AUTO: 28.2 PG (ref 26–34)
MCHC RBC AUTO-ENTMCNC: 36.2 G/DL (ref 30–36.5)
MCV RBC AUTO: 77.9 FL (ref 80–99)
MICROALBUMIN UR-MCNC: 0.68 MG/DL
MICROALBUMIN/CREAT UR-RTO: 3 MG/G (ref 0–30)
MONOCYTES # BLD: 0.3 K/UL (ref 0–1)
MONOCYTES NFR BLD: 5 % (ref 5–13)
NEUTS SEG # BLD: 3.2 K/UL (ref 1.8–8)
NEUTS SEG NFR BLD: 48 % (ref 32–75)
NITRITE UR QL STRIP.AUTO: NEGATIVE
NRBC # BLD: 0 K/UL (ref 0–0.01)
NRBC BLD-RTO: 0 PER 100 WBC
PH UR STRIP: 6.5 (ref 5–8)
PLATELET # BLD AUTO: 349 K/UL (ref 150–400)
PMV BLD AUTO: 10 FL (ref 8.9–12.9)
POTASSIUM SERPL-SCNC: 4.3 MMOL/L (ref 3.5–5.1)
PROT SERPL-MCNC: 7.8 G/DL (ref 6.4–8.2)
PROT UR STRIP-MCNC: NEGATIVE MG/DL
RBC # BLD AUTO: 5.6 M/UL (ref 3.8–5.2)
RBC #/AREA URNS HPF: ABNORMAL /HPF (ref 0–5)
SODIUM SERPL-SCNC: 140 MMOL/L (ref 136–145)
SP GR UR REFRACTOMETRY: 1.02 (ref 1–1.03)
TRIGL SERPL-MCNC: 74 MG/DL
TSH SERPL DL<=0.05 MIU/L-ACNC: 2.02 UIU/ML (ref 0.36–3.74)
UROBILINOGEN UR QL STRIP.AUTO: 0.2 EU/DL (ref 0.2–1)
VLDLC SERPL CALC-MCNC: 14.8 MG/DL
WBC # BLD AUTO: 6.8 K/UL (ref 3.6–11)
WBC URNS QL MICRO: ABNORMAL /HPF (ref 0–4)

## 2024-03-10 ASSESSMENT — SLEEP AND FATIGUE QUESTIONNAIRES
HOW LIKELY ARE YOU TO NOD OFF OR FALL ASLEEP WHILE SITTING AND TALKING TO SOMEONE: 0
HOW LIKELY ARE YOU TO NOD OFF OR FALL ASLEEP WHILE SITTING INACTIVE IN A PUBLIC PLACE: WOULD NEVER DOZE
HOW LIKELY ARE YOU TO NOD OFF OR FALL ASLEEP WHILE SITTING QUIETLY AFTER LUNCH WITHOUT ALCOHOL: SLIGHT CHANCE OF DOZING
HOW LIKELY ARE YOU TO NOD OFF OR FALL ASLEEP WHEN YOU ARE A PASSENGER IN A CAR FOR AN HOUR WITHOUT A BREAK: SLIGHT CHANCE OF DOZING
FOSQ SCORE: 18
HAS YOUR MOOD BEEN AFFECTED BECAUSE YOU ARE SLEEPY OR TIRED: NO
DO YOU HAVE DIFFICULTY WATCHING A MOVIE OR VIDEO BECAUSE YOU BECOME SLEEPY OR TIRED: YES, MODERATE
HOW LIKELY ARE YOU TO NOD OFF OR FALL ASLEEP IN A CAR, WHILE STOPPED FOR A FEW MINUTES IN TRAFFIC: 0
DO YOU HAVE DIFFICULTY BEING AS ACTIVE AS YOU WANT TO BE IN THE EVENING BECAUSE YOU ARE SLEEPY OR TIRED: YES, LITTLE
HOW LIKELY ARE YOU TO NOD OFF OR FALL ASLEEP WHILE LYING DOWN TO REST IN THE AFTERNOON WHEN CIRCUMSTANCES PERMIT: SLIGHT CHANCE OF DOZING
HOW LIKELY ARE YOU TO NOD OFF OR FALL ASLEEP WHEN YOU ARE A PASSENGER IN A CAR FOR AN HOUR WITHOUT A BREAK: 1
HOW LIKELY ARE YOU TO NOD OFF OR FALL ASLEEP WHILE SITTING AND READING: WOULD NEVER DOZE
HOW LIKELY ARE YOU TO NOD OFF OR FALL ASLEEP IN A CAR, WHILE STOPPED FOR A FEW MINUTES IN TRAFFIC: WOULD NEVER DOZE
ESS TOTAL SCORE: 6
HOW LIKELY ARE YOU TO NOD OFF OR FALL ASLEEP WHILE WATCHING TV: 3
DO YOU GENERALLY HAVE DIFFICULTY REMEMBERING THINGS BECAUSE YOU ARE SLEEPY OR TIRED: NO
HOW LIKELY ARE YOU TO NOD OFF OR FALL ASLEEP WHILE SITTING AND READING: 0
DO YOU HAVE DIFFICULTY CONCENTRATING ON THE THINGS YOU DO BECAUSE YOU ARE SLEEPY OR TIRED: NO
DO YOU HAVE DIFFICULTY BEING AS ACTIVE AS YOU WANT TO BE IN THE MORNING BECAUSE YOU ARE SLEEPY OR TIRED: NO
HAS YOUR RELATIONSHIP WITH FAMILY, FRIENDS OR WORK COLLEAGUES BEEN AFFECTED BECAUSE YOU ARE SLEEPY OR TIRED: NO
DO YOU HAVE DIFFICULTY VISITING YOUR FAMILY OR FRIENDS IN THEIR HOME BECAUSE YOU BECOME SLEEPY OR TIRED: NO
HOW LIKELY ARE YOU TO NOD OFF OR FALL ASLEEP WHILE SITTING QUIETLY AFTER LUNCH WITHOUT ALCOHOL: 1
HOW LIKELY ARE YOU TO NOD OFF OR FALL ASLEEP WHILE WATCHING TV: HIGH CHANCE OF DOZING
HOW LIKELY ARE YOU TO NOD OFF OR FALL ASLEEP WHILE SITTING INACTIVE IN A PUBLIC PLACE: 0
DO YOU HAVE DIFFICULTY OPERATING A MOTOR VEHICLE FOR LONG DISTANCES (GREATER THAN 100 MILES) BECAUSE YOU BECOME SLEEPY: YES, A LITTLE
HOW LIKELY ARE YOU TO NOD OFF OR FALL ASLEEP WHILE LYING DOWN TO REST IN THE AFTERNOON WHEN CIRCUMSTANCES PERMIT: 1
DO YOU HAVE DIFFICULTY OPERATING A MOTOR VEHICLE FOR SHORT DISTANCES (LESS THAN 100 MILES) BECAUSE YOU BECOME SLEEPY: NO
HOW LIKELY ARE YOU TO NOD OFF OR FALL ASLEEP WHILE SITTING AND TALKING TO SOMEONE: WOULD NEVER DOZE

## 2024-03-13 ENCOUNTER — OFFICE VISIT (OUTPATIENT)
Age: 64
End: 2024-03-13
Payer: COMMERCIAL

## 2024-03-13 ENCOUNTER — CLINICAL DOCUMENTATION (OUTPATIENT)
Age: 64
End: 2024-03-13

## 2024-03-13 VITALS
DIASTOLIC BLOOD PRESSURE: 76 MMHG | WEIGHT: 193.6 LBS | BODY MASS INDEX: 32.26 KG/M2 | TEMPERATURE: 98.1 F | OXYGEN SATURATION: 97 % | HEIGHT: 65 IN | HEART RATE: 94 BPM | SYSTOLIC BLOOD PRESSURE: 123 MMHG

## 2024-03-13 DIAGNOSIS — G47.33 OBSTRUCTIVE SLEEP APNEA (ADULT) (PEDIATRIC): Primary | ICD-10-CM

## 2024-03-13 DIAGNOSIS — I10 ESSENTIAL (PRIMARY) HYPERTENSION: ICD-10-CM

## 2024-03-13 PROCEDURE — 99214 OFFICE O/P EST MOD 30 MIN: CPT | Performed by: INTERNAL MEDICINE

## 2024-03-13 PROCEDURE — 3074F SYST BP LT 130 MM HG: CPT | Performed by: INTERNAL MEDICINE

## 2024-03-13 PROCEDURE — 3078F DIAST BP <80 MM HG: CPT | Performed by: INTERNAL MEDICINE

## 2024-03-13 RX ORDER — SEMAGLUTIDE 2.4 MG/.75ML
INJECTION, SOLUTION SUBCUTANEOUS
COMMUNITY
Start: 2024-02-16

## 2024-03-13 RX ORDER — UBIDECARENONE 200 MG
CAPSULE ORAL
COMMUNITY

## 2024-03-13 RX ORDER — ROSUVASTATIN CALCIUM 10 MG/1
10 TABLET, COATED ORAL DAILY
COMMUNITY
Start: 2024-03-01

## 2024-03-13 NOTE — PROGRESS NOTES
[address]  [place] [address]  Tel.  [phone]  Fax. [phone] [address]  [place] [address]  Tel.  [phone]  Fax. [phone] [address] [place] [address]  Tel.  [phone]  Fax. [phone]     S>Katarina Martinez is a 63 y.o. female seen for a positive airway pressure follow-up.  She reports no problems using the device.  The following problems are identified:    Drowsiness no Problems exhaling no   Snoring no Forget to put on no   Mask Comfortable yes Can't fall asleep no   Dry Mouth no Mask falls off no   Air Leaking no Frequent awakenings no     Estimated AHI flow from download shows 4/hour.   intermittent significant leak  Averaging 6/hours use on nights used  Days with usage 98%  Adherence 98%  Weight from last visit 195 lb      She admits that her sleep has improved.       Allergies   Allergen Reactions    Atorvastatin Other (See Comments)     Felt like it worsened memory.     Naltrexone-Bupropion Hcl Er Other (See Comments)     1 PILL MORNING, 2 PILLS EVENING  FELT ZOMBIED, \"OUT OF MY HEAD\"    Rosuvastatin Other (See Comments)     Felt like it worsened her memory    Gadolinium Derivatives Nausea And Vomiting       She has a current medication list which includes the following prescription(s): rosuvastatin, NONFORMULARY, wegovy, coq-10, terbinafine, pantoprazole, levothyroxine, calcium carbonate, omega-3, fiber, bempedoic acid, hydrochlorothiazide, acyclovir, albuterol sulfate hfa, cetirizine, ezetimibe, fluticasone, and scopolamine..      She  has a past medical history of Asthma, Chest pain, COVID-19 virus infection, DJD (degenerative joint disease) of cervical spine, Ductal carcinoma in situ (DCIS) of right breast, Gastritis and duodenitis, GERD (gastroesophageal reflux disease), Heartburn, Heterozygous familial hypercholesterolemia, Hyperglycemia, Hypertension, Hypothyroid, IGT (impaired glucose tolerance), Microscopic

## 2024-03-13 NOTE — PATIENT INSTRUCTIONS
[address]  [place] [address]  Tel.  [phone]  Fax. [phone] [address]  [place] [address]  Tel.  [phone]  Fax. [phone] [address] [place] [address]  Tel.  [phone]  Fax. [phone]     PROPER SLEEP HYGIENE    What to avoid  Do not have drinks with caffeine, such as coffee or black tea, for 8 hours before bed.  Do not smoke or use other types of tobacco near bedtime. Nicotine is a stimulant and can keep you awake.  Avoid drinking alcohol late in the evening, because it can cause you to wake in the middle of the night.  Do not eat a big meal close to bedtime. If you are hungry, eat a light snack.  Do not drink a lot of water close to bedtime, because the need to urinate may wake you up during the night.  Do not read or watch TV in bed. Use the bed only for sleeping and sexual activity.  What to try  Go to bed at the same time every night, and wake up at the same time every morning. Do not take naps during the day.  Keep your bedroom quiet, dark, and cool.  Get regular exercise, but not within 3 to 4 hours of your bedtime..  Sleep on a comfortable pillow and mattress.  If watching the clock makes you anxious, turn it facing away from you so you cannot see the time.  If you worry when you lie down, start a worry book. Well before bedtime, write down your worries, and then set the book and your concerns aside.  Try meditation or other relaxation techniques before you go to bed.  If you cannot fall asleep, get up and go to another room until you feel sleepy. Do something relaxing. Repeat your bedtime routine before you go to bed again.  Make your house quiet and calm about an hour before bedtime. Turn down the lights, turn off the TV, log off the computer, and turn down the volume on music. This can help you relax after a busy day.    Drowsy Driving  The [place] [employer] Traffic Safety Administration cites drowsiness as a

## 2024-03-18 ENCOUNTER — OFFICE VISIT (OUTPATIENT)
Age: 64
End: 2024-03-18
Payer: COMMERCIAL

## 2024-03-18 DIAGNOSIS — Z90.13 STATUS POST BILATERAL MASTECTOMY: ICD-10-CM

## 2024-03-18 DIAGNOSIS — Z85.3 HISTORY OF BREAST CANCER IN FEMALE: ICD-10-CM

## 2024-03-18 DIAGNOSIS — D05.11 DUCTAL CARCINOMA IN SITU (DCIS) OF RIGHT BREAST: Primary | ICD-10-CM

## 2024-03-18 PROCEDURE — 99213 OFFICE O/P EST LOW 20 MIN: CPT | Performed by: NURSE PRACTITIONER

## 2024-03-18 NOTE — PROGRESS NOTES
HISTORY OF PRESENT ILLNESS  Katarina Martinez is a 63 y.o. female     HPI Established patient presents for follow-up for RIGHT breast cancer. S/P BILATERAL mastectomy with THUY flap reconstruction.        Breast history -   Referring - [facility]  - left breast excisional biopsy   22 - RIGHT breast biopsy x 2   #1 RIGHT breast @ 12:00 4 cmfn - right DCIS : ER- 100%, IN- 100%, 3 LN -  #2 RIGHT breast @ 9:00 10cmfn - benign  22 - BILATERAL mastectomies, RIGHT SNBx with recon - Dr. Jaeger and Dr. Brown.   RIGHT - DCIS, radial scar, node negative. pTisN0   LEFT - benign  10/2022- THUY flap reconstruction - Dr. Brown        Family history -   Sister - breast cancer at 42          OB History    No obstetric history on file.      Obstetric Comments   Menarche 10, LMP in , # of children 2, age of 1st delivery 26, Hysterectomy/oophorectomy yes/one, Breast bx yes, history of breast feeding no, BCP yes, Hormone therapy n/a                   Past Surgical History:   Procedure Laterality Date    BREAST LUMPECTOMY Left     Atypical Cells.  Dr. Jeffrey Schaefer.    BREAST RECONSTRUCTION Bilateral 2022    BILATERAL BREAST RECONSTRUCTION WITH TISSUE EXPANDERS performed by Martin Brown MD at [facility] AMBULATORY OR     SECTION       x 2    COLONOSCOPY N/A 2019    COLONOSCOPY performed by nErico Lauren MD at [facility] ENDOSCOPY    HEENT      LASIK    MASTECTOMY Bilateral 2022    BILATERAL SKIN SPARING MASTECTOMIES, RIGHT BREAST SENTINEL NODE BIOPSY performed by Jessica Jaeger MD at [facility] AMBULATORY OR    [facility] AND [facility] (CERVIX REMOVED) Right     due to uterine fibroids.  Dr. Ela Marmolejo    TUBAL LIGATION      Dr. Ela Marmolejo    US LYMPH NODE BIOPSY  2022    US LYMPH NODE BIOPSY [facility] AMB HISTORICAL    US LYMPH NODE BIOPSY  2022    US LYMPH NODE BIOPSY [facility] AM[facility] HISTORICAL           Review of Systems      Physical Exam  Constitutional:       Appearance: Normal appearance.

## 2024-05-03 RX ORDER — HYDROCHLOROTHIAZIDE 25 MG/1
25 TABLET ORAL EVERY MORNING
Qty: 90 TABLET | Refills: 3 | Status: SHIPPED | OUTPATIENT
Start: 2024-05-03

## 2024-05-21 ENCOUNTER — CLINICAL DOCUMENTATION (OUTPATIENT)
Facility: CLINIC | Age: 64
End: 2024-05-21

## 2024-06-14 ENCOUNTER — ANESTHESIA EVENT (OUTPATIENT)
Facility: HOSPITAL | Age: 64
End: 2024-06-14
Payer: COMMERCIAL

## 2024-06-14 ENCOUNTER — ANESTHESIA (OUTPATIENT)
Facility: HOSPITAL | Age: 64
End: 2024-06-14
Payer: COMMERCIAL

## 2024-06-14 ENCOUNTER — HOSPITAL ENCOUNTER (OUTPATIENT)
Facility: HOSPITAL | Age: 64
Setting detail: OUTPATIENT SURGERY
Discharge: HOME | End: 2024-06-14
Attending: INTERNAL MEDICINE | Admitting: INTERNAL MEDICINE
Payer: COMMERCIAL

## 2024-06-14 VITALS
TEMPERATURE: 98 F | HEART RATE: 86 BPM | SYSTOLIC BLOOD PRESSURE: 112 MMHG | DIASTOLIC BLOOD PRESSURE: 76 MMHG | OXYGEN SATURATION: 94 % | HEIGHT: 65 IN | WEIGHT: 183 LBS | BODY MASS INDEX: 30.49 KG/M2 | RESPIRATION RATE: 17 BRPM

## 2024-06-14 PROCEDURE — 7100000011 HC PHASE II RECOVERY - ADDTL 15 MIN: Performed by: INTERNAL MEDICINE

## 2024-06-14 PROCEDURE — 2580000003 HC RX 258: Performed by: INTERNAL MEDICINE

## 2024-06-14 PROCEDURE — 3700000000 HC ANESTHESIA ATTENDED CARE: Performed by: INTERNAL MEDICINE

## 2024-06-14 PROCEDURE — 6360000002 HC RX W HCPCS

## 2024-06-14 PROCEDURE — 7100000010 HC PHASE II RECOVERY - FIRST 15 MIN: Performed by: INTERNAL MEDICINE

## 2024-06-14 PROCEDURE — 3600007502: Performed by: INTERNAL MEDICINE

## 2024-06-14 PROCEDURE — 2500000003 HC RX 250 WO HCPCS

## 2024-06-14 PROCEDURE — 3600007512: Performed by: INTERNAL MEDICINE

## 2024-06-14 PROCEDURE — 3700000001 HC ADD 15 MINUTES (ANESTHESIA): Performed by: INTERNAL MEDICINE

## 2024-06-14 PROCEDURE — 2709999900 HC NON-CHARGEABLE SUPPLY: Performed by: INTERNAL MEDICINE

## 2024-06-14 RX ORDER — SODIUM CHLORIDE 9 MG/ML
25 INJECTION, SOLUTION INTRAVENOUS PRN
Status: DISCONTINUED | OUTPATIENT
Start: 2024-06-14 | End: 2024-06-14 | Stop reason: HOSPADM

## 2024-06-14 RX ORDER — LIDOCAINE HYDROCHLORIDE 20 MG/ML
INJECTION, SOLUTION EPIDURAL; INFILTRATION; INTRACAUDAL; PERINEURAL PRN
Status: DISCONTINUED | OUTPATIENT
Start: 2024-06-14 | End: 2024-06-14 | Stop reason: SDUPTHER

## 2024-06-14 RX ORDER — SODIUM CHLORIDE 0.9 % (FLUSH) 0.9 %
5-40 SYRINGE (ML) INJECTION EVERY 12 HOURS SCHEDULED
Status: DISCONTINUED | OUTPATIENT
Start: 2024-06-14 | End: 2024-06-14 | Stop reason: HOSPADM

## 2024-06-14 RX ORDER — SODIUM CHLORIDE 0.9 % (FLUSH) 0.9 %
5-40 SYRINGE (ML) INJECTION PRN
Status: DISCONTINUED | OUTPATIENT
Start: 2024-06-14 | End: 2024-06-14 | Stop reason: HOSPADM

## 2024-06-14 RX ADMIN — LIDOCAINE HYDROCHLORIDE 100 MG: 20 INJECTION, SOLUTION EPIDURAL; INFILTRATION; INTRACAUDAL; PERINEURAL at 11:20

## 2024-06-14 RX ADMIN — PROPOFOL 30 MG: 10 INJECTION, EMULSION INTRAVENOUS at 11:34

## 2024-06-14 RX ADMIN — PROPOFOL 30 MG: 10 INJECTION, EMULSION INTRAVENOUS at 11:26

## 2024-06-14 RX ADMIN — PROPOFOL 20 MG: 10 INJECTION, EMULSION INTRAVENOUS at 11:37

## 2024-06-14 RX ADMIN — PROPOFOL 30 MG: 10 INJECTION, EMULSION INTRAVENOUS at 11:22

## 2024-06-14 RX ADMIN — PROPOFOL 30 MG: 10 INJECTION, EMULSION INTRAVENOUS at 11:28

## 2024-06-14 RX ADMIN — PROPOFOL 30 MG: 10 INJECTION, EMULSION INTRAVENOUS at 11:25

## 2024-06-14 RX ADMIN — PROPOFOL 30 MG: 10 INJECTION, EMULSION INTRAVENOUS at 11:23

## 2024-06-14 RX ADMIN — PROPOFOL 70 MG: 10 INJECTION, EMULSION INTRAVENOUS at 11:20

## 2024-06-14 RX ADMIN — PROPOFOL 30 MG: 10 INJECTION, EMULSION INTRAVENOUS at 11:32

## 2024-06-14 RX ADMIN — PROPOFOL 30 MG: 10 INJECTION, EMULSION INTRAVENOUS at 11:21

## 2024-06-14 RX ADMIN — PROPOFOL 30 MG: 10 INJECTION, EMULSION INTRAVENOUS at 11:24

## 2024-06-14 RX ADMIN — PROPOFOL 30 MG: 10 INJECTION, EMULSION INTRAVENOUS at 11:27

## 2024-06-14 RX ADMIN — SODIUM CHLORIDE 25 ML: 9 INJECTION, SOLUTION INTRAVENOUS at 11:04

## 2024-06-14 RX ADMIN — PROPOFOL 30 MG: 10 INJECTION, EMULSION INTRAVENOUS at 11:30

## 2024-06-14 ASSESSMENT — PAIN - FUNCTIONAL ASSESSMENT: PAIN_FUNCTIONAL_ASSESSMENT: NONE - DENIES PAIN

## 2024-06-14 NOTE — OP NOTE
G I Procedure Note            COLONOSCOPY   Dr. Enrico Lauren   [place] office     Katarina Martinez                                   [id number]                                  [id number]   1960                                      63 y.o.                                    female      Procedure Date: @date@   [x]  Anesthesia MAC                                                                                                Pre Op Diagnosis:    Indications:                   1. Screen for colon cancer [Z12.11]                                                                                                                                                                          Post Op Diagnosis:                    1.  Int hemorrhoids                                                                                     H&p completed: Yes            Anesthesia Assessment: Performed prior to procedure:      No change  Anesthesia Plan: Performed prior to procedure:                   No change       Medications: See Reviewed List and Reconcilation           Informed consent was obtained     Risk Statement:  Prior to the procedure the risks were explained to the patient and/or to the family including but not limited to perforation, bleeding, adverse drug reaction, aspiration, and even the need for possible surgery.  A colonoscopy exam is not 100% accurate which may be related to preparation or blind spots during the exam.The possibility that an abnormality and /or cancer could be missed was also discussed as well as alternative x-ray options.         Instrument:    Olympus adult Videocolonoscope                                   Immediate Procedure Reassessment Completed     With the patient in the left lateral position, a rectal examination was performed and the findings were: negative without mass, lesions or tenderness   [employer] Video

## 2024-06-14 NOTE — PROGRESS NOTES
ARRIVAL INFORMATION:  Verified patient name and date of birth, scheduled procedure, and informed consent.     : Matty jon contact number: [phone]  Physician and staff can share information with the .     Belongings with patient include:  Clothing,None    GI FOCUSED ASSESSMENT:  Neuro: Awake, alert, oriented x4  Respiratory: even and unlabored   GI: soft and non-distended  EKG Rhythm: normal sinus rhythm    Education:Reviewed general discharge instructions and  information.

## 2024-06-14 NOTE — PROGRESS NOTES
Endoscopy Case End Note:    1141:  Procedure scope was pre-cleaned, per protocol, at bedside by Kandice RIOS.      1143:  Report received from anesthesia - JEFFERY Stevens.  See anesthesia flowsheet for intra-procedure vital signs and events.

## 2024-06-14 NOTE — ANESTHESIA PRE PROCEDURE
Department of Anesthesiology  Preprocedure Note       Name:  Katarina Martinez   Age:  63 y.o.  :  1960                                          MRN:  [id number]         Date:  2024      Surgeon: Surgeon(s):  Enrico Lauren MD    Procedure: Procedure(s):  COLONOSCOPY BIOPSY    Medications prior to admission:   Prior to Admission medications    Medication Sig Start Date End Date Taking? Authorizing Provider   hydroCHLOROthiazide (HYDRODIURIL) 25 MG tablet Take 1 tablet by mouth every morning. 5/3/24   Remy Barney MD   rosuvastatin (CRESTOR) 10 MG tablet Take 1 tablet by mouth daily 3/1/24   Bhumi Palomo MD   NONFORMULARY Take 1 tablet by mouth Cutrelle-probiotics-prebiotics    Bhumi Palomo MD   WEGOVY 2.4 MG/0.75ML SOAJ SC injection  24   Bhumi Palomo MD   Coenzyme Q10 (COQ-10) 200 MG CAPS Take by mouth    Bhumi Palomo MD   terbinafine (LAMISIL) 250 MG tablet Take 1 tablet by mouth daily 23   Bhumi Palomo MD   pantoprazole (PROTONIX) 40 MG tablet Take 1 tablet by mouth every morning (before breakfast) 24   Remy Barney MD   levothyroxine (SYNTHROID) 25 MCG tablet TAKE 1 TABLET BY MOUTH ONCE DAILY BEFORE BREAKFAST 24   Remy Barney MD   calcium carbonate (OSCAL) 500 MG TABS tablet Take 1 tablet by mouth daily    Bhumi Palomo MD   Omega-3 1000 MG CAPS Take by mouth    Bhumi Palomo MD   Psyllium (FIBER) 28.3 % POWD Take by mouth    Bhumi Palomo MD   Bempedoic Acid 180 MG TABS Take 180 mg by mouth daily 23   Remy Barney MD   acyclovir (ZOVIRAX) 5 % ointment Apply to affected area 6 times a day 21   Automatic Reconciliation, Ar   albuterol sulfate HFA (PROVENTIL;VENTOLIN;PROAIR) 108 (90 Base) MCG/ACT inhaler Inhale 2 puffs into the lungs every 4 hours as needed 7/3/22   Automatic Reconciliation, Ar   cetirizine (ZYRTEC) 10 MG tablet Take 1 tablet by mouth daily as

## 2024-06-14 NOTE — H&P
G I Procedure Note           Endoscopy History and Physical           Dr. Enrico Lauren      [place] Office                Katarina Martinez [id number]  [id number]    1960  63 y.o.  female      Date of Procedure:   Preoperative Diagnosis:       Procedure:   [unfilled]      Screen for colon cancer [Z12.11]                         Procedure(s):  COLONOSCOPY BIOPSY      Gastroenterologist:  Anesthesia:           Enrico Lauren MD                               Monitor Anesthesia Care            History and procedure indication:  Katarina Martinez is a 63 y.o. Black /  female who presents with: Screen for colon cancer [Z12.11]   including the additional history of Screening ,Screening for colon cancer,,        Past Medical History:   Diagnosis Date    Asthma CHILDHOOD    Chest pain 07/11/2017    normal Stress Echo 07/19/17.    COVID-19 virus infection 07/06/2022    7/10/22    DJD (degenerative joint disease) of cervical spine 12/28/2023    xr    Ductal carcinoma in situ (DCIS) of right breast 04/28/2022    Gastritis and duodenitis 11/22/2019    egd - md ricky    GERD (gastroesophageal reflux disease)     Heartburn 2017    Heterozygous familial hypercholesterolemia 07/11/2019    Hyperglycemia 04/16/2016    pre-diabetic    Hypertension     Hypothyroid 08/2018    IGT (impaired glucose tolerance) 04/14/2023    Microscopic colitis 11/22/2019    Neck nodule 05/10/2018    Obesity (BMI 30-39.9) 2017    Dr. Barbara Gurrola, [place].    MOHAMUD (obstructive sleep apnea)     Patient does not wear CPAP    Statin intolerance 03/14/2022    Statin-induced myositis     Steatosis of liver 06/19/2016    TMJ (temporomandibular joint syndrome)     Tubular adenoma of colon 02/2017    Dr. Enrico Lauren    Uterine fibroid 2002    Dr. Ela Marmolejo    Vitamin D deficiency 04/18/2018    Dr. Kinsey Gurrola      Prior to Admission medications    Medication Sig Start                             [] with Moderate Sedation /Conscious Sedation                                  [x] MAC          Patient stable for planned procedure. See orders.     Enrico Lauren MD

## 2024-06-14 NOTE — ANESTHESIA POSTPROCEDURE EVALUATION
Department of Anesthesiology  Postprocedure Note    Patient: Katarina Martinez  MRN: [id number]  YOB: 1960  Date of evaluation: 6/14/2024    Procedure Summary       Date: 06/14/24 Room / Location: [facility] ENDO 04 / [facility] ENDOSCOPY    Anesthesia Start: 1113 Anesthesia Stop: 1147    Procedure: COLONOSCOPY BIOPSY Diagnosis:       Screen for colon cancer      (Screen for colon cancer [Z12.11])    Surgeons: Enrico Lauren MD Responsible Provider: Tanner Link MD    Anesthesia Type: MAC ASA Status: 2            Anesthesia Type: MAC    Lamin Phase I: Lamin Score: 10    Lamin Phase II: Lamin Score: 9    Anesthesia Post Evaluation    Patient location during evaluation: PACU  Patient participation: complete - patient participated  Level of consciousness: sleepy but conscious and responsive to verbal stimuli  Airway patency: patent  Nausea & Vomiting: no vomiting and no nausea  Cardiovascular status: blood pressure returned to baseline and hemodynamically stable  Respiratory status: acceptable  Hydration status: stable    No notable events documented.

## 2024-06-14 NOTE — DISCHARGE INSTRUCTIONS
Endoscopy Discharge Instructions     Dr. Enrico Lauren     [place] office                                            NAME: Katarina Martinez RECORD NUMBER:[id number]    AGE:  63 y.o. YOB: 1960                                                              FINAL Discharge Procedure and Diagnosis:       Procedure(s):  COLONOSCOPY BIOPSY       FINDINGS:     hemorrhoids                                        MEDICATIONS    [x] CONTINUE CURRENT MEDICATIONS     [] NEW MEDICATIONS           1.    2.    3.         Testing   Schedule              Colonoscopy Screening                                   Recommendations       []  Repeat colonoscopy in 6-12 month         2nd to Inadequate  prep    []  Repeat colonoscopy in 3 years    []  Repeat colonoscopy in 5 years    [x]  Repeat colonoscopy in 10 years         New additional  Tests  Call the office   [phone]) for the appointment time      []      []      []                                     YOUR NEXT APPOINTMENT WITH DR LAUREN:                                                                                                                                [x]   None follow up with pcp   []  1 week       []   2 week    []  1 month    Always keep KEEP  APPOINTMENT WITH  @PCP@ for regular medical follow up                                                                                                                         If you had a colonoscopy the \"C\" indicates specific instructions        x                                           Diet Instructions :   Ordinarily you may resume your previous diet but your initial diet should be       Light your discharge nurse will go over this with you.  Large meals can cause  abdominal discomfort after these procedures.                                                                           Specific Diet Recommendations:        [x] High fiber

## 2024-06-26 SDOH — ECONOMIC STABILITY: FOOD INSECURITY: WITHIN THE PAST 12 MONTHS, THE FOOD YOU BOUGHT JUST DIDN'T LAST AND YOU DIDN'T HAVE MONEY TO GET MORE.: NEVER TRUE

## 2024-06-26 SDOH — ECONOMIC STABILITY: INCOME INSECURITY: HOW HARD IS IT FOR YOU TO PAY FOR THE VERY BASICS LIKE FOOD, HOUSING, MEDICAL CARE, AND HEATING?: NOT HARD AT ALL

## 2024-06-26 SDOH — ECONOMIC STABILITY: FOOD INSECURITY: WITHIN THE PAST 12 MONTHS, YOU WORRIED THAT YOUR FOOD WOULD RUN OUT BEFORE YOU GOT MONEY TO BUY MORE.: NEVER TRUE

## 2024-06-26 SDOH — ECONOMIC STABILITY: TRANSPORTATION INSECURITY
IN THE PAST 12 MONTHS, HAS LACK OF TRANSPORTATION KEPT YOU FROM MEETINGS, WORK, OR FROM GETTING THINGS NEEDED FOR DAILY LIVING?: NO

## 2024-06-26 SDOH — ECONOMIC STABILITY: HOUSING INSECURITY
IN THE LAST 12 MONTHS, WAS THERE A TIME WHEN YOU DID NOT HAVE A STEADY PLACE TO SLEEP OR SLEPT IN A SHELTER (INCLUDING NOW)?: NO

## 2024-06-27 ENCOUNTER — OFFICE VISIT (OUTPATIENT)
Facility: CLINIC | Age: 64
End: 2024-06-27
Payer: COMMERCIAL

## 2024-06-27 VITALS
SYSTOLIC BLOOD PRESSURE: 118 MMHG | HEART RATE: 75 BPM | OXYGEN SATURATION: 96 % | BODY MASS INDEX: 30.92 KG/M2 | TEMPERATURE: 97.7 F | DIASTOLIC BLOOD PRESSURE: 77 MMHG | HEIGHT: 65 IN | RESPIRATION RATE: 18 BRPM | WEIGHT: 185.6 LBS

## 2024-06-27 DIAGNOSIS — T46.6X5A STATIN-INDUCED MYOSITIS: ICD-10-CM

## 2024-06-27 DIAGNOSIS — K76.0 STEATOSIS OF LIVER: ICD-10-CM

## 2024-06-27 DIAGNOSIS — M60.9 STATIN-INDUCED MYOSITIS: ICD-10-CM

## 2024-06-27 DIAGNOSIS — Z78.9 STATIN INTOLERANCE: ICD-10-CM

## 2024-06-27 DIAGNOSIS — G47.33 OSA (OBSTRUCTIVE SLEEP APNEA): ICD-10-CM

## 2024-06-27 DIAGNOSIS — Z85.3 PERSONAL HISTORY OF BREAST CANCER: ICD-10-CM

## 2024-06-27 DIAGNOSIS — E11.9 TYPE 2 DIABETES MELLITUS WITHOUT COMPLICATION, WITHOUT LONG-TERM CURRENT USE OF INSULIN (HCC): Primary | ICD-10-CM

## 2024-06-27 PROCEDURE — 3078F DIAST BP <80 MM HG: CPT | Performed by: INTERNAL MEDICINE

## 2024-06-27 PROCEDURE — 3044F HG A1C LEVEL LT 7.0%: CPT | Performed by: INTERNAL MEDICINE

## 2024-06-27 PROCEDURE — 99214 OFFICE O/P EST MOD 30 MIN: CPT | Performed by: INTERNAL MEDICINE

## 2024-06-27 PROCEDURE — 3074F SYST BP LT 130 MM HG: CPT | Performed by: INTERNAL MEDICINE

## 2024-06-27 RX ORDER — SEMAGLUTIDE 2.4 MG/.75ML
2.4 INJECTION, SOLUTION SUBCUTANEOUS
Qty: 10 ML | Refills: 3 | Status: SHIPPED | OUTPATIENT
Start: 2024-06-27

## 2024-06-27 SDOH — ECONOMIC STABILITY: FOOD INSECURITY: WITHIN THE PAST 12 MONTHS, YOU WORRIED THAT YOUR FOOD WOULD RUN OUT BEFORE YOU GOT MONEY TO BUY MORE.: NEVER TRUE

## 2024-06-27 SDOH — ECONOMIC STABILITY: INCOME INSECURITY: HOW HARD IS IT FOR YOU TO PAY FOR THE VERY BASICS LIKE FOOD, HOUSING, MEDICAL CARE, AND HEATING?: NOT HARD AT ALL

## 2024-06-27 SDOH — ECONOMIC STABILITY: FOOD INSECURITY: WITHIN THE PAST 12 MONTHS, THE FOOD YOU BOUGHT JUST DIDN'T LAST AND YOU DIDN'T HAVE MONEY TO GET MORE.: NEVER TRUE

## 2024-06-27 NOTE — PROGRESS NOTES
SPORTS MEDICINE AND PRIMARY CARE  Tera Barney MD, FACP, CMD  [address] [facility] [address]  Phone:  [phone]  Fax: [phone]       Chief Complaint   Patient presents with    Other     Patient states she has been having problems with her cholesterol states that medication is not working and wants to talk about weight loss management.   .      SUBJECTIVE:    Katarina Martinez is a 63 y.o. female  Dictation on: 06/27/2024  1:55 PM by: TERA BARNEY [[id number]]          Current Outpatient Medications   Medication Sig Dispense Refill    hydroCHLOROthiazide (HYDRODIURIL) 25 MG tablet Take 1 tablet by mouth every morning. 90 tablet 3    rosuvastatin (CRESTOR) 10 MG tablet Take 1 tablet by mouth daily      NONFORMULARY Take 1 tablet by mouth Cutrelle-probiotics-prebiotics      WEGOVY 2.4 MG/0.75ML SOAJ SC injection       pantoprazole (PROTONIX) 40 MG tablet Take 1 tablet by mouth every morning (before breakfast) 90 tablet 3    levothyroxine (SYNTHROID) 25 MCG tablet TAKE 1 TABLET BY MOUTH ONCE DAILY BEFORE BREAKFAST 90 tablet 3    calcium carbonate (OSCAL) 500 MG TABS tablet Take 1 tablet by mouth daily      Omega-3 1000 MG CAPS Take by mouth      Psyllium (FIBER) 28.3 % POWD Take by mouth      albuterol sulfate HFA (PROVENTIL;VENTOLIN;PROAIR) 108 (90 Base) MCG/ACT inhaler Inhale 2 puffs into the lungs every 4 hours as needed      cetirizine (ZYRTEC) 10 MG tablet Take 1 tablet by mouth daily as needed      ezetimibe (ZETIA) 10 MG tablet Take 1 tablet by mouth once daily      fluticasone (FLONASE) 50 MCG/ACT nasal spray 2 sprays by Nasal route daily      terbinafine (LAMISIL) 250 MG tablet Take 1 tablet by mouth daily      scopolamine (TRANSDERM-SCOP) transdermal patch APPLY 1 PATCH BY TRANSDERMAL ROUTE EVERY 72 HOURS (Patient not taking: Reported on 6/27/2024)       No current facility-administered medications for this visit.     Past Medical History:   Diagnosis Date    Asthma CHILDHOOD    Chest pain

## 2024-06-27 NOTE — PROGRESS NOTES
Chief Complaint   Patient presents with    Other     Patient states she has been having problems with her cholesterol states that medication is not working and wants to talk about weight loss management.       \"Have you been to the ER, urgent care clinic since your last visit?  Hospitalized since your last visit?\"    NO    “Have you seen or consulted any other health care providers outside of [facility] since your last visit?”    NO            Click Here for Release of Records Request

## 2024-06-28 LAB
ANION GAP SERPL CALC-SCNC: 8 MMOL/L (ref 5–15)
BUN SERPL-MCNC: 18 MG/DL (ref 6–20)
BUN/CREAT SERPL: 18 (ref 12–20)
CALCIUM SERPL-MCNC: 9.8 MG/DL (ref 8.5–10.1)
CHLORIDE SERPL-SCNC: 102 MMOL/L (ref 97–108)
CHOLEST SERPL-MCNC: 189 MG/DL
CO2 SERPL-SCNC: 28 MMOL/L (ref 21–32)
CREAT SERPL-MCNC: 1.01 MG/DL (ref 0.55–1.02)
EST. AVERAGE GLUCOSE BLD GHB EST-MCNC: 105 MG/DL
GLUCOSE SERPL-MCNC: 77 MG/DL (ref 65–100)
HBA1C MFR BLD: 5.3 % (ref 4–5.6)
HDLC SERPL-MCNC: 66 MG/DL
HDLC SERPL: 2.9 (ref 0–5)
LDLC SERPL CALC-MCNC: 105 MG/DL (ref 0–100)
POTASSIUM SERPL-SCNC: 3.8 MMOL/L (ref 3.5–5.1)
SODIUM SERPL-SCNC: 138 MMOL/L (ref 136–145)
TRIGL SERPL-MCNC: 90 MG/DL
VLDLC SERPL CALC-MCNC: 18 MG/DL

## 2024-08-09 RX ORDER — EZETIMIBE 10 MG/1
10 TABLET ORAL DAILY
Qty: 90 TABLET | Refills: 2 | Status: SHIPPED | OUTPATIENT
Start: 2024-08-09

## 2024-08-22 ENCOUNTER — HOSPITAL ENCOUNTER (OUTPATIENT)
Facility: HOSPITAL | Age: 64
Discharge: HOME | End: 2024-08-22
Payer: COMMERCIAL

## 2024-08-22 VITALS
BODY MASS INDEX: 31.22 KG/M2 | OXYGEN SATURATION: 98 % | TEMPERATURE: 97.1 F | RESPIRATION RATE: 14 BRPM | DIASTOLIC BLOOD PRESSURE: 83 MMHG | WEIGHT: 187.39 LBS | SYSTOLIC BLOOD PRESSURE: 141 MMHG | HEIGHT: 65 IN | HEART RATE: 75 BPM

## 2024-08-22 LAB
ANION GAP SERPL CALC-SCNC: 6 MMOL/L (ref 5–15)
BUN SERPL-MCNC: 22 MG/DL (ref 6–20)
BUN/CREAT SERPL: 22 (ref 12–20)
CALCIUM SERPL-MCNC: 10 MG/DL (ref 8.5–10.1)
CHLORIDE SERPL-SCNC: 101 MMOL/L (ref 97–108)
CO2 SERPL-SCNC: 30 MMOL/L (ref 21–32)
CREAT SERPL-MCNC: 0.98 MG/DL (ref 0.55–1.02)
GLUCOSE SERPL-MCNC: 85 MG/DL (ref 65–100)
POTASSIUM SERPL-SCNC: 4 MMOL/L (ref 3.5–5.1)
SODIUM SERPL-SCNC: 137 MMOL/L (ref 136–145)

## 2024-08-22 PROCEDURE — 93005 ELECTROCARDIOGRAM TRACING: CPT | Performed by: SURGERY

## 2024-08-22 PROCEDURE — 80048 BASIC METABOLIC PNL TOTAL CA: CPT

## 2024-08-22 PROCEDURE — 36415 COLL VENOUS BLD VENIPUNCTURE: CPT

## 2024-08-22 RX ORDER — PHENDIMETRAZINE TARTRATE 35 MG/1
35 TABLET ORAL DAILY
COMMUNITY

## 2024-08-22 RX ORDER — B-COMPLEX WITH VITAMIN C
TABLET ORAL DAILY
COMMUNITY

## 2024-08-22 NOTE — DISCHARGE INSTRUCTIONS
[facility]                   [address] [place] [address]   MAIN OR                                  [phone]   MAIN PRE OP                          [phone]                                                                                AMBULATORY PRE OP          [phone]  PRE-ADMISSION TESTING    [phone]     Surgery Date:  September 6 (Friday)       Is surgery arrival time given by surgeon?    NO  If “NO”, [facility] staff will call you between 3 and 7pm the day before your surgery with your arrival time. (If your surgery is on a Monday, we will call you the Friday before.)    Call [phone] after 7pm Monday-Friday if you did not receive this call.    INSTRUCTIONS BEFORE YOUR SURGERY   When You  Arrive Arrive at the 2nd Floor Admitting Desk on the day of your surgery  Have your insurance card, photo ID, and any copayment (if needed)   Food   and   Drink NO food or drink after midnight the night before surgery    This means NO water, gum, mints, coffee, juice, etc.  No alcohol (beer, wine, liquor) 24 hours before and after surgery   Medications to   TAKE   Morning of Surgery MEDICATIONS TO TAKE THE MORNING OF SURGERY WITH A SIP OF WATER:     Take medications as directed, except:    Do not take day of surgery - Hydrochlorothiazide, Psyllium    Do not take day of surgery or the day before - Ezetimibe    Do not take from PAT until instructed to resume - Wegovy     Medications  To  STOP      7 days before surgery Non-Steroidal anti-inflammatory Drugs (NSAID's): for example, Ibuprofen (Advil, Motrin), Naproxen (Aleve)  Aspirin, if taking for pain   Herbal supplements, vitamins, and fish oil  Other:  Phendimetrazine  (Pain medications not listed above, including Tylenol may be taken)       Bathing Clothing  Jewelry  Valuables     If you shower the morning of surgery, please do not apply anything to your skin (lotions, powders, deodorant, or makeup, especially  bogdan)  Follow Chlorhexidine Care [employer] body wash instructions provided to you during PAT appointment. Begin 3 days prior to surgery.  Do not shave or trim anywhere 24 hours before surgery  Wear your hair loose or down; no pony-tails, buns, or metal hair clips  Wear loose, comfortable, clean clothes  Wear glasses instead of contacts  Leave money, valuables, and jewelry, including body piercings, at home       Going Home - or Spending the Night SAME-DAY SURGERY: You must have a responsible adult drive you home and stay with you 24 hours after surgery.   Special Instructions        Follow all instructions so your surgery won’t be cancelled.  Please, be on time.                    If a situation occurs and you are delayed the day of surgery, call [phone].    If your physical condition changes (like a fever, cold, flu, etc.) call your surgeon.    Home medication(s) reviewed and verified via   LIST   VERBAL   during PAT appointment.    The patient was contacted by    IN-PERSON  The patient verbalizes understanding of all instructions and     DOES NOT   need reinforcement.    09-Mar-2021 21:00

## 2024-08-22 NOTE — PERIOP NOTE
Wegovy - (Taking for weight loss not diabetes) - Patient denies currently experiencing at N/V, Retching, Abdominal Pain, Bloating.  Did experience when she began taking.  Patient has appointment with prescriber tomorrow (8/23).  She is going to discuss directions from PAT.  Patient stated she is concerned about being off med for this period b/c doesn't want to \"start over\" with Wegovy.  I asked patient to call [phone] if MD provides direction which contradicts instructions from PAT.  I explained surgery may be cancelled if Wegovy regimen is unsafe for anesthesia.

## 2024-08-23 LAB
EKG ATRIAL RATE: 79 BPM
EKG DIAGNOSIS: NORMAL
EKG P AXIS: 60 DEGREES
EKG P-R INTERVAL: 162 MS
EKG Q-T INTERVAL: 388 MS
EKG QRS DURATION: 82 MS
EKG QTC CALCULATION (BAZETT): 444 MS
EKG R AXIS: 47 DEGREES
EKG T AXIS: 43 DEGREES
EKG VENTRICULAR RATE: 79 BPM

## 2024-08-23 PROCEDURE — 93010 ELECTROCARDIOGRAM REPORT: CPT | Performed by: SPECIALIST

## 2024-09-04 ENCOUNTER — OFFICE VISIT (OUTPATIENT)
Facility: CLINIC | Age: 64
End: 2024-09-04
Payer: COMMERCIAL

## 2024-09-04 VITALS
RESPIRATION RATE: 16 BRPM | HEART RATE: 80 BPM | HEIGHT: 65 IN | BODY MASS INDEX: 31.04 KG/M2 | DIASTOLIC BLOOD PRESSURE: 72 MMHG | TEMPERATURE: 98 F | WEIGHT: 186.3 LBS | OXYGEN SATURATION: 96 % | SYSTOLIC BLOOD PRESSURE: 112 MMHG

## 2024-09-04 DIAGNOSIS — D05.11 DUCTAL CARCINOMA IN SITU (DCIS) OF RIGHT BREAST: ICD-10-CM

## 2024-09-04 DIAGNOSIS — Z01.818 PREOP EXAMINATION: Primary | ICD-10-CM

## 2024-09-04 DIAGNOSIS — I10 PRIMARY HYPERTENSION: ICD-10-CM

## 2024-09-04 DIAGNOSIS — K76.0 STEATOSIS OF LIVER: ICD-10-CM

## 2024-09-04 DIAGNOSIS — E11.9 TYPE 2 DIABETES MELLITUS WITHOUT COMPLICATION, WITHOUT LONG-TERM CURRENT USE OF INSULIN (HCC): ICD-10-CM

## 2024-09-04 DIAGNOSIS — E78.01 HETEROZYGOUS FAMILIAL HYPERCHOLESTEROLEMIA: ICD-10-CM

## 2024-09-04 PROBLEM — R73.02 IGT (IMPAIRED GLUCOSE TOLERANCE): Status: RESOLVED | Noted: 2023-08-31 | Resolved: 2024-09-04

## 2024-09-04 LAB
ANION GAP SERPL CALC-SCNC: 3 MMOL/L (ref 5–15)
BASOPHILS # BLD: 0 K/UL (ref 0–0.1)
BASOPHILS NFR BLD: 1 % (ref 0–1)
BUN SERPL-MCNC: 20 MG/DL (ref 6–20)
BUN/CREAT SERPL: 20 (ref 12–20)
CALCIUM SERPL-MCNC: 9.7 MG/DL (ref 8.5–10.1)
CHLORIDE SERPL-SCNC: 103 MMOL/L (ref 97–108)
CHOLEST SERPL-MCNC: 205 MG/DL
CO2 SERPL-SCNC: 32 MMOL/L (ref 21–32)
COMMENT:: NORMAL
CREAT SERPL-MCNC: 1 MG/DL (ref 0.55–1.02)
DIFFERENTIAL METHOD BLD: ABNORMAL
EOSINOPHIL # BLD: 0.1 K/UL (ref 0–0.4)
EOSINOPHIL NFR BLD: 1 % (ref 0–7)
ERYTHROCYTE [DISTWIDTH] IN BLOOD BY AUTOMATED COUNT: 13.2 % (ref 11.5–14.5)
GLUCOSE SERPL-MCNC: 89 MG/DL (ref 65–100)
HCT VFR BLD AUTO: 43.8 % (ref 35–47)
HDLC SERPL-MCNC: 67 MG/DL
HDLC SERPL: 3.1 (ref 0–5)
HGB BLD-MCNC: 15.7 G/DL (ref 11.5–16)
IMM GRANULOCYTES # BLD AUTO: 0 K/UL (ref 0–0.04)
IMM GRANULOCYTES NFR BLD AUTO: 0 % (ref 0–0.5)
LDLC SERPL CALC-MCNC: 123.6 MG/DL (ref 0–100)
LYMPHOCYTES # BLD: 2.9 K/UL (ref 0.8–3.5)
LYMPHOCYTES NFR BLD: 42 % (ref 12–49)
MCH RBC QN AUTO: 28.2 PG (ref 26–34)
MCHC RBC AUTO-ENTMCNC: 35.8 G/DL (ref 30–36.5)
MCV RBC AUTO: 78.6 FL (ref 80–99)
MONOCYTES # BLD: 0.3 K/UL (ref 0–1)
MONOCYTES NFR BLD: 4 % (ref 5–13)
NEUTS SEG # BLD: 3.7 K/UL (ref 1.8–8)
NEUTS SEG NFR BLD: 52 % (ref 32–75)
NRBC # BLD: 0 K/UL (ref 0–0.01)
NRBC BLD-RTO: 0 PER 100 WBC
PLATELET # BLD AUTO: 316 K/UL (ref 150–400)
PMV BLD AUTO: 10.4 FL (ref 8.9–12.9)
POTASSIUM SERPL-SCNC: 3.8 MMOL/L (ref 3.5–5.1)
RBC # BLD AUTO: 5.57 M/UL (ref 3.8–5.2)
SODIUM SERPL-SCNC: 138 MMOL/L (ref 136–145)
SPECIMEN HOLD: NORMAL
TRIGL SERPL-MCNC: 72 MG/DL
VLDLC SERPL CALC-MCNC: 14.4 MG/DL
WBC # BLD AUTO: 6.9 K/UL (ref 3.6–11)

## 2024-09-04 PROCEDURE — 3078F DIAST BP <80 MM HG: CPT | Performed by: INTERNAL MEDICINE

## 2024-09-04 PROCEDURE — 3074F SYST BP LT 130 MM HG: CPT | Performed by: INTERNAL MEDICINE

## 2024-09-04 PROCEDURE — 3044F HG A1C LEVEL LT 7.0%: CPT | Performed by: INTERNAL MEDICINE

## 2024-09-04 PROCEDURE — 99214 OFFICE O/P EST MOD 30 MIN: CPT | Performed by: INTERNAL MEDICINE

## 2024-09-04 SDOH — ECONOMIC STABILITY: FOOD INSECURITY: WITHIN THE PAST 12 MONTHS, THE FOOD YOU BOUGHT JUST DIDN'T LAST AND YOU DIDN'T HAVE MONEY TO GET MORE.: NEVER TRUE

## 2024-09-04 SDOH — ECONOMIC STABILITY: INCOME INSECURITY: HOW HARD IS IT FOR YOU TO PAY FOR THE VERY BASICS LIKE FOOD, HOUSING, MEDICAL CARE, AND HEATING?: NOT HARD AT ALL

## 2024-09-04 SDOH — ECONOMIC STABILITY: FOOD INSECURITY: WITHIN THE PAST 12 MONTHS, YOU WORRIED THAT YOUR FOOD WOULD RUN OUT BEFORE YOU GOT MONEY TO BUY MORE.: NEVER TRUE

## 2024-09-04 ASSESSMENT — ANXIETY QUESTIONNAIRES
5. BEING SO RESTLESS THAT IT IS HARD TO SIT STILL: NOT AT ALL
GAD7 TOTAL SCORE: 0
2. NOT BEING ABLE TO STOP OR CONTROL WORRYING: NOT AT ALL
3. WORRYING TOO MUCH ABOUT DIFFERENT THINGS: NOT AT ALL
1. FEELING NERVOUS, ANXIOUS, OR ON EDGE: NOT AT ALL
6. BECOMING EASILY ANNOYED OR IRRITABLE: NOT AT ALL
IF YOU CHECKED OFF ANY PROBLEMS ON THIS QUESTIONNAIRE, HOW DIFFICULT HAVE THESE PROBLEMS MADE IT FOR YOU TO DO YOUR WORK, TAKE CARE OF THINGS AT HOME, OR GET ALONG WITH OTHER PEOPLE: NOT DIFFICULT AT ALL
4. TROUBLE RELAXING: NOT AT ALL
7. FEELING AFRAID AS IF SOMETHING AWFUL MIGHT HAPPEN: NOT AT ALL

## 2024-09-04 ASSESSMENT — PATIENT HEALTH QUESTIONNAIRE - PHQ9
SUM OF ALL RESPONSES TO PHQ QUESTIONS 1-9: 0
1. LITTLE INTEREST OR PLEASURE IN DOING THINGS: NOT AT ALL
SUM OF ALL RESPONSES TO PHQ QUESTIONS 1-9: 0
SUM OF ALL RESPONSES TO PHQ9 QUESTIONS 1 & 2: 0
SUM OF ALL RESPONSES TO PHQ QUESTIONS 1-9: 0
SUM OF ALL RESPONSES TO PHQ QUESTIONS 1-9: 0
2. FEELING DOWN, DEPRESSED OR HOPELESS: NOT AT ALL

## 2024-09-04 NOTE — PROGRESS NOTES
Chief Complaint   Patient presents with    Diabetes       Patient states \" she is present for a follow up visit and she has a upcoming out patient procedure for breast mastectomy.\"    \"Have you been to the ER, urgent care clinic since your last visit?  Hospitalized since your last visit?\"    NO    “Have you seen or consulted any other health care providers outside of [facility] since your last visit?”    NO            Click Here for Release of Records Request

## 2024-09-04 NOTE — PROGRESS NOTES
SPORTS MEDICINE AND PRIMARY CARE  Tera Barney MD, FACP, CMD  [address] [facility] [address]  Phone:  [phone]  Fax: [phone]       Chief Complaint   Patient presents with    Diabetes   .      SUBJECTIVE:    Katarina Martinez is a 63 y.o. female  Dictation on: 09/04/2024 10:17 AM by: TERA BARNEY [[id number]]          Current Outpatient Medications   Medication Sig Dispense Refill    Phendimetrazine Tartrate 35 MG TABS Take 35 mg by mouth daily. Max Daily Amount: 35 mg      Cobalamin Combinations (NEURIVA PLUS PO) Take by mouth every morning      NONFORMULARY every morning [facility]      Calcium Carb-Cholecalciferol (CALCIUM PLUS VITAMIN D PO) Take by mouth daily      B Complex Vitamins (VITAMIN B COMPLEX) TABS Take by mouth daily      Multiple Vitamins-Minerals (CENTRUM SILVER PO) Take by mouth daily      ezetimibe (ZETIA) 10 MG tablet Take 1 tablet by mouth daily. 90 tablet 2    WEGOVY 2.4 MG/0.75ML SOAJ SC injection Inject 2.4 mg into the skin every 7 days 10 mL 3    hydroCHLOROthiazide (HYDRODIURIL) 25 MG tablet Take 1 tablet by mouth every morning. 90 tablet 3    rosuvastatin (CRESTOR) 10 MG tablet Take 1 tablet by mouth daily      NONFORMULARY Take 1 tablet by mouth Cutrelle-probiotics-prebiotics      terbinafine (LAMISIL) 250 MG tablet Take 1 tablet by mouth daily      pantoprazole (PROTONIX) 40 MG tablet Take 1 tablet by mouth every morning (before breakfast) 90 tablet 3    levothyroxine (SYNTHROID) 25 MCG tablet TAKE 1 TABLET BY MOUTH ONCE DAILY BEFORE BREAKFAST 90 tablet 3    calcium carbonate (OSCAL) 500 MG TABS tablet Take 1 tablet by mouth daily      Omega-3 1000 MG CAPS Take by mouth      Psyllium (FIBER) 28.3 % POWD Take by mouth      albuterol sulfate HFA (PROVENTIL;VENTOLIN;PROAIR) 108 (90 Base) MCG/ACT inhaler Inhale 2 puffs into the lungs every 4 hours as needed      cetirizine (ZYRTEC) 10 MG tablet Take 1 tablet by mouth daily as needed      fluticasone (FLONASE) 50

## 2024-09-06 ENCOUNTER — ANESTHESIA (OUTPATIENT)
Facility: HOSPITAL | Age: 64
End: 2024-09-06
Payer: COMMERCIAL

## 2024-09-06 ENCOUNTER — ANESTHESIA EVENT (OUTPATIENT)
Facility: HOSPITAL | Age: 64
End: 2024-09-06
Payer: COMMERCIAL

## 2024-09-06 ENCOUNTER — HOSPITAL ENCOUNTER (OUTPATIENT)
Facility: HOSPITAL | Age: 64
Setting detail: OUTPATIENT SURGERY
Discharge: HOME | End: 2024-09-06
Attending: SURGERY | Admitting: SURGERY
Payer: COMMERCIAL

## 2024-09-06 VITALS
SYSTOLIC BLOOD PRESSURE: 134 MMHG | OXYGEN SATURATION: 99 % | HEIGHT: 65 IN | HEART RATE: 97 BPM | RESPIRATION RATE: 13 BRPM | TEMPERATURE: 97.8 F | BODY MASS INDEX: 31.15 KG/M2 | DIASTOLIC BLOOD PRESSURE: 79 MMHG | WEIGHT: 186.95 LBS

## 2024-09-06 DIAGNOSIS — Z90.13 S/P BILATERAL MASTECTOMY: Primary | ICD-10-CM

## 2024-09-06 LAB
GLUCOSE BLD STRIP.AUTO-MCNC: 109 MG/DL (ref 65–117)
SERVICE CMNT-IMP: NORMAL

## 2024-09-06 PROCEDURE — 2500000003 HC RX 250 WO HCPCS: Performed by: SURGERY

## 2024-09-06 PROCEDURE — 2580000003 HC RX 258: Performed by: STUDENT IN AN ORGANIZED HEALTH CARE EDUCATION/TRAINING PROGRAM

## 2024-09-06 PROCEDURE — 88305 TISSUE EXAM BY PATHOLOGIST: CPT

## 2024-09-06 PROCEDURE — 2580000003 HC RX 258: Performed by: SURGERY

## 2024-09-06 PROCEDURE — L8000 MASTECTOMY BRA: HCPCS | Performed by: SURGERY

## 2024-09-06 PROCEDURE — 6370000000 HC RX 637 (ALT 250 FOR IP): Performed by: SURGERY

## 2024-09-06 PROCEDURE — 7100000000 HC PACU RECOVERY - FIRST 15 MIN: Performed by: SURGERY

## 2024-09-06 PROCEDURE — 2709999900 HC NON-CHARGEABLE SUPPLY: Performed by: SURGERY

## 2024-09-06 PROCEDURE — 3600000003 HC SURGERY LEVEL 3 BASE: Performed by: SURGERY

## 2024-09-06 PROCEDURE — 3700000000 HC ANESTHESIA ATTENDED CARE: Performed by: SURGERY

## 2024-09-06 PROCEDURE — 7100000010 HC PHASE II RECOVERY - FIRST 15 MIN: Performed by: SURGERY

## 2024-09-06 PROCEDURE — 3600000013 HC SURGERY LEVEL 3 ADDTL 15MIN: Performed by: SURGERY

## 2024-09-06 PROCEDURE — 3700000001 HC ADD 15 MINUTES (ANESTHESIA): Performed by: SURGERY

## 2024-09-06 PROCEDURE — 6360000002 HC RX W HCPCS: Performed by: SURGERY

## 2024-09-06 PROCEDURE — 2500000003 HC RX 250 WO HCPCS: Performed by: NURSE ANESTHETIST, CERTIFIED REGISTERED

## 2024-09-06 PROCEDURE — 6370000000 HC RX 637 (ALT 250 FOR IP): Performed by: STUDENT IN AN ORGANIZED HEALTH CARE EDUCATION/TRAINING PROGRAM

## 2024-09-06 PROCEDURE — 6360000002 HC RX W HCPCS: Performed by: NURSE ANESTHETIST, CERTIFIED REGISTERED

## 2024-09-06 PROCEDURE — 7100000001 HC PACU RECOVERY - ADDTL 15 MIN: Performed by: SURGERY

## 2024-09-06 PROCEDURE — 6360000002 HC RX W HCPCS: Performed by: STUDENT IN AN ORGANIZED HEALTH CARE EDUCATION/TRAINING PROGRAM

## 2024-09-06 PROCEDURE — 7100000011 HC PHASE II RECOVERY - ADDTL 15 MIN: Performed by: SURGERY

## 2024-09-06 PROCEDURE — 82962 GLUCOSE BLOOD TEST: CPT

## 2024-09-06 RX ORDER — ONDANSETRON 2 MG/ML
4 INJECTION INTRAMUSCULAR; INTRAVENOUS
Status: DISCONTINUED | OUTPATIENT
Start: 2024-09-06 | End: 2024-09-06 | Stop reason: HOSPADM

## 2024-09-06 RX ORDER — SCOLOPAMINE TRANSDERMAL SYSTEM 1 MG/1
1 PATCH, EXTENDED RELEASE TRANSDERMAL
Status: DISCONTINUED | OUTPATIENT
Start: 2024-09-06 | End: 2024-09-06 | Stop reason: HOSPADM

## 2024-09-06 RX ORDER — HYDROMORPHONE HYDROCHLORIDE 2 MG/ML
INJECTION, SOLUTION INTRAMUSCULAR; INTRAVENOUS; SUBCUTANEOUS PRN
Status: DISCONTINUED | OUTPATIENT
Start: 2024-09-06 | End: 2024-09-06 | Stop reason: SDUPTHER

## 2024-09-06 RX ORDER — FENTANYL CITRATE 50 UG/ML
100 INJECTION, SOLUTION INTRAMUSCULAR; INTRAVENOUS
Status: DISCONTINUED | OUTPATIENT
Start: 2024-09-06 | End: 2024-09-06 | Stop reason: HOSPADM

## 2024-09-06 RX ORDER — OXYCODONE HYDROCHLORIDE 5 MG/1
5 TABLET ORAL ONCE
Status: COMPLETED | OUTPATIENT
Start: 2024-09-06 | End: 2024-09-06

## 2024-09-06 RX ORDER — ROCURONIUM BROMIDE 10 MG/ML
INJECTION, SOLUTION INTRAVENOUS PRN
Status: DISCONTINUED | OUTPATIENT
Start: 2024-09-06 | End: 2024-09-06 | Stop reason: SDUPTHER

## 2024-09-06 RX ORDER — LIDOCAINE HYDROCHLORIDE 20 MG/ML
INJECTION, SOLUTION EPIDURAL; INFILTRATION; INTRACAUDAL; PERINEURAL PRN
Status: DISCONTINUED | OUTPATIENT
Start: 2024-09-06 | End: 2024-09-06 | Stop reason: SDUPTHER

## 2024-09-06 RX ORDER — ONDANSETRON 2 MG/ML
INJECTION INTRAMUSCULAR; INTRAVENOUS PRN
Status: DISCONTINUED | OUTPATIENT
Start: 2024-09-06 | End: 2024-09-06 | Stop reason: SDUPTHER

## 2024-09-06 RX ORDER — LIDOCAINE HYDROCHLORIDE 10 MG/ML
1 INJECTION, SOLUTION EPIDURAL; INFILTRATION; INTRACAUDAL; PERINEURAL
Status: DISCONTINUED | OUTPATIENT
Start: 2024-09-06 | End: 2024-09-06 | Stop reason: HOSPADM

## 2024-09-06 RX ORDER — SODIUM CHLORIDE, SODIUM LACTATE, POTASSIUM CHLORIDE, CALCIUM CHLORIDE 600; 310; 30; 20 MG/100ML; MG/100ML; MG/100ML; MG/100ML
INJECTION, SOLUTION INTRAVENOUS CONTINUOUS
Status: DISCONTINUED | OUTPATIENT
Start: 2024-09-06 | End: 2024-09-06 | Stop reason: HOSPADM

## 2024-09-06 RX ORDER — DEXAMETHASONE SODIUM PHOSPHATE 4 MG/ML
INJECTION, SOLUTION INTRA-ARTICULAR; INTRALESIONAL; INTRAMUSCULAR; INTRAVENOUS; SOFT TISSUE PRN
Status: DISCONTINUED | OUTPATIENT
Start: 2024-09-06 | End: 2024-09-06 | Stop reason: SDUPTHER

## 2024-09-06 RX ORDER — MIDAZOLAM HYDROCHLORIDE 1 MG/ML
INJECTION INTRAMUSCULAR; INTRAVENOUS PRN
Status: DISCONTINUED | OUTPATIENT
Start: 2024-09-06 | End: 2024-09-06 | Stop reason: SDUPTHER

## 2024-09-06 RX ORDER — OXYCODONE HYDROCHLORIDE 5 MG/1
5 TABLET ORAL EVERY 6 HOURS PRN
Qty: 20 TABLET | Refills: 0 | Status: SHIPPED | OUTPATIENT
Start: 2024-09-06 | End: 2024-09-11

## 2024-09-06 RX ORDER — DIPHENHYDRAMINE HYDROCHLORIDE 50 MG/ML
12.5 INJECTION INTRAMUSCULAR; INTRAVENOUS
Status: DISCONTINUED | OUTPATIENT
Start: 2024-09-06 | End: 2024-09-06 | Stop reason: HOSPADM

## 2024-09-06 RX ORDER — FENTANYL CITRATE 50 UG/ML
INJECTION, SOLUTION INTRAMUSCULAR; INTRAVENOUS PRN
Status: DISCONTINUED | OUTPATIENT
Start: 2024-09-06 | End: 2024-09-06 | Stop reason: SDUPTHER

## 2024-09-06 RX ORDER — NALOXONE HYDROCHLORIDE 0.4 MG/ML
INJECTION, SOLUTION INTRAMUSCULAR; INTRAVENOUS; SUBCUTANEOUS PRN
Status: DISCONTINUED | OUTPATIENT
Start: 2024-09-06 | End: 2024-09-06 | Stop reason: HOSPADM

## 2024-09-06 RX ORDER — PROPOFOL 10 MG/ML
INJECTION, EMULSION INTRAVENOUS PRN
Status: DISCONTINUED | OUTPATIENT
Start: 2024-09-06 | End: 2024-09-06 | Stop reason: SDUPTHER

## 2024-09-06 RX ORDER — AMOXICILLIN 250 MG
2 CAPSULE ORAL 2 TIMES DAILY
Qty: 60 TABLET | Refills: 0 | Status: SHIPPED | OUTPATIENT
Start: 2024-09-06

## 2024-09-06 RX ORDER — ONDANSETRON 4 MG/1
4 TABLET, ORALLY DISINTEGRATING ORAL 3 TIMES DAILY PRN
Qty: 21 TABLET | Refills: 0 | Status: SHIPPED | OUTPATIENT
Start: 2024-09-06

## 2024-09-06 RX ORDER — MIDAZOLAM HYDROCHLORIDE 2 MG/2ML
2 INJECTION, SOLUTION INTRAMUSCULAR; INTRAVENOUS
Status: DISCONTINUED | OUTPATIENT
Start: 2024-09-06 | End: 2024-09-06 | Stop reason: HOSPADM

## 2024-09-06 RX ORDER — FAMOTIDINE 10 MG/ML
INJECTION, SOLUTION INTRAVENOUS PRN
Status: DISCONTINUED | OUTPATIENT
Start: 2024-09-06 | End: 2024-09-06 | Stop reason: SDUPTHER

## 2024-09-06 RX ORDER — CEPHALEXIN 500 MG/1
500 CAPSULE ORAL 4 TIMES DAILY
Qty: 28 CAPSULE | Refills: 0 | Status: SHIPPED | OUTPATIENT
Start: 2024-09-06

## 2024-09-06 RX ADMIN — FAMOTIDINE 20 MG: 10 INJECTION, SOLUTION INTRAVENOUS at 07:42

## 2024-09-06 RX ADMIN — PROPOFOL 50 MG: 10 INJECTION, EMULSION INTRAVENOUS at 07:55

## 2024-09-06 RX ADMIN — LIDOCAINE HYDROCHLORIDE 40 MG: 20 INJECTION, SOLUTION EPIDURAL; INFILTRATION; INTRACAUDAL; PERINEURAL at 10:06

## 2024-09-06 RX ADMIN — PROPOFOL 120 MG: 10 INJECTION, EMULSION INTRAVENOUS at 07:49

## 2024-09-06 RX ADMIN — ROCURONIUM BROMIDE 10 MG: 10 INJECTION, SOLUTION INTRAVENOUS at 08:40

## 2024-09-06 RX ADMIN — SUGAMMADEX 160 MG: 100 INJECTION, SOLUTION INTRAVENOUS at 10:10

## 2024-09-06 RX ADMIN — HYDROMORPHONE HYDROCHLORIDE 0.5 MG: 1 INJECTION, SOLUTION INTRAMUSCULAR; INTRAVENOUS; SUBCUTANEOUS at 10:48

## 2024-09-06 RX ADMIN — ONDANSETRON 4 MG: 2 INJECTION INTRAMUSCULAR; INTRAVENOUS at 09:31

## 2024-09-06 RX ADMIN — PROPOFOL 20 MG: 10 INJECTION, EMULSION INTRAVENOUS at 09:27

## 2024-09-06 RX ADMIN — HYDROMORPHONE HYDROCHLORIDE 0.5 MG: 2 INJECTION, SOLUTION INTRAMUSCULAR; INTRAVENOUS; SUBCUTANEOUS at 09:27

## 2024-09-06 RX ADMIN — DEXAMETHASONE SODIUM PHOSPHATE 8 MG: 4 INJECTION INTRA-ARTICULAR; INTRALESIONAL; INTRAMUSCULAR; INTRAVENOUS; SOFT TISSUE at 08:00

## 2024-09-06 RX ADMIN — SODIUM CHLORIDE, POTASSIUM CHLORIDE, SODIUM LACTATE AND CALCIUM CHLORIDE: 600; 310; 30; 20 INJECTION, SOLUTION INTRAVENOUS at 06:29

## 2024-09-06 RX ADMIN — HYDROMORPHONE HYDROCHLORIDE 0.5 MG: 2 INJECTION, SOLUTION INTRAMUSCULAR; INTRAVENOUS; SUBCUTANEOUS at 08:32

## 2024-09-06 RX ADMIN — OXYCODONE 5 MG: 5 TABLET ORAL at 11:31

## 2024-09-06 RX ADMIN — PROPOFOL 120 MCG/KG/MIN: 10 INJECTION, EMULSION INTRAVENOUS at 07:50

## 2024-09-06 RX ADMIN — WATER 2000 MG: 1 INJECTION INTRAMUSCULAR; INTRAVENOUS; SUBCUTANEOUS at 08:04

## 2024-09-06 RX ADMIN — LIDOCAINE HYDROCHLORIDE 40 MG: 20 INJECTION, SOLUTION EPIDURAL; INFILTRATION; INTRACAUDAL; PERINEURAL at 07:48

## 2024-09-06 RX ADMIN — MIDAZOLAM HYDROCHLORIDE 2 MG: 1 INJECTION, SOLUTION INTRAMUSCULAR; INTRAVENOUS at 07:42

## 2024-09-06 RX ADMIN — SODIUM CHLORIDE, POTASSIUM CHLORIDE, SODIUM LACTATE AND CALCIUM CHLORIDE: 600; 310; 30; 20 INJECTION, SOLUTION INTRAVENOUS at 10:01

## 2024-09-06 RX ADMIN — PROPOFOL 30 MG: 10 INJECTION, EMULSION INTRAVENOUS at 10:06

## 2024-09-06 RX ADMIN — FENTANYL CITRATE 50 MCG: 50 INJECTION, SOLUTION INTRAMUSCULAR; INTRAVENOUS at 07:48

## 2024-09-06 RX ADMIN — FENTANYL CITRATE 50 MCG: 50 INJECTION, SOLUTION INTRAMUSCULAR; INTRAVENOUS at 07:45

## 2024-09-06 RX ADMIN — ROCURONIUM BROMIDE 50 MG: 10 INJECTION, SOLUTION INTRAVENOUS at 07:50

## 2024-09-06 ASSESSMENT — PAIN DESCRIPTION - ORIENTATION
ORIENTATION: RIGHT

## 2024-09-06 ASSESSMENT — PAIN DESCRIPTION - LOCATION
LOCATION: BREAST

## 2024-09-06 ASSESSMENT — PAIN - FUNCTIONAL ASSESSMENT: PAIN_FUNCTIONAL_ASSESSMENT: 0-10

## 2024-09-06 ASSESSMENT — PAIN DESCRIPTION - ONSET
ONSET: ON-GOING
ONSET: AWAKENED FROM SLEEP
ONSET: ON-GOING
ONSET: ON-GOING

## 2024-09-06 ASSESSMENT — PAIN SCALES - GENERAL
PAINLEVEL_OUTOF10: 3
PAINLEVEL_OUTOF10: 5
PAINLEVEL_OUTOF10: 3
PAINLEVEL_OUTOF10: 5

## 2024-09-06 ASSESSMENT — PAIN DESCRIPTION - DESCRIPTORS
DESCRIPTORS: ACHING;THROBBING
DESCRIPTORS: ACHING

## 2024-09-06 ASSESSMENT — PAIN DESCRIPTION - PAIN TYPE
TYPE: SURGICAL PAIN

## 2024-09-06 ASSESSMENT — PAIN DESCRIPTION - FREQUENCY
FREQUENCY: CONTINUOUS

## 2024-09-06 NOTE — ANESTHESIA POSTPROCEDURE EVALUATION
Department of Anesthesiology  Postprocedure Note    Patient: Katarina Martinez  MRN: [id number]  YOB: 1960  Date of evaluation: 9/6/2024    Procedure Summary       Date: 09/06/24 Room / Location: [facility] MAIN OR  / [facility] MAIN OR    Anesthesia Start: 0742 Anesthesia Stop: 1030    Procedure: FAT GRAFTING TO BILATERAL BREAST (Bilateral: Breast) Diagnosis:       Personal history of breast cancer      Status post bilateral mastectomy      (Personal history of breast cancer [Z85.3])      (Status post bilateral mastectomy [Z90.13])    Surgeons: Martin Brown MD Responsible Provider: Stan Torres MD    Anesthesia Type: General, TIVA ASA Status: 2            Anesthesia Type: General, TIVA    Lamin Phase I: Lamin Score: 8    Lamin Phase II:      Anesthesia Post Evaluation    No notable events documented.

## 2024-09-06 NOTE — ANESTHESIA PRE PROCEDURE
Department of Anesthesiology  Preprocedure Note       Name:  Katarina Martinez   Age:  63 y.o.  :  1960                                          MRN:  [id number]         Date:  2024      Surgeon: Surgeon(s):  Martin Brown MD    Procedure: Procedure(s):  FAT GRAFTING TO BILATERAL BREAST, POSSIBLE BILATERAL MASTOPEXY    Medications prior to admission:   Prior to Admission medications    Medication Sig Start Date End Date Taking? Authorizing Provider   NONFORMULARY every morning [facility]   Yes Bhumi Palomo MD   hydroCHLOROthiazide (HYDRODIURIL) 25 MG tablet Take 1 tablet by mouth every morning. 5/3/24  Yes Remy Barney MD   NONFORMULARY Take 1 tablet by mouth Cutrelle-probiotics-prebiotics   Yes Bhumi Palomo MD   pantoprazole (PROTONIX) 40 MG tablet Take 1 tablet by mouth every morning (before breakfast) 24  Yes Remy Barney MD   levothyroxine (SYNTHROID) 25 MCG tablet TAKE 1 TABLET BY MOUTH ONCE DAILY BEFORE BREAKFAST 24  Yes Remy Barney MD   Psyllium (FIBER) 28.3 % POWD Take by mouth   Yes Bhumi Palomo MD   albuterol sulfate HFA (PROVENTIL;VENTOLIN;PROAIR) 108 (90 Base) MCG/ACT inhaler Inhale 2 puffs into the lungs every 4 hours as needed 7/3/22  Yes Automatic Reconciliation, Ar   cetirizine (ZYRTEC) 10 MG tablet Take 1 tablet by mouth daily as needed 10/19/22  Yes Automatic Reconciliation, Ar   fluticasone (FLONASE) 50 MCG/ACT nasal spray 2 sprays by Nasal route daily 10/19/22  Yes Automatic Reconciliation, Ar   Phendimetrazine Tartrate 35 MG TABS Take 35 mg by mouth daily. Max Daily Amount: 35 mg    Bhumi Palomo MD   Cobalamin Combinations (NEURIVA PLUS PO) Take by mouth every morning    Bhumi Palomo MD   Calcium Carb-Cholecalciferol (CALCIUM PLUS VITAMIN D PO) Take by mouth daily    Bhumi Palomo MD   B Complex Vitamins (VITAMIN B COMPLEX) TABS Take by mouth daily    Bhumi Palomo MD   Multiple

## 2024-09-06 NOTE — DISCHARGE INSTRUCTIONS
on purpose.    DIRECTIONS:  Take 10 to 15 slow deep breaths every hour while awake.  Breathe in deeply, and hold it for 2 seconds.  Exhale slowly through puckered lips, like blowing up a balloon.  After every 4th or 5th deep breath, hug your pillow to your chest or belly and give a hard, deep cough.      Yes, it will probably hurt.  But doing this exercise is a very important part of healing after surgery.  Take your pain medicine to help you do this exercise without too much pain.    Coughing and deep breathing help prevent bronchitis and pneumonia after surgery.  If you had chest or belly surgery, use a pillow as a \"hug gabriel\" and hold it tightly to your chest or belly when you cough.       ANKLE PUMPS    Ankle pumps increase the circulation of oxygenated blood to your lower extremities and decrease your risk for circulation problems such as blood clots. They also stretch the muscles, tendons and ligaments in your foot and ankle, and prevent joint contracture in the ankle and foot, especially after surgeries on the legs.    It is important to do ankle pump exercises regularly after surgery because immobility increases your risk for developing a blood clot.  Your doctor may also have you take an Aspirin for the next few days as well.      If your doctor did not ask you to take an Aspirin, consult with him before starting Aspirin therapy on your own.    The exercise is quite simple.     Slowly point your foot forward, feeling the muscles on the top of your lower leg stretch, and hold this position for 5 seconds.                  Next, pull your foot back toward you as far as possible, stretching the calf muscles, and hold that position for 5 seconds.                 Repeat with the other foot.  Perform 10 repetitions every hour while awake for both ankles if possible (down and then up with the foot once is one repetition).    You should feel gentle stretching of the muscles in your lower leg when doing this

## 2024-09-06 NOTE — H&P
Pre-op History and Physical    CC: Personal history of breast cancer [Z85.3]  Status post bilateral mastectomy [Z90.13]   HPI: 63 y.o. year old female with Personal history of breast cancer [Z85.3]  Status post bilateral mastectomy [Z90.13] for Procedure(s):  FAT GRAFTING TO BILATERAL BREAST, POSSIBLE BILATERAL MASTOPEXY.  Past medical history:   Past Medical History:   Diagnosis Date    Asthma CHILDHOOD    Chest pain 2017    normal Stress Echo 17.    COVID-19 virus infection 2022    7/10/22    Diabetes mellitus (HCC)     DJD (degenerative joint disease) of cervical spine 2023    xr    Ductal carcinoma in situ (DCIS) of right breast 2022    Gastritis and duodenitis 2019    egd - md ricky    GERD (gastroesophageal reflux disease)     Heartburn     Heterozygous familial hypercholesterolemia 2019    Hyperglycemia 2016    pre-diabetic    Hypertension     Hypothyroid 2018    IGT (impaired glucose tolerance) 2023    Microscopic colitis 2019    Neck nodule 05/10/2018    Obesity (BMI 30-39.9)     Dr. Barbara Gurrola, endo.    MOHAMUD (obstructive sleep apnea)     Patient utilizes CPAP    Statin intolerance 2022    Statin-induced myositis     Steatosis of liver 2016    TMJ (temporomandibular joint syndrome)     Tubular adenoma of colon 2017    Dr. Enrico Lauren    Type 2 diabetes mellitus without complication (HCC)     Uterine fibroid     Dr. Ela Marmolejo    Vitamin D deficiency 2018    Dr. Kinsey Gurrola      Past surgical history:   Past Surgical History:   Procedure Laterality Date    BREAST LUMPECTOMY Left     Atypical Cells.  Dr. Jeffrey Schaefer.    BREAST RECONSTRUCTION Bilateral 2022    BILATERAL BREAST RECONSTRUCTION WITH TISSUE EXPANDERS performed by Martin Brown MD at [facility] AMBULATORY OR     SECTION       x 2    COLONOSCOPY N/A 2019    COLONOSCOPY performed by Enrico Lauren MD at [facility] ENDOSCOPY  MD Bhumi   Multiple Vitamins-Minerals (CENTRUM SILVER PO) Take by mouth daily    ProviderBhumi MD   ezetimibe (ZETIA) 10 MG tablet Take 1 tablet by mouth daily. 8/9/24   Remy Barney MD WEGOVY 2.4 MG/0.75ML SOAJ SC injection Inject 2.4 mg into the skin every 7 days 6/27/24   eRmy Barney MD   Omega-3 1000 MG CAPS Take by mouth    Provider, MD Bhumi      Allergies:   Allergies   Allergen Reactions    Atorvastatin Other (See Comments)     Felt like it worsened memory.     Gadolinium Derivatives Nausea And Vomiting    Naltrexone-Bupropion Hcl Er Other (See Comments)     1 PILL MORNING, 2 PILLS EVENING  FELT ZOMBIED, \"OUT OF MY HEAD\"    Rosuvastatin Other (See Comments)     Felt like it worsened her memory      Review of systems:  Denies headache, fever, chills, weight change, congestion, sore throat, chest pain, shortness of breath, nausea, vomiting, diarrhea, constipation, abdominal pain, generalized weakness, muscle or joint pain, and rash.    Physical Exam:  Vitals: Blood pressure 128/78, pulse 85, temperature 98.3 °F (36.8 °C), temperature source Oral, resp. rate 12, height 1.651 m (5' 5\"), weight 84.8 kg (186 lb 15.2 oz), SpO2 95%.   General: awake and alert, NAD  Neck: supple  Cor: RRR  Lungs: clear  Abdomen: soft, non-tender, non-distended  Extremities: no edema  Skin: no rash    Impression: Personal history of breast cancer [Z85.3]  Status post bilateral mastectomy [Z90.13]    Plan:  Procedure(s):  FAT GRAFTING TO BILATERAL BREAST, POSSIBLE BILATERAL MASTOPEXY

## 2024-09-06 NOTE — OP NOTE
Operative Note      Patient: Katarina Martinez  YOB: 1960  MRN: [id number]    Date of Procedure: 9/6/2024    Pre-Op Diagnosis Codes:      * Personal history of breast cancer [Z85.3]     * Status post bilateral mastectomy [Z90.13]    Post-Op Diagnosis: Same       Procedure(s):  Right breast mastopexy  Left breast mastopexy  Fat grafting to right breast, 140 cc  Fat grafting to left breast, 20 cc    Surgeon(s):  Martin Brown MD    Assistant:   Surgical Assistant: Eva Carrizales    Anesthesia: General    Estimated Blood Loss (mL): less than 50     Complications: None    Specimens:   ID Type Source Tests Collected by Time Destination   1 : Valeria flap skin Tissue Breast SURGICAL PATHOLOGY Martin Brown MD 9/6/2024 1002    2 : Valeria flap skin and right breast tissue Tissue Breast SURGICAL PATHOLOGY Martin Brown MD 9/6/2024 1003        Implants:  * No implants in log *      Drains: * No LDAs found *    Findings:  Infection Present At Time Of Surgery (PATOS) (choose all levels that have infection present):  No infection present  Other Findings: healthy bilateral breast valeria flaps     Indications: This is a 63 year-old female with history of breast cancer who underwent bilateral skin sparing mastectomy with ultimate VALERIA flap reconstruction. She presents today to address her asymmetry and undergo bilateral mastopexy with fat grafting to bilateral breasts. Prior to the procedure, informed consent was obtained detailing the risks and alternate treatments. Risks include contour deformities, fat necrosis, oil cysts, asymmetry, and injury to surrounding structures. She agrees to proceed.     Detailed Description of Procedure:   Patient was greeted in the preoperative holding area. Informed consent was reviewed. She was marked in the upright standing position. Preoperative antibiotics were given and she was brought back to the operating room. She was sedated and intubated. Her surgical sites were prepped and  extubated without event and transferred to the pacu in stable condition.     Electronically signed by Martin Brown MD on 9/6/2024 at 10:22 AM

## 2024-09-16 ENCOUNTER — OFFICE VISIT (OUTPATIENT)
Age: 64
End: 2024-09-16
Payer: COMMERCIAL

## 2024-09-16 ENCOUNTER — CLINICAL DOCUMENTATION (OUTPATIENT)
Age: 64
End: 2024-09-16

## 2024-09-16 VITALS
BODY MASS INDEX: 31.99 KG/M2 | DIASTOLIC BLOOD PRESSURE: 73 MMHG | HEIGHT: 65 IN | SYSTOLIC BLOOD PRESSURE: 127 MMHG | HEART RATE: 93 BPM | TEMPERATURE: 98 F | OXYGEN SATURATION: 96 % | WEIGHT: 192 LBS

## 2024-09-16 DIAGNOSIS — I10 ESSENTIAL (PRIMARY) HYPERTENSION: ICD-10-CM

## 2024-09-16 DIAGNOSIS — G47.33 OBSTRUCTIVE SLEEP APNEA (ADULT) (PEDIATRIC): Primary | ICD-10-CM

## 2024-09-16 PROCEDURE — 3074F SYST BP LT 130 MM HG: CPT | Performed by: INTERNAL MEDICINE

## 2024-09-16 PROCEDURE — 99214 OFFICE O/P EST MOD 30 MIN: CPT | Performed by: INTERNAL MEDICINE

## 2024-09-16 PROCEDURE — 3078F DIAST BP <80 MM HG: CPT | Performed by: INTERNAL MEDICINE

## 2024-09-16 ASSESSMENT — SLEEP AND FATIGUE QUESTIONNAIRES
DO YOU HAVE DIFFICULTY WATCHING A MOVIE OR VIDEO BECAUSE YOU BECOME SLEEPY OR TIRED: YES, MODERATE
HOW LIKELY ARE YOU TO NOD OFF OR FALL ASLEEP WHEN YOU ARE A PASSENGER IN A CAR FOR AN HOUR WITHOUT A BREAK: WOULD NEVER DOZE
DO YOU HAVE DIFFICULTY BEING AS ACTIVE AS YOU WANT TO BE IN THE MORNING BECAUSE YOU ARE SLEEPY OR TIRED: NO
HOW LIKELY ARE YOU TO NOD OFF OR FALL ASLEEP WHILE LYING DOWN TO REST IN THE AFTERNOON WHEN CIRCUMSTANCES PERMIT: SLIGHT CHANCE OF DOZING
HOW LIKELY ARE YOU TO NOD OFF OR FALL ASLEEP WHEN YOU ARE A PASSENGER IN A CAR FOR AN HOUR WITHOUT A BREAK: WOULD NEVER DOZE
HOW LIKELY ARE YOU TO NOD OFF OR FALL ASLEEP WHILE SITTING INACTIVE IN A PUBLIC PLACE: WOULD NEVER DOZE
HOW LIKELY ARE YOU TO NOD OFF OR FALL ASLEEP WHILE WATCHING TV: MODERATE CHANCE OF DOZING
HOW LIKELY ARE YOU TO NOD OFF OR FALL ASLEEP WHILE LYING DOWN TO REST IN THE AFTERNOON WHEN CIRCUMSTANCES PERMIT: SLIGHT CHANCE OF DOZING
ESS TOTAL SCORE: 4
HOW LIKELY ARE YOU TO NOD OFF OR FALL ASLEEP WHILE SITTING AND READING: WOULD NEVER DOZE
HOW LIKELY ARE YOU TO NOD OFF OR FALL ASLEEP WHILE SITTING AND TALKING TO SOMEONE: WOULD NEVER DOZE
HOW LIKELY ARE YOU TO NOD OFF OR FALL ASLEEP WHILE SITTING INACTIVE IN A PUBLIC PLACE: WOULD NEVER DOZE
HAS YOUR MOOD BEEN AFFECTED BECAUSE YOU ARE SLEEPY OR TIRED: NO
HOW LIKELY ARE YOU TO NOD OFF OR FALL ASLEEP IN A CAR, WHILE STOPPED FOR A FEW MINUTES IN TRAFFIC: SLIGHT CHANCE OF DOZING
DO YOU HAVE DIFFICULTY OPERATING A MOTOR VEHICLE FOR LONG DISTANCES (GREATER THAN 100 MILES) BECAUSE YOU BECOME SLEEPY: NO
HOW LIKELY ARE YOU TO NOD OFF OR FALL ASLEEP IN A CAR, WHILE STOPPED FOR A FEW MINUTES IN TRAFFIC: SLIGHT CHANCE OF DOZING
HOW LIKELY ARE YOU TO NOD OFF OR FALL ASLEEP WHILE WATCHING TV: MODERATE CHANCE OF DOZING
DO YOU HAVE DIFFICULTY BEING AS ACTIVE AS YOU WANT TO BE IN THE EVENING BECAUSE YOU ARE SLEEPY OR TIRED: NO
DO YOU GENERALLY HAVE DIFFICULTY REMEMBERING THINGS BECAUSE YOU ARE SLEEPY OR TIRED: YES, A LITTLE
HOW LIKELY ARE YOU TO NOD OFF OR FALL ASLEEP WHILE SITTING AND READING: WOULD NEVER DOZE
HOW LIKELY ARE YOU TO NOD OFF OR FALL ASLEEP WHILE SITTING QUIETLY AFTER LUNCH WITHOUT ALCOHOL: WOULD NEVER DOZE
FOSQ SCORE: 18.5
DO YOU HAVE DIFFICULTY CONCENTRATING ON THE THINGS YOU DO BECAUSE YOU ARE SLEEPY OR TIRED: NO
DO YOU HAVE DIFFICULTY VISITING YOUR FAMILY OR FRIENDS IN THEIR HOME BECAUSE YOU BECOME SLEEPY OR TIRED: NO
DO YOU HAVE DIFFICULTY OPERATING A MOTOR VEHICLE FOR SHORT DISTANCES (LESS THAN 100 MILES) BECAUSE YOU BECOME SLEEPY: NO
HOW LIKELY ARE YOU TO NOD OFF OR FALL ASLEEP WHILE SITTING QUIETLY AFTER LUNCH WITHOUT ALCOHOL: WOULD NEVER DOZE
HAS YOUR RELATIONSHIP WITH FAMILY, FRIENDS OR WORK COLLEAGUES BEEN AFFECTED BECAUSE YOU ARE SLEEPY OR TIRED: NO
HOW LIKELY ARE YOU TO NOD OFF OR FALL ASLEEP WHILE SITTING AND TALKING TO SOMEONE: WOULD NEVER DOZE

## 2024-09-19 ENCOUNTER — TRANSCRIBE ORDERS (OUTPATIENT)
Facility: HOSPITAL | Age: 64
End: 2024-09-19

## 2024-09-19 ENCOUNTER — OFFICE VISIT (OUTPATIENT)
Age: 64
End: 2024-09-19
Payer: COMMERCIAL

## 2024-09-19 VITALS — BODY MASS INDEX: 30.82 KG/M2 | HEIGHT: 65 IN | WEIGHT: 185 LBS

## 2024-09-19 DIAGNOSIS — Z90.13 STATUS POST BILATERAL MASTECTOMY: ICD-10-CM

## 2024-09-19 DIAGNOSIS — Z85.3 HISTORY OF BREAST CANCER IN FEMALE: ICD-10-CM

## 2024-09-19 DIAGNOSIS — D05.11 DUCTAL CARCINOMA IN SITU (DCIS) OF RIGHT BREAST: Primary | ICD-10-CM

## 2024-09-19 DIAGNOSIS — I89.0 LYMPHEDEMA: Primary | ICD-10-CM

## 2024-09-19 PROCEDURE — 99213 OFFICE O/P EST LOW 20 MIN: CPT | Performed by: NURSE PRACTITIONER

## 2024-10-04 PROBLEM — Z01.818 PREOP EXAMINATION: Status: RESOLVED | Noted: 2024-09-04 | Resolved: 2024-10-04

## 2024-10-18 NOTE — DISCHARGE INSTRUCTIONS
Patient Education        Abdominal Pain: Care Instructions  Your Care Instructions    Abdominal pain has many possible causes. Some aren't serious and get better on their own in a few days. Others need more testing and treatment. If your pain continues or gets worse, you need to be rechecked and may need more tests to find out what is wrong. You may need surgery to correct the problem. Don't ignore new symptoms, such as fever, nausea and vomiting, urination problems, pain that gets worse, and dizziness. These may be signs of a more serious problem. Your doctor may have recommended a follow-up visit in the next 8 to 12 hours. If you are not getting better, you may need more tests or treatment. The doctor has checked you carefully, but problems can develop later. If you notice any problems or new symptoms, get medical treatment right away. Follow-up care is a key part of your treatment and safety. Be sure to make and go to all appointments, and call your doctor if you are having problems. It's also a good idea to know your test results and keep a list of the medicines you take. How can you care for yourself at home? · Rest until you feel better. · To prevent dehydration, drink plenty of fluids, enough so that your urine is light yellow or clear like water. Choose water and other caffeine-free clear liquids until you feel better. If you have kidney, heart, or liver disease and have to limit fluids, talk with your doctor before you increase the amount of fluids you drink. · If your stomach is upset, eat mild foods, such as rice, dry toast or crackers, bananas, and applesauce. Try eating several small meals instead of two or three large ones. · Wait until 48 hours after all symptoms have gone away before you have spicy foods, alcohol, and drinks that contain caffeine. · Do not eat foods that are high in fat. · Avoid anti-inflammatory medicines such as aspirin, ibuprofen (Advil, Motrin), and naproxen (Aleve). REASON FOR VISIT:  Chief Complaint   Patient presents with    Office Visit    Eye Exam     Patient is here for a complete eye exam. MICAH 8/2021 with Dr. Shay. HE@ reports decrease in VA (NV & Intermediate VA); c/o blurred vision. HX VF loss-HX Stroke. Current eye correction: GLS. (-) flashes, eye pain, headaches. He c/o diplopia when fatigued. Floaters present and stable. Glares with oncoming headlights at night. No other ocular complaints. Pt  denies family Hx of RD or GLC.       HISTORY OF PRESENT ILLNESS: Aquiles Shay is a 72 year old male who presents for a comprehensive dilated eye examination and refraction for evaluation of ocular health and refractive error. Patient was last examined by Dr. Leandro Shay 08/30/21. He reports a decrease in his near and intermediate vision.  He notes a history of a stroke impeding his left visual field.  He states he experiences glare with oncoming headlights at night and intermittent diplopia when tired.  No other complaints at this time.    Imaging History   MRI brain 8/14/21  IMPRESSION:   Subacute ischemia with cytotoxic edema within the medial right occipital lobe.  FINDINGS:   IMPRESSION:     CTA neck:   1.  No significant stenosis of either carotid system.  2.  Patent codominant bilateral vertebral artery. The left vertebral artery  arises at the arch.     CTA head:   1.  No large vessel occlusion.  2.  Bilateral cavernous ICA moderate atheromatous plaque in moderate  supraclinoid stenosis. 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  3.  Moderate, 50-69%, focal luminal narrowing at the mid basilar artery,  presumably atheromatous.  4.  Nonvisualization of the right PICA and right AICA, likely an anatomic  variation.     CT head:   1.  Redemonstrated presumed evolving infarct at the right PCA territory.  There are 2 linear branching enhancing structures along the lateral margin  of this infarct with the appearance of developmental venous  These can cause stomach upset. Talk to your doctor if you take daily aspirin for another health problem. When should you call for help? Call 911 anytime you think you may need emergency care. For example, call if:    · You passed out (lost consciousness).     · You pass maroon or very bloody stools.     · You vomit blood or what looks like coffee grounds.     · You have new, severe belly pain.    Call your doctor now or seek immediate medical care if:    · Your pain gets worse, especially if it becomes focused in one area of your belly.     · You have a new or higher fever.     · Your stools are black and look like tar, or they have streaks of blood.     · You have unexpected vaginal bleeding.     · You have symptoms of a urinary tract infection. These may include:  ? Pain when you urinate. ? Urinating more often than usual.  ? Blood in your urine.     · You are dizzy or lightheaded, or you feel like you may faint.    Watch closely for changes in your health, and be sure to contact your doctor if:    · You are not getting better after 1 day (24 hours). Where can you learn more? Go to http://coronaexactEarth Ltdandreea.info/. Enter T496 in the search box to learn more about \"Abdominal Pain: Care Instructions. \"  Current as of: June 26, 2019  Content Version: 12.2  © 9247-5369 [employer]. Care instructions adapted under license by [employer] (which disclaims liability or warranty for this information). If you have questions about a medical condition or this instruction, always ask your healthcare professional. [place] [address] any warranty or liability for your use of this information. Patient Education        Nausea and Vomiting: Care Instructions  Your Care Instructions    When you are nauseated, you may feel weak and sweaty and notice a lot of saliva in your mouth. Nausea often leads to vomiting.  Most of the time you do not need to worry about nausea and anomalies.  Recommend MRI of the brain without and with contrast.  2.  No intracranial hemorrhage.    ASSESSMENT:  1. Combined forms of age-related cataract of both eyes    2. History of stroke    3. Transient diplopia    4. Hyperopia of both eyes with astigmatism and presbyopia        PLAN:  Orders Placed This Encounter   Procedures    REFRACTION       Cataracts are not affecting vision. Discussed the natural history/progression, treatment options, and expectations of cataracts. Surgery is not indicated at this time. Patient advised to call my office with changes in his vision.   History of right occipital stroke resulting in left-sided visual field loss.  Previous visual field testing completed by Dr. Shay after event.  He noted a dense, highly congruent, left homonymous hemianopia with some meridional sparing superiorly.  Briefly discussed option of yoked prism to enhance visual field, would not like to pursue at this time. No emboli/infarcts noted on dilated fundus exam. Continue vascular management with primary care/cardiology.   Diplopia likely due to eye misalignment when fatigued.  Patient may benefit from vision therapy. Patient to contact office should he like to pursue.   Prescription given for eyeglasses as written below. Recommend 100% UV protection when outdoors to lessen the risk of cataracts, macular degeneration, and eyelid skin cancers.     FOLLOWUP:  Return in about 1 year (around 10/18/2025) for CEE.      FINAL Rx GLASSES:  Final Rx         Sphere Cylinder [place] Dist VA Add Near VA    Right +1.00 +1.25 003 20/20 +3.00 20/20    Left +1.00 +1.00 143 20/25-2 +3.00 20/30      Expiration Date: 10/18/2026          Final Rx #2         Sphere Cylinder [place] Dist VA Add Near VA    Right +1.00 +1.25 003 20/20 +3.50 20/20    Left +1.00 +1.00 143 20/25-2 +3.50 20/30      Type: Fishing glasses    Expiration Date: 10/18/2026            I reviewed the allergies, medication list, and past medical, family, and  social history sections listed in the medical record as well as any pertinent nursing/tech notes and recent labs.   vomiting, but they can be signs of other illnesses. Two common causes of nausea and vomiting are stomach flu and food poisoning. Nausea and vomiting from viral stomach flu will usually start to improve within 24 hours. Nausea and vomiting from food poisoning may last from 12 to 48 hours. The doctor has checked you carefully, but problems can develop later. If you notice any problems or new symptoms, get medical treatment right away. Follow-up care is a key part of your treatment and safety. Be sure to make and go to all appointments, and call your doctor if you are having problems. It's also a good idea to know your test results and keep a list of the medicines you take. How can you care for yourself at home? · To prevent dehydration, drink plenty of fluids, enough so that your urine is light yellow or clear like water. Choose water and other caffeine-free clear liquids until you feel better. If you have kidney, heart, or liver disease and have to limit fluids, talk with your doctor before you increase the amount of fluids you drink. · Rest in bed until you feel better. · When you are able to eat, try clear soups, mild foods, and liquids until all symptoms are gone for 12 to 48 hours. Other good choices include dry toast, crackers, cooked cereal, and gelatin dessert, such as Jell-O. When should you call for help? Call 911 anytime you think you may need emergency care. For example, call if:    · You passed out (lost consciousness).    Call your doctor now or seek immediate medical care if:    · You have symptoms of dehydration, such as:  ? Dry eyes and a dry mouth. ? Passing only a little dark urine. ?  Feeling thirstier than usual.     · You have new or worsening belly pain.     · You have a new or higher fever.     · You vomit blood or what looks like coffee grounds.    Watch closely for changes in your health, and be sure to contact your doctor if:    · You have ongoing nausea and vomiting.     · Your vomiting is getting worse.     · Your vomiting lasts longer than 2 days.     · You are not getting better as expected. Where can you learn more? Go to http://corona-andreea.info/. Enter 25 [id number] in the search box to learn more about \"Nausea and Vomiting: Care Instructions. \"  Current as of: June 26, 2019  Content Version: 12.2  © 6065-4441 [employer]. Care instructions adapted under license by [employer] (which disclaims liability or warranty for this information). If you have questions about a medical condition or this instruction, always ask your healthcare professional. [place] [address] any warranty or liability for your use of this information.

## 2024-10-21 ENCOUNTER — HOSPITAL ENCOUNTER (OUTPATIENT)
Facility: HOSPITAL | Age: 64
Setting detail: RECURRING SERIES
Discharge: HOME | End: 2024-10-24
Payer: MEDICARE

## 2024-10-21 PROCEDURE — 97535 SELF CARE MNGMENT TRAINING: CPT

## 2024-10-21 PROCEDURE — 97165 OT EVAL LOW COMPLEX 30 MIN: CPT

## 2024-10-21 NOTE — THERAPY EVALUATION
[facility]  a part of [facility]   [address], [address]  [place] [address]  Phone: [phone]    Fax: [phone]                                                                                PT/OT LYMPHEDEMA - EVALUATION/PLAN OF CARE NOTE (updated 3/23)      Date: 10/21/2024          Patient Name:  Katarina Martinez :  1960   Medical   Diagnosis:  Lymphedema [I89.0] Treatment Diagnosis:  I89.0     Lymphedema, not elsewhere classified    Referral Source:  Martin Brown MD Provider #:  [id number]                Insurance: Payor: HUMANA MEDICARE / Plan: HUMANA GOLD PLUS HMO / Product Type: *No Product type* /      Patient  verified yes     Visit #   Current  / Total 1 1   Time   In / Out 09:55 AM 11:13 AM   Total Treatment Time 78   Total Timed Codes 38   1:1 Treatment Time 38       BC Totals Reminder:  bill using total billable   min of TIMED therapeutic procedures and modalities.   8-22 min = 1 unit; 23-37 min = 2 units; 38-52 min = 3 units;  53-67 min = 4 units; 68-82 min = 5 units         SUBJECTIVE  Pain Level (0-10 scale): 0/10; I do have pain because of swelling, but not today  []constant [x]intermittent []improving []worsening []no change since onset    Any medication changes, allergies to medications, adverse drug reactions, diagnosis change, or new procedure performed?: [x] No    [] Yes (see summary sheet for update)  Medications: Verified on Patient Summary List    Subjective functional status/changes:     \"I have had swelling in my fingers the last couple of days.\"  \"I have another appointment after this if we can finish up a little early.\"    Start of Care: 10/21/2024  Onset Date: 2024; revision surgery  Current symptoms/Complaints: swelling, and occasional pain, fatigue, just feels bloated, impacted by the least little things I eat and drink keep me bloated for 3 days  Mechanism of Injury: revision surgery (fat grafting to B breasts);

## 2024-11-01 ENCOUNTER — PATIENT MESSAGE (OUTPATIENT)
Facility: CLINIC | Age: 64
End: 2024-11-01

## 2024-11-01 RX ORDER — BEMPEDOIC ACID 180 MG/1
1 TABLET, FILM COATED ORAL DAILY
Qty: 30 TABLET | Refills: 0 | OUTPATIENT
Start: 2024-11-01

## 2024-11-01 RX ORDER — EZETIMIBE 10 MG/1
10 TABLET ORAL DAILY
Qty: 90 TABLET | Refills: 3 | Status: SHIPPED | OUTPATIENT
Start: 2024-11-01

## 2024-11-04 DIAGNOSIS — E78.01 HETEROZYGOUS FAMILIAL HYPERCHOLESTEROLEMIA: Primary | ICD-10-CM

## 2024-11-04 DIAGNOSIS — Z78.9 STATIN INTOLERANCE: ICD-10-CM

## 2024-11-07 ENCOUNTER — HOSPITAL ENCOUNTER (OUTPATIENT)
Facility: HOSPITAL | Age: 64
Setting detail: RECURRING SERIES
Discharge: HOME | End: 2024-11-10
Payer: MEDICARE

## 2024-11-07 PROCEDURE — 97535 SELF CARE MNGMENT TRAINING: CPT

## 2024-11-07 PROCEDURE — 97760 ORTHOTIC MGMT&TRAING 1ST ENC: CPT

## 2024-11-07 NOTE — THERAPY EVALUATION
PHYSICAL THERAPY/OCCUPATIONAL THERAPY - MEDICARE DAILY TREATMENT NOTE (updated 3/23)      Date: 2024          Patient Name:  Katarina Martinez :  1960   Medical   Diagnosis:  Lymphedema [I89.0] Treatment Diagnosis:  I89.0     Lymphedema, not elsewhere classified    Referral Source:  Martin Brown MD Insurance:   Payor: HUMANA MEDICARE / Plan: HUMANA GOLD PLUS HMO / Product Type: *No Product type* /                     Patient  verified yes     Visit #   Current  / Total 1 12   Time   In / Out 2:55 PM 4:00 PM   Total Treatment Time 65   Total Timed Codes 55   1:1 Treatment Time 55      MC BC Totals Reminder:  bill using total billable   min of TIMED therapeutic procedures and modalities.   8-22 min = 1 unit; 23-37 min = 2 units; 38-52 min = 3 units;  53-67 min = 4 units; 68-82 min = 5 units         SUBJECTIVE    Pain Level (0-10 scale): 0/10    Any medication changes, allergies to medications, adverse drug reactions, diagnosis change, or new procedure performed?: [x] No    [] Yes (see summary sheet for update)  Medications: Verified on Patient Summary List    Subjective functional status/changes:     \"It's still mostly in my abdomen [swelling], but I feel it here too (indicating R lateral chest).\"    OBJECTIVE      Therapeutic Procedures:  Tx Min Billable or 1:1 Min (if diff from Tx Min) Procedure, Rationale, Specifics   10  25880 Self Care/Home Management (timed):  improve patient knowledge and understanding of home injury/symptom/pain management, positioning, and activity modification  to improve patient's ability to progress to PLOF and address remaining functional goals.  (see flow sheet as applicable)    Details if applicable:      Skin/wound care/debridement:   Reviewed skin care principles:   Low pH lotion  Application following MLD principles  Signs and symptoms of cellulitis  Prevention of cellulitis  How to care for skin injuries           45  [id number] [facility] MIHIR YA

## 2024-11-11 ENCOUNTER — HOSPITAL ENCOUNTER (OUTPATIENT)
Facility: HOSPITAL | Age: 64
Setting detail: RECURRING SERIES
Discharge: HOME | End: 2024-11-14
Payer: MEDICARE

## 2024-11-11 PROCEDURE — 97535 SELF CARE MNGMENT TRAINING: CPT

## 2024-11-11 PROCEDURE — 97763 ORTHC/PROSTC MGMT SBSQ ENC: CPT

## 2024-11-11 PROCEDURE — 97110 THERAPEUTIC EXERCISES: CPT

## 2024-11-11 NOTE — THERAPY EVALUATION
progress  Patient will be measured for comfortable and optimal fitting compression products to prevent reaccumulation of fluid at discharge which could impair patient's ability to perform safe mobility and dressing.   - in progress     Long Term Goals: To be accomplished in 12 treatments.  Patient/caregiver will demonstrate improved edema management as evidenced by performing donning/doffing of garments modified independent 3/3 times within session to aid in reducing risk for infection and promote transition to maintenance phase of CDT.  - in progress  Patient will demonstrate improved functional status evidenced by improved score on FOTO outcome measure from 58/100 to 67/100 as predicted by FOTO (Focus on Therapeutic Outcomes).  - in progress  Patient will complete a demonstration in clinic of a home vaso-pneumatic device, if appropriate, for purpose of home use to prevent re-accumulation of fluid for improved tolerance of mobility and ambulation and/or decrease the feeling of limb heaviness with bathing and dressing during the restorative phase of care.   - in progress      PLAN  Yes  Continue plan of care  Re-Cert Due: 01/19/2025  [x]  Upgrade activities as tolerated  []  Discharge due to:  []  Other:      Crys Matos OT/ KASANDRA       11/11/2024       7:09 AM

## 2024-11-18 ENCOUNTER — HOSPITAL ENCOUNTER (OUTPATIENT)
Facility: HOSPITAL | Age: 64
Setting detail: RECURRING SERIES
Discharge: HOME | End: 2024-11-21
Payer: MEDICARE

## 2024-11-18 PROCEDURE — 97110 THERAPEUTIC EXERCISES: CPT

## 2024-11-18 PROCEDURE — 97140 MANUAL THERAPY 1/> REGIONS: CPT

## 2024-11-18 NOTE — PROGRESS NOTES
[facility]  a part of [facility]   [address], [address]  [place] [address]  Phone: [phone]  Fax: [phone]    PHYSICAL THERAPY/OCCUPATIONAL THERAPY PROGRESS NOTE  Patient Name:  Katarina Martinez :  1960   Treatment/Medical Diagnosis: Lymphedema [I89.0]   Referral Source:  Martin Brown MD     Date of Initial Visit:  10/21/2024 Attended Visits:  4 Missed Visits:  0     SUMMARY OF TREATMENT/ASSESSMENT:  Pt returned for treatment today. Was able to review HEP. Pt has been performing daily and is independent with routine. Performed MLD and began instruction of MLD by educating pt regarding basic principles. Pt did not perform self MLD today. Hand-out and more detailed instruction will be provided on subsequent visit. Also able to update all objective measures today. Pt's FOTO score today was 62/100 with 100 denoting highest level of function, compared to 58/100 on admission. Given this patient’s risk-adjustment variables, and the actual Intake FS score, FOTO predicted this patient will experience at least an increase in function of 9 points (to 67 or higher). In regard to volume measurements, pt's RUE measures 3,044.23 ml today compared to 3,118.03  ml on 10/21/2024. Her LUE measures  2,931.04 ml today compared to  2,936.12  ml on 10/21/2024. Percent difference today is 3.86% as compared to 6.2% on evaluation. Pt tolerated treatment well and will continue to benefit from skilled OT services to address swelling, analyze and address soft tissue restrictions, analyze and cue movement patterns, analyze compression product fit and use, instruct in home and community integration, instruct in home lymphedema management program, measure for compression products, and modify and progress therapeutic interventions to address functional deficits and attain remaining goals.    CURRENT STATUS/GOALS    Short Term Goals: To be accomplished in 6 treatments.  Patient and/or

## 2024-11-18 NOTE — THERAPY EVALUATION
PHYSICAL THERAPY/OCCUPATIONAL THERAPY - MEDICARE DAILY TREATMENT NOTE (updated 3/23)      Date: 2024          Patient Name:  Katarina Martinez :  1960   Medical   Diagnosis:  Lymphedema [I89.0] Treatment Diagnosis:  I89.0     Lymphedema, not elsewhere classified    Referral Source:  Martin Brown MD Insurance:   Payor: HUMANA MEDICARE / Plan: HUMANA GOLD PLUS HMO / Product Type: *No Product type* /                     Patient  verified yes     Visit #   Current  / Total 4 12   Time   In / Out 1:15 PM 2:42 PM   Total Treatment Time 87   Total Timed Codes 87   1:1 Treatment Time 87       BC Totals Reminder:  bill using total billable   min of TIMED therapeutic procedures and modalities.   8-22 min = 1 unit; 23-37 min = 2 units; 38-52 min = 3 units;  53-67 min = 4 units; 68-82 min = 5 units         SUBJECTIVE    Pain Level (0-10 scale): 0/10    Any medication changes, allergies to medications, adverse drug reactions, diagnosis change, or new procedure performed?: [x] No    [] Yes (see summary sheet for update)  Medications: Verified on Patient Summary List    Subjective functional status/changes:     None reported.    OBJECTIVE      Therapeutic Procedures:  Tx Min Billable or 1:1 Min (if diff from Tx Min) Procedure, Rationale, Specifics   77  39954 Manual Therapy (timed):  decrease pain, increase ROM, decrease edema, and increase postural awareness to improve patient's ability to progress to PLOF and address remaining functional goals.  The manual therapy interventions were performed at a separate and distinct time from the therapeutic activities interventions . (see flow sheet as applicable)    Details if applicable:     Manual Lymphatic Drainage (MLD):  Area to decongest:   UE: Bilateral and trunk   Sequence used and effectiveness: Modifications were made to manual lymph drainage sequence to exclude cervical techniques secondary to patient's age., Secondary sequence for upper extremity with

## 2024-11-27 ENCOUNTER — CLINICAL DOCUMENTATION (OUTPATIENT)
Facility: CLINIC | Age: 64
End: 2024-11-27

## 2024-12-02 ENCOUNTER — HOSPITAL ENCOUNTER (OUTPATIENT)
Facility: HOSPITAL | Age: 64
Setting detail: RECURRING SERIES
Discharge: HOME | End: 2024-12-05
Payer: MEDICARE

## 2024-12-02 PROCEDURE — 97140 MANUAL THERAPY 1/> REGIONS: CPT

## 2024-12-02 NOTE — PROGRESS NOTES
PHYSICAL THERAPY/OCCUPATIONAL THERAPY - MEDICARE DAILY TREATMENT NOTE (updated 3/23)      Date: 2024          Patient Name:  Katarina Martinez :  1960   Medical   Diagnosis:  Lymphedema [I89.0] Treatment Diagnosis:  I89.0     Lymphedema, not elsewhere classified    Referral Source:  Martin Brown MD Insurance:   Payor: HUMANA MEDICARE / Plan: HUMANA GOLD PLUS HMO / Product Type: *No Product type* /                     Patient  verified yes     Visit #   Current  / Total 5 12   Time   In / Out 11:33 PM 12:55 PM   Total Treatment Time 82   Total Timed Codes 78   1:1 Treatment Time 78      MC BC Totals Reminder:  bill using total billable   min of TIMED therapeutic procedures and modalities.   8-22 min = 1 unit; 23-37 min = 2 units; 38-52 min = 3 units;  53-67 min = 4 units; 68-82 min = 5 units         SUBJECTIVE    Pain Level (0-10 scale): 0/10    Any medication changes, allergies to medications, adverse drug reactions, diagnosis change, or new procedure performed?: [x] No    [] Yes (see summary sheet for update)  Medications: Verified on Patient Summary List    Subjective functional status/changes:     \"I think I might be gaining weight. Or my swelling might be up.\"    OBJECTIVE  Pt has been performing:    Elevation from 10/21/2024 to present.  Lymphedema exercises from 2024 to present.  Self MLD daily from 2024 to present.  Wearing compression of at least 20-30 mmHg from 2024 to present.      Therapeutic Procedures:  Tx Min Billable or 1:1 Min (if diff from Tx Min) Procedure, Rationale, Specifics   77  35098 Manual Therapy (timed):  decrease pain, increase ROM, decrease edema, and increase postural awareness to improve patient's ability to progress to PLOF and address remaining functional goals.  The manual therapy interventions were performed at a separate and distinct time from the therapeutic activities interventions . (see flow sheet as applicable)    Details if applicable:

## 2024-12-09 ENCOUNTER — HOSPITAL ENCOUNTER (OUTPATIENT)
Facility: HOSPITAL | Age: 64
Setting detail: RECURRING SERIES
Discharge: HOME | End: 2024-12-12
Payer: MEDICARE

## 2024-12-09 PROCEDURE — 97140 MANUAL THERAPY 1/> REGIONS: CPT

## 2024-12-09 PROCEDURE — 97763 ORTHC/PROSTC MGMT SBSQ ENC: CPT

## 2024-12-09 NOTE — PROGRESS NOTES
PHYSICAL THERAPY/OCCUPATIONAL THERAPY - MEDICARE DAILY TREATMENT NOTE (updated 3/23)      Date: 2024          Patient Name:  Katarina Martinez :  1960   Medical   Diagnosis:  Lymphedema [I89.0] Treatment Diagnosis:  I89.0     Lymphedema, not elsewhere classified    Referral Source:  Martin Brown MD Insurance:   Payor: HUMANA MEDICARE / Plan: HUMANA GOLD PLUS HMO / Product Type: *No Product type* /                     Patient  verified yes     Visit #   Current  / Total 6 12   Time   In / Out 1:17 PM 2:49 PM   Total Treatment Time 92   Total Timed Codes    1:1 Treatment Time       [facility] Totals Reminder:  bill using total billable   min of TIMED therapeutic procedures and modalities.   8-22 min = 1 unit; 23-37 min = 2 units; 38-52 min = 3 units;  53-67 min = 4 units; 68-82 min = 5 units         SUBJECTIVE    Pain Level (0-10 scale): 2/10 at rest, more when I'm walking; not related to lymphedema    Any medication changes, allergies to medications, adverse drug reactions, diagnosis change, or new procedure performed?: [x] No    [] Yes (see summary sheet for update)  Medications: Verified on Patient Summary List    Subjective functional status/changes:     \"I'm a little sore today. I think I overdid my exercising. But the swelling is okay.\"    OBJECTIVE  Pt has been performing:    Elevation from 10/21/2024 to present.  Lymphedema exercises from 2024 to present.  Self MLD daily from 2024 to present.  Wearing compression of at least 20-30 mmHg from 2024 to present (this is corrected from previous note as pt began wearing garments a week prior to her coming in with them).      Therapeutic Procedures:  Tx Min Billable or 1:1 Min (if diff from Tx Min) Procedure, Rationale, Specifics   35  22395 Manual Therapy (timed):  decrease pain, increase ROM, decrease edema, and increase postural awareness to improve patient's ability to progress to PLOF and address remaining functional goals.  The

## 2024-12-16 ENCOUNTER — APPOINTMENT (OUTPATIENT)
Facility: HOSPITAL | Age: 64
End: 2024-12-16
Payer: MEDICARE

## 2024-12-30 ENCOUNTER — HOSPITAL ENCOUNTER (OUTPATIENT)
Facility: HOSPITAL | Age: 64
Setting detail: RECURRING SERIES
Discharge: HOME | End: 2025-01-02
Payer: MEDICARE

## 2024-12-30 PROCEDURE — 97763 ORTHC/PROSTC MGMT SBSQ ENC: CPT

## 2024-12-30 PROCEDURE — 97016 VASOPNEUMATIC DEVICE THERAPY: CPT

## 2024-12-30 PROCEDURE — 97535 SELF CARE MNGMENT TRAINING: CPT

## 2024-12-30 NOTE — PROGRESS NOTES
PHYSICAL THERAPY/OCCUPATIONAL THERAPY - MEDICARE DAILY TREATMENT NOTE (updated 3/23)      Date: 2024          Patient Name:  Katarina Martinez :  1960   Medical   Diagnosis:  Lymphedema [I89.0] Treatment Diagnosis:  I89.0     Lymphedema, not elsewhere classified    Referral Source:  Martin Brown MD Insurance:   Payor: [employer] MEDICARE / Plan: [employer] GOLD PLUS HMO / Product Type: *No Product type* /                     Patient  verified yes     Visit #   Current  / Total 7 12   Time   In / Out 7:59 AM 9:31 AM   Total Treatment Time 92   Total Timed Codes 27   1:1 Treatment Time 27       BC Totals Reminder:  bill using total billable   min of TIMED therapeutic procedures and modalities.   8-22 min = 1 unit; 23-37 min = 2 units; 38-52 min = 3 units;  53-67 min = 4 units; 68-82 min = 5 units         SUBJECTIVE    Pain Level (0-10 scale): 0/10  Any medication changes, allergies to medications, adverse drug reactions, diagnosis change, or new procedure performed?: [x] No    [] Yes (see summary sheet for update)  Medications: Verified on Patient Summary List    Subjective functional status/changes:     \"I know I've gained weight because of eating and I haven't been exercising as much over the holidays. The pain in my hand has still been there day and night.\"  \"My R arm hurt when raising overhead more.\"    OBJECTIVE  Pt has been performing:    Elevation from 10/21/2024 to present.  Lymphedema exercises from 2024 to present.  Self MLD daily from 2024 to present.  Wearing compression of at least 20-30 mmHg from 2024 to present.      Therapeutic Procedures:  Tx Min Billable or 1:1 Min (if diff from Tx Min) Procedure, Rationale, Specifics   10  39249 Orthotic/Prosthetic Management and/or Training UE, LE and/or trunk, subsequent orthotic(s)/prosthetic(s) encounter (timed): modification, fitting adjustments and additional training to improve positioning of lower extremity during weight

## 2024-12-30 NOTE — PROGRESS NOTES
[facility]  a part of [facility][note 3]   [address], [address]  [place] [address]  Phone: [phone]  Fax: [phone]    PHYSICAL THERAPY/OCCUPATIONAL THERAPY PROGRESS NOTE  Patient Name:  Katarina Martinez :  1960   Treatment/Medical Diagnosis: Lymphedema [I89.0]   Referral Source:  Martin Brown MD     Date of Initial Visit:  10/21/2024 Attended Visits:   Missed Visits:  1     SUMMARY OF TREATMENT/ASSESSMENT:  Pt returned for treatment today. Was able to perform garment fitting today for pt's nighttime torso garment and perform vasopneumatic pump demonstration today. Patient presents with secondary BUE lymphedema I89.0, which extends into her R axilla, R lateral trunk and B breast. She has utilized compression, elevation, and exercise for greater than 4 weeks and continues to experience significant lymphedema symptoms, including pain, fibrosis, and hyperpigmentation. The [employer] pump was trialed for 20 minutes on medium pressure in the clinic on 2024 in the presence of the therapist. Her pre- and post- basic pump trial measurements are detailed in the charts below.     [x]  RIGHT Arm   Measurements  (PRE-trial)  [x]  RIGHT Arm Measurements  (Post-trial)    Inseam   67.0 cm  Inseam   67.0 cm    Axilla 103.0 cm  Axilla 102.8 cm    Chest 112.3 cm  Chest 110.8 cm    Xiphoid   99.3 cm  Xiphoid   98.5 cm    Waist 104.7 cm  Waist 104.7 cm    Hips 110.6 cm  Hips 108.6 cm    Biceps   34.8 cm  Biceps   34.8 cm    Forearm   27.1 cm  Forearm 26.8   cm    Wrist   17.8 cm  Wrist   17.7 cm        The increased proximal measurements demonstrate that the basic pump is causing fluid to accumulate in the patient's waist and hips and is not an appropriate treatment option. The Flexi Touch was then tested, and post-trial measurements of wrist, forearm, biceps, axilla, chest, xiphoid, waist, and hips were 17.7 cm, 26.8 cm, 34.8 cm, 102.8 cm, 110.8 cm,

## 2025-01-08 DIAGNOSIS — Z78.9 STATIN INTOLERANCE: ICD-10-CM

## 2025-01-08 DIAGNOSIS — E78.5 DYSLIPIDEMIA: ICD-10-CM

## 2025-01-08 DIAGNOSIS — E11.9 TYPE 2 DIABETES MELLITUS WITHOUT COMPLICATION, WITHOUT LONG-TERM CURRENT USE OF INSULIN (HCC): Primary | ICD-10-CM

## 2025-01-14 ENCOUNTER — HOSPITAL ENCOUNTER (OUTPATIENT)
Facility: HOSPITAL | Age: 65
Setting detail: RECURRING SERIES
Discharge: HOME | End: 2025-01-17
Payer: MEDICARE

## 2025-01-14 ENCOUNTER — APPOINTMENT (OUTPATIENT)
Facility: HOSPITAL | Age: 65
End: 2025-01-14
Payer: MEDICARE

## 2025-01-14 PROCEDURE — 97140 MANUAL THERAPY 1/> REGIONS: CPT

## 2025-01-14 PROCEDURE — 97535 SELF CARE MNGMENT TRAINING: CPT

## 2025-01-14 NOTE — THERAPY RECERTIFICATION
[facility]  a part of [facility]   [address], [address]  [place] [address]  Phone: [phone]  Fax: [phone]    CONTINUED PLAN OF CARE/RECERTIFICATION FOR PHYSICAL THERAPY or OCCUPATIONAL THERAPY        Patient Name:              Katarina Martinez :  1960   Treatment/Medical Diagnosis:  Lymphedema [I89.0]   Onset Date:  2024    Referral Source:  Martin Brown MD Start of Care (SOC):  10/21/2024   Prior Hospitalization:  See Medical History Provider #:  [id number]      Prior Level of Function (PLOF):  independent   Comorbidities:  DM, DJD, and HTN    Medications:  Verified on Patient Summary List   Visits from SOC:  8 Missed Visits:  1     Progress toward Goals:  Short Term Goals: To be accomplished in 6 treatments.  Patient and/or caregiver will verbalize 3/3 signs and symptoms of infection without external cueing, in order to reduce the risk of infection and skin impairment and to promote optimal self-management of condition.    Status at last Eval/Progress Note: Met  Current Status: Met  Goal Met?  Yes     Patient to perform lymphedema remedial home exercise program in session with modified independence utilizing HEP handout, in order to promote optimal independence with management of condition, as well as promote optimal limb volume reduction required for proper fit of donned clothing.  Status at last Eval/Progress Note:Met  Current Status: Met  Goal Met?  Yes     Patient will be measured for comfortable and optimal fitting compression products to prevent reaccumulation of fluid at discharge which could impair patient's ability to perform safe mobility and dressing.   Status at last Eval/Progress Note:  in progress  Current Status:Met  Goal Met?  Yes        Long Term Goals: To be accomplished in 12 treatments.  Patient/caregiver will demonstrate improved edema management as evidenced by performing donning/doffing of garments modified independent 3/3

## 2025-01-14 NOTE — PROGRESS NOTES
PHYSICAL THERAPY/OCCUPATIONAL THERAPY - MEDICARE DAILY TREATMENT NOTE (updated 3/23)      Date: 2025          Patient Name:  Katarina Martinez :  1960   Medical   Diagnosis:  Lymphedema [I89.0] Treatment Diagnosis:  I89.0     Lymphedema, not elsewhere classified    Referral Source:  Martin Brown MD Insurance:   Payor: [place] BCBS / [facility] / Product Type: *No Product type* /                     Patient  verified yes     Visit #   Current  / Total 8 12   Time   In / Out 11:30 AM 1:00 AM   Total Treatment Time 90   Total Timed Codes 90   1:1 Treatment Time 90       BC Totals Reminder:  bill using total billable   min of TIMED therapeutic procedures and modalities.   8-22 min = 1 unit; 23-37 min = 2 units; 38-52 min = 3 units;  53-67 min = 4 units; 68-82 min = 5 units         SUBJECTIVE    Pain Level (0-10 scale): 0/10  Any medication changes, allergies to medications, adverse drug reactions, diagnosis change, or new procedure performed?: [x] No    [] Yes (see summary sheet for update)  Medications: Verified on Patient Summary List    Subjective functional status/changes:     \"Something's going on with my R hand. I don't know if it's carpal tunnel.\"  \"Yesterday I had some pain in my R breast.\"    OBJECTIVE  Pt has been performing:    Elevation from 10/21/2024 to present.  Lymphedema exercises from 2024 to present.  Self MLD daily from 2024 to present.  Wearing compression of at least 20-30 mmHg from 2024 to present.      Therapeutic Procedures:  Tx Min Billable or 1:1 Min (if diff from Tx Min) Procedure, Rationale, Specifics   80  49121 Manual Therapy (timed):  decrease pain, increase ROM, decrease edema, and increase postural awareness to improve patient's ability to progress to PLOF and address remaining functional goals.  The manual therapy interventions were performed at a separate and distinct time from the therapeutic activities interventions . (see flow

## 2025-01-18 SDOH — ECONOMIC STABILITY: INCOME INSECURITY: IN THE LAST 12 MONTHS, WAS THERE A TIME WHEN YOU WERE NOT ABLE TO PAY THE MORTGAGE OR RENT ON TIME?: NO

## 2025-01-18 SDOH — ECONOMIC STABILITY: FOOD INSECURITY: WITHIN THE PAST 12 MONTHS, YOU WORRIED THAT YOUR FOOD WOULD RUN OUT BEFORE YOU GOT MONEY TO BUY MORE.: NEVER TRUE

## 2025-01-18 SDOH — ECONOMIC STABILITY: FOOD INSECURITY: WITHIN THE PAST 12 MONTHS, THE FOOD YOU BOUGHT JUST DIDN'T LAST AND YOU DIDN'T HAVE MONEY TO GET MORE.: NEVER TRUE

## 2025-01-18 SDOH — ECONOMIC STABILITY: TRANSPORTATION INSECURITY
IN THE PAST 12 MONTHS, HAS THE LACK OF TRANSPORTATION KEPT YOU FROM MEDICAL APPOINTMENTS OR FROM GETTING MEDICATIONS?: NO

## 2025-01-21 ENCOUNTER — OFFICE VISIT (OUTPATIENT)
Facility: CLINIC | Age: 65
End: 2025-01-21

## 2025-01-21 VITALS
WEIGHT: 198.7 LBS | DIASTOLIC BLOOD PRESSURE: 83 MMHG | BODY MASS INDEX: 33.11 KG/M2 | TEMPERATURE: 97.5 F | RESPIRATION RATE: 12 BRPM | HEIGHT: 65 IN | SYSTOLIC BLOOD PRESSURE: 123 MMHG | HEART RATE: 93 BPM | OXYGEN SATURATION: 96 %

## 2025-01-21 DIAGNOSIS — I89.0 LYMPHEDEMA: ICD-10-CM

## 2025-01-21 DIAGNOSIS — E78.01 HETEROZYGOUS FAMILIAL HYPERCHOLESTEROLEMIA: ICD-10-CM

## 2025-01-21 DIAGNOSIS — Z98.890 HISTORY OF MASTOPEXY: ICD-10-CM

## 2025-01-21 DIAGNOSIS — Z00.00 WELCOME TO MEDICARE PREVENTIVE VISIT: Primary | ICD-10-CM

## 2025-01-21 DIAGNOSIS — E78.5 DYSLIPIDEMIA: ICD-10-CM

## 2025-01-21 DIAGNOSIS — E11.9 TYPE 2 DIABETES MELLITUS WITHOUT COMPLICATION, WITHOUT LONG-TERM CURRENT USE OF INSULIN (HCC): ICD-10-CM

## 2025-01-21 DIAGNOSIS — J45.20 MILD INTERMITTENT ASTHMA, UNSPECIFIED WHETHER COMPLICATED: ICD-10-CM

## 2025-01-21 SDOH — ECONOMIC STABILITY: FOOD INSECURITY: WITHIN THE PAST 12 MONTHS, YOU WORRIED THAT YOUR FOOD WOULD RUN OUT BEFORE YOU GOT MONEY TO BUY MORE.: NEVER TRUE

## 2025-01-21 SDOH — ECONOMIC STABILITY: FOOD INSECURITY: WITHIN THE PAST 12 MONTHS, THE FOOD YOU BOUGHT JUST DIDN'T LAST AND YOU DIDN'T HAVE MONEY TO GET MORE.: NEVER TRUE

## 2025-01-21 ASSESSMENT — PATIENT HEALTH QUESTIONNAIRE - PHQ9
1. LITTLE INTEREST OR PLEASURE IN DOING THINGS: NOT AT ALL
SUM OF ALL RESPONSES TO PHQ QUESTIONS 1-9: 0
SUM OF ALL RESPONSES TO PHQ9 QUESTIONS 1 & 2: 0
2. FEELING DOWN, DEPRESSED OR HOPELESS: NOT AT ALL
SUM OF ALL RESPONSES TO PHQ QUESTIONS 1-9: 0

## 2025-01-21 ASSESSMENT — LIFESTYLE VARIABLES
HOW MANY STANDARD DRINKS CONTAINING ALCOHOL DO YOU HAVE ON A TYPICAL DAY: 1 OR 2
HOW OFTEN DO YOU HAVE A DRINK CONTAINING ALCOHOL: MONTHLY OR LESS

## 2025-01-21 NOTE — PROGRESS NOTES
SPORTS MEDICINE AND PRIMARY CARE  Remy Barney MD, FACP, CMD  [address] [address]  Phone:  [phone]  Fax: [phone]       Chief Complaint   Patient presents with    Medicare AWV   .      SUBJECTIVE:  History of Present Illness         Katarina Martinez is a 64 y.o. female     patient presents for evaluation of diabetes mellitus type 2, bronchial asthma, dyslipidemia, heterozygous milieu hypercholesterolemia, and lymphedema.    She has been managing her diabetes through dietary control since 1994. She was previously on metformin but discontinued its use due to concerns about potential side effects. She reports that her blood glucose levels have been well-controlled with this approach. However, she is currently unable to afford Mounjaro, which was previously effective in managing her diabetes. Her last hemoglobin A1c test, conducted while she was on Mounjaro, showed normal levels. She recalls that her initial hemoglobin A1c level was 7.1 when she first started seeing me. She has requested a preauthorization for her next visit due to a change in insurance providers from [employer] to [employer].    She is currently undergoing therapy at the [facility] and reports satisfactory progress. She expresses dissatisfaction with the results of her breast augmentation, describing the appearance of her breasts as resembling \"bumps on her chest.\" Despite this, she is not overly concerned as there is no presence of cancer.    She has been taking phentermine as an appetite suppressant and is seeking a prescription for it. She has also requested a letter confirming her diagnoses of diabetes, asthma, hypertension, and high cholesterol for insurance purposes.    Supplemental Information  She has an appointment next week for carpal tunnel syndrome.    MEDICATIONS  Current: phentermine, metformin, Vytorin, Zetia    Current Outpatient Medications   Medication Sig Dispense Refill    Bempedoic

## 2025-01-21 NOTE — PROGRESS NOTES
Chief Complaint   Patient presents with    Medicare AWV     \"Have you been to the ER, urgent care clinic since your last visit?  Hospitalized since your last visit?\"    NO    “Have you seen or consulted any other health care providers outside our system since your last visit?”    NO      “Have you had a diabetic eye exam?”    NO     No diabetic eye exam on file

## 2025-01-21 NOTE — PROGRESS NOTES
Medicare Annual Wellness Visit    Katarina Martinez is here for Medicare AWV    Assessment & Plan   Welcome to Medicare preventive visit     No follow-ups on file.     Subjective       Patient's complete Health Risk Assessment and screening values have been reviewed and are found in Flowsheets. The following problems were reviewed today and where indicated follow up appointments were made and/or referrals ordered.    Positive Risk Factor Screenings with Interventions:             General HRA Questions:  Select all that apply: (!) New or Increased Pain  Interventions - Pain:  See A/P for plan and any pertinent orders        Abnormal BMI (obese):  Body mass index is 33.07 kg/m². (!) Abnormal  Interventions:  See A/P for plan and any pertinent orders             Advanced Directives:  Do you have a Living Will?: (!) No    Intervention:  has NO advanced directive - information provided                     Objective   Vitals:    01/21/25 1331   BP: 123/83   Site: Left Upper Arm   Position: Sitting   Cuff Size: Medium Adult   Pulse: 93   Resp: 12   Temp: 97.5 °F (36.4 °C)   TempSrc: Oral   SpO2: 96%   Weight: 90.1 kg (198 lb 11.2 oz)   Height: 1.651 m (5' 5\")      Body mass index is 33.07 kg/m².                    Allergies   Allergen Reactions    Atorvastatin Other (See Comments)     Felt like it worsened memory.     Gadolinium Derivatives Nausea And Vomiting    Naltrexone-Bupropion Hcl Er Other (See Comments)     1 PILL MORNING, 2 PILLS EVENING  FELT ZOMBIED, \"OUT OF MY HEAD\"    Rosuvastatin Other (See Comments)     Felt like it worsened her memory     Prior to Visit Medications    Medication Sig Taking? Authorizing Provider   Bempedoic Acid-Ezetimibe 180-10 MG TABS Take 1 tablet by mouth daily Yes Remy Barney MD   Tirzepatide 2.5 MG/0.5ML SOAJ Inject 2.5 mg into the skin every 7 days Yes Remy Barney MD   Phendimetrazine Tartrate 35 MG TABS Take 35 mg by mouth daily. Max Daily Amount: 35 mg Yes

## 2025-01-22 LAB
ANION GAP SERPL CALC-SCNC: 8 MMOL/L (ref 2–12)
BUN SERPL-MCNC: 19 MG/DL (ref 6–20)
BUN/CREAT SERPL: 21 (ref 12–20)
CALCIUM SERPL-MCNC: 9.6 MG/DL (ref 8.5–10.1)
CHLORIDE SERPL-SCNC: 102 MMOL/L (ref 97–108)
CHOLEST SERPL-MCNC: 156 MG/DL
CO2 SERPL-SCNC: 28 MMOL/L (ref 21–32)
CREAT SERPL-MCNC: 0.91 MG/DL (ref 0.55–1.02)
EST. AVERAGE GLUCOSE BLD GHB EST-MCNC: 117 MG/DL
GLUCOSE SERPL-MCNC: 81 MG/DL (ref 65–100)
HBA1C MFR BLD: 5.7 % (ref 4–5.6)
HDLC SERPL-MCNC: 51 MG/DL
HDLC SERPL: 3.1 (ref 0–5)
LDLC SERPL CALC-MCNC: 72 MG/DL (ref 0–100)
POTASSIUM SERPL-SCNC: 3.5 MMOL/L (ref 3.5–5.1)
SODIUM SERPL-SCNC: 138 MMOL/L (ref 136–145)
TRIGL SERPL-MCNC: 165 MG/DL
VLDLC SERPL CALC-MCNC: 33 MG/DL

## 2025-01-27 ENCOUNTER — APPOINTMENT (OUTPATIENT)
Facility: HOSPITAL | Age: 65
End: 2025-01-27
Payer: MEDICARE

## 2025-01-28 ENCOUNTER — HOSPITAL ENCOUNTER (OUTPATIENT)
Facility: HOSPITAL | Age: 65
Setting detail: RECURRING SERIES
Discharge: HOME | End: 2025-01-31
Payer: MEDICARE

## 2025-01-28 PROCEDURE — 97140 MANUAL THERAPY 1/> REGIONS: CPT

## 2025-01-28 NOTE — PROGRESS NOTES
treatments.  Patient and/or caregiver will verbalize 3/3 signs and symptoms of infection without external cueing, in order to reduce the risk of infection and skin impairment and to promote optimal self-management of condition.  - MET  Patient to perform lymphedema remedial home exercise program in session with modified independence utilizing HEP handout, in order to promote optimal independence with management of condition, as well as promote optimal limb volume reduction required for proper fit of donned clothing.  - MET  Patient will be measured for comfortable and optimal fitting compression products to prevent reaccumulation of fluid at discharge which could impair patient's ability to perform safe mobility and dressing.   - MET     Long Term Goals: To be accomplished in 12 treatments.  Patient/caregiver will demonstrate improved edema management as evidenced by performing donning/doffing of garments modified independent 3/3 times within session to aid in reducing risk for infection and promote transition to maintenance phase of CDT.  - MET  Patient will demonstrate improved functional status evidenced by improved score on FOTO outcome measure from 58/100 to 67/100 as predicted by FOTO (Focus on Therapeutic Outcomes).  - in progress  Patient will complete a demonstration in clinic of a home vaso-pneumatic device, if appropriate, for purpose of home use to prevent re-accumulation of fluid for improved tolerance of mobility and ambulation and/or decrease the feeling of limb heaviness with bathing and dressing during the restorative phase of care.   - MET, but has not yet received  Patient will demonstrate a loss of volume to 3,000 ml in RUE and 2,900 in LUE to reduce the risk of infection and to reduce sensation of limb heaviness while completing ADLs. - in progress  Pt will reduce truncal girth at axilla by 2 cm from today 's measurement of 105.8 required for proper fit of donned clothing. - in

## 2025-02-07 RX ORDER — VALACYCLOVIR HYDROCHLORIDE 500 MG/1
500 TABLET, FILM COATED ORAL 3 TIMES DAILY
Qty: 30 TABLET | Refills: 1 | Status: SHIPPED | OUTPATIENT
Start: 2025-02-07

## 2025-02-17 RX ORDER — PANTOPRAZOLE SODIUM 40 MG/1
40 TABLET, DELAYED RELEASE ORAL DAILY
Qty: 90 TABLET | Refills: 3 | Status: SHIPPED | OUTPATIENT
Start: 2025-02-17

## 2025-02-20 RX ORDER — AZITHROMYCIN 250 MG/1
250 TABLET, FILM COATED ORAL SEE ADMIN INSTRUCTIONS
Qty: 6 TABLET | Refills: 1 | Status: SHIPPED | OUTPATIENT
Start: 2025-02-20 | End: 2025-02-25

## 2025-03-03 RX ORDER — PANTOPRAZOLE SODIUM 40 MG/1
40 TABLET, DELAYED RELEASE ORAL
Qty: 90 TABLET | Refills: 3 | Status: SHIPPED | OUTPATIENT
Start: 2025-03-03

## 2025-03-04 ENCOUNTER — HOSPITAL ENCOUNTER (OUTPATIENT)
Facility: HOSPITAL | Age: 65
Setting detail: RECURRING SERIES
Discharge: HOME | End: 2025-03-07
Payer: COMMERCIAL

## 2025-03-04 PROCEDURE — 97535 SELF CARE MNGMENT TRAINING: CPT

## 2025-03-04 NOTE — DISCHARGE SUMMARY
[facility]  a part of [facility]   [address], [address]  [place] [address]  Phone: [phone]  Fax: [phone]    DISCHARGE SUMMARY  Patient Name: Katarina Martinez : 1960   Treatment/Medical Diagnosis: Lymphedema [I89.0]   Referral Source: Martin Brown MD     Date of Initial Visit: 10/21/2024 Attended Visits: 10 Missed Visits: 1     SUMMARY OF TREATMENT  Pt returned for treatment today. Was able to review home program, goals, and update all objective measures in preparation for discharge and phase 2 of treatment (self management). Pt's FOTO score of 57/100 with 100 denoting highest level of function, compared to 58/100 on admission. Given this patient’s risk-adjustment variables, and the actual Intake FS score, FOTO predicted this patient will experience at least an increase in function of 9 points (to 67 or higher). In regard to volume measurements, pt's RUE measures 3,117.48 ml today compared to 3,051.54 ml on 2025 compared to 3,044.23 ml on 2024 compared to 3,118.03  ml on 10/21/2024. Her LUE measures 3,080.30 ml today compared to 3,098.03 ml on 2025 compared to 2,931.04 ml on 2024 compared to  2,936.12  ml on 10/21/2024. Percent difference today is 1.21% as compared to 6.2% on evaluation. Pt met 5 of 8 goals, but has not yet started use of vasopneumatic pump and does not wear her compression sleeve consistently. She wears truncal compression consistently but stated she could not recently due to pain. Was encouraged today to do so. Pt has demonstrated proficiency with MLD and HEP. She has tolerated treatments well and is ready for discharge and phase 2 of treatment (home management).       CURRENT STATUS    Progress toward Goals:  Short Term Goals: To be accomplished in 6 treatments.  Patient and/or caregiver will verbalize 3/3 signs and symptoms of infection without external cueing, in order to reduce the risk of infection and

## 2025-03-04 NOTE — PROGRESS NOTES
PHYSICAL THERAPY/OCCUPATIONAL THERAPY - MEDICARE DAILY TREATMENT NOTE (updated 3/23)      Date: 3/4/2025          Patient Name:  Katarina Martinez :  1960   Medical   Diagnosis:  Lymphedema [I89.0] Treatment Diagnosis:  I89.0     Lymphedema, not elsewhere classified    Referral Source:  Martin Brown MD Insurance:   [place] / Plan: [place] / Product Type: *No Product type* /                     Patient  verified yes     Visit #   Current  / Total 10 12   Time   In / Out 11:38 AM 12:20 PM   Total Treatment Time 42   Total Timed Codes 42   1:1 Treatment Time 42       BC Totals Reminder:  bill using total billable   min of TIMED therapeutic procedures and modalities.   8-22 min = 1 unit; 23-37 min = 2 units; 38-52 min = 3 units;  53-67 min = 4 units; 68-82 min = 5 units         SUBJECTIVE    Pain Level (0-10 scale): 0/10  Any medication changes, allergies to medications, adverse drug reactions, diagnosis change, or new procedure performed?: [x] No    [] Yes (see summary sheet for update)  Medications: Verified on Patient Summary List    Subjective functional status/changes:     \"My lifting has improved a little since started coming here.\"  \"My arms have gotten better and my chest is not as hard.\"    OBJECTIVE      Therapeutic Procedures:  Tx Min Billable or 1:1 Min (if diff from Tx Min) Procedure, Rationale, Specifics   42  75455 Self Care/Home Management (timed):  improve patient knowledge and understanding of home injury/symptom/pain management, positioning, home safety, and activity modification  to improve patient's ability to progress to PLOF and address remaining functional goals.  (see flow sheet as applicable)    Details if applicable:       Skin/wound care/debridement:   Reviewed skin care principles:   Low pH lotion  Application following MLD principles  Signs and symptoms of cellulitis  Prevention of cellulitis  How to care for skin injuries     Compression garment donning and

## 2025-03-06 RX ORDER — LEVOTHYROXINE SODIUM 25 UG/1
25 TABLET ORAL
Qty: 90 TABLET | Refills: 3 | Status: SHIPPED | OUTPATIENT
Start: 2025-03-06

## 2025-03-24 DIAGNOSIS — E11.9 TYPE 2 DIABETES MELLITUS WITHOUT COMPLICATION, WITHOUT LONG-TERM CURRENT USE OF INSULIN: Primary | ICD-10-CM

## 2025-03-24 RX ORDER — BENZONATATE 200 MG/1
200 CAPSULE ORAL 3 TIMES DAILY PRN
Qty: 30 CAPSULE | Refills: 0 | Status: SHIPPED | OUTPATIENT
Start: 2025-03-24 | End: 2025-04-03

## 2025-04-02 NOTE — PATIENT INSTRUCTIONS
Anesthesia Evaluation     Patient summary reviewed and Nursing notes reviewed                Airway   Mallampati: II  TM distance: >3 FB  Neck ROM: full  No difficulty expected  Dental - normal exam     Pulmonary - negative pulmonary ROS and normal exam   Cardiovascular - negative cardio ROS and normal exam        Neuro/Psych- negative ROS  GI/Hepatic/Renal/Endo - negative ROS     Musculoskeletal (-) negative ROS    Abdominal  - normal exam    Bowel sounds: normal.   Substance History - negative use     OB/GYN negative ob/gyn ROS         Other        ROS/Med Hx Other: HCT 27                Anesthesia Plan    ASA 2     general     intravenous induction     Anesthetic plan, risks, benefits, and alternatives have been provided, discussed and informed consent has been obtained with: patient.    Plan discussed with CRNA.    CODE STATUS:          Body Mass Index: Care Instructions  Your Care Instructions    Body mass index (BMI) can help you see if your weight is raising your risk for health problems. It uses a formula to compare how much you weigh with how tall you are. · A BMI lower than 18.5 is considered underweight. · A BMI between 18.5 and 24.9 is considered healthy. · A BMI between 25 and 29.9 is considered overweight. A BMI of 30 or higher is considered obese. If your BMI is in the normal range, it means that you have a lower risk for weight-related health problems. If your BMI is in the overweight or obese range, you may be at increased risk for weight-related health problems, such as high blood pressure, heart disease, stroke, arthritis or joint pain, and diabetes. If your BMI is in the underweight range, you may be at increased risk for health problems such as fatigue, lower protection (immunity) against illness, muscle loss, bone loss, hair loss, and hormone problems. BMI is just one measure of your risk for weight-related health problems. You may be at higher risk for health problems if you are not active, you eat an unhealthy diet, or you drink too much alcohol or use tobacco products. Follow-up care is a key part of your treatment and safety. Be sure to make and go to all appointments, and call your doctor if you are having problems. It's also a good idea to know your test results and keep a list of the medicines you take. How can you care for yourself at home? · Practice healthy eating habits. This includes eating plenty of fruits, vegetables, whole grains, lean protein, and low-fat dairy. · If your doctor recommends it, get more exercise. Walking is a good choice. Bit by bit, increase the amount you walk every day. Try for at least 30 minutes on most days of the week. · Do not smoke. Smoking can increase your risk for health problems. If you need help quitting, talk to your doctor about stop-smoking programs and medicines. These can increase your chances of quitting for good. · Limit alcohol to 2 drinks a day for men and 1 drink a day for women. Too much alcohol can cause health problems. If you have a BMI higher than 25  · Your doctor may do other tests to check your risk for weight-related health problems. This may include measuring the distance around your waist. A waist measurement of more than 40 inches in men or 35 inches in women can increase the risk of weight-related health problems. · Talk with your doctor about steps you can take to stay healthy or improve your health. You may need to make lifestyle changes to lose weight and stay healthy, such as changing your diet and getting regular exercise. If you have a BMI lower than 18.5  · Your doctor may do other tests to check your risk for health problems. · Talk with your doctor about steps you can take to stay healthy or improve your health. You may need to make lifestyle changes to gain or maintain weight and stay healthy, such as getting more healthy foods in your diet and doing exercises to build muscle. Where can you learn more? Go to http://corona-andreea.info/. Enter S176 in the search box to learn more about \"Body Mass Index: Care Instructions. \"  Current as of: October 13, 2016  Content Version: 11.4  © 5184-4828 [employer]. Care instructions adapted under license by [employer] (which disclaims liability or warranty for this information). If you have questions about a medical condition or this instruction, always ask your healthcare professional. [address] any warranty or liability for your use of this information.

## 2025-04-13 RX ORDER — HYDROCHLOROTHIAZIDE 25 MG/1
25 TABLET ORAL EVERY MORNING
Qty: 90 TABLET | Refills: 3 | Status: SHIPPED | OUTPATIENT
Start: 2025-04-13

## 2025-05-19 DIAGNOSIS — E66.811 OBESITY (BMI 30.0-34.9): Primary | ICD-10-CM

## 2025-05-29 ENCOUNTER — OFFICE VISIT (OUTPATIENT)
Facility: CLINIC | Age: 65
End: 2025-05-29
Payer: MEDICARE

## 2025-05-29 VITALS
BODY MASS INDEX: 32.96 KG/M2 | SYSTOLIC BLOOD PRESSURE: 127 MMHG | DIASTOLIC BLOOD PRESSURE: 81 MMHG | HEART RATE: 95 BPM | TEMPERATURE: 98.8 F | HEIGHT: 65 IN | WEIGHT: 197.8 LBS | OXYGEN SATURATION: 94 % | RESPIRATION RATE: 16 BRPM

## 2025-05-29 DIAGNOSIS — G47.33 OSA (OBSTRUCTIVE SLEEP APNEA): ICD-10-CM

## 2025-05-29 DIAGNOSIS — I10 PRIMARY HYPERTENSION: ICD-10-CM

## 2025-05-29 DIAGNOSIS — Z12.31 ENCOUNTER FOR SCREENING MAMMOGRAM FOR MALIGNANT NEOPLASM OF BREAST: ICD-10-CM

## 2025-05-29 DIAGNOSIS — E11.9 TYPE 2 DIABETES MELLITUS WITHOUT COMPLICATION, WITHOUT LONG-TERM CURRENT USE OF INSULIN (HCC): Primary | ICD-10-CM

## 2025-05-29 DIAGNOSIS — Z78.9 STATIN INTOLERANCE: ICD-10-CM

## 2025-05-29 DIAGNOSIS — K76.0 STEATOSIS OF LIVER: ICD-10-CM

## 2025-05-29 DIAGNOSIS — E78.5 DYSLIPIDEMIA: ICD-10-CM

## 2025-05-29 PROCEDURE — 1123F ACP DISCUSS/DSCN MKR DOCD: CPT | Performed by: INTERNAL MEDICINE

## 2025-05-29 PROCEDURE — 3044F HG A1C LEVEL LT 7.0%: CPT | Performed by: INTERNAL MEDICINE

## 2025-05-29 PROCEDURE — 3079F DIAST BP 80-89 MM HG: CPT | Performed by: INTERNAL MEDICINE

## 2025-05-29 PROCEDURE — 3074F SYST BP LT 130 MM HG: CPT | Performed by: INTERNAL MEDICINE

## 2025-05-29 PROCEDURE — 99214 OFFICE O/P EST MOD 30 MIN: CPT | Performed by: INTERNAL MEDICINE

## 2025-05-29 RX ORDER — CETIRIZINE HYDROCHLORIDE 10 MG/1
10 TABLET ORAL DAILY
Qty: 90 TABLET | Refills: 3 | Status: SHIPPED | OUTPATIENT
Start: 2025-05-29

## 2025-05-29 RX ORDER — DOXYCYCLINE HYCLATE 100 MG
200 TABLET ORAL DAILY
Qty: 4 TABLET | Refills: 0 | Status: SHIPPED | OUTPATIENT
Start: 2025-05-29

## 2025-05-29 SDOH — ECONOMIC STABILITY: FOOD INSECURITY: WITHIN THE PAST 12 MONTHS, YOU WORRIED THAT YOUR FOOD WOULD RUN OUT BEFORE YOU GOT MONEY TO BUY MORE.: NEVER TRUE

## 2025-05-29 SDOH — ECONOMIC STABILITY: FOOD INSECURITY: WITHIN THE PAST 12 MONTHS, THE FOOD YOU BOUGHT JUST DIDN'T LAST AND YOU DIDN'T HAVE MONEY TO GET MORE.: NEVER TRUE

## 2025-05-29 ASSESSMENT — PATIENT HEALTH QUESTIONNAIRE - PHQ9
SUM OF ALL RESPONSES TO PHQ QUESTIONS 1-9: 0
1. LITTLE INTEREST OR PLEASURE IN DOING THINGS: NOT AT ALL
2. FEELING DOWN, DEPRESSED OR HOPELESS: NOT AT ALL

## 2025-05-29 NOTE — PROGRESS NOTES
Chief Complaint   Patient presents with    Follow-up    Hypertension     Have you been to the ER, urgent care clinic since your last visit?  Hospitalized since your last visit?   NO    Have you seen or consulted any other health care providers outside our system since your last visit?   NO      Have you had a diabetic eye exam?   NO     No diabetic eye exam on file

## 2025-05-29 NOTE — PROGRESS NOTES
SPORTS MEDICINE AND PRIMARY CARE  Remy Barney MD, FACP, CMD  [address] [address]  Phone:  [phone]  Fax: [phone]       Chief Complaint   Patient presents with    Follow-up    Hypertension   .      SUBJECTIVE:       History of Present Illness  The patient is a 64-year-old black female seen for follow-up of her hypertension. Other medical problems include type 2 diabetes, dyslipidemia, obstructive sleep apnea, statin intolerance, steatosis of the liver, and primary hypertension.    Since her last visit, she was seen by Dr. Naveed Bang for a follow-up examination post-surgery for right carpal tunnel syndrome. Dr. Bang explained that she had severe compression of the right carpal tunnel with denervation. Improvement may be slow and could take a year or more. It was unlikely she would regain normal function, but the main reason for surgery was to prevent further damage. Follow-up will be as needed. She is also followed by the lymphedema clinic.    She reports no significant decrease in appetite. She has a strong desire for food but does not believe she is addicted. She often purchases food items, consumes a small portion, and then discards the rest after it spoils. She was previously on Wegovy, escalating to the maximum dosage, but had to discontinue it upon starting Medicare. She then transitioned to Mounjaro, which she reports as ineffective at the lowest dose.    She experienced a tick bite on 05/25/2025 while camping in an area populated by [place]. She applied tape to the site last night after showering.    She has been experiencing memory issues and has been engaging in memory games and puzzles to help manage this. Despite these efforts, she feels her memory is deteriorating.    She has been experiencing floaters in her vision and has had several consultations with Dr. Bush over the past year. Most of the floaters have been successfully removed, but she continues to experience

## 2025-05-30 LAB
ALBUMIN SERPL-MCNC: 4.2 G/DL (ref 3.5–5)
ALBUMIN/GLOB SERPL: 1.2 (ref 1.1–2.2)
ALP SERPL-CCNC: 74 U/L (ref 45–117)
ALT SERPL-CCNC: 44 U/L (ref 12–78)
ANION GAP SERPL CALC-SCNC: 6 MMOL/L (ref 2–12)
APPEARANCE UR: CLEAR
AST SERPL-CCNC: 36 U/L (ref 15–37)
BACTERIA URNS QL MICRO: NEGATIVE /HPF
BASOPHILS # BLD: 0.05 K/UL (ref 0–0.1)
BASOPHILS NFR BLD: 0.7 % (ref 0–1)
BILIRUB SERPL-MCNC: 0.4 MG/DL (ref 0.2–1)
BILIRUB UR QL: NEGATIVE
BUN SERPL-MCNC: 16 MG/DL (ref 6–20)
BUN/CREAT SERPL: 13 (ref 12–20)
CALCIUM SERPL-MCNC: 9.7 MG/DL (ref 8.5–10.1)
CHLORIDE SERPL-SCNC: 104 MMOL/L (ref 97–108)
CHOLEST SERPL-MCNC: 130 MG/DL
CO2 SERPL-SCNC: 30 MMOL/L (ref 21–32)
COLOR UR: ABNORMAL
CREAT SERPL-MCNC: 1.26 MG/DL (ref 0.55–1.02)
CREAT UR-MCNC: 181 MG/DL
DIFFERENTIAL METHOD BLD: ABNORMAL
EOSINOPHIL # BLD: 0.13 K/UL (ref 0–0.4)
EOSINOPHIL NFR BLD: 1.9 % (ref 0–7)
EPITH CASTS URNS QL MICRO: ABNORMAL /LPF
ERYTHROCYTE [DISTWIDTH] IN BLOOD BY AUTOMATED COUNT: 13.2 % (ref 11.5–14.5)
EST. AVERAGE GLUCOSE BLD GHB EST-MCNC: 123 MG/DL
GLOBULIN SER CALC-MCNC: 3.6 G/DL (ref 2–4)
GLUCOSE SERPL-MCNC: 95 MG/DL (ref 65–100)
GLUCOSE UR STRIP.AUTO-MCNC: NEGATIVE MG/DL
HBA1C MFR BLD: 5.9 % (ref 4–5.6)
HCT VFR BLD AUTO: 44.6 % (ref 35–47)
HDLC SERPL-MCNC: 55 MG/DL
HDLC SERPL: 2.4 (ref 0–5)
HGB BLD-MCNC: 15.1 G/DL (ref 11.5–16)
HGB UR QL STRIP: NEGATIVE
HYALINE CASTS URNS QL MICRO: ABNORMAL /LPF (ref 0–5)
IMM GRANULOCYTES # BLD AUTO: 0.01 K/UL (ref 0–0.04)
IMM GRANULOCYTES NFR BLD AUTO: 0.1 % (ref 0–0.5)
KETONES UR QL STRIP.AUTO: NEGATIVE MG/DL
LDLC SERPL CALC-MCNC: 47 MG/DL (ref 0–100)
LEUKOCYTE ESTERASE UR QL STRIP.AUTO: ABNORMAL
LYMPHOCYTES # BLD: 3.18 K/UL (ref 0.8–3.5)
LYMPHOCYTES NFR BLD: 47 % (ref 12–49)
MCH RBC QN AUTO: 27 PG (ref 26–34)
MCHC RBC AUTO-ENTMCNC: 33.9 G/DL (ref 30–36.5)
MCV RBC AUTO: 79.6 FL (ref 80–99)
MICROALBUMIN UR-MCNC: 0.73 MG/DL
MICROALBUMIN/CREAT UR-RTO: 4 MG/G (ref 0–30)
MONOCYTES # BLD: 0.44 K/UL (ref 0–1)
MONOCYTES NFR BLD: 6.5 % (ref 5–13)
NEUTS SEG # BLD: 2.95 K/UL (ref 1.8–8)
NEUTS SEG NFR BLD: 43.8 % (ref 32–75)
NITRITE UR QL STRIP.AUTO: NEGATIVE
NRBC # BLD: 0 K/UL (ref 0–0.01)
NRBC BLD-RTO: 0 PER 100 WBC
PH UR STRIP: 7 (ref 5–8)
PLATELET # BLD AUTO: 389 K/UL (ref 150–400)
PMV BLD AUTO: 10.6 FL (ref 8.9–12.9)
POTASSIUM SERPL-SCNC: 4.2 MMOL/L (ref 3.5–5.1)
PROT SERPL-MCNC: 7.8 G/DL (ref 6.4–8.2)
PROT UR STRIP-MCNC: NEGATIVE MG/DL
RBC # BLD AUTO: 5.6 M/UL (ref 3.8–5.2)
RBC #/AREA URNS HPF: ABNORMAL /HPF (ref 0–5)
SODIUM SERPL-SCNC: 140 MMOL/L (ref 136–145)
SP GR UR REFRACTOMETRY: 1.02 (ref 1–1.03)
TRIGL SERPL-MCNC: 140 MG/DL
TSH SERPL DL<=0.05 MIU/L-ACNC: 1.83 UIU/ML (ref 0.36–3.74)
UROBILINOGEN UR QL STRIP.AUTO: 0.2 EU/DL (ref 0.2–1)
VLDLC SERPL CALC-MCNC: 28 MG/DL
WBC # BLD AUTO: 6.8 K/UL (ref 3.6–11)
WBC URNS QL MICRO: ABNORMAL /HPF (ref 0–4)

## 2025-06-01 ENCOUNTER — RESULTS FOLLOW-UP (OUTPATIENT)
Facility: CLINIC | Age: 65
End: 2025-06-01

## 2025-06-01 DIAGNOSIS — N18.31 STAGE 3A CHRONIC KIDNEY DISEASE (HCC): Primary | ICD-10-CM

## 2025-06-01 RX ORDER — HYDROCHLOROTHIAZIDE 12.5 MG/1
12.5 CAPSULE ORAL EVERY MORNING
Qty: 90 CAPSULE | Refills: 1 | Status: SHIPPED | OUTPATIENT
Start: 2025-06-01

## 2025-06-03 ENCOUNTER — CLINICAL DOCUMENTATION (OUTPATIENT)
Facility: CLINIC | Age: 65
End: 2025-06-03

## 2025-06-03 NOTE — PROGRESS NOTES
(Key: [id number]) - [id number]  Nexlizet 180-10MG tablets  status: PA Response - DeniedCreated: May 24th, 2025 [id number]: May 31st, 2025

## 2025-06-04 ENCOUNTER — CLINICAL DOCUMENTATION (OUTPATIENT)
Facility: CLINIC | Age: 65
End: 2025-06-04

## 2025-06-04 NOTE — PROGRESS NOTES
(Key: [id number]) - [id number]  Nexlizet 180-10MG tablets  status: PA Response - DeniedCreated: May 24th, 2025 [id number]: May 31st, 2025

## 2025-06-16 ENCOUNTER — PATIENT MESSAGE (OUTPATIENT)
Facility: CLINIC | Age: 65
End: 2025-06-16

## 2025-07-14 ENCOUNTER — PATIENT MESSAGE (OUTPATIENT)
Facility: CLINIC | Age: 65
End: 2025-07-14

## 2025-07-14 DIAGNOSIS — E11.9 TYPE 2 DIABETES MELLITUS WITHOUT COMPLICATION, WITHOUT LONG-TERM CURRENT USE OF INSULIN (HCC): ICD-10-CM

## 2025-07-14 DIAGNOSIS — M47.892 OTHER OSTEOARTHRITIS OF SPINE, CERVICAL REGION: Primary | ICD-10-CM

## 2025-07-15 ENCOUNTER — OFFICE VISIT (OUTPATIENT)
Age: 65
End: 2025-07-15

## 2025-07-15 ENCOUNTER — HOSPITAL ENCOUNTER (EMERGENCY)
Facility: HOSPITAL | Age: 65
Discharge: HOME | End: 2025-07-15
Attending: EMERGENCY MEDICINE
Payer: MEDICARE

## 2025-07-15 ENCOUNTER — APPOINTMENT (OUTPATIENT)
Facility: HOSPITAL | Age: 65
End: 2025-07-15
Payer: MEDICARE

## 2025-07-15 VITALS
OXYGEN SATURATION: 99 % | HEIGHT: 65 IN | DIASTOLIC BLOOD PRESSURE: 78 MMHG | BODY MASS INDEX: 32.82 KG/M2 | RESPIRATION RATE: 16 BRPM | HEART RATE: 86 BPM | WEIGHT: 197 LBS | TEMPERATURE: 98.4 F | SYSTOLIC BLOOD PRESSURE: 155 MMHG

## 2025-07-15 VITALS
DIASTOLIC BLOOD PRESSURE: 77 MMHG | OXYGEN SATURATION: 98 % | RESPIRATION RATE: 18 BRPM | TEMPERATURE: 97.8 F | HEART RATE: 86 BPM | WEIGHT: 197 LBS | SYSTOLIC BLOOD PRESSURE: 128 MMHG | BODY MASS INDEX: 32.78 KG/M2

## 2025-07-15 DIAGNOSIS — K29.00 ACUTE GASTRITIS WITHOUT HEMORRHAGE, UNSPECIFIED GASTRITIS TYPE: Primary | ICD-10-CM

## 2025-07-15 DIAGNOSIS — R10.9 LEFT FLANK PAIN: Primary | ICD-10-CM

## 2025-07-15 DIAGNOSIS — R10.9 LEFT FLANK PAIN: ICD-10-CM

## 2025-07-15 LAB
ALBUMIN SERPL-MCNC: 4 G/DL (ref 3.5–5)
ALBUMIN/GLOB SERPL: 1 (ref 1.1–2.2)
ALP SERPL-CCNC: 73 U/L (ref 45–117)
ALT SERPL-CCNC: 37 U/L (ref 12–78)
ANION GAP SERPL CALC-SCNC: 8 MMOL/L (ref 2–12)
APPEARANCE UR: CLEAR
AST SERPL-CCNC: 31 U/L (ref 15–37)
BACTERIA URNS QL MICRO: NEGATIVE /HPF
BASOPHILS # BLD: 0.04 K/UL (ref 0–0.1)
BASOPHILS NFR BLD: 0.4 % (ref 0–1)
BILIRUB SERPL-MCNC: 0.4 MG/DL (ref 0.2–1)
BILIRUB UR QL: NEGATIVE
BILIRUBIN, URINE, POC: NEGATIVE
BLOOD URINE, POC: ABNORMAL
BUN SERPL-MCNC: 12 MG/DL (ref 6–20)
BUN/CREAT SERPL: 14 (ref 12–20)
CALCIUM SERPL-MCNC: 9.1 MG/DL (ref 8.5–10.1)
CHLORIDE SERPL-SCNC: 106 MMOL/L (ref 97–108)
CO2 SERPL-SCNC: 23 MMOL/L (ref 21–32)
COLOR UR: ABNORMAL
COMMENT:: NORMAL
CREAT SERPL-MCNC: 0.88 MG/DL (ref 0.55–1.02)
DIFFERENTIAL METHOD BLD: ABNORMAL
EKG ATRIAL RATE: 86 BPM
EKG DIAGNOSIS: NORMAL
EKG P AXIS: 75 DEGREES
EKG P-R INTERVAL: 154 MS
EKG Q-T INTERVAL: 378 MS
EKG QRS DURATION: 78 MS
EKG QTC CALCULATION (BAZETT): 452 MS
EKG R AXIS: 68 DEGREES
EKG T AXIS: 67 DEGREES
EKG VENTRICULAR RATE: 86 BPM
EOSINOPHIL # BLD: 0.07 K/UL (ref 0–0.4)
EOSINOPHIL NFR BLD: 0.8 % (ref 0–7)
EPITH CASTS URNS QL MICRO: ABNORMAL /LPF
ERYTHROCYTE [DISTWIDTH] IN BLOOD BY AUTOMATED COUNT: 13.3 % (ref 11.5–14.5)
GLOBULIN SER CALC-MCNC: 4 G/DL (ref 2–4)
GLUCOSE SERPL-MCNC: 90 MG/DL (ref 65–100)
GLUCOSE UR STRIP.AUTO-MCNC: NEGATIVE MG/DL
GLUCOSE URINE, POC: NEGATIVE
HCT VFR BLD AUTO: 43.2 % (ref 35–47)
HGB BLD-MCNC: 15 G/DL (ref 11.5–16)
HGB UR QL STRIP: NEGATIVE
HYALINE CASTS URNS QL MICRO: ABNORMAL /LPF (ref 0–5)
IMM GRANULOCYTES # BLD AUTO: 0.01 K/UL (ref 0–0.04)
IMM GRANULOCYTES NFR BLD AUTO: 0.1 % (ref 0–0.5)
KETONES UR QL STRIP.AUTO: NEGATIVE MG/DL
KETONES, URINE, POC: NEGATIVE
LEUKOCYTE ESTERASE UR QL STRIP.AUTO: ABNORMAL
LEUKOCYTE ESTERASE, URINE, POC: ABNORMAL
LIPASE SERPL-CCNC: 36 U/L (ref 13–75)
LYMPHOCYTES # BLD: 3.62 K/UL (ref 0.8–3.5)
LYMPHOCYTES NFR BLD: 39.5 % (ref 12–49)
MCH RBC QN AUTO: 27.2 PG (ref 26–34)
MCHC RBC AUTO-ENTMCNC: 34.7 G/DL (ref 30–36.5)
MCV RBC AUTO: 78.3 FL (ref 80–99)
MONOCYTES # BLD: 0.36 K/UL (ref 0–1)
MONOCYTES NFR BLD: 3.9 % (ref 5–13)
NEUTS SEG # BLD: 5.06 K/UL (ref 1.8–8)
NEUTS SEG NFR BLD: 55.3 % (ref 32–75)
NITRITE UR QL STRIP.AUTO: NEGATIVE
NITRITE, URINE, POC: NEGATIVE
NRBC # BLD: 0 K/UL (ref 0–0.01)
NRBC BLD-RTO: 0 PER 100 WBC
PH UR STRIP: 7 (ref 5–8)
PH, URINE, POC: 6.5 (ref 4.6–8)
PLATELET # BLD AUTO: 349 K/UL (ref 150–400)
PMV BLD AUTO: 9.8 FL (ref 8.9–12.9)
POTASSIUM SERPL-SCNC: 3.5 MMOL/L (ref 3.5–5.1)
PROT SERPL-MCNC: 8 G/DL (ref 6.4–8.2)
PROT UR STRIP-MCNC: NEGATIVE MG/DL
PROTEIN,URINE, POC: NEGATIVE
RBC # BLD AUTO: 5.52 M/UL (ref 3.8–5.2)
RBC #/AREA URNS HPF: ABNORMAL /HPF (ref 0–5)
SODIUM SERPL-SCNC: 137 MMOL/L (ref 136–145)
SP GR UR REFRACTOMETRY: 1.01 (ref 1–1.03)
SPECIFIC GRAVITY, URINE, POC: 1.01 (ref 1–1.03)
SPECIMEN HOLD: NORMAL
SPECIMEN HOLD: NORMAL
TROPONIN I SERPL HS-MCNC: 6 NG/L (ref 0–51)
URINALYSIS CLARITY, POC: CLEAR
URINALYSIS COLOR, POC: YELLOW
UROBILINOGEN UR QL STRIP.AUTO: 0.2 EU/DL (ref 0.2–1)
UROBILINOGEN, POC: ABNORMAL MG/DL
WBC # BLD AUTO: 9.2 K/UL (ref 3.6–11)
WBC URNS QL MICRO: ABNORMAL /HPF (ref 0–4)

## 2025-07-15 PROCEDURE — 2580000003 HC RX 258

## 2025-07-15 PROCEDURE — 74176 CT ABD & PELVIS W/O CONTRAST: CPT

## 2025-07-15 PROCEDURE — 84484 ASSAY OF TROPONIN QUANT: CPT

## 2025-07-15 PROCEDURE — 83690 ASSAY OF LIPASE: CPT

## 2025-07-15 PROCEDURE — 6370000000 HC RX 637 (ALT 250 FOR IP)

## 2025-07-15 PROCEDURE — 93005 ELECTROCARDIOGRAM TRACING: CPT

## 2025-07-15 PROCEDURE — 6370000000 HC RX 637 (ALT 250 FOR IP): Performed by: EMERGENCY MEDICINE

## 2025-07-15 PROCEDURE — 99284 EMERGENCY DEPT VISIT MOD MDM: CPT

## 2025-07-15 PROCEDURE — 80053 COMPREHEN METABOLIC PANEL: CPT

## 2025-07-15 PROCEDURE — 81001 URINALYSIS AUTO W/SCOPE: CPT

## 2025-07-15 PROCEDURE — 85025 COMPLETE CBC W/AUTO DIFF WBC: CPT

## 2025-07-15 RX ORDER — FAMOTIDINE 20 MG/1
20 TABLET, FILM COATED ORAL
Status: COMPLETED | OUTPATIENT
Start: 2025-07-15 | End: 2025-07-15

## 2025-07-15 RX ORDER — FAMOTIDINE 20 MG/1
20 TABLET, FILM COATED ORAL 2 TIMES DAILY PRN
Qty: 30 TABLET | Refills: 0 | Status: SHIPPED | OUTPATIENT
Start: 2025-07-15

## 2025-07-15 RX ORDER — BEMPEDOIC ACID AND EZETIMIBE 180; 10 MG/1; MG/1
180 TABLET, FILM COATED ORAL
COMMUNITY
Start: 2025-02-19

## 2025-07-15 RX ORDER — 0.9 % SODIUM CHLORIDE 0.9 %
1000 INTRAVENOUS SOLUTION INTRAVENOUS ONCE
Status: COMPLETED | OUTPATIENT
Start: 2025-07-15 | End: 2025-07-15

## 2025-07-15 RX ADMIN — SODIUM CHLORIDE 1000 ML: 0.9 INJECTION, SOLUTION INTRAVENOUS at 21:45

## 2025-07-15 RX ADMIN — FAMOTIDINE 20 MG: 20 TABLET, FILM COATED ORAL at 23:25

## 2025-07-15 RX ADMIN — LIDOCAINE HYDROCHLORIDE 40 ML: 20 SOLUTION ORAL at 21:41

## 2025-07-15 ASSESSMENT — PAIN - FUNCTIONAL ASSESSMENT
PAIN_FUNCTIONAL_ASSESSMENT: 0-10
PAIN_FUNCTIONAL_ASSESSMENT: 0-10

## 2025-07-15 ASSESSMENT — PAIN SCALES - GENERAL
PAINLEVEL_OUTOF10: 4
PAINLEVEL_OUTOF10: 6
PAINLEVEL_OUTOF10: 9

## 2025-07-15 ASSESSMENT — ENCOUNTER SYMPTOMS: ABDOMINAL PAIN: 1

## 2025-07-15 NOTE — PROGRESS NOTES
7/15/2025   Patient Status: New patient  Katarina Martinez (: 1960) is a 64 y.o. female, New patient, here for evaluation of the following chief complaint(s):  Abdominal Pain (Patient complains with L upper abdominal pain and middle back pain x 10 days./Possible kidney infection.)          Assessment & Plan  Left flank pain  Alert and oriented x 3  Pacing around room and holding onto left upper quadrant and in obvious discomfort    Patient has spent over an hour here without being able to urinate but then was finally able to produce a small amount.  Although it showed trace blood, shared decision making and I discussed how due to her severe pain and her generalized tenderness on exam, that I could not be confident that this was just a renal stone.  Especially in light of her lack of history of renal stones.  Her last GFR was in the 40s which is an acute change from what was previously.  Her vital signs are stable but we discussed that she needs additional workup for her abdominal pain.  The patient was reluctant but was in agreement with the plan.  She plans to go to the emergency department.  Declined EMS which I felt was reasonable in light of her vital signs being stable.    Orders:    AMB POC URINALYSIS DIP STICK MANUAL W/O MICRO      Handout given with care instructions  OTC for symptom management. Increase fluid intake, ensure adequate nutritional intake.  Follow up with PCP as needed.  Go to ED with development of any acute symptoms.     SUBJECTIVE/OBJECTIVE:  Patient presents with left flank pain.  Onset about 10 days ago but have progressively worsened.  Has history of type 2 diabetes that she reports is well-controlled.  Denies any history of renal stones or kidney problems.  Endorses urinary retention today and has gone about half the times that she normally does.  Was tolerating p.o. intake well but today it is her when she is drinking water and has generalized abdominal pain.  Denies fevers,

## 2025-07-16 NOTE — ED PROVIDER NOTES
[facility] EMERGENCY DEPARTMENT  EMERGENCY DEPARTMENT ENCOUNTER      Pt Name: Katarina Martinez  MRN: [id number]  Birthdate 1960  Date of evaluation: 7/15/2025  Provider: Todd Remy DO      HISTORY OF PRESENT ILLNESS      64-year-old female presents to the emergency department noting left flank and left-sided abdominal pain for the last 10 to 12 days.  She notes associated worsening of her back with eating and drinking.  She denies any fever, chills, nausea, vomiting, or trauma to the area although she did have some diarrhea today.  No radiation of the pain down her leg.  History of prior hysterectomy due to uterine fibroids but no other abdominal surgeries.              Nursing Notes were reviewed.    REVIEW OF SYSTEMS         Review of Systems        PAST MEDICAL HISTORY     Past Medical History:   Diagnosis Date    Asthma CHILDHOOD    Chest pain 07/11/2017    normal Stress Echo 07/19/17.    COVID-19 virus infection 07/06/2022    7/10/22    DJD (degenerative joint disease) of cervical spine 12/28/2023    xr    DM2 (diabetes mellitus, type 2) (HCC) 1994    Ductal carcinoma in situ (DCIS) of right breast 04/28/2022    Gastritis and duodenitis 11/22/2019    egd - md ricky    GERD (gastroesophageal reflux disease)     Heartburn 2017    Heterozygous familial hypercholesterolemia 07/11/2019    History of mastopexy 09/06/2024    Martin Brown MD    Hyperglycemia 04/16/2016    pre-diabetic    Hypertension     Hypothyroid 08/2018    IGT (impaired glucose tolerance) 04/14/2023    Lymphedema     Microscopic colitis 11/22/2019    Neck nodule 05/10/2018    Obesity (BMI 30-39.9) 2017    ashley Estrada.    MOHAMUD (obstructive sleep apnea)     Patient utilizes CPAP    Statin intolerance 03/14/2022    Statin-induced myositis     Steatosis of liver 06/19/2016    TMJ (temporomandibular joint syndrome)     Tubular adenoma of colon 02/2017    Dr. Enrico Lauren    Uterine fibroid 2002    Dr. Ela Marmolejo

## 2025-07-16 NOTE — ED NOTES
Patient is a 64-year-old female with extensive past medical history listed in chart presenting with worsening left-sided pain.  Says the pain has been going on for the last 10 to 12 days but has been more severe.  Went to urgent care prior to coming here and they recommended she come to the ED.  No urinary symptoms.  Endorses diarrhea that started today.  No recent antibiotic use.  No fevers, but does endorse chills.  No specific trigger or injury.  Pain worse with standing.  Patient appears uncomfortable in the triage richter.  Took Tylenol and antacids and Pepto with minimal relief.  Does also endorse a tick bite around Memorial Day that she was treated with antibiotic therapy.    8:51 PM  I have evaluated the patient as the Provider in Rapid Medical Evaluation (RME). I have reviewed her vital signs and the triage nurse assessment. I have talked with the patient and any available family and advised that I am the provider in triage and have ordered the appropriate study to initiate their work up based on the clinical presentation during my assessment. I have advised that the patient will be accommodated in the Main ED as soon as possible. I have also requested to contact the triage nurse or myself immediately if the patient experiences any changes in their condition during this brief waiting period.  MIC Beckett Kayla G, PA-C  07/15/25 2051

## 2025-07-16 NOTE — ED TRIAGE NOTES
Started 10-12 days ago left side pain that radiates into her left back that has worsen today. Denies any urinary problems, states she has had diarrhea today. Endorses chills no fever. Denies any trauma,

## 2025-07-17 RX ORDER — ESOMEPRAZOLE MAGNESIUM 40 MG/1
40 CAPSULE, DELAYED RELEASE ORAL
Qty: 90 CAPSULE | Refills: 1 | Status: SHIPPED | OUTPATIENT
Start: 2025-07-17

## 2025-07-17 RX ORDER — PANTOPRAZOLE SODIUM 40 MG/1
40 TABLET, DELAYED RELEASE ORAL
Qty: 90 TABLET | Refills: 3 | Status: SHIPPED | OUTPATIENT
Start: 2025-07-17

## 2025-08-07 ENCOUNTER — TRANSCRIBE ORDERS (OUTPATIENT)
Facility: HOSPITAL | Age: 65
End: 2025-08-07

## 2025-08-07 DIAGNOSIS — I89.0 LYMPHEDEMA: Primary | ICD-10-CM

## 2025-08-18 DIAGNOSIS — E11.9 TYPE 2 DIABETES MELLITUS WITHOUT COMPLICATION, WITHOUT LONG-TERM CURRENT USE OF INSULIN (HCC): ICD-10-CM

## 2025-08-18 RX ORDER — TIRZEPATIDE 15 MG/.5ML
INJECTION, SOLUTION SUBCUTANEOUS
Qty: 4 ML | Refills: 0 | Status: SHIPPED | OUTPATIENT
Start: 2025-08-18

## 2025-08-20 RX ORDER — BEMPEDOIC ACID AND EZETIMIBE 180; 10 MG/1; MG/1
TABLET, FILM COATED ORAL
Qty: 90 TABLET | Refills: 3 | Status: SHIPPED | OUTPATIENT
Start: 2025-08-20

## (undated) DEVICE — DRESSING,GAUZE,XEROFORM,CURAD,1"X8",ST: Brand: CURAD

## (undated) DEVICE — SET GRAV CK VLV NEEDLESS ST 3 GANGED 4WAY STPCOCK HI FLO 10

## (undated) DEVICE — PAD,ABDOMINAL,5"X9",ST,LF,25/BX: Brand: MEDLINE INDUSTRIES, INC.

## (undated) DEVICE — APPLIER LIG CLP M L11IN TI STR RNG HNDL FOR 20 CLP DISP

## (undated) DEVICE — RESERVOIR,SUCTION,100CC,SILICONE: Brand: MEDLINE

## (undated) DEVICE — ROCKER SWITCH PENCIL BLADE ELECTRODE, HOLSTER: Brand: EDGE

## (undated) DEVICE — TTL1LYR 16FR10ML 100%SIL TMPST TR: Brand: MEDLINE

## (undated) DEVICE — SUT SLK 2-0SH 30IN BLK --

## (undated) DEVICE — SYR 10ML LUER LOK 1/5ML GRAD --

## (undated) DEVICE — CONTAINER SPEC 20 ML LID NEUT BUFF FORMALIN 10 % POLYPR STS

## (undated) DEVICE — ASPIRATION TUBING SET, DISPOSABLE: Brand: MICROAIRE®

## (undated) DEVICE — STRIP,CLOSURE,WOUND,MEDI-STRIP,1/2X4: Brand: MEDLINE

## (undated) DEVICE — BASIN EMSIS 16OZ GRAPHITE PLAS KID SHP MOLD GRAD FOR ORAL

## (undated) DEVICE — INSULATED BLADE ELECTRODE: Brand: EDGE

## (undated) DEVICE — DECANTER BAG 9": Brand: MEDLINE INDUSTRIES, INC.

## (undated) DEVICE — SUTURE MONOCRYL SZ 3-0 L27IN ABSRB UD L19MM PS-2 3/8 CIR PRIM Y427H

## (undated) DEVICE — 3M™ TEGADERM™ TRANSPARENT FILM DRESSING FRAME STYLE, 1626W, 4 IN X 4-3/4 IN (10 CM X 12 CM), 50/CT 4CT/CASE: Brand: 3M™ TEGADERM™

## (undated) DEVICE — SUTURE PDS II SZ 2-0 L36IN ABSRB VLT CT L40MM 1/2 CIR TAPR Z357H

## (undated) DEVICE — IV START KIT: Brand: MEDLINE

## (undated) DEVICE — GARMENT,MEDLINE,DVT,INT,CALF,MED, GEN2: Brand: MEDLINE

## (undated) DEVICE — ADHESIVE SKIN CLOSURE WND 8.661X1.5 IN 22 CM LIQUIBAND SECUR

## (undated) DEVICE — SOLIDIFIER MEDC 1200ML -- CONVERT TO 356117

## (undated) DEVICE — SOLUTION IRRIG 1000ML STRL H2O USP PLAS POUR BTL

## (undated) DEVICE — SYRINGE MED 10ML LUERLOCK TIP W/O SFTY DISP

## (undated) DEVICE — TOWEL SURG W17XL27IN STD BLU COT NONFENESTRATED PREWASHED

## (undated) DEVICE — CORD,CAUTERY,BIPOLAR,STERILE: Brand: MEDLINE

## (undated) DEVICE — COVER US PRB W12XL244CM FLD IORT STR TIP

## (undated) DEVICE — SOLUTION SCRB 4OZ 10% PVP I POVIDONE IOD TOP PAINT EXIDINE

## (undated) DEVICE — SOL INJ SOD CL0.9% 10ML PREFIL -- SUB B-D306546Z 30/BX $6.00

## (undated) DEVICE — CLIP SKIN CLSR MIC TI SUPERFINE

## (undated) DEVICE — WIPE 400300 MEROCEL 20PK INSTRUMENT: Brand: MEROCEL®

## (undated) DEVICE — SOLUTION IRRIG 1000ML 0.9% SOD CHL USP POUR PLAS BTL

## (undated) DEVICE — CODMAN® SURGICAL PATTIES 3/4" X 3/4" (1.91CM X 1.91CM): Brand: CODMAN®

## (undated) DEVICE — Device

## (undated) DEVICE — MATERIAL BKGRND W25XL50MM 1MM GRID LN BLU SIL RADPQ MERCIAN

## (undated) DEVICE — BIPOLAR FORCEPS CORD: Brand: VALLEYLAB

## (undated) DEVICE — SUTURE ETHLN SZ 2-0 L30IN NONABSORBABLE BLK L36MM PSLX 3/8 1697H

## (undated) DEVICE — CLIP HEMSTAT TI CHEVRON SHP INTLOK ATRAUM TEETH MICROCLP

## (undated) DEVICE — CUFF BLD PRSS AD CLTH SGL TB W/ BAYNT CONN ROUNDED CORNER

## (undated) DEVICE — SYRINGE 10ML FLUSH ST PREFILLED W/SALINE

## (undated) DEVICE — TOWEL 4 PLY TISS 19X30 SUE WHT

## (undated) DEVICE — GLOVE SURG SZ 6 L12IN FNGR THK126MIL CRM LTX FREE

## (undated) DEVICE — ELECTRODE,RADIOTRANSLUCENT,FOAM,5PK: Brand: MEDLINE

## (undated) DEVICE — SUTURE STRATAFIX SZ 3-0 30CM NONABSORB UD 26MM FS 3/8 SXMP2B412

## (undated) DEVICE — CANISTER, RIGID, 3000CC: Brand: MEDLINE INDUSTRIES, INC.

## (undated) DEVICE — DRAPE FLD WRM W44XL66IN C6L FOR INTRATEMP SYS THERMABASIN

## (undated) DEVICE — REM POLYHESIVE ADULT PATIENT RETURN ELECTRODE: Brand: VALLEYLAB

## (undated) DEVICE — GLOVE SURG SZ 6 L12IN FNGR THK79MIL GRN LTX FREE

## (undated) DEVICE — BRA SURG LG 40-42IN WHT LYCRA FR HK AND LOOP CLSR WIDE ADJ

## (undated) DEVICE — SUTURE STRATAFIX SPRL MCRYL + SZ 3-0 L24IN ABSRB UD PS-2 SXMP1B108

## (undated) DEVICE — DERMABOND SKIN ADH 0.7ML -- DERMABOND ADVANCED 12/BX

## (undated) DEVICE — TRAY CATH FOLEY 16FR 5CC BALLN --

## (undated) DEVICE — MICROVASCULAR CLAMPS ARE USED FOR END-TO-END ANASTOMOTIC PROCEDURES FOR ARTERIES AND VEINS: Brand: GEM BIOVER MICROVASCULAR CLAMP

## (undated) DEVICE — PREP SKN CHLRAPRP APL 26ML STR --

## (undated) DEVICE — GLOVE SURG SZ 6 THK91MIL LTX FREE SYN POLYISOPRENE ANTI

## (undated) DEVICE — YANKAUER,TAPERED BULBOUS TIP,W/O VENT: Brand: MEDLINE

## (undated) DEVICE — APPLICATOR MEDICATED 26 CC SOLUTION HI LT ORNG CHLORAPREP

## (undated) DEVICE — RETRACTOR 50-101-1 RADIALUX US: Brand: RADIALUX™

## (undated) DEVICE — BLADE ES ELASTOMERIC COAT INSUL DURABLE BEND UPTO 90DEG

## (undated) DEVICE — SYR 3ML LL TIP 1/10ML GRAD --

## (undated) DEVICE — Z DISCONTINUED PER MEDLINE LINE GAS SAMPLING O2/CO2 LNG AD 13 FT NSL W/ TBNG FILTERLINE

## (undated) DEVICE — STERILE POLYISOPRENE POWDER-FREE SURGICAL GLOVES WITH EMOLLIENT COATING: Brand: PROTEXIS

## (undated) DEVICE — COVER US PRB W15XL120CM W/ GEL RUBBERBAND TAPE STRP FLD GEN

## (undated) DEVICE — HYPODERMIC SAFETY NEEDLE: Brand: MAGELLAN

## (undated) DEVICE — INTENDED FOR TISSUE SEPARATION, AND OTHER PROCEDURES THAT REQUIRE A SHARP SURGICAL BLADE TO PUNCTURE OR CUT.: Brand: BARD-PARKER ® CARBON RIB-BACK BLADES

## (undated) DEVICE — SUTURE ABS MF 2-0 CT1 27IN STRATAFIX PDS+ SXPP1B412

## (undated) DEVICE — SUTURE MCRYL SZ 3-0 L18IN ABSRB UD L19MM PS-2 3/8 CIR PRIM Y497G

## (undated) DEVICE — DRAIN SURG 19FR 0.25IN SIL RND W/ TRCR INDIC DOT RADPQ FULL

## (undated) DEVICE — NEEDLE HYPO 22GA L1.5IN BLK POLYPR HUB S STL REG BVL STR

## (undated) DEVICE — PLASTICS CHEST BREAST ASU: Brand: MEDLINE INDUSTRIES, INC.

## (undated) DEVICE — BLANKET WRM W35.4XL86.6IN FULL UNDERBODY + FORC AIR

## (undated) DEVICE — SUTURE ABSRB L30CM 2-0 VLT SPRL PDS + STRATAFIX SXPP1B410

## (undated) DEVICE — CATH IV AUTOGRD BC PNK 20GA 25 -- INSYTE

## (undated) DEVICE — HOOK RETRCT 12MM S STL BLNT E STAY LONE STAR

## (undated) DEVICE — BLOCK BITE ENDOSCP AD 21 MM W/ DIL BLU LF DISP

## (undated) DEVICE — SET ADMIN 16ML TBNG L100IN 2 Y INJ SITE IV PIGGY BK DISP

## (undated) DEVICE — TUBING SUCT L9FT FOR AUTOFUSE INFLTR SYS

## (undated) DEVICE — 4MM SINGLE USE CANNULA, 10MM PORT, 30CM LONG, MERCEDES: Brand: MICROAIRE®

## (undated) DEVICE — PACK,BASIC,SIRUS,V: Brand: MEDLINE

## (undated) DEVICE — LIQUIBAND RAPID ADHESIVE 36/CS 0.8ML: Brand: MEDLINE

## (undated) DEVICE — APPLIER CLP LIG SM 9IN DISP -- LIGACLIP

## (undated) DEVICE — SPONGE GZ W4XL4IN COT 12 PLY TYP VII WVN C FLD DSGN

## (undated) DEVICE — UNDERPAD INCON STD 36X23IN --

## (undated) DEVICE — FRAZIER SUCTION INSTRUMENT 7 FR W/CONTROL VENT & OBTURATOR: Brand: FRAZIER

## (undated) DEVICE — DRAIN SURG 19FR 100% SIL RADPQ RND CHN FULL FLUT

## (undated) DEVICE — PLASTICS -SFMC: Brand: MEDLINE INDUSTRIES, INC.

## (undated) DEVICE — SUTURE VCRL SZ 3-0 L18IN ABSRB UD L26MM SH 1/2 CIR J864D

## (undated) DEVICE — SUTURE MCRYL SZ 3-0 L27IN ABSRB UD L19MM PS-2 3/8 CIR PRIM Y427H

## (undated) DEVICE — STAPLER SKIN H3.9MM WIRE DIA0.58MM CRWN 6.9MM 35 STPL ROT

## (undated) DEVICE — FORCEPS BX L160CM DIA8MM GRSP DISECT CUP TIP NONLOCKING ROT

## (undated) DEVICE — DRAPE MICSCP W46XL120IN FOR ZEISS MD FEATURING CLEARLENS

## (undated) DEVICE — SUT VCRL 2-0 36IN CT1 UD --

## (undated) DEVICE — PAD,NON-ADHERENT,3X8,STERILE,LF,1/PK: Brand: MEDLINE

## (undated) DEVICE — PENCIL SMK EVAC L10FT DIA95MM TBNG NONSTICK W ADPT TO 22MM

## (undated) DEVICE — 2DSM19 3-0 UND MONODERM 40X40: Brand: 2DSM19 3-0 UND MONODERM 40X40

## (undated) DEVICE — NEEDLE HYPO 18GA L1.5IN PNK S STL HUB POLYPR SHLD REG BVL

## (undated) DEVICE — SPONGE LAP 18X18IN STRL -- 5/PK

## (undated) DEVICE — DRAIN SURG 15FR SIL RND CHN W/ TRCR FULL FLUT DBL WRP TRAD

## (undated) DEVICE — SUTURE ETHLN SZ 9-0 L5IN NONABSORBABLE BLK BV130-5 L19MM 2809G

## (undated) DEVICE — NEONATAL-ADULT SPO2 SENSOR: Brand: NELLCOR

## (undated) DEVICE — SYRINGE CATH TIP 50ML

## (undated) DEVICE — 4-PORT MANIFOLD: Brand: NEPTUNE 2

## (undated) DEVICE — 2DSM24 2-0 UND MONODERM 40X40: Brand: 2DSM24 2-0 UND MONODERM 40X40

## (undated) DEVICE — PACK,ORTOHMAX/CVMAX,UNIVERSAL,5/CS: Brand: MEDLINE

## (undated) DEVICE — CORD ELECSURG BPLR 12 FT DISP [810T818750] [ADLER INSTRUMENT CO]

## (undated) DEVICE — ENDOSCOPIC KIT COMPLIANCE ENDOKIT

## (undated) DEVICE — NEEDLE HYPO 25GA L1.5IN BLU POLYPR HUB S STL REG BVL STR

## (undated) DEVICE — SYSTEM FAT PROC ADV ADIPOSE REVOLVE 1 PER CA

## (undated) DEVICE — BAG SPEC BIOHZRD 10 X 10 IN --

## (undated) DEVICE — SPONGE GZ W4XL4IN COT RADPQ HIGHLY ABSRB

## (undated) DEVICE — FORCEPS BX L240CM JAW DIA2.8MM L CAP W/ NDL MIC MESH TOOTH

## (undated) DEVICE — SUT MCRYL 4-0 27IN PS2 UD --

## (undated) DEVICE — DRAPE,CHEST,FENES,15X10,STERIL: Brand: MEDLINE

## (undated) DEVICE — CURVED, SMALL JAW, OPEN SEALER/DIVIDER: Brand: LIGASURE

## (undated) DEVICE — AEGIS 1" DISK 4MM HOLE, PEEL OPEN: Brand: MEDLINE

## (undated) DEVICE — 1200 GUARD II KIT W/5MM TUBE W/O VAC TUBE: Brand: GUARDIAN

## (undated) DEVICE — SUTURE NONABSORBABLE MONOFILAMENT 2-0 FS 18 IN ETHILON 664H

## (undated) DEVICE — TUBING SUCT 10FR MAL ALUM SHFT FN CAP VENT UNIV CONN W/ OBT